# Patient Record
Sex: MALE | Race: WHITE | NOT HISPANIC OR LATINO | Employment: OTHER | URBAN - METROPOLITAN AREA
[De-identification: names, ages, dates, MRNs, and addresses within clinical notes are randomized per-mention and may not be internally consistent; named-entity substitution may affect disease eponyms.]

---

## 2022-02-14 DIAGNOSIS — Z89.519: ICD-10-CM

## 2022-02-14 DIAGNOSIS — Z89.512 S/P BKA (BELOW KNEE AMPUTATION), LEFT: Primary | ICD-10-CM

## 2022-02-18 ENCOUNTER — CLINICAL SUPPORT (OUTPATIENT)
Dept: REHABILITATION | Facility: HOSPITAL | Age: 82
End: 2022-02-18
Attending: INTERNAL MEDICINE
Payer: MEDICARE

## 2022-02-18 DIAGNOSIS — R68.89 DECREASED STRENGTH, ENDURANCE, AND MOBILITY: ICD-10-CM

## 2022-02-18 DIAGNOSIS — R53.1 DECREASED STRENGTH, ENDURANCE, AND MOBILITY: ICD-10-CM

## 2022-02-18 DIAGNOSIS — Z89.512 S/P BKA (BELOW KNEE AMPUTATION), LEFT: ICD-10-CM

## 2022-02-18 DIAGNOSIS — Z74.09 DECREASED STRENGTH, ENDURANCE, AND MOBILITY: ICD-10-CM

## 2022-02-18 DIAGNOSIS — M25.661 DECREASED RANGE OF MOTION OF BOTH LOWER EXTREMITIES: ICD-10-CM

## 2022-02-18 DIAGNOSIS — M25.662 DECREASED RANGE OF MOTION OF BOTH LOWER EXTREMITIES: ICD-10-CM

## 2022-02-18 PROBLEM — M25.669 DECREASED ROM OF LOWER EXTREMITY: Status: ACTIVE | Noted: 2022-02-18

## 2022-02-18 PROCEDURE — 97163 PT EVAL HIGH COMPLEX 45 MIN: CPT

## 2022-02-18 NOTE — PLAN OF CARE
[facility]   Physical Therapy Initial Evaluation     Date: 2/18/2022   Name: Hunter Schofield  [facility] Number: [id number]    Therapy Diagnosis:   Encounter Diagnosis   Name Primary?    S/P BKA (below knee amputation), left      Physician: Fallon Dudley*    Physician Orders: PT Eval and Treat   Medical Diagnosis from Referral: s/p Left BKA  Evaluation Date: 2/18/2022  Authorization Period Expiration: 4/1/22  Plan of Care Expiration: 4/15/22  Progress Note Due: 3/18/22  Visit # / Visits authorized: 1/1  FOTO: 1/3    Precautions: Standard, Diabetes, Fall and wound on R foot, Incontinence    Time In: 1010  Time Out: 1055  Total Appointment Time (timed & untimed codes): 45 minutes      SUBJECTIVE   Date of onset: 06/27/2021    HisMedical History:      Aortic atherosclerosis (HCC) (Primary Dx);   Anemia, unspecified type;   BPH with elevated PSA;   Stage 3a chronic kidney disease (HCC);   Diabetic ulcer of toe of right foot associated with type 2 diabetes mellitus, unspecified ulcer stage (HCC);   Essential hypertension;   History of osteomyelitis;   History of rectal cancer;   Hyperlipidemia, unspecified hyperlipidemia type;   Acquired hypothyroidism;   Peripheral vascular disease (HCC);   Phantom limb pain (HCC);   History of therapeutic radiation;   Type 2 diabetes mellitus with stage 3a chronic kidney disease, with long-term current use of insulin (HCC);   Thrombocytopenia (HCC);   S/P BKA (below knee amputation), left (HCC);   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 Mixed in 8 oz water    ascorbic acid, vitamin C, (vitamin C) 500 mg tablet Take 500 mg by mouth 2 (two) times  daily.    aspirin 81 MG chewable tablet Take 1 tablet by mouth daily. 30 tablet 11    atorvastatin (LIPITOR) 40 mg tablet TAKE 1 TABLET BY MOUTH EVERY DAY 30 tablet 0    blood sugar diagnostic (FreeStyle Precision Jass Strips) by Misc.(Non-Drug; Combo Route) route. Indications: used to test blood sugar 3-4 times daily.    blood-glucose meter (ACCU-CHEK TEODORO PLUS METER) USE TO TEST BLOOD SUGARS DAILY (Patient taking differently: USE TO TEST BLOOD SUGARS 3-4 times DAILY) 1 each 0    blood-glucose meter by Misc.(Non-Drug; Combo Route) route. Indications: use to test blood sugars daily. used to test blood sugars 3-4 times daily.    clopidogreL (PLAVIX) 75 mg tablet Take 1 tablet by mouth daily for 180 days. 30 tablet 5    clotrimazole-betamethasone (Lotrisone) cream Apply topically 2 (two) times daily. Apply to affected area 2 times daily 45 g 1    ferrous gluconate 324mg, 38 mg elemental iron, (FERGON) 324 mg (38 mg iron) tablet Take 1 tablet by mouth Take daily with breakfast. 90 tablet 3    FreeStyle Precision Jass Strips USE TO TEST BLOOD SUGAR 3 TIMES DAILY (Patient taking differently: USE TO TEST BLOOD SUGAR 3-4 TIMES DAILY) 200 strip 0    insulin lispro (HUMALOG, ADMELOG) 100 unit/mL injection Inject 0-15 Units into the skin 4 (four) times daily before meals and at bedtime. 10 mL 12    Lantus Solostar U-100 Insulin (LANTUS) 100 unit/mL (3 mL) injection Inject 10 Units into the skin at bedtime. ADMINISTER 30 UNITS UNDER THE SKIN EVERY NIGHT AT BEDTIME 15 mL 1    levothyroxine (SYNTHROID, LEVOTHROID) 112 mcg tablet TAKE 1 TABLET BY MOUTH DAILY 30 tablet 5    lidocain-me.salicyl-caps-menth 0.5-20-0.035-5 % Adhesive Patch, Medicated Apply topically.    lidocaine (XYLOCAINE) 5 % Ointment Apply topically 4 (four) times daily as needed. Indications: apply around anus before BM four times daily as needed.    multivitamin with folic acid (THERAGRAN) 400 mcg Tablet Take 1 tablet by mouth daily.    pen  "needle, diabetic (BD Ultra-Fine Mini Pen Needle) 31 gauge x 3/16" Needle Use as directed 100 each 0    protein supplement (ProMod Protein) Liquid Take 30 mLs by         Allergies:    Penicillins    History of current condition - Hunter reports: That he has not walked since May of 2021.  He had Left toe amputations then Left BKA in June of 2021.  He has had debridement on Right foot.  He had Inpatient Rehab, then was seeing home health therapy and home health [facility] care.  Now he goes to wound care 1x/week for wound on the plantar aspect of the right. He has not seen by therapy for the past several months.    Imaging: No significant imaging in chart    Prior Level of Function: Independent with activities of daily living, walking 2-5 miles/day, driving    Current Level of Function: Self-Employed, working from home, living home alone, able to dress, wheelchair does not fit through the doorway, using urinal and depends and cleans up in bed, dressing and donning prosthetic lower extremity on his own.    Falls:  None    Prior Therapy: inpatient rehab and home health    Family Present at Eval: none    Social History:  lives alone    Occupation: self employed    Exercise Routine/History: no routine    Home Environment: 1 story, no step to enter home - ramp    DME: hospital bed, manual wheelchair, drop arm bedside commode, walker  Presents to evaluation sitting in manual wheelchair     Orthotics: bilateral knee braces    Pain:Current 2/10, worst 2/10, best 0/10   Location: bilateral legs    Description: Aching  Aggravating Factors: Sitting, Extension, Flexing and Getting out of bed/chair  Easing Factors: relaxation and rest    Patients goals: To stand up, walk and drive       OBJECTIVE   STRENGTH:    L/E MMT Right   ROM Left ROM Pain/Dysfunction with Movement Goal   Hip Flexion  3+/5 Within Functional Limits  3+/5 Within Functional Limits     Hip Extension  2+/5 Limited but unable to accurately measure due to time constraints " 2+/5 Limited but unable to accurately measure due to time constraints     Hip Abduction  2+/5 Limited but unable to accurately measure due to time constraints 2+/5 Limited but unable to accurately measure due to time constraints     Knee Extension 3-/5 -40 degrees 3-/5 -35 degrees *potential contractures - 10 bilaterally   Knee Flexion 4-/5 Within Functional Limits  4-/5 Within Functional Limits     Ankle DF 3+/5 To Neutral, limited by bandage not applicable  not applicable      Ankle PF 3+/5 To Neutral, limited by bandage not applicable  not applicable        Mobility:  Patient is independent with wheelchair propulsion    Transfers:  Attempted 2 stands in parallel bars with moderate assistance x2 but he was unable to achieve upright stand posture due to tightness in bilateral knees and hips.    Posture:  Patient presents with: slouched posture, forward head, posterior pelvic tilt, flexed posturing of bilateral lower extremities     Sensation:  Type Response Comments   Light Touch I Reports touch feeling hyposensitive   Localization N    Proprioception I    N = Normal, I = Impaired, A = Absent    Coordination:  Type Response Left  Response Right  Comments   Alternating Toe Tap      Heel to Shin      Finger to Nose          *bilateral lower extremity coordination deficits noted with all functional mobility             Limitation/Restriction for FOTO Amputation Survey    Therapist reviewed FOTO scores for Hunter Schofield on 2/18/2022.   FOTO documents entered into [employer] - see Media section.    Limitation Score: 59%         TREATMENT     Total Treatment time (time-based codes) separate from Evaluation: 0 minutes      Hunter received the treatments listed below:  none      PATIENT EDUCATION AND HOME EXERCISES     Education provided:   Patient educated on staying active within his home.  Also, he was educated on performing Terminal Knee Extension exercise in supine or with lower extremities elevated in sitting.  He was  also asked to attempt to transition to Sidelying in bed to provide feedback on positions that he can attempt to get in for next session to stretch lower extremities     Written Home Exercises Provided: HEP discussed. Exercises were reviewed and Hunter was able to demonstrate them prior to the end of the session.  Hunter demonstrated good  understanding of the education provided. See EMR under Patient Instructions for exercises provided during therapy sessions.    ASSESSMENT     Hunter is a 81 y.o. male referred to outpatient Physical Therapy with a medical diagnosis of s/p Left BKA. Patient presents with limited range of motion in bilateral knees and hips, weakness of lower extremities, decreased coordination which decrease his ability to efficiently perform transitional movements. He does not have family or friend support at home and may benefit from home modifications to improve his independence within the home.     Patient prognosis is Fair.   Patientt will benefit from skilled outpatient Physical Therapy to address the deficits stated above and in the chart below, provide patient /family education, and to maximize patientt's level of independence.     Plan of care discussed with patient: Yes  Patient's spiritual, cultural and educational needs considered and patient is agreeable to the plan of care and goals as stated below:     Anticipated Barriers for therapy: co-morbidities, transportation assistance needed at times, lifestyle, potential contractures of knees/hips    Medical Necessity is demonstrated by the following  History  Co-morbidities and personal factors that may impact the plan of care Co-morbidities:   CAD, CKD stage 3, diabetes, history of cancer, HTN, poor medication/medical compliance, transportation assistance required and s/p L BKA    Personal Factors:   age  lifestyle     high   Examination  Body Structures and Functions, activity limitations and participation restrictions that may impact the  plan of care Body Regions:   lower extremities  trunk    Body Systems:    gross symmetry  ROM  strength  gross coordinated movement  balance  gait  transfers  transitions  motor control  motor learning  skin integrity    Participation Restrictions:   Pain, range of motion limitations    Activity limitations:   Learning and applying knowledge  solving problems    General Tasks and Commands  undertaking multiple tasks    Communication  no deficits    Mobility  lifting and carrying objects  walking  moving around using equipment (WC)  using transportation (bus, train, plane, car)  driving (bike, car, motorcycle)    Self care  washing oneself (bathing, drying, washing hands)  caring for body parts (brushing teeth, shaving, grooming)  toileting  dressing  looking after one's health    Domestic Life  shopping  cooking  doing house work (cleaning house, washing dishes, laundry)  assisting others    Interactions/Relationships  formal relationships  intimate relationships    Life Areas  employment    Community and Social Life  community life  recreation and leisure  Latter day and spirituality         high   Clinical Presentation evolving clinical presentation with changing clinical characteristics moderate   Decision Making/ Complexity Score: high     GOALS:    Short Term Goals:  4 weeks Progress    1. Function: Patient will demonstrate improved function as indicated by a functional limitation score of less than or equal to 55 out of 100 on FOTO = 44% limitation PC   2. Mobility: Patient will improve sit to stand transfer with moderate assistance in order to progress towards independence with functional activities.  PC   3. Gait: Patient will ambulated 3 steps in parallel bars with Maximum assistance to demonstrate improved strength, posture and endurance PC   4. Balance: Complete FIST test upon next visit. PC   5. HEP: Patient will demonstrate independence with HEP in order to progress toward functional independence.     6. Assess patient's needs for Dynasplints and set up consultation if needed.      Long Term Goals:  8 weeks Progress   1. Function: Patient will demonstrate improved function as indicated by a functional limitation score of less than or equal to 60 out of 100 on FOTO = 40 % limitation PC   2. Mobility: Patient will improve AROM of bilateral knees to -10 degrees  in order to return to maximal functional potential and improve quality of life. PC   3. Gait: Patient will ambulate 15 feet with rolling walker with moderate assistance to demonstrate improved strength, endurance and mobility. PC   4. Balance: Modify FIST goal once completed. PC   5. HEP: Patient educated on HEP in preparation for d/c verbalizing and demonstrating good understanding of topics discussed.      6.       Goals Key:  PC= progressing/continue; PM= partially met;        DC= discontinue      PLAN   Plan of care Certification: 2/18/2022 to 4/15/2022    Outpatient Physical Therapy 2 times weekly for 8 weeks to include the following interventions: Electrical Stimulation Attended, Gait Training, Moist Heat/ Ice, Neuromuscular Re-ed, Orthotic Management and Training, Patient Education, Self Care, Therapeutic Activities, Therapeutic Exercise and Prosthetic Management.     Mindy Patino, PT, DPT    I CERTIFY THE NEED FOR THESE SERVICES FURNISHED UNDER THIS PLAN OF TREATMENT AND WHILE UNDER MY CARE   Physician's comments:     Physician's Signature: ___________________________________________________

## 2022-02-22 ENCOUNTER — CLINICAL SUPPORT (OUTPATIENT)
Dept: REHABILITATION | Facility: HOSPITAL | Age: 82
End: 2022-02-22
Attending: INTERNAL MEDICINE
Payer: MEDICARE

## 2022-02-22 DIAGNOSIS — R68.89 DECREASED STRENGTH, ENDURANCE, AND MOBILITY: ICD-10-CM

## 2022-02-22 DIAGNOSIS — M25.661 DECREASED RANGE OF MOTION OF BOTH LOWER EXTREMITIES: Primary | ICD-10-CM

## 2022-02-22 DIAGNOSIS — M25.662 DECREASED RANGE OF MOTION OF BOTH LOWER EXTREMITIES: Primary | ICD-10-CM

## 2022-02-22 DIAGNOSIS — R53.1 DECREASED STRENGTH, ENDURANCE, AND MOBILITY: ICD-10-CM

## 2022-02-22 DIAGNOSIS — Z74.09 DECREASED STRENGTH, ENDURANCE, AND MOBILITY: ICD-10-CM

## 2022-02-22 PROCEDURE — 97530 THERAPEUTIC ACTIVITIES: CPT

## 2022-02-22 PROCEDURE — 97110 THERAPEUTIC EXERCISES: CPT

## 2022-02-22 NOTE — PROGRESS NOTES
[facility]   Physical Therapy Treatment Note     Name: Hunter Schofield  Clinic Number: [id number]    Therapy Diagnosis: Left BKA, decreased range of motion of bilateral hips and knees  Physician: Fallon Dudley*    Visit Date: 2/22/2022     Physician Orders: PT Eval and Treat   Medical Diagnosis from Referral: s/p Left BKA  Evaluation Date: 2/18/2022  Authorization Period Expiration: 4/1/22  Plan of Care Expiration: 4/15/22  Progress Note Due: 3/18/22  Visit # / Visits authorized: 1/20 (+1 eval)  FOTO: 1/3     Precautions: Standard, Diabetes, Fall and wound on R foot, Incontinence    PTA Visit #: 0/5     Time In: 1007  Time Out: 1100  Total Billable Time: 53 minutes    SUBJECTIVE     Pt reports: He reports wearing knee braces to sleep in at night to assit with stretching knees  He was compliant with instructions given at evaluation.  Response to previous treatment: good with no adverse effects  Functional change: no significant change since eval    Pain: 6/10 non-verbal with stretching  Location: bilateral knees and hips      OBJECTIVE     Objective Measures updated at progress report unless specified.   In supine:  Left Hip Extension = - 20  Right Hip Extension = - 25  Left Knee Extension = - 48  Right Knee Extension = - 45    Treatment     Hunter received the treatments listed below:      THERAPEUTIC EXERCISES to develop strength, endurance, ROM, flexibility, posture and core stabilization for (43) minutes including:    Intervention Performed Today    short arc quad in supine x lower extremities over wedge 4x10, passive to stretch to endrange by PT for 2x10 and then PT performing superior patella glides for 2x10   sidelying toward prone  x Stretch to Hip flexor 3x30 seconds each side   Quad set  x 2x10 bilateral    Sidelying hip extension  x 2x10 bilaterally with 10 seconds endrange stretch on la   [place] Left Prosthetic leg x Performed at edge of mat    Sitting in  reclined position at edge of mat  x Hip flexor stretch 3x30 seconds bilateral           Plan for Next Visit:      manual therapy techniques: Joint mobilizations, Manual traction and Soft tissue Mobilization were applied to the:0 minutes, including:    Intervention Performed Today                                                neuromuscular re-education activities to improve: Balance, Coordination, Sense, Proprioception and Posture for 0 minutes. The following activities were included:    Intervention Performed Today                                                THERAPEUTIC ACTIVITIES to improve dynamic and functional  performance for (10) minutes including:    Intervention Performed Today    Bed - wheelchair transfer  x Practiced set up and sequence   Bed mobility sit to supine to sit x Practiced sequence    Rolling  x 3 x each side working on sequence to incorporate hip flexor stretch on each side. Then separate stretch performed as listed above                         Plan for Next Visit:        Patient Education and Home Exercises     Home Exercises Provided and Patient Education Provided     Education provided: Patient educated on increasing length and consistency of stretching knees and hips to every 2 hours at home.  He was shown and demonstrated understanding of short arc quads, rolling toward prone and sitting in reclined position to stretch hip flexors.    Home Exercises Provided:  Exercises were reviewed and Hunter was able to demonstrate them prior to the end of the session.  Hunter demonstrated good  understanding of the education provided. See EMR under Patient Instructions for exercises provided during therapy sessions    ASSESSMENT     Pt responded well to treatment tasks.  He is significantly limited by hip flexor and hamstring tightness bilaterally.  He was able to perform transfers and bed mobility without assistance.  He was endured all exercises and stretches, but is limited by pain at endrange.     Supine position directly effected knee extension measurements today due to hip flexor tightness.  Hunter Is progressing well towards his goals.     Pt prognosis is Fair.     Pt will continue to benefit from skilled outpatient physical therapy to address the deficits listed in the problem list box on initial evaluation, provide pt/family education and to maximize pt's level of independence in the home and community environment.     Pt's spiritual, cultural and educational needs considered and pt agreeable to plan of care and goals.     Anticipated Barriers for therapy: co-morbidities, transportation assistance needed at times, lifestyle, potential contractures of knees/hips    GOALS:     Short Term Goals:  4 weeks Progress    1. Function: Patient will demonstrate improved function as indicated by a functional limitation score of less than or equal to 55 out of 100 on FOTO = 44% limitation PC   2. Mobility: Patient will improve sit to stand transfer with moderate assistance in order to progress towards independence with functional activities.  PC   3. Gait: Patient will ambulated 3 steps in parallel bars with Maximum assistance to demonstrate improved strength, posture and endurance PC   4. Balance: Complete FIST test upon next visit. PC   5. HEP: Patient will demonstrate independence with HEP in order to progress toward functional independence.     6. Assess patient's needs for Dynasplints and set up consultation if needed.        Long Term Goals:  8 weeks Progress   1. Function: Patient will demonstrate improved function as indicated by a functional limitation score of less than or equal to 60 out of 100 on FOTO = 40 % limitation PC   2. Mobility: Patient will improve AROM of bilateral knees to -10 degrees  in order to return to maximal functional potential and improve quality of life. PC   3. Gait: Patient will ambulate 15 feet with rolling walker with moderate assistance to demonstrate improved strength,  endurance and mobility. PC   4. Balance: Modify FIST goal once completed. PC   5. HEP: Patient educated on HEP in preparation for d/c verbalizing and demonstrating good understanding of topics discussed.       6.          Goals Key:  PC= progressing/continue;        PM= partially met;             DC= discontinue         PLAN     Continue Plan of Care (POC) and progress per patient tolerance. See Treatment section for exercise progression.  Outpatient Physical Therapy 2 times weekly for 8 weeks to include the following interventions: Electrical Stimulation Attended, Gait Training, Moist Heat/ Ice, Neuromuscular Re-ed, Orthotic Management and Training, Patient Education, Self Care, Therapeutic Activities, Therapeutic Exercise and Prosthetic Management.      Mindy Patino, PT, DPT

## 2022-02-24 ENCOUNTER — CLINICAL SUPPORT (OUTPATIENT)
Dept: REHABILITATION | Facility: HOSPITAL | Age: 82
End: 2022-02-24
Attending: INTERNAL MEDICINE
Payer: MEDICARE

## 2022-02-24 DIAGNOSIS — M25.662 DECREASED RANGE OF MOTION OF BOTH LOWER EXTREMITIES: Primary | ICD-10-CM

## 2022-02-24 DIAGNOSIS — Z74.09 DECREASED STRENGTH, ENDURANCE, AND MOBILITY: ICD-10-CM

## 2022-02-24 DIAGNOSIS — R68.89 DECREASED STRENGTH, ENDURANCE, AND MOBILITY: ICD-10-CM

## 2022-02-24 DIAGNOSIS — R53.1 DECREASED STRENGTH, ENDURANCE, AND MOBILITY: ICD-10-CM

## 2022-02-24 DIAGNOSIS — M25.661 DECREASED RANGE OF MOTION OF BOTH LOWER EXTREMITIES: Primary | ICD-10-CM

## 2022-02-24 PROCEDURE — 97110 THERAPEUTIC EXERCISES: CPT

## 2022-02-24 NOTE — PROGRESS NOTES
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  Physician: Fallon Dudley*    Visit Date: 2/24/2022     Physician Orders: PT Eval and Treat   Medical Diagnosis from Referral: s/p Left BKA  Evaluation Date: 2/18/2022  Authorization Period Expiration: 4/1/22  Plan of Care Expiration: 4/15/22  Progress Note Due: 3/18/22  Visit # / Visits authorized: 1/20 (+1 eval)  FOTO: 1/3     Precautions: Standard, Diabetes, Fall and wound on R foot, Incontinence    PTA Visit #: 0/5     Time In: 1019  Time Out: 1100  Total Billable Time: 41 minutes    SUBJECTIVE     Pt reports: He reports wearing knee braces to sleep in at night to assit with stretching knees.  He presented with bilateral knee immobilizer braces donned today.  He was compliant with instructions given at last session.  Response to previous treatment: good with no adverse effects  Functional change: no significant change since last session    Pain: 6/10 non-verbal with stretching  Location: bilateral knees and hips      OBJECTIVE     Objective Measures updated at progress report unless specified.   In supine:  Measures taken 2/22/22  Left Hip Extension = - 20  Right Hip Extension = - 25  Left Knee Extension = - 48  Right Knee Extension = - 45     Treatment     Hunter received the treatments listed below:      THERAPEUTIC EXERCISES to develop strength, endurance, ROM, flexibility, posture and core stabilization for (41) minutes including:    Intervention Performed Today    short arc quad in supine x lower extremities over wedge 2x10 bilateral    sidelying toward prone  x Stretch to Hip flexor 3x30 seconds each side   Quad set  x 2x10 Left     Sidelying hip extension  x 2x10 bilaterally with 10 seconds endrange stretch on last rep   [place] Left Prosthetic leg x Performed at edge of mat     Sitting in reclined position at edge of mat   Hip flexor stretch 3x30 seconds bilateral    Hip Abduction x In sidelying 2x10 bilateral , In supine 2x10 bilateral    Bridge attempt x 1x5   Posterior Pelvic Tilt Supine x 2x5               Plan for Next Visit:      manual therapy techniques: Joint mobilizations, Manual traction and Soft tissue Mobilization were applied to the:0 minutes, including:    Intervention Performed Today                                                neuromuscular re-education activities to improve: Balance, Coordination, Sense, Proprioception and Posture for 0 minutes. The following activities were included:    Intervention Performed Today                                                THERAPEUTIC ACTIVITIES to improve dynamic and functional  performance for () minutes including:    Intervention Performed Today    Bed - wheelchair transfer   Practiced set up and sequence   Bed mobility sit to supine to sit  Practiced sequence    Rolling   3 x each side working on sequence to incorporate hip flexor stretch on each side. Then separate stretch performed as listed above                         Plan for Next Visit:        Patient Education and Home Exercises     Home Exercises Provided and Patient Education Provided     Education provided:   -2/22/22 Patient educated on increasing length and consistency of stretching knees and hips to every 2 hours at home.  He was shown and demonstrated understanding of short arc quads, rolling toward prone and sitting in reclined position to stretch hip flexors.  -2/24/22 Patient given Home Exercise Program on exercises for lower extremities to be performed 2x15 3x's/day  See patient Instructions in EMR  Home Exercises Provided: 2/24/22 Exercises were reviewed and Hunter was able to demonstrate them prior to the end of the session.  Hunter demonstrated good  understanding of the education provided.   ASSESSMENT     Pt responded well to treatment tasks.  He is  significantly limited by hip flexor and hamstring tightness bilaterally.    He was endured all exercises and stretches, but is limited by pain at endrange.  He reported good understanding on the importance of compliance with exercises and stretches to make functional gains with therapy.  Hunter Is progressing well towards his goals.     Pt prognosis is Fair.     Pt will continue to benefit from skilled outpatient physical therapy to address the deficits listed in the problem list box on initial evaluation, provide pt/family education and to maximize pt's level of independence in the home and community environment.     Pt's spiritual, cultural and educational needs considered and pt agreeable to plan of care and goals.     Anticipated Barriers for therapy: co-morbidities, transportation assistance needed at times, lifestyle, potential contractures of knees/hips    GOALS:     Short Term Goals:  4 weeks Progress    1. Function: Patient will demonstrate improved function as indicated by a functional limitation score of less than or equal to 55 out of 100 on FOTO = 44% limitation PC   2. Mobility: Patient will improve sit to stand transfer with moderate assistance in order to progress towards independence with functional activities.  PC   3. Gait: Patient will ambulated 3 steps in parallel bars with Maximum assistance to demonstrate improved strength, posture and endurance PC   4. Balance: Complete FIST test upon next visit. PC   5. HEP: Patient will demonstrate independence with HEP in order to progress toward functional independence.     6. Assess patient's needs for Dynasplints and set up consultation if needed.        Long Term Goals:  8 weeks Progress   1. Function: Patient will demonstrate improved function as indicated by a functional limitation score of less than or equal to 60 out of 100 on FOTO = 40 % limitation PC   2. Mobility: Patient will improve AROM of bilateral knees to -10 degrees  in order to return to  maximal functional potential and improve quality of life. PC   3. Gait: Patient will ambulate 15 feet with rolling walker with moderate assistance to demonstrate improved strength, endurance and mobility. PC   4. Balance: Modify FIST goal once completed. PC   5. HEP: Patient educated on HEP in preparation for d/c verbalizing and demonstrating good understanding of topics discussed.       6.          Goals Key:  PC= progressing/continue;        PM= partially met;             DC= discontinue         PLAN     Continue Plan of Care (POC) and progress per patient tolerance. See Treatment section for exercise progression.  Outpatient Physical Therapy 2 times weekly for 8 weeks to include the following interventions: Electrical Stimulation Attended, Gait Training, Moist Heat/ Ice, Neuromuscular Re-ed, Orthotic Management and Training, Patient Education, Self Care, Therapeutic Activities, Therapeutic Exercise and Prosthetic Management.      Mindy Patino, PT, DPT

## 2022-02-24 NOTE — PATIENT INSTRUCTIONS
DO NOT perform straight leg raise or Hip and knee bending exercise due to tightness. Work up to 20 minutes in prone position.  Perform each 2x15 3x's /day.

## 2022-03-01 ENCOUNTER — CLINICAL SUPPORT (OUTPATIENT)
Dept: REHABILITATION | Facility: HOSPITAL | Age: 82
End: 2022-03-01
Payer: MEDICARE

## 2022-03-01 DIAGNOSIS — M25.662 DECREASED RANGE OF MOTION OF BOTH LOWER EXTREMITIES: Primary | ICD-10-CM

## 2022-03-01 DIAGNOSIS — Z74.09 DECREASED STRENGTH, ENDURANCE, AND MOBILITY: ICD-10-CM

## 2022-03-01 DIAGNOSIS — R68.89 DECREASED STRENGTH, ENDURANCE, AND MOBILITY: ICD-10-CM

## 2022-03-01 DIAGNOSIS — R53.1 DECREASED STRENGTH, ENDURANCE, AND MOBILITY: ICD-10-CM

## 2022-03-01 DIAGNOSIS — M25.661 DECREASED RANGE OF MOTION OF BOTH LOWER EXTREMITIES: Primary | ICD-10-CM

## 2022-03-01 PROCEDURE — 97530 THERAPEUTIC ACTIVITIES: CPT

## 2022-03-01 PROCEDURE — 97110 THERAPEUTIC EXERCISES: CPT

## 2022-03-01 NOTE — PROGRESS NOTES
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  Response to previous treatment: good with no adverse effects  Functional change: no significant change since last session, he reports that Right knee and hip feel easier to move with exercises.    Pain: 0/10 at rest  Location: bilateral knees and hips      OBJECTIVE     Objective Measures updated at progress report unless specified.   In supine:  Measures taken 2/22/22  Left Hip Extension = - 20  Right Hip Extension = - 25  Left Knee Extension = - 48  Right Knee Extension = - 45     Treatment     Hunter Schofield received the treatments listed below:      THERAPEUTIC EXERCISES to develop strength, endurance, ROM, flexibility, posture and core stabilization for (37) minutes including:    Intervention Performed Today    short arc quad in supine x lower extremities over wedge 2x30 Right AROM, Left 2x10 Active Assistive for form and quality    sidelying toward prone   Stretch to Hip flexor 3x30 seconds each side   Quad set   2x10 Left     Sidelying hip extension  x 3x10 bilaterally with 10 seconds endrange stretch on last rep   [employer] and [place] Left Prosthetic leg x Performed at edge of mat    Sitting in reclined position at edge of mat    Hip flexor stretch 3x30 seconds bilateral    Hip Abduction x In sidelying 2x10 bilateral    Bridge attempt  1x5   Posterior Pelvic Tilt Supine  2x5   Prone lying over light blue small triangle wedge x Holding position 2 minutes, then 3 minutes   Stretch to Hamstrings x 3x30 seconds with hip at endrange extension and 3x30 seconds with hip at 90 degrees flexion.  Repeated bilateral      Plan for Next Visit:      manual therapy techniques: Joint mobilizations, Manual traction and Soft tissue Mobilization were applied to the:0 minutes, including:    Intervention Performed Today                                                neuromuscular re-education activities to improve: Balance, Coordination, Sense, Proprioception and Posture for 0 minutes. The following activities were included:    Intervention Performed Today                                                THERAPEUTIC ACTIVITIES to improve dynamic and functional  performance for (10) minutes including:    Intervention Performed Today    Bed - wheelchair transfer  x Practiced set up and sequence   Bed mobility sit to supine to sit x Practiced sequence    Rolling   3 x each side working on sequence to incorporate hip flexor stretch on each side. Then separate stretch performed as listed above   Standing in parallel bars x 3 x's working on several weight shifts towards hip extension and knee extension while standing.                    Plan for Next Visit:        Patient Education and Home Exercises     Home Exercises Provided and Patient Education Provided     Education provided:   -2/22/22 Patient educated on increasing length and consistency of stretching knees and hips to every 2 hours at home.  He was shown and demonstrated understanding of short arc quads, rolling toward prone and sitting in reclined position to stretch hip flexors.  -2/24/22 Patient given Home Exercise Program on exercises for lower extremities to be performed 2x15 3x's/day  See patient  Instructions in EMR  -3/1/22 Re-inforced Home Exercise Program and asked patient to incorporate prone lying for longer periods of time to build up to 20 minutes per day.  Home Exercises Provided: 2/24/22 Exercises were reviewed and Hunter Schofield was able to demonstrate them prior to the end of the session.  Hunter Schofield demonstrated good  understanding of the education provided.   ASSESSMENT     Pt responded well to treatment tasks.  He was able to progress to prone lying over wedge today.  He had normal discomfort with stretches and exercises, but no complaints of pain.  He had good effort and endurance.  He was also able to progress with standing in parallel bars with improved stand posture.  Hunter Schofield Is progressing well towards his goals.     Pt prognosis is Fair  To Good    Pt will continue to benefit from skilled outpatient physical therapy to address the deficits listed in the problem list box on initial evaluation, provide pt/family education and to maximize pt's level of independence in the home and community environment.     Pt's spiritual, cultural and educational needs considered and pt agreeable to plan of care and goals.     Anticipated Barriers for therapy: co-morbidities, transportation assistance needed at times, lifestyle, potential contractures of knees/hips    GOALS:     Short Term Goals:  4 weeks Progress    1. Function: Patient will demonstrate improved function as indicated by a functional limitation score of less than or equal to 55 out of 100 on FOTO = 44% limitation PC   2. Mobility: Patient will improve sit to stand transfer with moderate assistance in order to progress towards independence with functional activities.  PC   3. Gait: Patient will ambulated 3 steps in parallel bars with Maximum assistance to demonstrate improved strength, posture and endurance PC   4. Balance: Complete FIST test upon next visit. PC   5. HEP: Patient will demonstrate independence with  HEP in order to progress toward functional independence.     6. Assess patient's needs for Dynasplints and set up consultation if needed.        Long Term Goals:  8 weeks Progress   1. Function: Patient will demonstrate improved function as indicated by a functional limitation score of less than or equal to 60 out of 100 on FOTO = 40 % limitation PC   2. Mobility: Patient will improve AROM of bilateral knees to -10 degrees  in order to return to maximal functional potential and improve quality of life. PC   3. Gait: Patient will ambulate 15 feet with rolling walker with moderate assistance to demonstrate improved strength, endurance and mobility. PC   4. Balance: Modify FIST goal once completed. PC   5. HEP: Patient educated on HEP in preparation for d/c verbalizing and demonstrating good understanding of topics discussed.       6.          Goals Key:  PC= progressing/continue;        PM= partially met;             DC= discontinue         PLAN     Continue Plan of Care (POC) and progress per patient tolerance. See Treatment section for exercise progression.  Outpatient Physical Therapy 2 times weekly for 8 weeks to include the following interventions: Electrical Stimulation Attended, Gait Training, Moist Heat/ Ice, Neuromuscular Re-ed, Orthotic Management and Training, Patient Education, Self Care, Therapeutic Activities, Therapeutic Exercise and Prosthetic Management.      Mindy Patino, PT, DPT

## 2022-03-03 ENCOUNTER — CLINICAL SUPPORT (OUTPATIENT)
Dept: REHABILITATION | Facility: HOSPITAL | Age: 82
End: 2022-03-03
Payer: MEDICARE

## 2022-03-03 DIAGNOSIS — Z74.09 DECREASED STRENGTH, ENDURANCE, AND MOBILITY: ICD-10-CM

## 2022-03-03 DIAGNOSIS — R53.1 DECREASED STRENGTH, ENDURANCE, AND MOBILITY: ICD-10-CM

## 2022-03-03 DIAGNOSIS — R68.89 DECREASED STRENGTH, ENDURANCE, AND MOBILITY: ICD-10-CM

## 2022-03-03 DIAGNOSIS — M25.662 DECREASED RANGE OF MOTION OF BOTH LOWER EXTREMITIES: Primary | ICD-10-CM

## 2022-03-03 DIAGNOSIS — M25.661 DECREASED RANGE OF MOTION OF BOTH LOWER EXTREMITIES: Primary | ICD-10-CM

## 2022-03-03 PROCEDURE — 97530 THERAPEUTIC ACTIVITIES: CPT

## 2022-03-03 PROCEDURE — 97110 THERAPEUTIC EXERCISES: CPT

## 2022-03-03 NOTE — PROGRESS NOTES
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  Physician: Fallon Dudley*    Visit Date: 3/3/2022     Physician Orders: PT Eval and Treat   Medical Diagnosis from Referral: s/p Left BKA  Evaluation Date: 2/18/2022  Authorization Period Expiration: 4/1/22  Plan of Care Expiration: 4/15/22  Progress Note Due: 3/18/22  Visit # / Visits authorized: 4/20 (+1 eval)  FOTO: 1/3     Precautions: Standard, Diabetes, Fall and wound on R foot, Incontinence    PTA Visit #: 0/5     Time In: 0840  Time Out: 0930  Total Billable Time: 50    SUBJECTIVE     Pt reports: He reports that he did not perform Home Exercise Program because he was upset with wound care visit.  He reports that he went to wound care yesterday and was told that his wound has gotten bigger on his Right plantar aspect of foot.  He had an X-Ray of Right Foot, they wanted to look at bones in his foot and he is waiting on the results.  According to patient, he is still allowed to weight bear on the foot.    Response to previous treatment: good with no adverse effects  Functional change:  No significant change in function.  Pain: 0/10 at rest  Location: not applicable     OBJECTIVE     Objective Measures updated at progress report unless specified.   In supine:  Measures taken 2/22/22  Left Hip Extension = - 20  Right Hip Extension = - 25  Left Knee Extension = - 48  Right Knee Extension = - 45     3/3/22  In prone:   Hip Extension = - 20  Right, unable to get accurate measure on Left    Treatment     Hunter Schofield received the treatments listed below:      THERAPEUTIC EXERCISES to develop strength, endurance, ROM, flexibility, posture and core stabilization for (35) minutes including:    Intervention Performed Today    short arc quad in supine x lower extremities over wedge 2x20 Right AROM,  Left 2x20 Active Assistive for form and quality    sidelying toward prone   Stretch to Hip flexor 3x30 seconds each side   Quad set   2x10 Left     Sidelying hip extension  x 2x10 bilaterally with 10 seconds endrange stretch on last rep   [place] Left Prosthetic leg x Performed at edge of mat    Sitting in reclined position at edge of mat   Hip flexor stretch 3x30 seconds bilateral    Hip Abduction x In sidelying 2x10 bilateral    Bridge attempt  1x5   Posterior Pelvic Tilt Supine  2x5   Prone lying over light blue small triangle wedge x Holding position 2 minutes, then 4 minutes   Stretch to Hamstrings x 3 x 1 minute Right lower extremity    [place] with forceful push towards extension x 2x10 bilateral with 10 second enrange hold stretch on last rep    10 # over distal Left thigh x Stretch to Left hip flexor for over 5 minutes while exercising Right lower extremity                     Plan for Next Visit:      manual therapy techniques: Joint mobilizations, Manual traction and Soft tissue Mobilization were applied to the:0 minutes, including:    Intervention Performed Today                                                neuromuscular re-education activities to improve: Balance, Coordination, Sense, Proprioception and Posture for 0 minutes. The following activities were included:    Intervention Performed Today                                                THERAPEUTIC ACTIVITIES to improve dynamic and functional  performance for (15) minutes including:    Intervention Performed Today    Bed - wheelchair transfer  x Practiced set up and sequence   Bed mobility sit to supine to sit x Practiced sequence    Rolling   3 x each side working on sequence to incorporate hip flexor stretch on each side. Then separate stretch performed as listed above   Standing in parallel bars x 4 x's working on several weight shifts towards hip extension and knee extension while standing.   Standing in parallel bars  performing step forward x With Right lower extremity 5x's with moderate assistance to maintain standing position               Plan for Next Visit:        Patient Education and Home Exercises     Home Exercises Provided and Patient Education Provided     Education provided:   -2/22/22 Patient educated on increasing length and consistency of stretching knees and hips to every 2 hours at home.  He was shown and demonstrated understanding of short arc quads, rolling toward prone and sitting in reclined position to stretch hip flexors.  -2/24/22 Patient given Home Exercise Program on exercises for lower extremities to be performed 2x15 3x's/day  See patient Instructions in EMR  -3/1/22 Re-inforced Home Exercise Program and asked patient to incorporate prone lying for longer periods of time to build up to 20 minutes per day.  3/3/22 - Discussed the option of dynasplints for bilateral lower extremities. Patient educated on the option of Dynasplints vs basic knee braces and encouraged to consider Dynasplints as a more aggressive and efficient way of properly positioning and dynamically stretching his legs outside of therapy.    Home Exercises Provided: 2/24/22 Exercises were reviewed and Hunter Schofield was able to demonstrate them prior to the end of the session.  Hunter Schofield demonstrated good  understanding of the education provided.   ASSESSMENT     Pt responded well to treatment tasks.  He was able to progress prone lying time and is making gains with his stand tolerance and posture.  He may benefit from a more aggressive lower extremity splint and Dynasplint consult will be initiated for next session.  Pt prognosis is Fair  To Good    Pt will continue to benefit from skilled outpatient physical therapy to address the deficits listed in the problem list box on initial evaluation, provide pt/family education and to maximize pt's level of independence in the home and community environment.     Pt's spiritual,  cultural and educational needs considered and pt agreeable to plan of care and goals.     Anticipated Barriers for therapy: co-morbidities, transportation assistance needed at times, lifestyle, potential contractures of knees/hips    GOALS:     Short Term Goals:  4 weeks Progress    1. Function: Patient will demonstrate improved function as indicated by a functional limitation score of less than or equal to 55 out of 100 on FOTO = 44% limitation PC   2. Mobility: Patient will improve sit to stand transfer with moderate assistance in order to progress towards independence with functional activities.  PC   3. Gait: Patient will ambulated 3 steps in parallel bars with Maximum assistance to demonstrate improved strength, posture and endurance PC   4. Balance: Complete FIST test upon next visit. PC   5. HEP: Patient will demonstrate independence with HEP in order to progress toward functional independence.     6. Assess patient's needs for Dynasplints and set up consultation if needed.        Long Term Goals:  8 weeks Progress   1. Function: Patient will demonstrate improved function as indicated by a functional limitation score of less than or equal to 60 out of 100 on FOTO = 40 % limitation PC   2. Mobility: Patient will improve AROM of bilateral knees to -10 degrees  in order to return to maximal functional potential and improve quality of life. PC   3. Gait: Patient will ambulate 15 feet with rolling walker with moderate assistance to demonstrate improved strength, endurance and mobility. PC   4. Balance: Modify FIST goal once completed. PC   5. HEP: Patient educated on HEP in preparation for d/c verbalizing and demonstrating good understanding of topics discussed.       6.          Goals Key:  PC= progressing/continue;        PM= partially met;             DC= discontinue         PLAN     Continue Plan of Care (POC) and progress per patient tolerance. See Treatment section for exercise progression.  Outpatient  Physical Therapy 2 times weekly for 8 weeks to include the following interventions: Electrical Stimulation Attended, Gait Training, Moist Heat/ Ice, Neuromuscular Re-ed, Orthotic Management and Training, Patient Education, Self Care, Therapeutic Activities, Therapeutic Exercise and Prosthetic Management.      Mindy Patino, PT, DPT

## 2022-03-08 ENCOUNTER — CLINICAL SUPPORT (OUTPATIENT)
Dept: REHABILITATION | Facility: HOSPITAL | Age: 82
End: 2022-03-08
Payer: MEDICARE

## 2022-03-08 DIAGNOSIS — R68.89 DECREASED STRENGTH, ENDURANCE, AND MOBILITY: ICD-10-CM

## 2022-03-08 DIAGNOSIS — Z74.09 DECREASED STRENGTH, ENDURANCE, AND MOBILITY: ICD-10-CM

## 2022-03-08 DIAGNOSIS — M25.662 DECREASED RANGE OF MOTION OF BOTH LOWER EXTREMITIES: Primary | ICD-10-CM

## 2022-03-08 DIAGNOSIS — M25.661 DECREASED RANGE OF MOTION OF BOTH LOWER EXTREMITIES: Primary | ICD-10-CM

## 2022-03-08 DIAGNOSIS — R53.1 DECREASED STRENGTH, ENDURANCE, AND MOBILITY: ICD-10-CM

## 2022-03-08 PROCEDURE — 97110 THERAPEUTIC EXERCISES: CPT

## 2022-03-08 PROCEDURE — 97530 THERAPEUTIC ACTIVITIES: CPT

## 2022-03-09 NOTE — PROGRESS NOTES
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  Physician: Fallon Dudley*    Visit Date: 3/8/2022     Physician Orders: PT Eval and Treat   Medical Diagnosis from Referral: s/p Left BKA  Evaluation Date: 2/18/2022  Authorization Period Expiration: 4/1/22  Plan of Care Expiration: 4/15/22  Progress Note Due: 3/18/22  Visit # / Visits authorized:5/20 (+1 eval)  FOTO: 1/3     Precautions: Standard, Diabetes, Fall and wound on R foot, Incontinence    PTA Visit #: 0/5     Time In: 1018  Time Out: 1100  Total Billable Time: 48    SUBJECTIVE     Pt reports: He reports that he has been exercising and stretching his legs on the days that he does not come in for therapy.  Response to previous treatment: good with no adverse effects  Functional change:  No significant change in function.  Pain: 0/10 at rest  Location: not applicable     OBJECTIVE     Objective Measures updated at progress report unless specified.   In supine:  Measures taken 2/22/22  Left Hip Extension = - 20  Right Hip Extension = - 25  Left Knee Extension = - 48  Right Knee Extension = - 45     3/3/22  In prone:   Hip Extension = - 20  Right, unable to get accurate measure on Left    Treatment     Hunter Schofield received the treatments listed below:      THERAPEUTIC EXERCISES to develop strength, endurance, ROM, flexibility, posture and core stabilization for (15) minutes including:    Intervention Performed Today    short arc quad in supine  lower extremities over wedge 2x20 Right AROM, Left 2x20 Active Assistive for form and quality    sidelying toward prone   Stretch to Hip flexor 3x30 seconds each side   Quad set   2x10 Left     Sidelying hip extension   2x10 bilaterally with 10 seconds endrange stretch on last rep   [place] Left Prosthetic leg x Performed at edge of  mat    Sitting in reclined position at edge of mat   Hip flexor stretch 3x30 seconds bilateral    Hip Abduction  In sidelying 2x10 bilateral    Bridge attempt  1x5   Posterior Pelvic Tilt Supine  2x5   Prone lying over light blue small triangle wedge x Holding position 2 minutes, then 4 minutes   Stretch to Hamstrings  3 x 1 minute Right lower extremity    Heelslide with forceful push towards extension  2x10 bilateral with 10 second enrange hold stretch on last rep    10 # over distal Left thigh  Stretch to Left hip flexor for over 5 minutes while exercising Right lower extremity    Prone Hamstring curl endrange for quad/hip flexor stretch x 2x10 AAROM   lower extremity lifts towards extension in prone x 2x10 AAROM   Prone hip flexor stretch  x 3x10 second holds bilateral      Plan for Next Visit:      manual therapy techniques: Joint mobilizations, Manual traction and Soft tissue Mobilization were applied to the:0 minutes, including:    Intervention Performed Today                                                neuromuscular re-education activities to improve: Balance, Coordination, Sense, Proprioception and Posture for 0 minutes. The following activities were included:    Intervention Performed Today                                                THERAPEUTIC ACTIVITIES to improve dynamic and functional  performance for (33) minutes including:    Intervention Performed Today    Bed - wheelchair transfer  x Practiced set up and sequence   Bed mobility sit to supine to sit x Practiced sequence    Rolling   3 x each side working on sequence to incorporate hip flexor stretch on each side. Then separate stretch performed as listed above   Standing in parallel bars x 4 x's working on several weight shifts towards hip extension and knee extension while standing.   Standing in parallel bars performing step forward x With Right lower extremity 5x's with moderate assistance to maintain standing position   Sit to   parallel bars without assistance x 2x's   Dynasplint Consult x Andriy Stout (Rep for Dynasplint) met with physical therapist and patient today during session to discuss options for dynasplints for bilateral knees due to patient's significant lack of range of motion.       Plan for Next Visit:        Patient Education and Home Exercises     Home Exercises Provided and Patient Education Provided     Education provided:   -2/22/22 Patient educated on increasing length and consistency of stretching knees and hips to every 2 hours at home.  He was shown and demonstrated understanding of short arc quads, rolling toward prone and sitting in reclined position to stretch hip flexors.  -2/24/22 Patient given Home Exercise Program on exercises for lower extremities to be performed 2x15 3x's/day  See patient Instructions in EMR  -3/1/22 Re-inforced Home Exercise Program and asked patient to incorporate prone lying for longer periods of time to build up to 20 minutes per day.  3/3/22 - Discussed the option of dynasplints for bilateral lower extremities. Patient educated on the option of Dynasplints vs basic knee braces and encouraged to consider Dynasplints as a more aggressive and efficient way of properly positioning and dynamically stretching his legs outside of therapy.  3/8/22 - Patient was educated on Dynasplints with Andriy Stout (Rep).   He was shown pictures on ipad and Andriy and physical therapist discussed wearing schedule to make gains with his muscle length outside of therapy sessions.  Andriy informed patient that he would assist with collecting information regarding insurance coverage and provide him with any out of pocket costs so he can decide if he would like to pursue this option.    Home Exercise Provided: 2/24/22 Exercises were reviewed and Hunter Schofield was able to demonstrate them prior to the end of the session.  Hunter Schofield demonstrated good  understanding of the education provided.   ASSESSMENT      Pt responded well to session today, verbalizing understanding of the benefits of a Dynasplint vs his current knee braces at home.  He progressed to standing on his own in the parallel bars today, but continues to need assistance at his hips to complete upright stand at his available range of motion.  He is progressing well with Physical Therapy.    Pt prognosis is Fair  To Good    Pt will continue to benefit from skilled outpatient physical therapy to address the deficits listed in the problem list box on initial evaluation, provide pt/family education and to maximize pt's level of independence in the home and community environment.     Pt's spiritual, cultural and educational needs considered and pt agreeable to plan of care and goals.     Anticipated Barriers for therapy: co-morbidities, transportation assistance needed at times, lifestyle, potential contractures of knees/hips    GOALS:     Short Term Goals:  4 weeks Progress    1. Function: Patient will demonstrate improved function as indicated by a functional limitation score of less than or equal to 55 out of 100 on FOTO = 44% limitation PC   2. Mobility: Patient will improve sit to stand transfer with moderate assistance in order to progress towards independence with functional activities.  PC   3. Gait: Patient will ambulated 3 steps in parallel bars with Maximum assistance to demonstrate improved strength, posture and endurance PC   4. Balance: Complete FIST test upon next visit. PC   5. HEP: Patient will demonstrate independence with HEP in order to progress toward functional independence.     6. Assess patient's needs for Dynasplints and set up consultation if needed.        Long Term Goals:  8 weeks Progress   1. Function: Patient will demonstrate improved function as indicated by a functional limitation score of less than or equal to 60 out of 100 on FOTO = 40 % limitation PC   2. Mobility: Patient will improve AROM of bilateral knees to -10 degrees   in order to return to maximal functional potential and improve quality of life. PC   3. Gait: Patient will ambulate 15 feet with rolling walker with moderate assistance to demonstrate improved strength, endurance and mobility. PC   4. Balance: Modify FIST goal once completed. PC   5. HEP: Patient educated on HEP in preparation for d/c verbalizing and demonstrating good understanding of topics discussed.       6.          Goals Key:  PC= progressing/continue;        PM= partially met;             DC= discontinue         PLAN     Continue Plan of Care (POC) and progress per patient tolerance. See Treatment section for exercise progression.  Outpatient Physical Therapy 2 times weekly for 8 weeks to include the following interventions: Electrical Stimulation Attended, Gait Training, Moist Heat/ Ice, Neuromuscular Re-ed, Orthotic Management and Training, Patient Education, Self Care, Therapeutic Activities, Therapeutic Exercise and Prosthetic Management.      Mindy Patino, PT, DPT

## 2022-03-10 ENCOUNTER — CLINICAL SUPPORT (OUTPATIENT)
Dept: REHABILITATION | Facility: HOSPITAL | Age: 82
End: 2022-03-10
Payer: MEDICARE

## 2022-03-10 DIAGNOSIS — Z74.09 DECREASED STRENGTH, ENDURANCE, AND MOBILITY: ICD-10-CM

## 2022-03-10 DIAGNOSIS — M25.661 DECREASED RANGE OF MOTION OF BOTH LOWER EXTREMITIES: Primary | ICD-10-CM

## 2022-03-10 DIAGNOSIS — R68.89 DECREASED STRENGTH, ENDURANCE, AND MOBILITY: ICD-10-CM

## 2022-03-10 DIAGNOSIS — M25.662 DECREASED RANGE OF MOTION OF BOTH LOWER EXTREMITIES: Primary | ICD-10-CM

## 2022-03-10 DIAGNOSIS — R53.1 DECREASED STRENGTH, ENDURANCE, AND MOBILITY: ICD-10-CM

## 2022-03-10 PROCEDURE — 97110 THERAPEUTIC EXERCISES: CPT

## 2022-03-15 ENCOUNTER — CLINICAL SUPPORT (OUTPATIENT)
Dept: REHABILITATION | Facility: HOSPITAL | Age: 82
End: 2022-03-15
Payer: MEDICARE

## 2022-03-15 DIAGNOSIS — M25.661 DECREASED RANGE OF MOTION OF BOTH LOWER EXTREMITIES: Primary | ICD-10-CM

## 2022-03-15 DIAGNOSIS — Z74.09 DECREASED STRENGTH, ENDURANCE, AND MOBILITY: ICD-10-CM

## 2022-03-15 DIAGNOSIS — M25.662 DECREASED RANGE OF MOTION OF BOTH LOWER EXTREMITIES: Primary | ICD-10-CM

## 2022-03-15 DIAGNOSIS — R53.1 DECREASED STRENGTH, ENDURANCE, AND MOBILITY: ICD-10-CM

## 2022-03-15 DIAGNOSIS — R68.89 DECREASED STRENGTH, ENDURANCE, AND MOBILITY: ICD-10-CM

## 2022-03-15 PROCEDURE — 97110 THERAPEUTIC EXERCISES: CPT

## 2022-03-15 NOTE — PROGRESS NOTES
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 He reports that he has not heard back from his MD regarding authorization for injection at Right toes.  He is ready to participate in therapy today.  Response to previous treatment: good with no adverse effects  Functional change:  No significant change in function.  Pain: 0/10 at rest  Location: not applicable     OBJECTIVE     Objective Measures updated at progress report unless specified.   In supine:  Measures taken 2/22/22  Left Hip Extension = - 20  Right Hip Extension = - 25  Left Knee Extension = - 48  Right Knee Extension = - 45     3/3/22  In prone:   Hip Extension = - 20  Right, unable to get accurate measure on Left    Treatment     Hunter Schofield received the treatments listed below:      THERAPEUTIC EXERCISES to develop strength, endurance, ROM, flexibility, posture and core stabilization for (44) minutes including:    Intervention Performed Today    short arc quad in supine  lower extremities over wedge 2x20 Right AROM, Left 2x20 Active Assistive for form and quality    sidelying toward prone   Stretch to Hip flexor 3x30 seconds each side   Quad set   2x10 Left     Sidelying hip extension   2x10 bilaterally with 10 seconds endrange stretch on last rep    [place] Left Prosthetic leg  Performed at edge of mat    Sitting in reclined position at edge of mat   Hip flexor stretch 3x30 seconds bilateral    Hip Abduction  In sidelying 2x10 bilateral    Bridge attempt  1x5   Posterior Pelvic Tilt Supine  2x5   Prone lying over light blue small triangle wedge  Holding position 2 minutes, then 4 minutes   Stretch to Hamstrings  3 x 1 minute Right lower extremity    Heelslide with forceful push towards extension  2x10 bilateral with 10 second enrange hold stretch on last rep    10 # over distal Left thigh  Stretch to Left hip flexor for over 5 minutes while exercising Right lower extremity    Prone Hamstring curl endrange for quad/hip flexor stretch  2x10 AAROM   lower extremity lifts towards extension in prone  2x10 AAROM   Prone hip flexor stretch   3x10 second holds bilateral    Standing frame raising to patient's tolerance X        X  X  X    X  x - Standing 7x's in standing frame (towel under Right heel to offload anterior foot) holding each stand for 1-5 minutes working on the following:  - Horizontal Abduction with bilateral upper extremities 1x15  - terminal knee extension in stand position 2x10 bilateral   - Rows with green theraband 2x10  - Pushing through upper extremities to achieve trunk extension 2x10  - Overhead press with 2# dumbbell in each hand 1x8 bilateral   - Patient given feedback on standing frame position - working on increasing seat angle after 30 second rest between lifts (repeated 3-4 times)   NuStep  x 5 minutes at Level 1, working on alternating movement of lower extremities and stretch to bilateral knees   * In standing frame:  Sitting position = 0 degree seat angle  Full upright stand position with hips and knees at 0 degrees = 90 degree seat angle  3/15/22 - 1st  standing frame was at 42 degree seat angle, last stand position was at 65 degree seat angle  Plan for Next Visit:      manual therapy techniques: Joint  mobilizations, Manual traction and Soft tissue Mobilization were applied to the:(0) minutes, including:    Intervention Performed Today    Patella glides superiorly  2 minutes bilateral    Anterior tibia glide Grade 1-2  1 minute bilateral                                      neuromuscular re-education activities to improve: Balance, Coordination, Sense, Proprioception and Posture for 0 minutes. The following activities were included:    Intervention Performed Today                                                THERAPEUTIC ACTIVITIES to improve dynamic and functional  performance for () minutes including:    Intervention Performed Today    Bed - wheelchair transfer   Practiced set up and sequence   Bed mobility sit to supine to sit  Practiced sequence    Rolling   3 x each side working on sequence to incorporate hip flexor stretch on each side. Then separate stretch performed as listed above   Standing in parallel bars  4 x's working on several weight shifts towards hip extension and knee extension while standing.   Standing in parallel bars performing step forward  With Right lower extremity 5x's with moderate assistance to maintain standing position   Sit to  parallel bars without assistance  2x's   Dynasplint Consult  Andriy Stout (Rep for Dynasplint) met with physical therapist and patient today during session to discuss options for dynasplints for bilateral knees due to patient's significant lack of range of motion.       Plan for Next Visit:        Patient Education and Home Exercises     Home Exercises Provided and Patient Education Provided     Education provided:   -2/22/22 Patient educated on increasing length and consistency of stretching knees and hips to every 2 hours at home.  He was shown and demonstrated understanding of short arc quads, rolling toward prone and sitting in reclined position to stretch hip flexors.  -2/24/22 Patient given Home Exercise Program on exercises for lower  extremities to be performed 2x15 3x's/day  See patient Instructions in EMR  -3/1/22 Re-inforced Home Exercise Program and asked patient to incorporate prone lying for longer periods of time to build up to 20 minutes per day.  3/3/22 - Discussed the option of dynasplints for bilateral lower extremities. Patient educated on the option of Dynasplints vs basic knee braces and encouraged to consider Dynasplints as a more aggressive and efficient way of properly positioning and dynamically stretching his legs outside of therapy.  3/8/22 - Patient was educated on Dynasplints with Andriy Stout ().   He was shown pictures on ipad and Andriy and physical therapist discussed wearing schedule to make gains with his muscle length outside of therapy sessions.  Andriy informed patient that he would assist with collecting information regarding insurance coverage and provide him with any out of pocket costs so he can decide if he would like to pursue this option.    Home Exercise Provided: 2/24/22 Exercises were reviewed and Hunter Schofield was able to demonstrate them prior to the end of the session.  Hunter Schofield demonstrated good  understanding of the education provided.   ASSESSMENT     Pt responded well to session today.  He was able to tolerate improved stand endurance in standing frame today and with prolonged stretch to his legs, he was able to increase his stand position 23 degrees from 1st stand to last stand of the session.  He in continuing to make gains with Physical Therapy.    Pt prognosis is Fair  To Good    Pt will continue to benefit from skilled outpatient physical therapy to address the deficits listed in the problem list box on initial evaluation, provide pt/family education and to maximize pt's level of independence in the home and community environment.     Pt's spiritual, cultural and educational needs considered and pt agreeable to plan of care and goals.     Anticipated Barriers for therapy:  co-morbidities, transportation assistance needed at times, lifestyle, potential contractures of knees/hips    GOALS:     Short Term Goals:  4 weeks Progress    1. Function: Patient will demonstrate improved function as indicated by a functional limitation score of less than or equal to 55 out of 100 on FOTO = 44% limitation PC   2. Mobility: Patient will improve sit to stand transfer with moderate assistance in order to progress towards independence with functional activities.  PC   3. Gait: Patient will ambulated 3 steps in parallel bars with Maximum assistance to demonstrate improved strength, posture and endurance PC   4. Balance: Complete FIST test upon next visit. PC   5. HEP: Patient will demonstrate independence with HEP in order to progress toward functional independence.     6. Assess patient's needs for Dynasplints and set up consultation if needed.        Long Term Goals:  8 weeks Progress   1. Function: Patient will demonstrate improved function as indicated by a functional limitation score of less than or equal to 60 out of 100 on FOTO = 40 % limitation PC   2. Mobility: Patient will improve AROM of bilateral knees to -10 degrees  in order to return to maximal functional potential and improve quality of life. PC   3. Gait: Patient will ambulate 15 feet with rolling walker with moderate assistance to demonstrate improved strength, endurance and mobility. PC   4. Balance: Modify FIST goal once completed. PC   5. HEP: Patient educated on HEP in preparation for d/c verbalizing and demonstrating good understanding of topics discussed.       6.          Goals Key:  PC= progressing/continue;        PM= partially met;             DC= discontinue         PLAN     Continue Plan of Care (POC) and progress per patient tolerance. See Treatment section for exercise progression.  Outpatient Physical Therapy 2 times weekly for 8 weeks to include the following interventions: Electrical Stimulation Attended, Gait  Training, Moist Heat/ Ice, Neuromuscular Re-ed, Orthotic Management and Training, Patient Education, Self Care, Therapeutic Activities, Therapeutic Exercise and Prosthetic Management.      Mindy Patino, PT, DPT

## 2022-03-17 ENCOUNTER — CLINICAL SUPPORT (OUTPATIENT)
Dept: REHABILITATION | Facility: HOSPITAL | Age: 82
End: 2022-03-17
Payer: MEDICARE

## 2022-03-17 DIAGNOSIS — Z74.09 DECREASED STRENGTH, ENDURANCE, AND MOBILITY: ICD-10-CM

## 2022-03-17 DIAGNOSIS — R53.1 DECREASED STRENGTH, ENDURANCE, AND MOBILITY: ICD-10-CM

## 2022-03-17 DIAGNOSIS — R68.89 DECREASED STRENGTH, ENDURANCE, AND MOBILITY: ICD-10-CM

## 2022-03-17 DIAGNOSIS — M25.661 DECREASED RANGE OF MOTION OF BOTH LOWER EXTREMITIES: Primary | ICD-10-CM

## 2022-03-17 DIAGNOSIS — M25.662 DECREASED RANGE OF MOTION OF BOTH LOWER EXTREMITIES: Primary | ICD-10-CM

## 2022-03-17 PROCEDURE — 97110 THERAPEUTIC EXERCISES: CPT

## 2022-03-17 PROCEDURE — 97116 GAIT TRAINING THERAPY: CPT

## 2022-03-17 NOTE — PROGRESS NOTES
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  Physician: Fallon Dudley*    Visit Date: 3/17/2022     Physician Orders: PT Eval and Treat   Medical Diagnosis from Referral: s/p Left BKA  Evaluation Date: 2/18/2022  Authorization Period Expiration: 4/1/22  Plan of Care Expiration: 4/15/22  Progress Note Due: 4/15/22  Visit # / Visits authorized:8/20 (+1 eval)  FOTO: 2/3     Precautions: Standard, Diabetes, Fall and wound on R foot, Incontinence    PTA Visit #: 0/5     Time In: 1004  Time Out: 1058  Total Billable Time: 54    SUBJECTIVE     Pt reports: that he went to wound care yesterday and no longer has a wound on the plantar aspect of the right foot.  Injection to heal the spot at his Right toes is scheduled for tomorrow.  Response to previous treatment: good with no adverse effects.  Functional change:  No significant change in function.  Pain: 0/10 at rest  Location: not applicable     OBJECTIVE     Objective Measures updated at progress report unless specified.   In supine:  Measures taken 2/22/22  Left Hip Extension = - 20  Right Hip Extension = - 25  Left Knee Extension = - 48  Right Knee Extension = - 45     3/3/22  In prone:   Hip Extension = - 20  Right, unable to get accurate measure on Left    3/17/22  Measurements of bilateral Knees and Hips taken in standing frame:  Hip Extension: Left  (-25) Right   (-25)  Knee Extension: Left  (-23)    Right  (-23)   Sitting: Knee Extension: Left:  (-35) AROM to (-30) PROM, Right: (-37) AROM, (-34) PROM    STRENGTH:                    L/E MMT Right  2/18/22    Right  3/17/22 range of motion  2/18/22 Range of motion  3/17/22 Left  2/18/22 Left   3/17/22 range of motion   2/18/22 Range of motion  3/17/22 Pain/Dysfunction with Movement Goal   Hip Flexion  3+/5 4-/5 Within Functional  Limits  Within Functional Limits  3+/5 4-/5 Within Functional Limits  Within Functional Limits         Hip Extension  2+/5 2+/5 Limited but unable to accurately measure due to time constraints See above 2+/5 2+/5 Limited but unable to accurately measure due to time constraints See above       Hip Abduction  2+/5 3-/5 Limited but unable to accurately measure due to time constraints In sitting- approximately 10 degrees 2+/5 3-/5 Limited but unable to accurately measure due to time constraints In sitting - approximately 10 degrees       Knee Extension 3-/5 3+ within available Range -40 degrees See above 3-/5 3+ within available range -35 degrees See above *potential contractures - 10 bilaterally   Knee Flexion 4-/5 4/5 Within Functional Limits  Within Functional Limits  4-/5 4/5 Within Functional Limits Within Functional Limits        Ankle DF 3+/5 3+/5 within available range To Neutral, limited by bandage To Neutral, limited by bandage not applicable  Not applicable not applicable  Not applicable       Ankle PF 3+/5 4-/5 To Neutral, limited by bandage 10 degrees not applicable  Not applicable not applicable  Not applicable              Treatment     Hunter Schofield received the treatments listed below:      THERAPEUTIC EXERCISES to develop strength, endurance, ROM, flexibility, posture and core stabilization for (44) minutes including:    Intervention Performed Today    short arc quad in supine  lower extremities over wedge 2x20 Right AROM, Left 2x20 Active Assistive for form and quality    sidelying toward prone   Stretch to Hip flexor 3x30 seconds each side   Quad set   2x10 Left     Sidelying hip extension   2x10 bilaterally with 10 seconds endrange stretch on last rep   Gerlach and Donning Left Prosthetic leg  Performed at edge of mat    Sitting in reclined position at edge of mat   Hip flexor stretch 3x30 seconds bilateral    Hip Abduction  In sidelying 2x10 bilateral    Bridge attempt  1x5   Posterior Pelvic  Tilt Supine  2x5   Prone lying over light blue small triangle wedge  Holding position 2 minutes, then 4 minutes   Stretch to Hamstrings  3 x 1 minute Right lower extremity    Heelslide with forceful push towards extension  2x10 bilateral with 10 second enrange hold stretch on last rep    10 # over distal Left thigh  Stretch to Left hip flexor for over 5 minutes while exercising Right lower extremity    Prone Hamstring curl endrange for quad/hip flexor stretch  2x10 AAROM   lower extremity lifts towards extension in prone  2x10 AAROM   Prone hip flexor stretch   3x10 second holds bilateral    Standing frame raising to patient's tolerance X        X  X  X          x - Standing 4x's in standing frame holding each stand for 1-5 minutes working on the following:  - Horizontal Abduction with bilateral upper extremities 1x15  - terminal knee extension in stand position 2x10 bilateral   - Rows with green theraband 2x10  - Pushing through upper extremities to achieve trunk extension 2x10  - Overhead press with 2# dumbbell in each hand 1x8 bilateral   - Patient given feedback on standing frame position - working on increasing seat angle after 30 second rest between lifts (repeated 3-4 times)  - Sarah Ramires 2 x 25 hits working on upright trunk posture   NuStep   5 minutes at Level 1, working on alternating movement of lower extremities and stretch to bilateral knees   * In standing frame:  Sitting position = 0 degree seat angle  Full upright stand position with hips and knees at 0 degrees = 90 degree seat angle  3/15/22 - 1st  standing frame was at 42 degree seat angle, last stand position was at 65 degree seat angle  3/17/22 - 1st  standing frame was at 48 degree seat angle, last stand position was at 70 degree seat angle  Plan for Next Visit:      manual therapy techniques: Joint mobilizations, Manual traction and Soft tissue Mobilization were applied to the:(0) minutes, including:    Intervention  Performed Today    Patella glides superiorly  2 minutes bilateral    Anterior tibia glide Grade 1-2  1 minute bilateral                                      Neuromuscular Re-Education activities to improve: Balance, Coordination, Sense, Proprioception and Posture for 0 minutes. The following activities were included:    Intervention Performed Today                                                THERAPEUTIC ACTIVITIES to improve dynamic and functional  performance for () minutes including:    Intervention Performed Today    Bed - wheelchair transfer   Practiced set up and sequence   Bed mobility sit to supine to sit  Practiced sequence    Rolling   3 x each side working on sequence to incorporate hip flexor stretch on each side. Then separate stretch performed as listed above   Standing in parallel bars  3 x's working on several weight shifts towards hip extension and knee extension while standing.   Standing in parallel bars performing step forward  With Right lower extremity 5x's with moderate assistance to maintain standing position        Sit to  parallel bars without assistance  2x's   Dynasplint Consult  Andriy Stout (Rep for Dynasplint) met with physical therapist and patient today during session to discuss options for dynasplints for bilateral knees due to patient's significant lack of range of motion.       Plan for Next Visit:      Gait Training: for (10) minutes:  Ambulating 3x8 ft in the parallel bars with bilateral hand support, sheet support around hips for therapist to facilitate hip extension and overall moderate assistance for postural facilitation and cues for sequence.    Limitation/Restriction for FOTO Amputation Survey     Therapist reviewed FOTO scores for Hunter Schofield on 3/17/2022.   FOTO documents entered into [facility] - see Media section.     Limitation Score: 52%            Patient Education and Home Exercises     Home Exercises Provided and Patient Education Provided     Education  provided:   -2/22/22 Patient educated on increasing length and consistency of stretching knees and hips to every 2 hours at home.  He was shown and demonstrated understanding of short arc quads, rolling toward prone and sitting in reclined position to stretch hip flexors.  -2/24/22 Patient given Home Exercise Program on exercises for lower extremities to be performed 2x15 3x's/day  See patient Instructions in EMR  -3/1/22 Re-inforced Home Exercise Program and asked patient to incorporate prone lying for longer periods of time to build up to 20 minutes per day.  3/3/22 - Discussed the option of dynasplints for bilateral lower extremities. Patient educated on the option of Dynasplints vs basic knee braces and encouraged to consider Dynasplints as a more aggressive and efficient way of properly positioning and dynamically stretching his legs outside of therapy.  3/8/22 - Patient was educated on Dynasplints with Andriy Stout ().   He was shown pictures on ipad and Andriy and physical therapist discussed wearing schedule to make gains with his muscle length outside of therapy sessions.  Andriy informed patient that he would assist with collecting information regarding insurance coverage and provide him with any out of pocket costs so he can decide if he would like to pursue this option.    Home Exercise Provided: 2/24/22 Exercises were reviewed and Hunter Schofield was able to demonstrate them prior to the end of the session.  Hunter Schofield demonstrated good  understanding of the education provided.   ASSESSMENT     Pt responded well to session today.  He has demonstrated improved stand endurance and bilateral lower extremity range of motion since last assessed with assistance of standing frame positioning.  He still lacks active motion.  He was able to walk 3x8ft in the parallel bars today, though he has crouched posturing due to residual tightness.  Each step, therapist assisting patient to dynamically stretch  his lower extremity.  He has great effort and is making gains with his therapy.    Pt prognosis is Fair  To Good    Pt will continue to benefit from skilled outpatient physical therapy to address the deficits listed in the problem list box on initial evaluation, provide pt/family education and to maximize pt's level of independence in the home and community environment.     Pt's spiritual, cultural and educational needs considered and pt agreeable to plan of care and goals.     Anticipated Barriers for therapy: co-morbidities, transportation assistance needed at times, lifestyle, potential contractures of knees/hips    GOALS:     Short Term Goals:  4 weeks Progress    1. Function: Patient will demonstrate improved function as indicated by a functional limitation score of less than or equal to 55 out of 100 on FOTO = 44% limitation PC   2. Mobility: Patient will improve sit to stand transfer with moderate assistance in order to progress towards independence with functional activities.  met   3. Gait: Patient will ambulated 3 steps in parallel bars with Maximum assistance to demonstrate improved strength, posture and endurance met   4. Balance: Complete FIST test upon next visit. PC   5. HEP: Patient will demonstrate independence with HEP in order to progress toward functional independence. met    6. Assess patient's needs for Dynasplints and set up consultation if needed. met       Long Term Goals:  8 weeks Progress   1. Function: Patient will demonstrate improved function as indicated by a functional limitation score of less than or equal to 60 out of 100 on FOTO = 40 % limitation PC   2. Mobility: Patient will improve AROM of bilateral knees to -10 degrees  in order to return to maximal functional potential and improve quality of life. PC   3. Gait: Patient will ambulate 15 feet with rolling walker with moderate assistance to demonstrate improved strength, endurance and mobility. PC   4. Balance: Modify FIST goal  once completed. PC   5. HEP: Patient educated on HEP in preparation for d/c verbalizing and demonstrating good understanding of topics discussed.    PC   6.          Goals Key:  PC= progressing/continue;        PM= partially met;             DC= discontinue         PLAN     Continue Plan of Care (POC) and progress per patient tolerance. See Treatment section for exercise progression.  Outpatient Physical Therapy 2 times weekly for 8 weeks to include the following interventions: Electrical Stimulation Attended, Gait Training, Moist Heat/ Ice, Neuromuscular Re-ed, Orthotic Management and Training, Patient Education, Self Care, Therapeutic Activities, Therapeutic Exercise and Prosthetic Management.      Mindy Patino, PT, DPT

## 2022-03-22 ENCOUNTER — CLINICAL SUPPORT (OUTPATIENT)
Dept: REHABILITATION | Facility: HOSPITAL | Age: 82
End: 2022-03-22
Payer: MEDICARE

## 2022-03-22 DIAGNOSIS — M25.661 DECREASED RANGE OF MOTION OF BOTH LOWER EXTREMITIES: Primary | ICD-10-CM

## 2022-03-22 DIAGNOSIS — Z74.09 DECREASED STRENGTH, ENDURANCE, AND MOBILITY: ICD-10-CM

## 2022-03-22 DIAGNOSIS — M25.662 DECREASED RANGE OF MOTION OF BOTH LOWER EXTREMITIES: Primary | ICD-10-CM

## 2022-03-22 DIAGNOSIS — R68.89 DECREASED STRENGTH, ENDURANCE, AND MOBILITY: ICD-10-CM

## 2022-03-22 DIAGNOSIS — R53.1 DECREASED STRENGTH, ENDURANCE, AND MOBILITY: ICD-10-CM

## 2022-03-22 PROCEDURE — 97110 THERAPEUTIC EXERCISES: CPT

## 2022-03-22 NOTE — PROGRESS NOTES
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  Physician: Fallon Dudley*    Visit Date: 3/22/2022     Physician Orders: PT Eval and Treat   Medical Diagnosis from Referral: s/p Left BKA  Evaluation Date: 2/18/2022  Authorization Period Expiration: 4/1/22  Plan of Care Expiration: 4/15/22  Progress Note Due: 4/15/22  Visit # / Visits authorized:9/20 (+1 eval)  FOTO: 2/3     Precautions: Standard, Diabetes, Fall and wound on R foot, Incontinence    PTA Visit #: 0/5     Time In: 1021  Time Out: 1102  Total Billable Time: 41    SUBJECTIVE     Pt reports: that he has received the infusion for the healing of his right foot last Friday and will have his second dose Friday of this week.  Response to previous treatment: good with no adverse effects.  Functional change:  No significant change in function.  Pain: 0/10 at rest  Location: not applicable     OBJECTIVE     Objective Measures updated at progress report unless specified.   In supine:  Measures taken 2/22/22  Left Hip Extension = - 20  Right Hip Extension = - 25  Left Knee Extension = - 48  Right Knee Extension = - 45     3/3/22  In prone:   Hip Extension = - 20  Right, unable to get accurate measure on Left    3/17/22  Measurements of bilateral Knees and Hips taken in standing frame:  Hip Extension: Left  (-25) Right   (-25)  Knee Extension: Left  (-23)    Right  (-23)   Sitting: Knee Extension: Left:  (-35) AROM to (-30) PROM, Right: (-37) AROM, (-34) PROM    STRENGTH:                    L/E MMT Right  2/18/22    Right  3/17/22 range of motion  2/18/22 Range of motion  3/17/22 Left  2/18/22 Left   3/17/22 range of motion   2/18/22 Range of motion  3/17/22 Pain/Dysfunction with Movement Goal   Hip Flexion  3+/5 4-/5 Within Functional Limits  Within Functional Limits  3+/5 4-/5  Within Functional Limits  Within Functional Limits         Hip Extension  2+/5 2+/5 Limited but unable to accurately measure due to time constraints See above 2+/5 2+/5 Limited but unable to accurately measure due to time constraints See above       Hip Abduction  2+/5 3-/5 Limited but unable to accurately measure due to time constraints In sitting- approximately 10 degrees 2+/5 3-/5 Limited but unable to accurately measure due to time constraints In sitting - approximately 10 degrees       Knee Extension 3-/5 3+ within available Range -40 degrees See above 3-/5 3+ within available range -35 degrees See above *potential contractures - 10 bilaterally   Knee Flexion 4-/5 4/5 Within Functional Limits  Within Functional Limits  4-/5 4/5 Within Functional Limits Within Functional Limits        Ankle DF 3+/5 3+/5 within available range To Neutral, limited by bandage To Neutral, limited by bandage not applicable  Not applicable not applicable  Not applicable       Ankle PF 3+/5 4-/5 To Neutral, limited by bandage 10 degrees not applicable  Not applicable not applicable  Not applicable              Treatment     Hunter Schofield received the treatments listed below:      THERAPEUTIC EXERCISES to develop strength, endurance, ROM, flexibility, posture and core stabilization for (41) minutes including:    Intervention Performed Today    short arc quad in supine  lower extremities over wedge 2x20 Right AROM, Left 2x20 Active Assistive for form and quality    sidelying toward prone   Stretch to Hip flexor 3x30 seconds each side   Quad set   2x10 Left     Sidelying hip extension   2x10 bilaterally with 10 seconds endrange stretch on last rep   [place] Left Prosthetic leg  Performed at edge of mat    Sitting in reclined position at edge of mat   Hip flexor stretch 3x30 seconds bilateral    Hip Abduction  In sidelying 2x10 bilateral    Bridge attempt  1x5   Posterior Pelvic Tilt Supine  2x5   Prone lying over light  blue small triangle wedge  Holding position 2 minutes, then 4 minutes   Stretch to Hamstrings  3 x 1 minute Right lower extremity    [place]elslide with forceful push towards extension  2x10 bilateral with 10 second enrange hold stretch on last rep    10 # over distal Left thigh  Stretch to Left hip flexor for over 5 minutes while exercising Right lower extremity    Prone Hamstring curl endrange for quad/hip flexor stretch  2x10 AAROM   lower extremity lifts towards extension in prone  2x10 AAROM   Prone hip flexor stretch   3x10 second holds bilateral    Standing frame raising to patient's tolerance X      x  x      X            x   - Standing 4x's in standing frame holding each stand for several  minutes working on the following:  - Horizontal Abduction with bilateral upper extremities 1x15  - terminal knee extension in stand position 2x10 bilateral   - Rows with 1# dumbbells bilateral  2x10  - Pushing through upper extremities to achieve trunk extension 2x10  - Overhead press with 1# dumbbells bilateral 1x10, then 2# dumbbell in each hand 1x10 bilateral   - Patient given feedback on standing frame position - working on increasing seat angle after 30 second rest between lifts (repeated 3-4 times)  - Sarah Ramires 2 x 25 hits working on upright trunk posture  - Pulling with bilateral upper extremities on tray at front to pull hips forward from seat and extend knees 4x10   NuStep   5 minutes at Level 1, working on alternating movement of lower extremities and stretch to bilateral knees   * In standing frame:  Sitting position = 0 degree seat angle  Full upright stand position with hips and knees at 0 degrees = 90 degree seat angle  3/15/22 - 1st  standing frame was at 42 degree seat angle, last stand position was at 65 degree seat angle  3/17/22 - 1st  standing frame was at 48 degree seat angle, last stand position was at 70 degree seat angle  3/21/22 - 1st  standing frame was at 52 degree seat  angle, last stand position was at 70 degree seat angle  Plan for Next Visit:      manual therapy techniques: Joint mobilizations, Manual traction and Soft tissue Mobilization were applied to the:(0) minutes, including:    Intervention Performed Today    Patella glides superiorly  2 minutes bilateral    Anterior tibia glide Grade 1-2  1 minute bilateral                                      Neuromuscular Re-Education activities to improve: Balance, Coordination, Sense, Proprioception and Posture for 0 minutes. The following activities were included:    Intervention Performed Today                                                THERAPEUTIC ACTIVITIES to improve dynamic and functional  performance for () minutes including:    Intervention Performed Today    Bed - wheelchair transfer   Practiced set up and sequence   Bed mobility sit to supine to sit  Practiced sequence    Rolling   3 x each side working on sequence to incorporate hip flexor stretch on each side. Then separate stretch performed as listed above   Standing in parallel bars  3 x's working on several weight shifts towards hip extension and knee extension while standing.   Standing in parallel bars performing step forward  With Right lower extremity 5x's with moderate assistance to maintain standing position        Sit to  parallel bars without assistance  2x's   Dynasplint Consult  Andriy Stout (Rep for Dynasplint) met with physical therapist and patient today during session to discuss options for dynasplints for bilateral knees due to patient's significant lack of range of motion.       Plan for Next Visit:      Gait Training: for (0) minutes:  3/17/22 - Ambulating 3x8 ft in the parallel bars with bilateral hand support, sheet support around hips for therapist to facilitate hip extension and overall moderate assistance for postural facilitation and cues for sequence.    Limitation/Restriction for FOTO Amputation Survey     Therapist reviewed FOTO  scores for Hunter Schofield on 3/17/2022.   FOTO documents entered into [email or web address] - see Media section.     Limitation Score: 52%            Patient Education and Home Exercises     Home Exercises Provided and Patient Education Provided     Education provided:   -2/22/22 Patient educated on increasing length and consistency of stretching knees and hips to every 2 hours at home.  He was shown and demonstrated understanding of short arc quads, rolling toward prone and sitting in reclined position to stretch hip flexors.  -2/24/22 Patient given Home Exercise Program on exercises for lower extremities to be performed 2x15 3x's/day  See patient Instructions in EMR  -3/1/22 Re-inforced Home Exercise Program and asked patient to incorporate prone lying for longer periods of time to build up to 20 minutes per day.  3/3/22 - Discussed the option of dynasplints for bilateral lower extremities. Patient educated on the option of Dynasplints vs basic knee braces and encouraged to consider Dynasplints as a more aggressive and efficient way of properly positioning and dynamically stretching his legs outside of therapy.  3/8/22 - Patient was educated on Dynasplints with Andriy Stout (Rep).   He was shown pictures on ipad and Andriy and physical therapist discussed wearing schedule to make gains with his muscle length outside of therapy sessions.  Andriy informed patient that he would assist with collecting information regarding insurance coverage and provide him with any out of pocket costs so he can decide if he would like to pursue this option.  3/22/22 - Patient was educated that Andriy Stout (Rep for Dynasplint) messaged me that his splints have been approved and are being mailed this week.     Home Exercise Provided: 2/24/22 Exercises were reviewed and Hunter Schofield was able to demonstrate them prior to the end of the session.  Hunter Schofield demonstrated good  understanding of the education provided.   ASSESSMENT     Pt  responded well to session today.  He has demonstrated improved starting seat angle with standing frame today.  He benefits from the prolonged stretch and upright positioning of standing frame to work towards upright stand posture in preparation for walking with walker.  He has also been approved for bilateral Dynasplints and follow-up consult with Andriy Stout (Rep for Dynasplint) is scheduled for next session.    Pt prognosis is Fair  To Good    Pt will continue to benefit from skilled outpatient physical therapy to address the deficits listed in the problem list box on initial evaluation, provide pt/family education and to maximize pt's level of independence in the home and community environment.     Pt's spiritual, cultural and educational needs considered and pt agreeable to plan of care and goals.     Anticipated Barriers for therapy: co-morbidities, transportation assistance needed at times, lifestyle, potential contractures of knees/hips    GOALS:     Short Term Goals:  4 weeks Progress    1. Function: Patient will demonstrate improved function as indicated by a functional limitation score of less than or equal to 55 out of 100 on FOTO = 44% limitation PC   2. Mobility: Patient will improve sit to stand transfer with moderate assistance in order to progress towards independence with functional activities.  met   3. Gait: Patient will ambulated 3 steps in parallel bars with Maximum assistance to demonstrate improved strength, posture and endurance met   4. Balance: Complete FIST test upon next visit. PC   5. HEP: Patient will demonstrate independence with HEP in order to progress toward functional independence. met    6. Assess patient's needs for Dynasplints and set up consultation if needed. met       Long Term Goals:  8 weeks Progress   1. Function: Patient will demonstrate improved function as indicated by a functional limitation score of less than or equal to 60 out of 100 on FOTO = 40 % limitation PC    2. Mobility: Patient will improve AROM of bilateral knees to -10 degrees  in order to return to maximal functional potential and improve quality of life. PC   3. Gait: Patient will ambulate 15 feet with rolling walker with moderate assistance to demonstrate improved strength, endurance and mobility. PC   4. Balance: Modify FIST goal once completed. PC   5. HEP: Patient educated on HEP in preparation for d/c verbalizing and demonstrating good understanding of topics discussed.    PC   6.          Goals Key:  PC= progressing/continue;        PM= partially met;             DC= discontinue         PLAN     Continue Plan of Care (POC) and progress per patient tolerance. See Treatment section for exercise progression.  Outpatient Physical Therapy 2 times weekly for 8 weeks to include the following interventions: Electrical Stimulation Attended, Gait Training, Moist Heat/ Ice, Neuromuscular Re-ed, Orthotic Management and Training, Patient Education, Self Care, Therapeutic Activities, Therapeutic Exercise and Prosthetic Management.      Mindy Patino, PT, DPT

## 2022-03-24 ENCOUNTER — CLINICAL SUPPORT (OUTPATIENT)
Dept: REHABILITATION | Facility: HOSPITAL | Age: 82
End: 2022-03-24
Payer: MEDICARE

## 2022-03-24 DIAGNOSIS — R53.1 DECREASED STRENGTH, ENDURANCE, AND MOBILITY: ICD-10-CM

## 2022-03-24 DIAGNOSIS — M25.661 DECREASED RANGE OF MOTION OF BOTH LOWER EXTREMITIES: Primary | ICD-10-CM

## 2022-03-24 DIAGNOSIS — Z74.09 DECREASED STRENGTH, ENDURANCE, AND MOBILITY: ICD-10-CM

## 2022-03-24 DIAGNOSIS — M25.662 DECREASED RANGE OF MOTION OF BOTH LOWER EXTREMITIES: Primary | ICD-10-CM

## 2022-03-24 DIAGNOSIS — R68.89 DECREASED STRENGTH, ENDURANCE, AND MOBILITY: ICD-10-CM

## 2022-03-24 PROCEDURE — 97530 THERAPEUTIC ACTIVITIES: CPT

## 2022-03-24 NOTE — PROGRESS NOTES
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   Response to previous treatment: good with no adverse effects.  Functional change:  No significant change in function.  Pain: 0/10 at rest  Location: not applicable     OBJECTIVE     Objective Measures updated at progress report unless specified.   In supine:  Measures taken 2/22/22  Left Hip Extension = - 20  Right Hip Extension = - 25  Left Knee Extension = - 48  Right Knee Extension = - 45     3/3/22  In prone:   Hip Extension = - 20  Right, unable to get accurate measure on Left    3/17/22  Measurements of bilateral Knees and Hips taken in standing frame:  Hip Extension: Left  (-25) Right   (-25)  Knee Extension: Left  (-23)    Right  (-23)   Sitting: Knee Extension: Left:  (-35) AROM to (-30) PROM, Right: (-37) AROM, (-34) PROM    STRENGTH:                    L/E MMT Right  2/18/22    Right  3/17/22 range of motion  2/18/22 Range of motion  3/17/22 Left  2/18/22 Left   3/17/22 range of motion   2/18/22 Range of motion  3/17/22 Pain/Dysfunction with Movement Goal   Hip Flexion  3+/5 4-/5 Within Functional Limits  Within Functional Limits  3+/5 4-/5 Within Functional Limits  Within Functional Limits         Hip Extension  2+/5 2+/5 Limited but  unable to accurately measure due to time constraints See above 2+/5 2+/5 Limited but unable to accurately measure due to time constraints See above       Hip Abduction  2+/5 3-/5 Limited but unable to accurately measure due to time constraints In sitting- approximately 10 degrees 2+/5 3-/5 Limited but unable to accurately measure due to time constraints In sitting - approximately 10 degrees       Knee Extension 3-/5 3+ within available Range -40 degrees See above 3-/5 3+ within available range -35 degrees See above *potential contractures - 10 bilaterally   Knee Flexion 4-/5 4/5 Within Functional Limits  Within Functional Limits  4-/5 4/5 Within Functional Limits Within Functional Limits        Ankle DF 3+/5 3+/5 within available range To Neutral, limited by bandage To Neutral, limited by bandage not applicable  Not applicable not applicable  Not applicable       Ankle PF 3+/5 4-/5 To Neutral, limited by bandage 10 degrees not applicable  Not applicable not applicable  Not applicable              Treatment     Hunter Schofield received the treatments listed below:      THERAPEUTIC EXERCISES to develop strength, endurance, ROM, flexibility, posture and core stabilization for (41) minutes including:    Intervention Performed Today    short arc quad in supine  lower extremities over wedge 2x20 Right AROM, Left 2x20 Active Assistive for form and quality    sidelying toward prone   Stretch to Hip flexor 3x30 seconds each side   Quad set   2x10 Left     Sidelying hip extension   2x10 bilaterally with 10 seconds endrange stretch on last rep   [place] Left Prosthetic leg  Performed at edge of mat    Sitting in reclined position at edge of mat   Hip flexor stretch 3x30 seconds bilateral    Hip Abduction  In sidelying 2x10 bilateral    Bridge attempt  1x5   Posterior Pelvic Tilt Supine  2x5   Prone lying over light blue small triangle wedge  Holding position 2 minutes, then 4 minutes   Stretch to Hamstrings  3  x 1 minute Right lower extremity    Heelslide with forceful push towards extension  2x10 bilateral with 10 second enrange hold stretch on last rep    10 # over distal Left thigh  Stretch to Left hip flexor for over 5 minutes while exercising Right lower extremity    Prone Hamstring curl endrange for quad/hip flexor stretch  2x10 AAROM   lower extremity lifts towards extension in prone  2x10 AAROM   Prone hip flexor stretch   3x10 second holds bilateral    Standing frame raising to patient's tolerance                              - Standing 4x's in standing frame holding each stand for several  minutes working on the following:  - Horizontal Abduction with bilateral upper extremities 1x15  - terminal knee extension in stand position 2x10 bilateral   - Rows with 1# dumbbells bilateral  2x10  - Pushing through upper extremities to achieve trunk extension 2x10  - Overhead press with 1# dumbbells bilateral 1x10, then 2# dumbbell in each hand 1x10 bilateral   - Patient given feedback on standing frame position - working on increasing seat angle after 30 second rest between lifts (repeated 3-4 times)  - Sarah Ramires 2 x 25 hits working on upright trunk posture  - Pulling with bilateral upper extremities on tray at front to pull hips forward from seat and extend knees 4x10   NuStep   5 minutes at Level 1, working on alternating movement of lower extremities and stretch to bilateral knees   Standing Frame x Standing 1 x in standing frame working on holding endrange position of approximately 55 degree seat angle for 5 minutes   * In standing frame:  Sitting position = 0 degree seat angle  Full upright stand position with hips and knees at 0 degrees = 90 degree seat angle  3/15/22 - 1st  standing frame was at 42 degree seat angle, last stand position was at 65 degree seat angle  3/17/22 - 1st  standing frame was at 48 degree seat angle, last stand position was at 70 degree seat angle  3/21/22 - 1st   standing frame was at 52 degree seat angle, last stand position was at 70 degree seat angle  Plan for Next Visit:      manual therapy techniques: Joint mobilizations, Manual traction and Soft tissue Mobilization were applied to the:(0) minutes, including:    Intervention Performed Today    Patella glides superiorly  2 minutes bilateral    Anterior tibia glide Grade 1-2  1 minute bilateral                                      Neuromuscular Re-Education activities to improve: Balance, Coordination, Sense, Proprioception and Posture for 0 minutes. The following activities were included:    Intervention Performed Today                                                THERAPEUTIC ACTIVITIES to improve dynamic and functional  performance for () minutes including:    Intervention Performed Today    Bed - wheelchair transfer   Practiced set up and sequence   Bed mobility sit to supine to sit  Practiced sequence    Rolling   3 x each side working on sequence to incorporate hip flexor stretch on each side. Then separate stretch performed as listed above   Standing in parallel bars  3 x's working on several weight shifts towards hip extension and knee extension while standing.   Standing in parallel bars performing step forward  With Right lower extremity 5x's with moderate assistance to maintain standing position        Sit to  parallel bars without assistance  2x's   Dynasplint Consult  Andriy Stout (Rep for Dynasplint) met with physical therapist and patient today during session to discuss options for dynasplints for bilateral knees due to patient's significant lack of range of motion.     Dynasplint Fitting x Andriy Stout (Rep for Dynasplint) met with physical therapist and patient today to deliver and fit patient for bilateral knee dynasplints.  We discussed wearing time of 1-2 hours/day for the first week and then we would re-assess.  Patient was shown how to don each brace and straps were marked for future use.  The  Dynasplint force was increased to 6 on the Left and 7 on the Right to start.  Extra padding was added to thin straps for skin protection.     Plan for Next Visit:      Gait Training: for (0) minutes:  3/17/22 - Ambulating 3x8 ft in the parallel bars with bilateral hand support, sheet support around hips for therapist to facilitate hip extension and overall moderate assistance for postural facilitation and cues for sequence.    Limitation/Restriction for FOTO Amputation Survey     Therapist reviewed FOTO scores for Hunter Schofield on 3/17/2022.   FOTO documents entered into [employer] - see Media section.     Limitation Score: 52%            Patient Education and Home Exercises     Home Exercises Provided and Patient Education Provided     Education provided:   -2/22/22 Patient educated on increasing length and consistency of stretching knees and hips to every 2 hours at home.  He was shown and demonstrated understanding of short arc quads, rolling toward prone and sitting in reclined position to stretch hip flexors.  -2/24/22 Patient given Home Exercise Program on exercises for lower extremities to be performed 2x15 3x's/day  See patient Instructions in EMR  -3/1/22 Re-inforced Home Exercise Program and asked patient to incorporate prone lying for longer periods of time to build up to 20 minutes per day.  3/3/22 - Discussed the option of dynasplints for bilateral lower extremities. Patient educated on the option of Dynasplints vs basic knee braces and encouraged to consider Dynasplints as a more aggressive and efficient way of properly positioning and dynamically stretching his legs outside of therapy.  3/8/22 - Patient was educated on Dynasplints with Andriy Stout ().   He was shown pictures on ipad and Andriy and physical therapist discussed wearing schedule to make gains with his muscle length outside of therapy sessions.  Andriy informed patient that he would assist with collecting information regarding insurance  coverage and provide him with any out of pocket costs so he can decide if he would like to pursue this option.  3/22/22 - Patient was educated that Andriy Stout (Rep for Dynasplint) messaged me that his splints have been approved and are being mailed this week.   3/24/22 - Patient was educated on Dynasplint management and importance of daily skin checks.  His tolerance of wearing splints for the first week will determine his wearing schedule for the following weeks.    Home Exercise Provided: 2/24/22 Exercises were reviewed and Hunter Schofield was able to demonstrate them prior to the end of the session.  Hunter Schofield demonstrated good  understanding of the education provided.   ASSESSMENT     Pt responded well to session today.  He verbalized good understanding of the use and management of dynasplints.  He was only able to  the standing frame 1 x today secondary to Dynasplint fitting taking most of the session today.  He has good potential to make gains towards standing and gait with consistent use of Dynasplint at home as an adjunct to his weekly physical therapy sessions.    Pt prognosis is Fair  To Good    Pt will continue to benefit from skilled outpatient physical therapy to address the deficits listed in the problem list box on initial evaluation, provide pt/family education and to maximize pt's level of independence in the home and community environment.     Pt's spiritual, cultural and educational needs considered and pt agreeable to plan of care and goals.     Anticipated Barriers for therapy: co-morbidities, transportation assistance needed at times, lifestyle, potential contractures of knees/hips    GOALS:     Short Term Goals:  4 weeks Progress    1. Function: Patient will demonstrate improved function as indicated by a functional limitation score of less than or equal to 55 out of 100 on FOTO = 44% limitation PC   2. Mobility: Patient will improve sit to stand transfer with moderate  assistance in order to progress towards independence with functional activities.  met   3. Gait: Patient will ambulated 3 steps in parallel bars with Maximum assistance to demonstrate improved strength, posture and endurance met   4. Balance: Complete FIST test upon next visit. PC   5. HEP: Patient will demonstrate independence with HEP in order to progress toward functional independence. met    6. Assess patient's needs for Dynasplints and set up consultation if needed. met       Long Term Goals:  8 weeks Progress   1. Function: Patient will demonstrate improved function as indicated by a functional limitation score of less than or equal to 60 out of 100 on FOTO = 40 % limitation PC   2. Mobility: Patient will improve AROM of bilateral knees to -10 degrees  in order to return to maximal functional potential and improve quality of life. PC   3. Gait: Patient will ambulate 15 feet with rolling walker with moderate assistance to demonstrate improved strength, endurance and mobility. PC   4. Balance: Modify FIST goal once completed. PC   5. HEP: Patient educated on HEP in preparation for d/c verbalizing and demonstrating good understanding of topics discussed.    PC   6.          Goals Key:  PC= progressing/continue;        PM= partially met;             DC= discontinue         PLAN     Continue Plan of Care (POC) and progress per patient tolerance. See Treatment section for exercise progression.  Outpatient Physical Therapy 2 times weekly for 8 weeks to include the following interventions: Electrical Stimulation Attended, Gait Training, Moist Heat/ Ice, Neuromuscular Re-ed, Orthotic Management and Training, Patient Education, Self Care, Therapeutic Activities, Therapeutic Exercise and Prosthetic Management.      Mindy Patino, PT, DPT

## 2022-03-29 ENCOUNTER — CLINICAL SUPPORT (OUTPATIENT)
Dept: REHABILITATION | Facility: HOSPITAL | Age: 82
End: 2022-03-29
Payer: MEDICARE

## 2022-03-29 DIAGNOSIS — R53.1 DECREASED STRENGTH, ENDURANCE, AND MOBILITY: ICD-10-CM

## 2022-03-29 DIAGNOSIS — M25.661 DECREASED RANGE OF MOTION OF BOTH LOWER EXTREMITIES: Primary | ICD-10-CM

## 2022-03-29 DIAGNOSIS — Z74.09 DECREASED STRENGTH, ENDURANCE, AND MOBILITY: ICD-10-CM

## 2022-03-29 DIAGNOSIS — M25.662 DECREASED RANGE OF MOTION OF BOTH LOWER EXTREMITIES: Primary | ICD-10-CM

## 2022-03-29 DIAGNOSIS — R68.89 DECREASED STRENGTH, ENDURANCE, AND MOBILITY: ICD-10-CM

## 2022-03-29 PROCEDURE — 97110 THERAPEUTIC EXERCISES: CPT

## 2022-03-29 NOTE — PROGRESS NOTES
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  Physician: Fallon Dudley*    Visit Date: 3/29/2022     Physician Orders: PT Eval and Treat   Medical Diagnosis from Referral: s/p Left BKA  Evaluation Date: 2/18/2022  Authorization Period Expiration: 4/1/22  Plan of Care Expiration: 4/15/22  Progress Note Due: 4/15/22  Visit # / Visits authorized:11/20 (+1 eval)  FOTO: 2/3     Precautions: Standard, Diabetes, Fall and wound on R foot, Incontinence    PTA Visit #: 0/5     Time In: 1017  Time Out: 1103   Total Billable Time: 46 minutes    SUBJECTIVE     Pt reports: that he has been putting on the dynasplints every day for 2 hours, and yesterday for 2x 2hours.  He is through with the infusion treatments and has been told that his Right foot is healed.  Response to previous treatment: good with no adverse effects.  Functional change:  No significant change in function.  Pain: 0/10 at rest  Location: not applicable     OBJECTIVE     Objective Measures updated at progress report unless specified.   In supine:  Measures taken 2/22/22  Left Hip Extension = - 20  Right Hip Extension = - 25  Left Knee Extension = - 48  Right Knee Extension = - 45     3/3/22  In prone:   Hip Extension = - 20  Right, unable to get accurate measure on Left    3/17/22  Measurements of bilateral Knees and Hips taken in standing frame:  Hip Extension: Left  (-25) Right   (-25)  Knee Extension: Left  (-23)    Right  (-23)   Sitting: Knee Extension: Left:  (-35) AROM to (-30) PROM, Right: (-37) AROM, (-34) PROM    STRENGTH:                    L/E MMT Right  2/18/22    Right  3/17/22 range of motion  2/18/22 Range of motion  3/17/22 Left  2/18/22 Left   3/17/22 range of motion   2/18/22 Range of motion  3/17/22 Pain/Dysfunction with Movement Goal   Hip Flexion  3+/5 4-/5 Within  Functional Limits  Within Functional Limits  3+/5 4-/5 Within Functional Limits  Within Functional Limits         Hip Extension  2+/5 2+/5 Limited but unable to accurately measure due to time constraints See above 2+/5 2+/5 Limited but unable to accurately measure due to time constraints See above       Hip Abduction  2+/5 3-/5 Limited but unable to accurately measure due to time constraints In sitting- approximately 10 degrees 2+/5 3-/5 Limited but unable to accurately measure due to time constraints In sitting - approximately 10 degrees       Knee Extension 3-/5 3+ within available Range -40 degrees See above 3-/5 3+ within available range -35 degrees See above *potential contractures - 10 bilaterally   Knee Flexion 4-/5 4/5 Within Functional Limits  Within Functional Limits  4-/5 4/5 Within Functional Limits Within Functional Limits        Ankle DF 3+/5 3+/5 within available range To Neutral, limited by bandage To Neutral, limited by bandage not applicable  Not applicable not applicable  Not applicable       Ankle PF 3+/5 4-/5 To Neutral, limited by bandage 10 degrees not applicable  Not applicable not applicable  Not applicable              Treatment     Hunter Schofield received the treatments listed below:      THERAPEUTIC EXERCISES to develop strength, endurance, ROM, flexibility, posture and core stabilization for (40) minutes including:    Intervention Performed Today    short arc quad in supine  lower extremities over wedge 2x20 Right AROM, Left 2x20 Active Assistive for form and quality    sidelying toward prone   Stretch to Hip flexor 3x30 seconds each side   Quad set   2x10 Left     Sidelying hip extension   2x10 bilaterally with 10 seconds endrange stretch on last rep   St[place] and [place]ning Left Prosthetic leg  Performed at edge of mat    Sitting in reclined position at edge of mat   Hip flexor stretch 3x30 seconds bilateral    Hip Abduction  In sidelying 2x10 bilateral    Bridge attempt  1x5    Posterior Pelvic Tilt Supine  2x5   Prone lying over light blue small triangle wedge  Holding position 2 minutes, then 4 minutes   Stretch to Hamstrings  3 x 1 minute Right lower extremity    Heelslide with forceful push towards extension  2x10 bilateral with 10 second enrange hold stretch on last rep    10 # over distal Left thigh  Stretch to Left hip flexor for over 5 minutes while exercising Right lower extremity    Prone Hamstring curl endrange for quad/hip flexor stretch  2x10 AAROM   lower extremity lifts towards extension in prone  2x10 AAROM   Prone hip flexor stretch   3x10 second holds bilateral    Standing frame raising to patient's tolerance x    X    x  x  x    x        x         - Standing 2x's in standing frame holding each stand for several  minutes working on the following:  - Horizontal Abduction with bilateral upper extremities 1x10 with 1# dowel bilateral   - terminal knee extension in stand position 2x10 bilateral   - Rows with 1# dumbbells bilateral  2x10  - Pushing through upper extremities to achieve trunk extension 2x10  - Overhead press with 1# dumbbells bilateral 1x10  - Patient given feedback on standing frame position - working on increasing seat angle after 30 second rest between lifts (repeated 3-4 times)  - Sarah Ramires 2 x 25 hits working on upright trunk posture  - Pulling with bilateral upper extremities on tray at front to pull hips forward from seat and extend knees 4x10   NuStep   5 minutes at Level 1, working on alternating movement of lower extremities and stretch to bilateral knees   Standing Frame  Standing 1 x in standing frame working on holding endrange position of approximately 55 degree seat angle for 5 minutes   * In standing frame:  Sitting position = 0 degree seat angle  Full upright stand position with hips and knees at 0 degrees = 90 degree seat angle  3/15/22 - 1st  standing frame was at 42 degree seat angle, last stand position was at 65 degree seat  angle  3/17/22 - 1st  standing frame was at 48 degree seat angle, last stand position was at 70 degree seat angle  3/21/22 - 1st  standing frame was at 52 degree seat angle, last stand position was at 70 degree seat angle  3/29/22 - 1st  standing frame was at 60 degree seat angle, last stand position was at 72 degree seat angle  Plan for Next Visit:      manual therapy techniques: Joint mobilizations, Manual traction and Soft tissue Mobilization were applied to the:(0) minutes, including:    Intervention Performed Today    Patella glides superiorly  2 minutes bilateral    Anterior tibia glide Grade 1-2  1 minute bilateral                                      Neuromuscular Re-Education activities to improve: Balance, Coordination, Sense, Proprioception and Posture for 0 minutes. The following activities were included:    Intervention Performed Today                                                THERAPEUTIC ACTIVITIES to improve dynamic and functional  performance for (3) minutes including:    Intervention Performed Today    Bed - wheelchair transfer   Practiced set up and sequence   Bed mobility sit to supine to sit  Practiced sequence    Rolling   3 x each side working on sequence to incorporate hip flexor stretch on each side. Then separate stretch performed as listed above   Standing in parallel bars  3 x's working on several weight shifts towards hip extension and knee extension while standing.   Standing in parallel bars performing step forward  With Right lower extremity 5x's with moderate assistance to maintain standing position   Dynasplint Consult via Rubina with Andriy to discuss fit     Sit to  parallel bars without assistance  2x's   Dynasplint Consult  Andriy Stout (Rep for Dynasplint) met with physical therapist and patient today during session to discuss options for dynasplints for bilateral knees due to patient's significant lack of range of motion.     Dynasplint  Fitting x Andriy Stout (Rep for Dynasplint) met with physical therapist and patient today to deliver and fit patient for bilateral knee dynasplints.  We discussed wearing time of 1-2 hours/day for the first week and then we would re-assess.  Patient was shown how to don each brace and straps were marked for future use.  The Dynasplint force was increased to 6 on the Left and 7 on the Right to start.  Extra padding was added to thin straps for skin protection.     Plan for Next Visit:      Gait Training: for (0) minutes:  3/17/22 - Ambulating 3x8 ft in the parallel bars with bilateral hand support, sheet support around hips for therapist to facilitate hip extension and overall moderate assistance for postural facilitation and cues for sequence.    Limitation/Restriction for FOTO Amputation Survey     Therapist reviewed FOTO scores for Hunter Schofield on 3/17/2022.   FOTO documents entered into [employer] - see Media section.     Limitation Score: 52%            Patient Education and Home Exercises     Home Exercises Provided and Patient Education Provided     Education provided:   -2/22/22 Patient educated on increasing length and consistency of stretching knees and hips to every 2 hours at home.  He was shown and demonstrated understanding of short arc quads, rolling toward prone and sitting in reclined position to stretch hip flexors.  -2/24/22 Patient given Home Exercise Program on exercises for lower extremities to be performed 2x15 3x's/day  See patient Instructions in EMR  -3/1/22 Re-inforced Home Exercise Program and asked patient to incorporate prone lying for longer periods of time to build up to 20 minutes per day.  3/3/22 - Discussed the option of dynasplints for bilateral lower extremities. Patient educated on the option of Dynasplints vs basic knee braces and encouraged to consider Dynasplints as a more aggressive and efficient way of properly positioning and dynamically stretching his legs outside of  therapy.  3/8/22 - Patient was educated on Dynasplints with Andriy Stout (Rep).   He was shown pictures on ipad and Andriy and physical therapist discussed wearing schedule to make gains with his muscle length outside of therapy sessions.  Andriy informed patient that he would assist with collecting information regarding insurance coverage and provide him with any out of pocket costs so he can decide if he would like to pursue this option.  3/22/22 - Patient was educated that Andriy Stout (Rep for Dynasplint) messaged me that his splints have been approved and are being mailed this week.   3/24/22 - Patient was educated on Dynasplint management and importance of daily skin checks.  His tolerance of wearing splints for the first week will determine his wearing schedule for the following weeks.  3/29/22 - Patient was educated on only wearing Dynasplints while lying down to get the most effective stretch to lower extremities.    Home Exercise Provided: 2/24/22 Exercises were reviewed and Hunter Schofield was able to demonstrate them prior to the end of the session.  Hunter Schofield demonstrated good  understanding of the education provided.   ASSESSMENT     Pt responded well to session today.  He was able to progress with his seat angle on the standing frame but is still limited by bilateral hip and knee tightness.  He has tolerated wearing schedule of Dynasplints and was able to increase tension of Right splint to 9 and Left splint to 8.  He has been educated to wear splints only in a lying position to get the most stretch.      Pt prognosis is Fair  To Good    Pt will continue to benefit from skilled outpatient physical therapy to address the deficits listed in the problem list box on initial evaluation, provide pt/family education and to maximize pt's level of independence in the home and community environment.     Pt's spiritual, cultural and educational needs considered and pt agreeable to plan of care and  goals.     Anticipated Barriers for therapy: co-morbidities, transportation assistance needed at times, lifestyle, potential contractures of knees/hips    GOALS:     Short Term Goals:  4 weeks Progress    1. Function: Patient will demonstrate improved function as indicated by a functional limitation score of less than or equal to 55 out of 100 on FOTO = 44% limitation PC   2. Mobility: Patient will improve sit to stand transfer with moderate assistance in order to progress towards independence with functional activities.  met   3. Gait: Patient will ambulated 3 steps in parallel bars with Maximum assistance to demonstrate improved strength, posture and endurance met   4. Balance: Complete FIST test upon next visit. PC   5. HEP: Patient will demonstrate independence with HEP in order to progress toward functional independence. met    6. Assess patient's needs for Dynasplints and set up consultation if needed. met       Long Term Goals:  8 weeks Progress   1. Function: Patient will demonstrate improved function as indicated by a functional limitation score of less than or equal to 60 out of 100 on FOTO = 40 % limitation PC   2. Mobility: Patient will improve AROM of bilateral knees to -10 degrees  in order to return to maximal functional potential and improve quality of life. PC   3. Gait: Patient will ambulate 15 feet with rolling walker with moderate assistance to demonstrate improved strength, endurance and mobility. PC   4. Balance: Modify FIST goal once completed. PC   5. HEP: Patient educated on HEP in preparation for d/c verbalizing and demonstrating good understanding of topics discussed.    PC   6.          Goals Key:  PC= progressing/continue;        PM= partially met;             DC= discontinue         PLAN     Continue Plan of Care (POC) and progress per patient tolerance. See Treatment section for exercise progression.  Outpatient Physical Therapy 2 times weekly for 8 weeks to include the following  interventions: Electrical Stimulation Attended, Gait Training, Moist Heat/ Ice, Neuromuscular Re-ed, Orthotic Management and Training, Patient Education, Self Care, Therapeutic Activities, Therapeutic Exercise and Prosthetic Management.      Mindy Patino, PT, DPT

## 2022-03-31 ENCOUNTER — CLINICAL SUPPORT (OUTPATIENT)
Dept: REHABILITATION | Facility: HOSPITAL | Age: 82
End: 2022-03-31
Payer: MEDICARE

## 2022-03-31 DIAGNOSIS — Z74.09 DECREASED STRENGTH, ENDURANCE, AND MOBILITY: ICD-10-CM

## 2022-03-31 DIAGNOSIS — R68.89 DECREASED STRENGTH, ENDURANCE, AND MOBILITY: ICD-10-CM

## 2022-03-31 DIAGNOSIS — R53.1 DECREASED STRENGTH, ENDURANCE, AND MOBILITY: ICD-10-CM

## 2022-03-31 DIAGNOSIS — M25.661 DECREASED RANGE OF MOTION OF BOTH LOWER EXTREMITIES: Primary | ICD-10-CM

## 2022-03-31 DIAGNOSIS — M25.662 DECREASED RANGE OF MOTION OF BOTH LOWER EXTREMITIES: Primary | ICD-10-CM

## 2022-03-31 PROCEDURE — 97110 THERAPEUTIC EXERCISES: CPT

## 2022-03-31 NOTE — PROGRESS NOTES
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  Physician: Fallon Dudley*    Visit Date: 3/31/2022     Physician Orders: PT Eval and Treat   Medical Diagnosis from Referral: s/p Left BKA  Evaluation Date: 2/18/2022  Authorization Period Expiration: 4/1/22  Plan of Care Expiration: 4/15/22  Progress Note Due: 4/15/22  Visit # / Visits authorized:12/20 (+1 eval)  FOTO: 2/3     Precautions: Standard, Diabetes, Fall and wound on R foot, Incontinence    PTA Visit #: 0/5     Time In: 1000  Time Out: 1055   Total Billable Time: 55 minutes    SUBJECTIVE     Pt reports: that he has been putting on the dynasplints every day in bed, but still does not feel a good stretch.  Response to previous treatment: good with no adverse effects.  Functional change:  He reports that he was able to transfer to [employer] cart for shopping which he has not done before.  Pain: 0/10 at rest  Location: not applicable     OBJECTIVE     Objective Measures updated at progress report unless specified.   In supine:  Measures taken 2/22/22  Left Hip Extension = - 20  Right Hip Extension = - 25  Left Knee Extension = - 48  Right Knee Extension = - 45     3/3/22  In prone:   Hip Extension = - 20  Right, unable to get accurate measure on Left    3/17/22  Measurements of bilateral Knees and Hips taken in standing frame:  Hip Extension: Left  (-25) Right   (-25)  Knee Extension: Left  (-23)    Right  (-23)   Sitting: Knee Extension: Left:  (-35) AROM to (-30) PROM, Right: (-37) AROM, (-34) PROM    STRENGTH:                    L/E MMT Right  2/18/22    Right  3/17/22 range of motion  2/18/22 Range of motion  3/17/22 Left  2/18/22 Left   3/17/22 range of motion   2/18/22 Range of motion  3/17/22 Pain/Dysfunction with Movement Goal   Hip Flexion  3+/5 4-/5 Within Functional Limits   Within Functional Limits  3+/5 4-/5 Within Functional Limits  Within Functional Limits         Hip Extension  2+/5 2+/5 Limited but unable to accurately measure due to time constraints See above 2+/5 2+/5 Limited but unable to accurately measure due to time constraints See above       Hip Abduction  2+/5 3-/5 Limited but unable to accurately measure due to time constraints In sitting- approximately 10 degrees 2+/5 3-/5 Limited but unable to accurately measure due to time constraints In sitting - approximately 10 degrees       Knee Extension 3-/5 3+ within available Range -40 degrees See above 3-/5 3+ within available range -35 degrees See above *potential contractures - 10 bilaterally   Knee Flexion 4-/5 4/5 Within Functional Limits  Within Functional Limits  4-/5 4/5 Within Functional Limits Within Functional Limits        Ankle DF 3+/5 3+/5 within available range To Neutral, limited by bandage To Neutral, limited by bandage not applicable  Not applicable not applicable  Not applicable       Ankle PF 3+/5 4-/5 To Neutral, limited by bandage 10 degrees not applicable  Not applicable not applicable  Not applicable              Treatment     Hunter Schofield received the treatments listed below:      THERAPEUTIC EXERCISES to develop strength, endurance, ROM, flexibility, posture and core stabilization for (55) minutes including:    Intervention Performed Today    short arc quad in supine  lower extremities over wedge 2x20 Right AROM, Left 2x20 Active Assistive for form and quality    sidelying toward prone   Stretch to Hip flexor 3x30 seconds each side   Quad set   2x10 Left     Sidelying hip extension   2x10 bilaterally with 10 seconds endrange stretch on last rep   [place] and Donning Left Prosthetic leg  Performed at edge of mat    Sitting in reclined position at edge of mat   Hip flexor stretch 3x30 seconds bilateral    Hip Abduction  In sidelying 2x10 bilateral    Bridge attempt  1x5   Posterior Pelvic Tilt  Supine  2x5   Prone lying over light blue small triangle wedge  Holding position 2 minutes, then 4 minutes   Stretch to Hamstrings  3 x 1 minute Right lower extremity    Heelslide with forceful push towards extension  2x10 bilateral with 10 second enrange hold stretch on last rep    10 # over distal Left thigh  Stretch to Left hip flexor for over 5 minutes while exercising Right lower extremity    Prone Hamstring curl endrange for quad/hip flexor stretch  2x10 AAROM   lower extremity lifts towards extension in prone  2x10 AAROM   Prone hip flexor stretch   3x10 second holds bilateral    Standing frame raising to patient's tolerance x    X    x  x  x    x        x  x       - Standing 5x's in standing frame holding each stand for 5-8 minutes working on the following:  - Horizontal Abduction with bilateral upper extremities 1x10 with 1# dowel bilateral   - terminal knee extension in stand position 2x15 bilateral   - Rows with green theraband bilateral  2x10  - Pushing through upper extremities to achieve trunk extension 2x10  - Overhead press with green tband bilateral 1x10  - Patient given feedback on standing frame position - working on increasing seat angle after 30 second rest between lifts (repeated 3-4 times)  - Balloon Volley 2 x 25 hits working on upright trunk posture  - Pulling with bilateral upper extremities on tray at front to pull hips forward from seat and extend knees 3x10   NuStep   5 minutes at Level 1, working on alternating movement of lower extremities and stretch to bilateral knees   Standing Frame  Standing 1 x in standing frame working on holding endrange position of approximately 55 degree seat angle for 5 minutes   * In standing frame:  Sitting position = 0 degree seat angle  Full upright stand position with hips and knees at 0 degrees = 90 degree seat angle  3/15/22 - 1st  standing frame was at 42 degree seat angle, last stand position was at 65 degree seat angle  3/17/22 - 1st stand  in standing frame was at 48 degree seat angle, last stand position was at 70 degree seat angle  3/21/22 - 1st  standing frame was at 52 degree seat angle, last stand position was at 70 degree seat angle  3/29/22 - 1st  standing frame was at 60 degree seat angle, last stand position was at 72 degree seat angle  3/31/22 - 1st  standing frame was at 55 degree seat angle, last stand position was at 75 degree seat angle  Plan for Next Visit:      manual therapy techniques: Joint mobilizations, Manual traction and Soft tissue Mobilization were applied to the:(0) minutes, including:    Intervention Performed Today    Patella glides superiorly  2 minutes bilateral    Anterior tibia glide Grade 1-2  1 minute bilateral                                      Neuromuscular Re-Education activities to improve: Balance, Coordination, Sense, Proprioception and Posture for 0 minutes. The following activities were included:    Intervention Performed Today                                                THERAPEUTIC ACTIVITIES to improve dynamic and functional  performance for () minutes including:    Intervention Performed Today    Bed - wheelchair transfer   Practiced set up and sequence   Bed mobility sit to supine to sit  Practiced sequence    Rolling   3 x each side working on sequence to incorporate hip flexor stretch on each side. Then separate stretch performed as listed above   Standing in parallel bars  3 x's working on several weight shifts towards hip extension and knee extension while standing.   Standing in parallel bars performing step forward  With Right lower extremity 5x's with moderate assistance to maintain standing position   Dynasplint Consult via Rubina with Andriy to discuss fit     Sit to  parallel bars without assistance  2x's   Dynasplint Consult  Andriy Stout (Rep for Dynasplint) met with physical therapist and patient today during session to discuss options for dynasplints for  bilateral knees due to patient's significant lack of range of motion.     Dynasplint Fitting  Andriy Stout (Rep for Dynasplint) met with physical therapist and patient today to deliver and fit patient for bilateral knee dynasplints.  We discussed wearing time of 1-2 hours/day for the first week and then we would re-assess.  Patient was shown how to don each brace and straps were marked for future use.  The Dynasplint force was increased to 6 on the Left and 7 on the Right to start.  Extra padding was added to thin straps for skin protection.     Plan for Next Visit:      Gait Training: for (0) minutes:  3/17/22 - Ambulating 3x8 ft in the parallel bars with bilateral hand support, sheet support around hips for therapist to facilitate hip extension and overall moderate assistance for postural facilitation and cues for sequence.    Limitation/Restriction for FOTO Amputation Survey     Therapist reviewed FOTO scores for Hunter Schofield on 3/17/2022.   FOTO documents entered into [employer] - see Media section.     Limitation Score: 52%            Patient Education and Home Exercises     Home Exercises Provided and Patient Education Provided     Education provided:   -2/22/22 Patient educated on increasing length and consistency of stretching knees and hips to every 2 hours at home.  He was shown and demonstrated understanding of short arc quads, rolling toward prone and sitting in reclined position to stretch hip flexors.  -2/24/22 Patient given Home Exercise Program on exercises for lower extremities to be performed 2x15 3x's/day  See patient Instructions in EMR  -3/1/22 Re-inforced Home Exercise Program and asked patient to incorporate prone lying for longer periods of time to build up to 20 minutes per day.  3/3/22 - Discussed the option of dynasplints for bilateral lower extremities. Patient educated on the option of Dynasplints vs basic knee braces and encouraged to consider Dynasplints as a more aggressive and efficient  way of properly positioning and dynamically stretching his legs outside of therapy.  3/8/22 - Patient was educated on Dynasplints with Andriy Stout (Rep).   He was shown pictures on ipad and Andriy and physical therapist discussed wearing schedule to make gains with his muscle length outside of therapy sessions.  Andriy informed patient that he would assist with collecting information regarding insurance coverage and provide him with any out of pocket costs so he can decide if he would like to pursue this option.  3/22/22 - Patient was educated that Andriy Stout (Rep for Dynasplint) messaged me that his splints have been approved and are being mailed this week.   3/24/22 - Patient was educated on Dynasplint management and importance of daily skin checks.  His tolerance of wearing splints for the first week will determine his wearing schedule for the following weeks.  3/29/22 - Patient was educated on only wearing Dynasplints while lying down to get the most effective stretch to lower extremities.  3/31/22 - Patient was asked to reach out to Dynasplint rep (Andriy) to set up a time for Andriy to re-assess the positioning and fit of splints to see if he can feel more of a stretch with wearing schedule.    Home Exercise Provided: 2/24/22 Exercises were reviewed and Hunter Schofield was able to demonstrate them prior to the end of the session.  Hunter Schofield demonstrated good  understanding of the education provided.   ASSESSMENT     Pt responded well to session today.  He was able to progress with his seat angle on the standing frame but is still limited by bilateral hip and knee tightness.  He has tolerated wearing schedule of Dynasplints in supine well with no issues.  He will contact Andriy (Dynasplint rep) to make sure that the fit is correct.  He has great effort and is making gains with his leg length with physical therapy.    Pt prognosis is Fair  To Good    Pt will continue to benefit from skilled outpatient  physical therapy to address the deficits listed in the problem list box on initial evaluation, provide pt/family education and to maximize pt's level of independence in the home and community environment.     Pt's spiritual, cultural and educational needs considered and pt agreeable to plan of care and goals.     Anticipated Barriers for therapy: co-morbidities, transportation assistance needed at times, lifestyle, potential contractures of knees/hips    GOALS:     Short Term Goals:  4 weeks Progress    1. Function: Patient will demonstrate improved function as indicated by a functional limitation score of less than or equal to 55 out of 100 on FOTO = 44% limitation PC   2. Mobility: Patient will improve sit to stand transfer with moderate assistance in order to progress towards independence with functional activities.  met   3. Gait: Patient will ambulated 3 steps in parallel bars with Maximum assistance to demonstrate improved strength, posture and endurance met   4. Balance: Complete FIST test upon next visit. PC   5. HEP: Patient will demonstrate independence with HEP in order to progress toward functional independence. met    6. Assess patient's needs for Dynasplints and set up consultation if needed. met       Long Term Goals:  8 weeks Progress   1. Function: Patient will demonstrate improved function as indicated by a functional limitation score of less than or equal to 60 out of 100 on FOTO = 40 % limitation PC   2. Mobility: Patient will improve AROM of bilateral knees to -10 degrees  in order to return to maximal functional potential and improve quality of life. PC   3. Gait: Patient will ambulate 15 feet with rolling walker with moderate assistance to demonstrate improved strength, endurance and mobility. PC   4. Balance: Modify FIST goal once completed. PC   5. HEP: Patient educated on HEP in preparation for d/c verbalizing and demonstrating good understanding of topics discussed.    PC   6.           Goals Key:  PC= progressing/continue;        PM= partially met;             DC= discontinue         PLAN     Continue Plan of Care (POC) and progress per patient tolerance. See Treatment section for exercise progression.  Outpatient Physical Therapy 2 times weekly for 8 weeks to include the following interventions: Electrical Stimulation Attended, Gait Training, Moist Heat/ Ice, Neuromuscular Re-ed, Orthotic Management and Training, Patient Education, Self Care, Therapeutic Activities, Therapeutic Exercise and Prosthetic Management.      Mindy Patino, PT, DPT

## 2022-04-07 ENCOUNTER — CLINICAL SUPPORT (OUTPATIENT)
Dept: REHABILITATION | Facility: HOSPITAL | Age: 82
End: 2022-04-07
Attending: INTERNAL MEDICINE
Payer: MEDICARE

## 2022-04-07 DIAGNOSIS — Z74.09 DECREASED STRENGTH, ENDURANCE, AND MOBILITY: ICD-10-CM

## 2022-04-07 DIAGNOSIS — M25.661 DECREASED RANGE OF MOTION OF BOTH LOWER EXTREMITIES: Primary | ICD-10-CM

## 2022-04-07 DIAGNOSIS — M25.662 DECREASED RANGE OF MOTION OF BOTH LOWER EXTREMITIES: Primary | ICD-10-CM

## 2022-04-07 DIAGNOSIS — R53.1 DECREASED STRENGTH, ENDURANCE, AND MOBILITY: ICD-10-CM

## 2022-04-07 DIAGNOSIS — R68.89 DECREASED STRENGTH, ENDURANCE, AND MOBILITY: ICD-10-CM

## 2022-04-07 PROCEDURE — 97110 THERAPEUTIC EXERCISES: CPT

## 2022-04-07 PROCEDURE — 97116 GAIT TRAINING THERAPY: CPT

## 2022-04-07 NOTE — PROGRESS NOTES
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  Physician: Fallon Dudley*    Visit Date: 4/7/2022     Physician Orders: PT Eval and Treat   Medical Diagnosis from Referral: s/p Left BKA  Evaluation Date: 2/18/2022  Authorization Period Expiration: 4/1/22  Plan of Care Expiration: 4/15/22  Progress Note Due: 4/15/22  Visit # / Visits authorized:13/20 (+1 eval)  FOTO: 2/3     Precautions: Standard, Diabetes, Fall and wound on R foot, Incontinence    PTA Visit #: 0/5     Time In: 1018  Time Out: 1012   Total Billable Time: 54 minutes    SUBJECTIVE     Pt reports: that he has been putting on the dynasplints every day in bed, but still does not feel a good stretch.  He has been wearing his other knee braces to sleep in at night.  He reports that he could not attend last session due to the rain.  Response to previous treatment: good with no adverse effects.  Functional change:  He reports that he feels stronger and he can tell that his legs extending more.  Pain: 0/10 at rest  Location: not applicable     OBJECTIVE     Objective Measures updated at progress report unless specified.   In supine:  Measures taken 2/22/22  Left Hip Extension = - 20  Right Hip Extension = - 25  Left Knee Extension = - 48  Right Knee Extension = - 45     3/3/22  In prone:   Hip Extension = - 20  Right, unable to get accurate measure on Left    3/17/22  Measurements of bilateral Knees and Hips taken in standing frame:  Hip Extension: Left  (-25) Right   (-25)  Knee Extension: Left  (-23)    Right  (-23)   Sitting: Knee Extension: Left:  (-35) AROM to (-30) PROM, Right: (-37) AROM, (-34) PROM    STRENGTH:                    L/E MMT Right  2/18/22    Right  3/17/22 range of motion  2/18/22 Range of motion  3/17/22 Left  2/18/22 Left   3/17/22 range of motion   2/18/22 Range of  motion  3/17/22 Pain/Dysfunction with Movement Goal   Hip Flexion  3+/5 4-/5 Within Functional Limits  Within Functional Limits  3+/5 4-/5 Within Functional Limits  Within Functional Limits         Hip Extension  2+/5 2+/5 Limited but unable to accurately measure due to time constraints See above 2+/5 2+/5 Limited but unable to accurately measure due to time constraints See above       Hip Abduction  2+/5 3-/5 Limited but unable to accurately measure due to time constraints In sitting- approximately 10 degrees 2+/5 3-/5 Limited but unable to accurately measure due to time constraints In sitting - approximately 10 degrees       Knee Extension 3-/5 3+ within available Range -40 degrees See above 3-/5 3+ within available range -35 degrees See above *potential contractures - 10 bilaterally   Knee Flexion 4-/5 4/5 Within Functional Limits  Within Functional Limits  4-/5 4/5 Within Functional Limits Within Functional Limits        Ankle DF 3+/5 3+/5 within available range To Neutral, limited by bandage To Neutral, limited by bandage not applicable  Not applicable not applicable  Not applicable       Ankle PF 3+/5 4-/5 To Neutral, limited by bandage 10 degrees not applicable  Not applicable not applicable  Not applicable              Treatment     Hunter Schofield received the treatments listed below:      THERAPEUTIC EXERCISES to develop strength, endurance, ROM, flexibility, posture and core stabilization for (45) minutes including:    Intervention Performed Today    short arc quad in supine  lower extremities over wedge 2x20 Right AROM, Left 2x20 Active Assistive for form and quality    sidelying toward prone   Stretch to Hip flexor 3x30 seconds each side   Quad set   2x10 Left     Sidelying hip extension   2x10 bilaterally with 10 seconds endrange stretch on last rep   [place] Left Prosthetic leg  Performed at edge of mat    Sitting in reclined position at edge of mat   Hip flexor stretch 3x30 seconds  bilateral    Hip Abduction  In sidelying 2x10 bilateral    Bridge attempt  1x5   Posterior Pelvic Tilt Supine  2x5   Prone lying over light blue small triangle wedge  Holding position 2 minutes, then 4 minutes   Stretch to Hamstrings  3 x 1 minute Right lower extremity    Heelslide with forceful push towards extension  2x10 bilateral with 10 second enrange hold stretch on last rep    10 # over distal Left thigh  Stretch to Left hip flexor for over 5 minutes while exercising Right lower extremity    Prone Hamstring curl endrange for quad/hip flexor stretch  2x10 AAROM   lower extremity lifts towards extension in prone  2x10 AAROM   Prone hip flexor stretch   3x10 second holds bilateral    Standing frame raising to patient's tolerance x    X    x  x  x    x  x    x  x  x    x - Standing 4x's in standing frame holding each stand for 5-8 minutes working on the following:  - Horizontal Abduction with bilateral upper extremities 2x10 AROM and then with yellow theraband  - terminal knee extension in stand position 4x15 bilateral   - Rows with green theraband bilateral  2x10  - Pushing through upper extremities to achieve trunk extension 2x15  - scap retraction 2x10  - attempted 1x10 shoulder rolls backward - patient had difficulty coordinating  - Overhead press with green tband bilateral 1x10  - Patient given feedback on standing frame position - working on increasing seat angle after 30 second rest between lifts (repeated 3-4 times)  - Balloon Volley 1 x 25 hits working on upright trunk posture  - Pulling with bilateral upper extremities on tray at front to pull hips forward from seat and extend knees 3x10   NuStep   5 minutes at Level 1, working on alternating movement of lower extremities and stretch to bilateral knees   Standing Frame  Standing 1 x in standing frame working on holding endrange position of approximately 55 degree seat angle for 5 minutes   * In standing frame:  Sitting position = 0 degree seat  angle  Full upright stand position with hips and knees at 0 degrees = 90 degree seat angle  3/15/22 - 1st  standing frame was at 42 degree seat angle, last stand position was at 65 degree seat angle  3/17/22 - 1st  standing frame was at 48 degree seat angle, last stand position was at 70 degree seat angle  3/21/22 - 1st  standing frame was at 52 degree seat angle, last stand position was at 70 degree seat angle  3/29/22 - 1st  standing frame was at 60 degree seat angle, last stand position was at 72 degree seat angle  3/31/22 - 1st  standing frame was at 55 degree seat angle, last stand position was at 75 degree seat angle  4/7/22 - 1st  standing frame was at 60 degree seat angle, last stand position wsa at 80 degree seat angle  Plan for Next Visit:      manual therapy techniques: Joint mobilizations, Manual traction and Soft tissue Mobilization were applied to the:(0) minutes, including:    Intervention Performed Today    Patella glides superiorly  2 minutes bilateral    Anterior tibia glide Grade 1-2  1 minute bilateral                                      Neuromuscular Re-Education activities to improve: Balance, Coordination, Sense, Proprioception and Posture for 0 minutes. The following activities were included:    Intervention Performed Today                                                THERAPEUTIC ACTIVITIES to improve dynamic and functional  performance for () minutes including:    Intervention Performed Today    Bed - wheelchair transfer   Practiced set up and sequence   Bed mobility sit to supine to sit  Practiced sequence    Rolling   3 x each side working on sequence to incorporate hip flexor stretch on each side. Then separate stretch performed as listed above   Standing in parallel bars  3 x's working on several weight shifts towards hip extension and knee extension while standing.   Standing in parallel bars performing step forward  With Right lower  extremity 5x's with moderate assistance to maintain standing position   Dynasplint Consult via FaceTime with Andriy to discuss fit     Sit to  parallel bars without assistance  2x's   Dynasplint Consult  Andriy Stout (Rep for Dynasplint) met with physical therapist and patient today during session to discuss options for dynasplints for bilateral knees due to patient's significant lack of range of motion.     Dynasplint Fitting  Andriyjohn Stout (Rep for Dynasplint) met with physical therapist and patient today to deliver and fit patient for bilateral knee dynasplints.  We discussed wearing time of 1-2 hours/day for the first week and then we would re-assess.  Patient was shown how to don each brace and straps were marked for future use.  The Dynasplint force was increased to 6 on the Left and 7 on the Right to start.  Extra padding was added to thin straps for skin protection.     Plan for Next Visit:      Gait Training: for (8) minutes:  4/7/22 - Ambulating 1x 8 ft in the parallel bars with bilateral hand support, sheet support around hips for therapist to facilitate hip extension and overall moderate assistance for postural facilitation and cues for sequence.  4/7/22 - Ambulated with walker with sheet assistance to for hip extension for 5 feet with helper following with wheelchair.    Limitation/Restriction for FOTO Amputation Survey     Therapist reviewed FOTO scores for Hunter Schofield on 3/17/2022.   FOTO documents entered into [employer] - see Media section.     Limitation Score: 52%            Patient Education and Home Exercises     Home Exercises Provided and Patient Education Provided     Education provided:   -2/22/22 Patient educated on increasing length and consistency of stretching knees and hips to every 2 hours at home.  He was shown and demonstrated understanding of short arc quads, rolling toward prone and sitting in reclined position to stretch hip flexors.  -2/24/22 Patient given Home Exercise Program  on exercises for lower extremities to be performed 2x15 3x's/day  See patient Instructions in EMR  -3/1/22 Re-inforced Home Exercise Program and asked patient to incorporate prone lying for longer periods of time to build up to 20 minutes per day.  3/3/22 - Discussed the option of dynasplints for bilateral lower extremities. Patient educated on the option of Dynasplints vs basic knee braces and encouraged to consider Dynasplints as a more aggressive and efficient way of properly positioning and dynamically stretching his legs outside of therapy.  3/8/22 - Patient was educated on Dynasplints with Andriy Stout (Rep).   He was shown pictures on ipad and Andriy and physical therapist discussed wearing schedule to make gains with his muscle length outside of therapy sessions.  Andriy informed patient that he would assist with collecting information regarding insurance coverage and provide him with any out of pocket costs so he can decide if he would like to pursue this option.  3/22/22 - Patient was educated that Andriy Stout (Rep for Dynasplint) messaged me that his splints have been approved and are being mailed this week.   3/24/22 - Patient was educated on Dynasplint management and importance of daily skin checks.  His tolerance of wearing splints for the first week will determine his wearing schedule for the following weeks.  3/29/22 - Patient was educated on only wearing Dynasplints while lying down to get the most effective stretch to lower extremities.  3/31/22 - Patient was asked to reach out to Dynasplint rep (Andriy) to set up a time for Andriy to re-assess the positioning and fit of splints to see if he can feel more of a stretch with wearing schedule.  4/7/22 - Patient was asked to reach out to Dynasplint rep to make sure that splints are adjusted appropriately.  Home Exercise Provided: 2/24/22 Exercises were reviewed and Hunter Schofield was able to demonstrate them prior to the end of the session.  Hunter Moore  Kanwal demonstrated good  understanding of the education provided.   ASSESSMENT     Pt responded well to session today.  He was able to progress with his seat angle on the standing frame and was able to demonstrate improved upright posture with gait.  He was also able to progress to gait training with walker today.  He is making good gains, but continues to need aggressive stretching and strengthening to reach his gait goals.    Pt prognosis is Fair  To Good    Pt will continue to benefit from skilled outpatient physical therapy to address the deficits listed in the problem list box on initial evaluation, provide pt/family education and to maximize pt's level of independence in the home and community environment.     Pt's spiritual, cultural and educational needs considered and pt agreeable to plan of care and goals.     Anticipated Barriers for therapy: co-morbidities, transportation assistance needed at times, lifestyle, potential contractures of knees/hips    GOALS:     Short Term Goals:  4 weeks Progress    1. Function: Patient will demonstrate improved function as indicated by a functional limitation score of less than or equal to 55 out of 100 on FOTO = 44% limitation PC   2. Mobility: Patient will improve sit to stand transfer with moderate assistance in order to progress towards independence with functional activities.  met   3. Gait: Patient will ambulated 3 steps in parallel bars with Maximum assistance to demonstrate improved strength, posture and endurance met   4. Balance: Complete FIST test upon next visit. PC   5. HEP: Patient will demonstrate independence with HEP in order to progress toward functional independence. met    6. Assess patient's needs for Dynasplints and set up consultation if needed. met       Long Term Goals:  8 weeks Progress   1. Function: Patient will demonstrate improved function as indicated by a functional limitation score of less than or equal to 60 out of 100 on FOTO = 40 %  limitation PC   2. Mobility: Patient will improve AROM of bilateral knees to -10 degrees  in order to return to maximal functional potential and improve quality of life. PC   3. Gait: Patient will ambulate 15 feet with rolling walker with moderate assistance to demonstrate improved strength, endurance and mobility. PC   4. Balance: Modify FIST goal once completed. PC   5. HEP: Patient educated on HEP in preparation for d/c verbalizing and demonstrating good understanding of topics discussed.    PC   6.          Goals Key:  PC= progressing/continue;        PM= partially met;             DC= discontinue         PLAN     Continue Plan of Care (POC) and progress per patient tolerance. See Treatment section for exercise progression.  Outpatient Physical Therapy 2 times weekly for 8 weeks to include the following interventions: Electrical Stimulation Attended, Gait Training, Moist Heat/ Ice, Neuromuscular Re-ed, Orthotic Management and Training, Patient Education, Self Care, Therapeutic Activities, Therapeutic Exercise and Prosthetic Management.      Mindy Patino, PT, DPT

## 2022-04-12 ENCOUNTER — CLINICAL SUPPORT (OUTPATIENT)
Dept: REHABILITATION | Facility: HOSPITAL | Age: 82
End: 2022-04-12
Attending: INTERNAL MEDICINE
Payer: MEDICARE

## 2022-04-12 DIAGNOSIS — Z74.09 DECREASED STRENGTH, ENDURANCE, AND MOBILITY: ICD-10-CM

## 2022-04-12 DIAGNOSIS — M25.662 DECREASED RANGE OF MOTION OF BOTH LOWER EXTREMITIES: Primary | ICD-10-CM

## 2022-04-12 DIAGNOSIS — M25.661 DECREASED RANGE OF MOTION OF BOTH LOWER EXTREMITIES: Primary | ICD-10-CM

## 2022-04-12 DIAGNOSIS — R68.89 DECREASED STRENGTH, ENDURANCE, AND MOBILITY: ICD-10-CM

## 2022-04-12 DIAGNOSIS — R53.1 DECREASED STRENGTH, ENDURANCE, AND MOBILITY: ICD-10-CM

## 2022-04-12 PROCEDURE — 97110 THERAPEUTIC EXERCISES: CPT

## 2022-04-12 NOTE — PROGRESS NOTES
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  He reports that he has been putting it more on the left leg vs the right because he feels that his right leg is looser.  Response to previous treatment: good with no adverse effects.  Functional change:  He reports that he feels that his right leg has more motion.  He feels that his transfers are improved and he has been lifting higher with the squat (towards standing), making getting in and out of bed easier.   Pain: 0/10 at rest  Location: not applicable     OBJECTIVE     Objective Measures updated at progress report unless specified.   In supine:  Measures taken 2/22/22  Left Hip Extension = - 20  Right Hip Extension = - 25  Left Knee Extension = - 48  Right Knee Extension = - 45     3/3/22  In prone:   Hip Extension = - 20  Right, unable to get accurate measure on Left    3/17/22  Measurements of bilateral Knees and Hips taken in standing frame:  Hip Extension: Left  (-25) Right   (-25)  Knee Extension: Left  (-23)    Right  (-23)   Sitting: Knee Extension: Left:  (-35) AROM to (-30) PROM, Right: (-37) AROM, (-34) PROM    STRENGTH:                L/E MMT Right  2/18/22    Right  3/17/22 range of motion  2/18/22 Range of  motion  3/17/22 Left  2/18/22 Left   3/17/22 range of motion   2/18/22 Range of motion  3/17/22   Hip Flexion  3+/5 4-/5 Within Functional Limits  Within Functional Limits  3+/5 4-/5 Within Functional Limits  Within Functional Limits    Hip Extension  2+/5 2+/5 Limited but unable to accurately measure due to time constraints See above 2+/5 2+/5 Limited but unable to accurately measure due to time constraints See above   Hip Abduction  2+/5 3-/5 Limited but unable to accurately measure due to time constraints In sitting- approximately 10 degrees 2+/5 3-/5 Limited but unable to accurately measure due to time constraints In sitting - approximately 10 degrees   Knee Extension 3-/5 3+ within available Range -40 degrees See above 3-/5 3+ within available range -35 degrees See above   Knee Flexion 4-/5 4/5 Within Functional Limits  Within Functional Limits  4-/5 4/5 Within Functional Limits Within Functional Limits    Ankle DF 3+/5 3+/5 within available range To Neutral, limited by bandage To Neutral, limited by bandage not applicable  Not applicable not applicable  Not applicable   Ankle PF 3+/5 4-/5 To Neutral, limited by bandage 10 degrees not applicable  Not applicable not applicable  Not applicable          Treatment     Hunter Schofield received the treatments listed below:      THERAPEUTIC EXERCISES to develop strength, endurance, ROM, flexibility, posture and core stabilization for (50) minutes including:    Intervention Performed Today    short arc quad in supine  lower extremities over wedge 2x20 Right AROM, Left 2x20 Active Assistive for form and quality    sidelying toward prone   Stretch to Hip flexor 3x30 seconds each side   Quad set   2x10 Left     Sidelying hip extension   2x10 bilaterally with 10 seconds endrange stretch on last rep   [place] Left Prosthetic leg  Performed at edge of mat    Sitting in reclined position at edge of mat   Hip flexor stretch 3x30 seconds bilateral    Hip  Abduction  In sidelying 2x10 bilateral    Bridge attempt  1x5   Posterior Pelvic Tilt Supine  2x5   Prone lying over light blue small triangle wedge  Holding position 2 minutes, then 4 minutes   Stretch to Hamstrings  3 x 1 minute Right lower extremity    Heelslide with forceful push towards extension  2x10 bilateral with 10 second enrange hold stretch on last rep    10 # over distal Left thigh  Stretch to Left hip flexor for over 5 minutes while exercising Right lower extremity    Prone Hamstring curl endrange for quad/hip flexor stretch  2x10 AAROM   lower extremity lifts towards extension in prone  2x10 AAROM   Prone hip flexor stretch   3x10 second holds bilateral    Standing frame raising to patient's tolerance x    X    x  x  x               - Standing 3x's in standing frame holding each stand for 5-8 minutes working on the following:  - Horizontal Abduction with bilateral upper extremities 2x10 AROM and then with yellow theraband  - terminal knee extension in stand position 2x20 bilateral   - Rows with green theraband bilateral  2x10  - Pushing through upper extremities to achieve trunk extension 2x15  - scap retraction 2x10  - attempted 1x10 shoulder rolls backward - patient had difficulty coordinating  - Overhead press with green tband bilateral 1x10  - Patient given feedback on standing frame position - working on increasing seat angle after 30 second rest between lifts (repeated 3-4 times)  - Balloon Volley 1 x 25 hits working on upright trunk posture  - Pulling with bilateral upper extremities on tray at front to pull hips forward from seat and extend knees 3x10   NuStep   5 minutes at Level 1, working on alternating movement of lower extremities and stretch to bilateral knees   Standing Frame  Standing 1 x in standing frame working on holding endrange position of approximately 55 degree seat angle for 5 minutes   * In standing frame:  Sitting position = 0 degree seat angle  Full upright stand position  with hips and knees at 0 degrees = 90 degree seat angle  3/15/22 - 1st  standing frame was at 42 degree seat angle, last stand position was at 65 degree seat angle  3/17/22 - 1st  standing frame was at 48 degree seat angle, last stand position was at 70 degree seat angle  3/21/22 - 1st  standing frame was at 52 degree seat angle, last stand position was at 70 degree seat angle  3/29/22 - 1st  standing frame was at 60 degree seat angle, last stand position was at 72 degree seat angle  3/31/22 - 1st  standing frame was at 55 degree seat angle, last stand position was at 75 degree seat angle  4/7/22 - 1st  standing frame was at 60 degree seat angle, last stand position was at 80 degree seat angle  4/11/22 - 1st  standing frame was at 60 degree seat angle, last stand position was at 78 degree seat angle  Plan for Next Visit:      manual therapy techniques: Joint mobilizations, Manual traction and Soft tissue Mobilization were applied to the:(0) minutes, including:    Intervention Performed Today    Patella glides superiorly  2 minutes bilateral    Anterior tibia glide Grade 1-2  1 minute bilateral                                      Neuromuscular Re-Education activities to improve: Balance, Coordination, Sense, Proprioception and Posture for 0 minutes. The following activities were included:    Intervention Performed Today                                                THERAPEUTIC ACTIVITIES to improve dynamic and functional  performance for () minutes including:    Intervention Performed Today    Bed - wheelchair transfer   Practiced set up and sequence   Bed mobility sit to supine to sit  Practiced sequence    Rolling   3 x each side working on sequence to incorporate hip flexor stretch on each side. Then separate stretch performed as listed above   Standing in parallel bars  3 x's working on several weight shifts towards hip extension and knee extension while  standing.   Standing in parallel bars performing step forward  With Right lower extremity 5x's with moderate assistance to maintain standing position   Dynasplint Consult via FaceTime with Andriy to discuss fit     Sit to  parallel bars without assistance  2x's   Dynasplint Consult  Andriy Stout (Rep for Dynasplint) met with physical therapist and patient today during session to discuss options for dynasplints for bilateral knees due to patient's significant lack of range of motion.     Dynasplint Fitting  Andriy Stout (Rep for Dynasplint) met with physical therapist and patient today to deliver and fit patient for bilateral knee dynasplints.  We discussed wearing time of 1-2 hours/day for the first week and then we would re-assess.  Patient was shown how to don each brace and straps were marked for future use.  The Dynasplint force was increased to 6 on the Left and 7 on the Right to start.  Extra padding was added to thin straps for skin protection.          Plan for Next Visit:      Gait Training: for (0) minutes:  4/7/22 - Ambulating 1x 8 ft in the parallel bars with bilateral hand support, sheet support around hips for therapist to facilitate hip extension and overall moderate assistance for postural facilitation and cues for sequence.  4/7/22 - Ambulated with walker with sheet assistance to for hip extension for 5 feet with helper following with wheelchair.    Limitation/Restriction for FOTO Amputation Survey     Therapist reviewed FOTO scores for Hunter Schofield on 3/17/2022.   FOTO documents entered into [employer] - see Media section.     Limitation Score: 52%            Patient Education and Home Exercises     Home Exercises Provided and Patient Education Provided     Education provided:   -2/22/22 Patient educated on increasing length and consistency of stretching knees and hips to every 2 hours at home.  He was shown and demonstrated understanding of short arc quads, rolling toward prone and sitting in  reclined position to stretch hip flexors.  -2/24/22 Patient given Home Exercise Program on exercises for lower extremities to be performed 2x15 3x's/day  See patient Instructions in EMR  -3/1/22 Re-inforced Home Exercise Program and asked patient to incorporate prone lying for longer periods of time to build up to 20 minutes per day.  3/3/22 - Discussed the option of dynasplints for bilateral lower extremities. Patient educated on the option of Dynasplints vs basic knee braces and encouraged to consider Dynasplints as a more aggressive and efficient way of properly positioning and dynamically stretching his legs outside of therapy.  3/8/22 - Patient was educated on Dynasplints with Andriy Stout (Rep).   He was shown pictures on ipad and Andriy and physical therapist discussed wearing schedule to make gains with his muscle length outside of therapy sessions.  Andriy informed patient that he would assist with collecting information regarding insurance coverage and provide him with any out of pocket costs so he can decide if he would like to pursue this option.  3/22/22 - Patient was educated that Andriy Stout (Rep for Dynasplint) messaged me that his splints have been approved and are being mailed this week.   3/24/22 - Patient was educated on Dynasplint management and importance of daily skin checks.  His tolerance of wearing splints for the first week will determine his wearing schedule for the following weeks.  3/29/22 - Patient was educated on only wearing Dynasplints while lying down to get the most effective stretch to lower extremities.  3/31/22 - Patient was asked to reach out to Dynasplint rep (Andriy) to set up a time for Andriy to re-assess the positioning and fit of splints to see if he can feel more of a stretch with wearing schedule.  4/7/22 - Patient was asked to reach out to Dynasplint rep to make sure that splints are adjusted appropriately.  4/11/22 - PT recommended that patient follow up with Dynasplint rep  for reassessment of splint fit.  Home Exercise Provided: 2/24/22 Exercises were reviewed and Hunter Schofield was able to demonstrate them prior to the end of the session.  Hunter Schofield demonstrated good  understanding of the education provided.   ASSESSMENT     Pt responded well to session today with good effort and stand endurance in the standing frame.  He was not able to reach 80 degree seat angle due to posterior knee pain/tightness as compared to last session.  He is still making good gains with PT.  PT contacted Dynasplint rep on behalf of patient to set up a home visit for him to re-assess the Dynasplints fit and tension with the patient.    Pt prognosis is Fair  To Good    Pt will continue to benefit from skilled outpatient physical therapy to address the deficits listed in the problem list box on initial evaluation, provide pt/family education and to maximize pt's level of independence in the home and community environment.     Pt's spiritual, cultural and educational needs considered and pt agreeable to plan of care and goals.     Anticipated Barriers for therapy: co-morbidities, transportation assistance needed at times, lifestyle, potential contractures of knees/hips    GOALS:     Short Term Goals:  4 weeks Progress    1. Function: Patient will demonstrate improved function as indicated by a functional limitation score of less than or equal to 55 out of 100 on FOTO = 44% limitation PC   2. Mobility: Patient will improve sit to stand transfer with moderate assistance in order to progress towards independence with functional activities.  met   3. Gait: Patient will ambulated 3 steps in parallel bars with Maximum assistance to demonstrate improved strength, posture and endurance met   4. Balance: Complete FIST test upon next visit. PC   5. HEP: Patient will demonstrate independence with HEP in order to progress toward functional independence. met    6. Assess patient's needs for Dynasplints and  set up consultation if needed. met       Long Term Goals:  8 weeks Progress   1. Function: Patient will demonstrate improved function as indicated by a functional limitation score of less than or equal to 60 out of 100 on FOTO = 40 % limitation PC   2. Mobility: Patient will improve AROM of bilateral knees to -10 degrees  in order to return to maximal functional potential and improve quality of life. PC   3. Gait: Patient will ambulate 15 feet with rolling walker with moderate assistance to demonstrate improved strength, endurance and mobility. PC   4. Balance: Modify FIST goal once completed. PC   5. HEP: Patient educated on HEP in preparation for d/c verbalizing and demonstrating good understanding of topics discussed.    PC   6.          Goals Key:  PC= progressing/continue;        PM= partially met;             DC= discontinue         PLAN     Continue Plan of Care (POC) and progress per patient tolerance. See Treatment section for exercise progression.  Outpatient Physical Therapy 2 times weekly for 8 weeks to include the following interventions: Electrical Stimulation Attended, Gait Training, Moist Heat/ Ice, Neuromuscular Re-ed, Orthotic Management and Training, Patient Education, Self Care, Therapeutic Activities, Therapeutic Exercise and Prosthetic Management.      Mindy Patino, PT, DPT

## 2022-04-14 ENCOUNTER — CLINICAL SUPPORT (OUTPATIENT)
Dept: REHABILITATION | Facility: HOSPITAL | Age: 82
End: 2022-04-14
Attending: INTERNAL MEDICINE
Payer: MEDICARE

## 2022-04-14 DIAGNOSIS — R53.1 DECREASED STRENGTH, ENDURANCE, AND MOBILITY: ICD-10-CM

## 2022-04-14 DIAGNOSIS — M25.662 DECREASED RANGE OF MOTION OF BOTH LOWER EXTREMITIES: Primary | ICD-10-CM

## 2022-04-14 DIAGNOSIS — R68.89 DECREASED STRENGTH, ENDURANCE, AND MOBILITY: ICD-10-CM

## 2022-04-14 DIAGNOSIS — M25.661 DECREASED RANGE OF MOTION OF BOTH LOWER EXTREMITIES: Primary | ICD-10-CM

## 2022-04-14 DIAGNOSIS — Z74.09 DECREASED STRENGTH, ENDURANCE, AND MOBILITY: ICD-10-CM

## 2022-04-14 PROCEDURE — 97110 THERAPEUTIC EXERCISES: CPT

## 2022-04-14 PROCEDURE — 97530 THERAPEUTIC ACTIVITIES: CPT

## 2022-04-14 NOTE — PROGRESS NOTES
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  Physician: Fallon Dudley*    Visit Date: 4/14/2022     Physician Orders: PT Eval and Treat   Medical Diagnosis from Referral: s/p Left BKA  Evaluation Date: 2/18/2022  Authorization Period Expiration: 12/31/22  Plan of Care Expiration: 6/9/22  Progress Note Due: 5/14/22  Visit # / Visits authorized:15/20 (+1 eval)  FOTO: 3/3     Precautions: Standard, Diabetes, Fall and wound on R foot, Incontinence    PTA Visit #: 0/5     Time In: 1018  Time Out: 1108   Total Billable Time: 50 minutes    SUBJECTIVE     Pt reports: that he is sleeping with his old splints and he put a heating pad on his hamstrings before the session today to help loosen his legs for stretching.  Response to previous treatment: good with no adverse effects.  Functional change:  He reports that he feels that his right leg has more motion.  He feels that his transfers are improved and he has been lifting higher with the squat (towards standing), making getting in and out of bed easier.   Pain: 0/10 at rest  Location: not applicable     OBJECTIVE     Objective Measures updated at progress report unless specified.  Re-Assessment for (10) minutes    Joint Measured 2/22/22 3/7/22 4/14/22   Left Hip Extension -20 -25 -20    Right Hip Extension -25 -25 -25   Left Knee Extension -48 -35 AROM, -30 PROM -15   Right Knee Extension -45 -37 AROM, -34 PROM -12     3/3/22  In prone:   Hip Extension = - 20  Right, unable to get accurate measure on Left      STRENGTH:              L/E MMT Right  2/18/22    Right  3/17/22 Right   4/14/22 Left  2/18/22 Left   3/17/22 Left   4/14/22   Hip Flexion  3+/5 4-/5 4-/5 3+/5 4-/5 4-/5   Hip Extension  2+/5 2+/5 2+/5 2+/5 2+/5 2+/5   Hip Abduction  2+/5 3-/5 3-/5 2+/5 3-/5 3-/5   Knee Extension 3-/5 3+  within available Range 4-/5 within available range 3-/5 3+ within available range 3+/5 within available range   Knee Flexion 4-/5 4/5 4+/5 4-/5 4/5 4/5   Ankle DF 3+/5 3+/5 within available range 3+/5 within available range (-10 degrees from neurtral) not applicable  Not applicable not applicable    Ankle PF 3+/5 4-/5 4/5 not applicable  Not applicable not applicable           Treatment     Hunter Schofield received the treatments listed below:      THERAPEUTIC EXERCISES to develop strength, endurance, ROM, flexibility, posture and core stabilization for (50) minutes including:    Intervention Performed Today    short arc quad in supine  lower extremities over wedge 2x20 Right AROM, Left 2x20 Active Assistive for form and quality    sidelying toward prone   Stretch to Hip flexor 3x30 seconds each side   Quad set   2x10 Left     Sidelying hip extension   2x10 bilaterally with 10 seconds endrange stretch on last rep   [place] Left Prosthetic leg  Performed at edge of mat    Sitting in reclined position at edge of mat   Hip flexor stretch 3x30 seconds bilateral    Hip Abduction  In sidelying 2x10 bilateral    Bridge attempt  1x5   Posterior Pelvic Tilt Supine  2x5   Prone lying over light blue small triangle wedge  Holding position 2 minutes, then 4 minutes   Stretch to Hamstrings  3 x 1 minute Right lower extremity    Heelslide with forceful push towards extension  2x10 bilateral with 10 second enrange hold stretch on last rep    10 # over distal Left thigh  Stretch to Left hip flexor for over 5 minutes while exercising Right lower extremity    Prone Hamstring curl endrange for quad/hip flexor stretch  2x10 AAROM   lower extremity lifts towards extension in prone  2x10 AAROM   Prone hip flexor stretch   3x10 second holds bilateral    Standing frame raising to patient's tolerance x    X    x  x  x          x        X  x - Standing 3x's in standing frame holding each stand for 5-8 minutes working on the  following:  - Horizontal Abduction with bilateral upper extremities 2x10 AROM   - terminal knee extension in stand position 2x20 bilateral   - Rows with green theraband bilateral  2x10  - Pushing through upper extremities to achieve trunk extension 2x15  - scap retraction 2x10  - attempted 1x10 shoulder rolls backward - patient had difficulty coordinating  - Overhead press with green tband bilateral 1x10  - Patient given feedback on standing frame position - working on increasing seat angle after 30 second rest between lifts (repeated 3-4 times)  - Balloon Volley 1 x 25 hits working on upright trunk posture  - Pulling with bilateral upper extremities on tray at front to pull hips forward from seat and extend knees 4x10   NuStep   5 minutes at Level 1, working on alternating movement of lower extremities and stretch to bilateral knees   Standing Frame  Standing 1 x in standing frame working on holding endrange position of approximately 55 degree seat angle for 5 minutes   * In standing frame:  Sitting position = 0 degree seat angle  Full upright stand position with hips and knees at 0 degrees = 90 degree seat angle  3/15/22 - 1st  standing frame was at 42 degree seat angle, last stand position was at 65 degree seat angle  3/17/22 - 1st  standing frame was at 48 degree seat angle, last stand position was at 70 degree seat angle  3/21/22 - 1st  standing frame was at 52 degree seat angle, last stand position was at 70 degree seat angle  3/29/22 - 1st  standing frame was at 60 degree seat angle, last stand position was at 72 degree seat angle  3/31/22 - 1st  standing frame was at 55 degree seat angle, last stand position was at 75 degree seat angle  4/7/22 - 1st  standing frame was at 60 degree seat angle, last stand position was at 80 degree seat angle  4/11/22 - 1st  standing frame was at 60 degree seat angle, last stand position was at 78 degree seat  angle  4/14/22 - 1st  standing frame was at 55 degree seat angle, last stand position was at 86 degree seat angle  Plan for Next Visit:      manual therapy techniques: Joint mobilizations, Manual traction and Soft tissue Mobilization were applied to the:(0) minutes, including:    Intervention Performed Today    Patella glides superiorly  2 minutes bilateral    Anterior tibia glide Grade 1-2  1 minute bilateral                                      Neuromuscular Re-Education activities to improve: Balance, Coordination, Sense, Proprioception and Posture for 0 minutes. The following activities were included:    Intervention Performed Today                                                THERAPEUTIC ACTIVITIES to improve dynamic and functional  performance for () minutes including:    Intervention Performed Today    Bed - wheelchair transfer   Practiced set up and sequence   Bed mobility sit to supine to sit  Practiced sequence    Rolling   3 x each side working on sequence to incorporate hip flexor stretch on each side. Then separate stretch performed as listed above   Standing in parallel bars  3 x's working on several weight shifts towards hip extension and knee extension while standing.   Standing in parallel bars performing step forward  With Right lower extremity 5x's with moderate assistance to maintain standing position   [facility]riy to discuss fit     Sit to  parallel bars without assistance  2x's   Dynasplint Consult  Andriy Stout (Rep for Dynasplint) met with physical therapist and patient today during session to discuss options for dynasplints for bilateral knees due to patient's significant lack of range of motion.     Dynasplint Fitting  Andriy Stout (Rep for Dynasplint) met with physical therapist and patient today to deliver and fit patient for bilateral knee dynasplints.  We discussed wearing time of 1-2 hours/day for the first week and then we would re-assess.   Patient was shown how to don each brace and straps were marked for future use.  The Dynasplint force was increased to 6 on the Left and 7 on the Right to start.  Extra padding was added to thin straps for skin protection.          Plan for Next Visit:      Gait Training: for (0) minutes:  4/7/22 - Ambulating 1x 8 ft in the parallel bars with bilateral hand support, sheet support around hips for therapist to facilitate hip extension and overall moderate assistance for postural facilitation and cues for sequence.  4/7/22 - Ambulated with walker with sheet assistance to for hip extension for 5 feet with helper following with wheelchair.    Limitation/Restriction for FOTO Amputation Survey     Therapist reviewed FOTO scores for Hunter Schofield on 3/17/2022.   FOTO documents entered into [employer] - see Media section.     Limitation Score: 52%  4/14/22 - Limitation Score: 45%            Patient Education and Home Exercises     Home Exercises Provided and Patient Education Provided     Education provided:   -2/22/22 Patient educated on increasing length and consistency of stretching knees and hips to every 2 hours at home.  He was shown and demonstrated understanding of short arc quads, rolling toward prone and sitting in reclined position to stretch hip flexors.  -2/24/22 Patient given Home Exercise Program on exercises for lower extremities to be performed 2x15 3x's/day  See patient Instructions in EMR  -3/1/22 Re-inforced Home Exercise Program and asked patient to incorporate prone lying for longer periods of time to build up to 20 minutes per day.  3/3/22 - Discussed the option of dynasplints for bilateral lower extremities. Patient educated on the option of Dynasplints vs basic knee braces and encouraged to consider Dynasplints as a more aggressive and efficient way of properly positioning and dynamically stretching his legs outside of therapy.  3/8/22 - Patient was educated on Dynasplints with Andriy Stout (Rep).   He  was shown pictures on ipad and Andriy and physical therapist discussed wearing schedule to make gains with his muscle length outside of therapy sessions.  Andriy informed patient that he would assist with collecting information regarding insurance coverage and provide him with any out of pocket costs so he can decide if he would like to pursue this option.  3/22/22 - Patient was educated that Andriy Stout (Rep for Dynasplint) messaged me that his splints have been approved and are being mailed this week.   3/24/22 - Patient was educated on Dynasplint management and importance of daily skin checks.  His tolerance of wearing splints for the first week will determine his wearing schedule for the following weeks.  3/29/22 - Patient was educated on only wearing Dynasplints while lying down to get the most effective stretch to lower extremities.  3/31/22 - Patient was asked to reach out to Dynasplint rep (Andriy) to set up a time for Andriy to re-assess the positioning and fit of splints to see if he can feel more of a stretch with wearing schedule.  4/7/22 - Patient was asked to reach out to Dynasplint rep to make sure that splints are adjusted appropriately.  4/11/22 - PT recommended that patient follow up with Dynasplint rep for reassessment of splint fit.  4/14/22 - Patient informed that PT would reach out to Dynasplint rep again to have him schedule a visit.  He was encouraged to continue being active at home.  Home Exercise Provided: 2/24/22 Exercises were reviewed and Hunter Schofield was able to demonstrate them prior to the end of the session.  Hunter Schofield demonstrated good  understanding of the education provided.   ASSESSMENT     Pt responded well to session today with good effort and stand endurance in the standing frame.  He achieved an 86 degree seat angle and had improved bilateral knee extension measurements as compared to when it was last assessed.  He also improved with his bilateral lower extremity  strength.  He has been very consistent with his attendance in therapy and has gradually increased his tolerance in standing frame from 42 degree seat angle to 86 degrees today.  He has received bilateral dynasplints and been compliant wearing them, but reports that he does not feel a good stretch.  Dynasplint rep has been consulted to follow up.  He would benefit from continued PT to work towards improving his transfers and gait safety in order to improve his overall independence within his home.    Pt prognosis is Fair  To Good    Pt will continue to benefit from skilled outpatient physical therapy to address the deficits listed in the problem list box on initial evaluation, provide pt/family education and to maximize pt's level of independence in the home and community environment.     Pt's spiritual, cultural and educational needs considered and pt agreeable to plan of care and goals.     Anticipated Barriers for therapy: co-morbidities, transportation assistance needed at times, lifestyle, potential contractures of knees/hips    GOALS:     Short Term Goals:  4 weeks Progress    1. Function: Patient will demonstrate improved function as indicated by a functional limitation score of less than or equal to 55 out of 100 on FOTO = 44% limitation PC   2. Mobility: Patient will improve sit to stand transfer with moderate assistance in order to progress towards independence with functional activities.  met   3. Gait: Patient will ambulated 3 steps in parallel bars with Maximum assistance to demonstrate improved strength, posture and endurance met   4. Balance: Complete FIST test upon next visit. PC   5. HEP: Patient will demonstrate independence with HEP in order to progress toward functional independence. met    6. Assess patient's needs for Dynasplints and set up consultation if needed. met       Long Term Goals:  8 weeks Progress   1. Function: Patient will demonstrate improved function as indicated by a functional  limitation score of less than or equal to 60 out of 100 on FOTO = 40 % limitation PC   2. Mobility: Patient will improve AROM of bilateral knees to -10 degrees  in order to return to maximal functional potential and improve quality of life. PC   3. Gait: Patient will ambulate 15 feet with rolling walker with moderate assistance to demonstrate improved strength, endurance and mobility. PC   4. Balance: Modify FIST goal once completed. PC   5. HEP: Patient educated on HEP in preparation for d/c verbalizing and demonstrating good understanding of topics discussed.    PC   6.          Goals Key:  PC= progressing/continue;        PM= partially met;             DC= discontinue         PLAN     Continue Plan of Care (POC) and progress per patient tolerance. See Treatment section for exercise progression.  Outpatient Physical Therapy 2 times weekly for 8 weeks to include the following interventions: Electrical Stimulation Attended, Gait Training, Moist Heat/ Ice, Neuromuscular Re-ed, Orthotic Management and Training, Patient Education, Self Care, Therapeutic Activities, Therapeutic Exercise and Prosthetic Management.      Mindy Patino, PT, DPT

## 2022-04-14 NOTE — PLAN OF CARE
Outpatient Therapy Updated Plan of Care     Visit Date: 4/14/2022  Name: Hunter Schofield  Clinic Number: [id number]    Therapy Diagnosis:   Encounter Diagnoses   Name Primary?    Decreased range of motion of both lower extremities Yes    Decreased strength, endurance, and mobility      Physician: Fallon Dudley*    Physician Orders: PT Eval and Treat   Medical Diagnosis from Referral: s/p Left BKA  Evaluation Date: 2/18/2022     Total Visits Received: 16  Cancelled Visits: 1 due to weather (patient propels himself from his home to the therapy clinic)  No Show Visits: 0    Current Certification Period:  2/18/22 to 4/15/22  Precautions:  Standard, Diabetes, Fall and wound on R foot, Incontinence    Visits from Evaluation Date:  15  Functional Level Prior to Evaluation:  Independent within his home, using wheelchair for all functional mobility.  Needing to change brief, clothes and bathe at bed level.    Subjective     Update: Patient reports that he is very excited about his progress.  He looks forward to attending his PT sessions and sees the benefit of using the standing frame to improve the length of his lower extremity muscles.      Objective     Update:     Joint Measured 2/22/22 3/7/22 4/14/22   Left Hip Extension -20 -25 -20    Right Hip Extension -25 -25 -25   Left Knee Extension -48 -35 AROM, -30 PROM -15   Right Knee Extension -45 -37 AROM, -34 PROM -12     3/3/22  In prone:   Hip Extension = - 20  Right, unable to get accurate measure on Left      STRENGTH:              L/E MMT Right  2/18/22    Right  3/17/22 Right   4/14/22 Left  2/18/22 Left   3/17/22 Left   4/14/22   Hip Flexion  3+/5 4-/5 4-/5 3+/5 4-/5 4-/5   Hip Extension  2+/5 2+/5 2+/5 2+/5 2+/5 2+/5   Hip Abduction  2+/5 3-/5 3-/5 2+/5 3-/5 3-/5   Knee Extension 3-/5 3+ within available Range 4-/5 within available range 3-/5 3+ within available range 3+/5 within available range   Knee Flexion 4-/5 4/5 4+/5 4-/5 4/5 4/5   Ankle DF 3+/5  3+/5 within available range 3+/5 within available range (-10 degrees from neurtral) not applicable  Not applicable not applicable    Ankle PF 3+/5 4-/5 4/5 not applicable  Not applicable not applicable        Assessment     Update:   Pt responded well to sessions with good effort.  He has improved his stand endurance in the standing frame.  He has improved bilateral knee extension measurements and strength as compared to when it was last assessed.  He has been very consistent with his attendance in therapy and has gradually increased his tolerance in standing frame from 42 degree seat angle to 86 degrees today.  He has received bilateral dynasplints and been compliant wearing them, but reports that he wants to feel more of a stretch.  Dynasplint rep has been consulted to follow up.  He would benefit from continued PT to work towards improving his transfers and gait safety in order to improve his overall independence within his home and progress to a walker.      Short Term Goals:  4 weeks Progress    1. Function: Patient will demonstrate improved function as indicated by a functional limitation score of less than or equal to 55 out of 100 on FOTO = 44% limitation PC   2. Mobility: Patient will improve sit to stand transfer with moderate assistance in order to progress towards independence with functional activities.  met   3. Gait: Patient will ambulated 3 steps in parallel bars with Maximum assistance to demonstrate improved strength, posture and endurance met   4. Balance: Complete FIST test upon next visit. PC   5. HEP: Patient will demonstrate independence with HEP in order to progress toward functional independence. met    6. Assess patient's needs for Dynasplints and set up consultation if needed. met       Long Term Goals:  8 weeks Progress   1. Function: Patient will demonstrate improved function as indicated by a functional limitation score of less than or equal to 60 out of 100 on FOTO = 40 % limitation PC    2. Mobility: Patient will improve AROM of bilateral knees to -10 degrees  in order to return to maximal functional potential and improve quality of life. PC   3. Gait: Patient will ambulate 15 feet with rolling walker with moderate assistance to demonstrate improved strength, endurance and mobility. PC   4. Balance: Modify FIST goal once completed. PC   5. HEP: Patient educated on HEP in preparation for d/c verbalizing and demonstrating good understanding of topics discussed.    PC   6.          Goals Key:  PC= progressing/continue;        PM= partially met;             DC= discontinue        Reasons for Recertification of Therapy:   Hunter is making great gains with his bilateral hip and knee range of motion in order to progress to gait training with walker.  He would benefit from continued therapy to progress towards his goal of functional mobility independence with walker.    Plan     Updated Certification Period: 4/14/2022 to 6/9/22  Recommended Treatment Plan: 2 times per week for 8 weeks: Electrical Stimulation Attended, Gait Training, Manual Therapy, Moist Heat/ Ice, Neuromuscular Re-ed, Orthotic Management and Training, Patient Education, Self Care, Therapeutic Activities, Therapeutic Exercise and Dry Needling  Other Recommendations: None at this time    Mindy Patino, PT  4/14/2022      I CERTIFY THE NEED FOR THESE SERVICES FURNISHED UNDER THIS PLAN OF TREATMENT AND WHILE UNDER MY CARE    Physician's comments:        Physician's Signature: ___________________________________________________

## 2022-04-19 ENCOUNTER — CLINICAL SUPPORT (OUTPATIENT)
Dept: REHABILITATION | Facility: HOSPITAL | Age: 82
End: 2022-04-19
Attending: INTERNAL MEDICINE
Payer: MEDICARE

## 2022-04-19 DIAGNOSIS — M25.662 DECREASED RANGE OF MOTION OF BOTH LOWER EXTREMITIES: Primary | ICD-10-CM

## 2022-04-19 DIAGNOSIS — M25.661 DECREASED RANGE OF MOTION OF BOTH LOWER EXTREMITIES: Primary | ICD-10-CM

## 2022-04-19 DIAGNOSIS — Z74.09 DECREASED STRENGTH, ENDURANCE, AND MOBILITY: ICD-10-CM

## 2022-04-19 DIAGNOSIS — R68.89 DECREASED STRENGTH, ENDURANCE, AND MOBILITY: ICD-10-CM

## 2022-04-19 DIAGNOSIS — R53.1 DECREASED STRENGTH, ENDURANCE, AND MOBILITY: ICD-10-CM

## 2022-04-19 PROCEDURE — 97110 THERAPEUTIC EXERCISES: CPT

## 2022-04-19 NOTE — PROGRESS NOTES
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  Physician: Fallon Dudley*    Visit Date: 4/19/2022     Physician Orders: PT Eval and Treat   Medical Diagnosis from Referral: s/p Left BKA  Evaluation Date: 2/18/2022  Authorization Period Expiration: 12/31/22  Plan of Care Expiration: 6/9/22  Progress Note Due: 5/14/22  Visit # / Visits authorized:16/20 (+1 eval)  FOTO: 3/3     Precautions: Standard, Diabetes, Fall and wound on R foot, Incontinence    PTA Visit #: 0/5     Time In: 1005  Time Out: 1100   Total Billable Time: 55 minutes    SUBJECTIVE     Pt reports: that he is sleeping with his old splints but they are not staying on his legs at night.  He put a heating pad on his hamstrings before the session today to help loosen his legs for stretching.  Response to previous treatment: good with no adverse effects.  Functional change:  He reports that he is now performing transfers to a rolling stool and scooting with his legs and using furniture with his arms to roll himself into his bathroom in order to use his toilet vs his bedside commode.  Pain: 0/10 at rest  Location: not applicable     OBJECTIVE     Objective Measures updated at progress report unless specified.  Re-Assessment for (0) minutes    Joint Measured 2/22/22 3/7/22 4/14/22   Left Hip Extension -20 -25 -20    Right Hip Extension -25 -25 -25   Left Knee Extension -48 -35 AROM, -30 PROM -15   Right Knee Extension -45 -37 AROM, -34 PROM -12     3/3/22  In prone:   Hip Extension = - 20  Right, unable to get accurate measure on Left      STRENGTH:              L/E MMT Right  2/18/22    Right  3/17/22 Right   4/14/22 Left  2/18/22 Left   3/17/22 Left   4/14/22   Hip Flexion  3+/5 4-/5 4-/5 3+/5 4-/5 4-/5   Hip Extension  2+/5 2+/5 2+/5 2+/5 2+/5 2+/5   Hip Abduction  2+/5 3-/5 3-/5 2+/5  3-/5 3-/5   Knee Extension 3-/5 3+ within available Range 4-/5 within available range 3-/5 3+ within available range 3+/5 within available range   Knee Flexion 4-/5 4/5 4+/5 4-/5 4/5 4/5   Ankle DF 3+/5 3+/5 within available range 3+/5 within available range (-10 degrees from neurtral) not applicable  Not applicable not applicable    Ankle PF 3+/5 4-/5 4/5 not applicable  Not applicable not applicable           Treatment     Hunter Schofield received the treatments listed below:      THERAPEUTIC EXERCISES to develop strength, endurance, ROM, flexibility, posture and core stabilization for (55) minutes including:    Intervention Performed Today    short arc quad in supine  lower extremities over wedge 2x20 Right AROM, Left 2x20 Active Assistive for form and quality    sidelying toward prone   Stretch to Hip flexor 3x30 seconds each side   Quad set   2x10 Left     Sidelying hip extension   2x10 bilaterally with 10 seconds endrange stretch on last rep   [place] Left Prosthetic leg  Performed at edge of mat    Sitting in reclined position at edge of mat   Hip flexor stretch 3x30 seconds bilateral    Hip Abduction  In sidelying 2x10 bilateral    Bridge attempt  1x5   Posterior Pelvic Tilt Supine  2x5   Prone lying over light blue small triangle wedge  Holding position 2 minutes, then 4 minutes   Stretch to Hamstrings  3 x 1 minute Right lower extremity    Heelslide with forceful push towards extension  2x10 bilateral with 10 second enrange hold stretch on last rep    10 # over distal Left thigh  Stretch to Left hip flexor for over 5 minutes while exercising Right lower extremity    Prone Hamstring curl endrange for quad/hip flexor stretch  2x10 AAROM   lower extremity lifts towards extension in prone  2x10 AAROM   Prone hip flexor stretch   3x10 second holds bilateral    Standing frame raising to patient's tolerance x    X    x  x  x    x      x        X  X    X   - Standing 3x's in standing frame holding  each stand for 5-8 minutes working on the following:  - Horizontal Abduction with bilateral upper extremities 2x10 AROM   - terminal knee extension in stand position 2x10 bilateral   - Rows with green theraband bilateral  2x10  - Pushing through upper extremities to achieve trunk extension 2x10  - scap retraction 2x10  - attempted 1x10 shoulder rolls backward - patient had difficulty coordinating  - Overhead press with green tband bilateral 1x10  - Patient given feedback on standing frame position - working on increasing seat angle after 30 second rest between lifts (repeated 3-4 times)  - Beach [employer] Vol[employer] x 25 hits working on upright trunk posture  - Pulling with bilateral upper extremities on tray at front to pull hips forward from seat and extend knees 2x10  - Reaching Left and Right with cones to target to stretch lower extremities with each reach 1x12 cones to each side   NuStep   5 minutes at Level 1, working on alternating movement of lower extremities and stretch to bilateral knees   Standing Frame  Standing 1 x in standing frame working on holding endrange position of approximately 55 degree seat angle for 5 minutes   * Moist Heat to bilateral hamstrings in standing frame to assist with stretching for 10-15 minutes.    * In standing frame:  Sitting position = 0 degree seat angle  Full upright stand position with hips and knees at 0 degrees = 90 degree seat angle  3/15/22 - 1st  standing frame was at 42 degree seat angle, last stand position was at 65 degree seat angle  3/17/22 - 1st  standing frame was at 48 degree seat angle, last stand position was at 70 degree seat angle  3/21/22 - 1st  standing frame was at 52 degree seat angle, last stand position was at 70 degree seat angle  3/29/22 - 1st  standing frame was at 60 degree seat angle, last stand position was at 72 degree seat angle  3/31/22 - 1st  standing frame was at 55 degree seat angle, last stand position  was at 75 degree seat angle  4/7/22 - 1st  standing frame was at 60 degree seat angle, last stand position was at 80 degree seat angle  4/11/22 - 1st  standing frame was at 60 degree seat angle, last stand position was at 78 degree seat angle  4/14/22 - 1st  standing frame was at 55 degree seat angle, last stand position was at 86 degree seat angle  4/19/22 - 1st  standing frame was at 60 degree seat angle, last stand position was at 78 degree seat angle  Plan for Next Visit:      manual therapy techniques: Joint mobilizations, Manual traction and Soft tissue Mobilization were applied to the:(0) minutes, including:    Intervention Performed Today    Patella glides superiorly  2 minutes bilateral    Anterior tibia glide Grade 1-2  1 minute bilateral                                      Neuromuscular Re-Education activities to improve: Balance, Coordination, Sense, Proprioception and Posture for 0 minutes. The following activities were included:    Intervention Performed Today                                                THERAPEUTIC ACTIVITIES to improve dynamic and functional  performance for () minutes including:    Intervention Performed Today    Bed - wheelchair transfer   Practiced set up and sequence   Bed mobility sit to supine to sit  Practiced sequence    Rolling   3 x each side working on sequence to incorporate hip flexor stretch on each side. Then separate stretch performed as listed above   Standing in parallel bars  3 x's working on several weight shifts towards hip extension and knee extension while standing.   Standing in parallel bars performing step forward  With Right lower extremity 5x's with moderate assistance to maintain standing position   Dynasplint Consult via [place] with Andriy to discuss fit     Sit to  parallel bars without assistance  2x's   Dynasplint Consult  Andriy Stout (Rep for Dynasplint) met with physical therapist and patient today during  session to discuss options for dynasplints for bilateral knees due to patient's significant lack of range of motion.     Dynasplint Fitting  Andriyjohn Stout (Rep for Dynasplint) met with physical therapist and patient today to deliver and fit patient for bilateral knee dynasplints.  We discussed wearing time of 1-2 hours/day for the first week and then we would re-assess.  Patient was shown how to don each brace and straps were marked for future use.  The Dynasplint force was increased to 6 on the Left and 7 on the Right to start.  Extra padding was added to thin straps for skin protection.          Plan for Next Visit:      Gait Training: for (0) minutes:  4/7/22 - Ambulating 1x 8 ft in the parallel bars with bilateral hand support, sheet support around hips for therapist to facilitate hip extension and overall moderate assistance for postural facilitation and cues for sequence.  4/7/22 - Ambulated with walker with sheet assistance to for hip extension for 5 feet with helper following with wheelchair.    Limitation/Restriction for FOTO Amputation Survey     Therapist reviewed FOTO scores for Hunter Schofield on 3/17/2022.   FOTO documents entered into [facility] - see Media section.     Limitation Score: 52%  4/14/22 - Limitation Score: 45%            Patient Education and Home Exercises     Home Exercises Provided and Patient Education Provided     Education provided:   -2/22/22 Patient educated on increasing length and consistency of stretching knees and hips to every 2 hours at home.  He was shown and demonstrated understanding of short arc quads, rolling toward prone and sitting in reclined position to stretch hip flexors.  -2/24/22 Patient given Home Exercise Program on exercises for lower extremities to be performed 2x15 3x's/day  See patient Instructions in EMR  -3/1/22 Re-inforced Home Exercise Program and asked patient to incorporate prone lying for longer periods of time to build up to 20 minutes per day.  3/3/22 -  Discussed the option of dynasplints for bilateral lower extremities. Patient educated on the option of Dynasplints vs basic knee braces and encouraged to consider Dynasplints as a more aggressive and efficient way of properly positioning and dynamically stretching his legs outside of therapy.  3/8/22 - Patient was educated on Dynasplints with Andriy Stout (Rep).   He was shown pictures on ipad and Andriy and physical therapist discussed wearing schedule to make gains with his muscle length outside of therapy sessions.  Andriy informed patient that he would assist with collecting information regarding insurance coverage and provide him with any out of pocket costs so he can decide if he would like to pursue this option.  3/22/22 - Patient was educated that Andriy Stout (Rep for Dynasplint) messaged me that his splints have been approved and are being mailed this week.   3/24/22 - Patient was educated on Dynasplint management and importance of daily skin checks.  His tolerance of wearing splints for the first week will determine his wearing schedule for the following weeks.  3/29/22 - Patient was educated on only wearing Dynasplints while lying down to get the most effective stretch to lower extremities.  3/31/22 - [facility] to set up a time for Andriy to re-assess the positioning and fit of splints to see if he can feel more of a stretch with wearing schedule.  4/7/22 - Patient was [facility] to [facility] to make sure that splints are adjusted appropriately.  4/11/22 - PT recommended that patient follow up [facility] for reassessment of splint fit.  4/14/22 - Patient informed that PT would reach out to [facility] again to have him schedule a visit.  He was encouraged to continue being active at home.  4/18/22 -  Patient asked to contact Dynasplint rep to set up a time for a consult that would work for his schedule.    Home Exercise Provided: 2/24/22  Exercises were reviewed and Hunter Schofield was able to demonstrate them prior to the end of the session.  Hunter Schofield demonstrated good  understanding of the education provided.   ASSESSMENT     Pt responded well to treatment session but his hamstrings were tighter today.  Even with moist heat pack, he still struggled to tolerate standing frame seat angle over 78 degrees.  He is making progress with PT and gait training will be focused on for upcoming sessions to carryover improved muscle length into functional tasks.      Pt prognosis is Fair  To Good    Pt will continue to benefit from skilled outpatient physical therapy to address the deficits listed in the problem list box on initial evaluation, provide pt/family education and to maximize pt's level of independence in the home and community environment.     Pt's spiritual, cultural and educational needs considered and pt agreeable to plan of care and goals.     Anticipated Barriers for therapy: co-morbidities, transportation assistance needed at times, lifestyle, potential contractures of knees/hips    GOALS:     Short Term Goals:  4 weeks Progress    1. Function: Patient will demonstrate improved function as indicated by a functional limitation score of less than or equal to 55 out of 100 on FOTO = 44% limitation PC   2. Mobility: Patient will improve sit to stand transfer with moderate assistance in order to progress towards independence with functional activities.  met   3. Gait: Patient will ambulated 3 steps in parallel bars with Maximum assistance to demonstrate improved strength, posture and endurance met   4. Balance: Complete FIST test upon next visit. PC   5. HEP: Patient will demonstrate independence with HEP in order to progress toward functional independence. met    6. Assess patient's needs for Dynasplints and set up consultation if needed. met       Long Term Goals:  8 weeks Progress   1. Function: Patient will demonstrate improved  function as indicated by a functional limitation score of less than or equal to 60 out of 100 on FOTO = 40 % limitation PC   2. Mobility: Patient will improve AROM of bilateral knees to -10 degrees  in order to return to maximal functional potential and improve quality of life. PC   3. Gait: Patient will ambulate 15 feet with rolling walker with moderate assistance to demonstrate improved strength, endurance and mobility. PC   4. Balance: Modify FIST goal once completed. PC   5. HEP: Patient educated on HEP in preparation for d/c verbalizing and demonstrating good understanding of topics discussed.    PC   6.          Goals Key:  PC= progressing/continue;        PM= partially met;             DC= discontinue         PLAN     Continue Plan of Care (POC) and progress per patient tolerance. See Treatment section for exercise progression.  Outpatient Physical Therapy 2 times weekly for 8 weeks to include the following interventions: Electrical Stimulation Attended, Gait Training, Moist Heat/ Ice, Neuromuscular Re-ed, Orthotic Management and Training, Patient Education, Self Care, Therapeutic Activities, Therapeutic Exercise and Prosthetic Management.      Mindy Patino, PT, DPT

## 2022-04-22 ENCOUNTER — CLINICAL SUPPORT (OUTPATIENT)
Dept: REHABILITATION | Facility: HOSPITAL | Age: 82
End: 2022-04-22
Attending: INTERNAL MEDICINE
Payer: MEDICARE

## 2022-04-22 DIAGNOSIS — Z74.09 DECREASED STRENGTH, ENDURANCE, AND MOBILITY: ICD-10-CM

## 2022-04-22 DIAGNOSIS — M25.662 DECREASED RANGE OF MOTION OF BOTH LOWER EXTREMITIES: Primary | ICD-10-CM

## 2022-04-22 DIAGNOSIS — M25.661 DECREASED RANGE OF MOTION OF BOTH LOWER EXTREMITIES: Primary | ICD-10-CM

## 2022-04-22 DIAGNOSIS — R53.1 DECREASED STRENGTH, ENDURANCE, AND MOBILITY: ICD-10-CM

## 2022-04-22 DIAGNOSIS — R68.89 DECREASED STRENGTH, ENDURANCE, AND MOBILITY: ICD-10-CM

## 2022-04-22 PROCEDURE — 97110 THERAPEUTIC EXERCISES: CPT

## 2022-04-22 NOTE — PROGRESS NOTES
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  Physician: Fallon Dudley*    Visit Date: 4/22/2022     Physician Orders: PT Eval and Treat   Medical Diagnosis from Referral: s/p Left BKA  Evaluation Date: 2/18/2022  Authorization Period Expiration: 12/31/22  Plan of Care Expiration: 6/9/22  Progress Note Due: 5/14/22  Visit # / Visits authorized:16/20 (+1 eval)  FOTO: 3/3     Precautions: Standard, Diabetes, Fall and wound on R foot, Incontinence    PTA Visit #: 0/5     Time In: 0945  Time Out: 1030   Total Billable Time: 45 minutes    SUBJECTIVE     Pt reports: that he is sleeping with his old splints but they are not staying on his legs at night.  He put a heating pad on his hamstrings before the session today to help loosen his legs for stretching.  Response to previous treatment: good with no adverse effects.  Functional change:  He reports that he is now performing transfers to a rolling stool and scooting with his legs and using furniture with his arms to roll himself into his bathroom in order to use his toilet vs his bedside commode.  Pain: 0/10 at rest  Location: not applicable     OBJECTIVE     Objective Measures updated at progress report unless specified.  Re-Assessment for (0) minutes    Joint Measured 2/22/22 3/7/22 4/14/22   Left Hip Extension -20 -25 -20    Right Hip Extension -25 -25 -25   Left Knee Extension -48 -35 AROM, -30 PROM -15   Right Knee Extension -45 -37 AROM, -34 PROM -12     3/3/22  In prone:   Hip Extension = - 20  Right, unable to get accurate measure on Left      STRENGTH:              L/E MMT Right  2/18/22    Right  3/17/22 Right   4/14/22 Left  2/18/22 Left   3/17/22 Left   4/14/22   Hip Flexion  3+/5 4-/5 4-/5 3+/5 4-/5 4-/5   Hip Extension  2+/5 2+/5 2+/5 2+/5 2+/5 2+/5   Hip Abduction  2+/5 3-/5 3-/5 2+/5  3-/5 3-/5   Knee Extension 3-/5 3+ within available Range 4-/5 within available range 3-/5 3+ within available range 3+/5 within available range   Knee Flexion 4-/5 4/5 4+/5 4-/5 4/5 4/5   Ankle DF 3+/5 3+/5 within available range 3+/5 within available range (-10 degrees from neurtral) not applicable  Not applicable not applicable    Ankle PF 3+/5 4-/5 4/5 not applicable  Not applicable not applicable           Treatment     Hunter Schofield received the treatments listed below:      THERAPEUTIC EXERCISES to develop strength, endurance, ROM, flexibility, posture and core stabilization for (45) minutes including:    Intervention Performed Today    short arc quad in supine  lower extremities over wedge 2x20 Right AROM, Left 2x20 Active Assistive for form and quality    sidelying toward prone   Stretch to Hip flexor 3x30 seconds each side   Quad set   2x10 Left     Sidelying hip extension   2x10 bilaterally with 10 seconds endrange stretch on last rep   [place] [employer] [place] Left Prosthetic leg  Performed at edge of mat    Sitting in reclined position at edge of mat   Hip flexor stretch 3x30 seconds bilateral    Hip Abduction  In sidelying 2x10 bilateral    Bridge attempt  1x5   Posterior Pelvic Tilt Supine  2x5   Prone lying over light blue small triangle wedge  Holding position 2 minutes, then 4 minutes   Stretch to Hamstrings  3 x 1 minute Right lower extremity    Heelslide with forceful push towards extension  2x10 bilateral with 10 second enrange hold stretch on last rep    10 # over distal Left thigh  Stretch to Left hip flexor for over 5 minutes while exercising Right lower extremity    Prone Hamstring curl endrange for quad/hip flexor stretch  2x10 AAROM   lower extremity lifts towards extension in prone  2x10 AAROM   Prone hip flexor stretch   3x10 second holds bilateral    Standing frame raising to patient's tolerance x    X    x  x  x    x              X  X       - Standing 3x's in standing frame holding  each stand for 5-8 minutes working on the following:  - Horizontal Abduction with bilateral upper extremities 2x10 AROM   - terminal knee extension in stand position 2x10 bilateral   - Rows with green theraband bilateral  2x10  - Pushing through upper extremities to achieve trunk extension 2x10  - scap retraction 2x10  - attempted 1x10 shoulder rolls backward - patient had difficulty coordinating  - Overhead press with green tband bilateral 1x10  - Patient given feedback on standing frame position - working on increasing seat angle after 30 second rest between lifts (repeated 3-4 times)  - Beach [employer] Volley 1 x 25 hits working on upright trunk posture  - Pulling with bilateral upper extremities on tray at front to pull hips forward from seat and extend knees 3x10  - Reaching Left and Right with cones to target to stretch lower extremities with each reach 1x12 cones to each side   NuStep   5 minutes at Level 1, working on alternating movement of lower extremities and stretch to bilateral knees   Standing Frame  Standing 1 x in standing frame working on holding endrange position of approximately 55 degree seat angle for 5 minutes     * In standing frame:  Sitting position = 0 degree seat angle  Full upright stand position with hips and knees at 0 degrees = 90 degree seat angle  3/15/22 - 1st  standing frame was at 42 degree seat angle, last stand position was at 65 degree seat angle  3/17/22 - 1st  standing frame was at 48 degree seat angle, last stand position was at 70 degree seat angle  3/21/22 - 1st  standing frame was at 52 degree seat angle, last stand position was at 70 degree seat angle  3/29/22 - 1st  standing frame was at 60 degree seat angle, last stand position was at 72 degree seat angle  3/31/22 - 1st  standing frame was at 55 degree seat angle, last stand position was at 75 degree seat angle  4/7/22 - 1st  standing frame was at 60 degree seat angle, last  stand position was at 80 degree seat angle  4/11/22 - 1st  standing frame was at 60 degree seat angle, last stand position was at 78 degree seat angle  4/14/22 - 1st  standing frame was at 55 degree seat angle, last stand position was at 86 degree seat angle  4/19/22 - 1st  standing frame was at 60 degree seat angle, last stand position was at 78 degree seat angle  4/22/22 - 1st  standing frame was at 65 degree seat angle, last stand position was at 81 degree seat angle    Plan for Next Visit:      manual therapy techniques: Joint mobilizations, Manual traction and Soft tissue Mobilization were applied to the:(0) minutes, including:    Intervention Performed Today    Patella glides superiorly  2 minutes bilateral    Anterior tibia glide Grade 1-2  1 minute bilateral                                      Neuromuscular Re-Education activities to improve: Balance, Coordination, Sense, Proprioception and Posture for 0 minutes. The following activities were included:    Intervention Performed Today                                                THERAPEUTIC ACTIVITIES to improve dynamic and functional  performance for () minutes including:    Intervention Performed Today    Bed - wheelchair transfer   Practiced set up and sequence   Bed mobility sit to supine to sit  Practiced sequence    Rolling   3 x each side working on sequence to incorporate hip flexor stretch on each side. Then separate stretch performed as listed above   Standing in parallel bars  3 x's working on several weight shifts towards hip extension and knee extension while standing.   Standing in parallel bars performing step forward  With Right lower extremity 5x's with moderate assistance to maintain standing position   Dynasplint Consult via Rubina with Andriy to discuss fit     Sit to  parallel bars without assistance  2x's   Dynasplint Consult  Andriy Stout (Rep for Dynasplint) met with physical therapist and  patient today during session to discuss options for dynasplints for bilateral knees due to patient's significant lack of range of motion.     Dynasplint Fitting  Andriyjohn Stout (Rep for Dynasplint) met with physical therapist and patient today to deliver and fit patient for bilateral knee dynasplints.  We discussed wearing time of 1-2 hours/day for the first week and then we would re-assess.  Patient was shown how to don each brace and straps were marked for future use.  The Dynasplint force was increased to 6 on the Left and 7 on the Right to start.  Extra padding was added to thin straps for skin protection.          Plan for Next Visit:      Gait Training: for (0) minutes:  4/7/22 - Ambulating 1x 8 ft in the parallel bars with bilateral hand support, sheet support around hips for therapist to facilitate hip extension and overall moderate assistance for postural facilitation and cues for sequence.  4/7/22 - Ambulated with walker with sheet assistance to for hip extension for 5 feet with helper following with wheelchair.    Limitation/Restriction for FOTO Amputation Survey     Therapist reviewed FOTO scores for Hunter Schofield on 3/17/2022.   FOTO documents entered into [employer] - see Media section.     Limitation Score: 52%  4/14/22 - Limitation Score: 45%            Patient Education and Home Exercises     Home Exercises Provided and Patient Education Provided     Education provided:   -2/22/22 Patient educated on increasing length and consistency of stretching knees and hips to every 2 hours at home.  He was shown and demonstrated understanding of short arc quads, rolling toward prone and sitting in reclined position to stretch hip flexors.  -2/24/22 Patient given Home Exercise Program on exercises for lower extremities to be performed 2x15 3x's/day  See patient Instructions in EMR  -3/1/22 Re-inforced Home Exercise Program and asked patient to incorporate prone lying for longer periods of time to build up to 20  minutes per day.  3/3/22 - Discussed the option of dynasplints for bilateral lower extremities. Patient educated on the option of Dynasplints vs basic knee braces and encouraged to consider Dynasplints as a more aggressive and efficient way of properly positioning and dynamically stretching his legs outside of therapy.  3/8/22 - Patient was educated on Dynasplints with Andriy Stout (Rep).   He was shown pictures on ipad and Andriy and physical therapist discussed wearing schedule to make gains with his muscle length outside of therapy sessions.  Andriy informed patient that he would assist with collecting information regarding insurance coverage and provide him with any out of pocket costs so he can decide if he would like to pursue this option.  3/22/22 - Patient was educated that Andriy Stout (Rep for Dynasplint) messaged me that his splints have been approved and are being mailed this week.   3/24/22 - Patient was educated on Dynasplint management and importance of daily skin checks.  His tolerance of wearing splints for the first week will determine his wearing schedule for the following weeks.  3/29/22 - Patient was educated on only wearing Dynasplints while lying down to get the most effective stretch to lower extremities.  3/31/22 - Patient was asked to reach out to Dynasplint rep (Andriy) to set up a time for Andriy to re-assess the positioning and fit of splints to see if he can feel more of a stretch with wearing schedule.  4/7/22 - Patient was asked to reach out to Dynasplint rep to make sure that splints are adjusted appropriately.  4/11/22 - PT recommended that [facility] for reassessment of splint fit.  4/14/22 - Patient informed that PT would reach out to [facility] rep again to have him schedule a visit.  He was encouraged to continue being active at home.  4/18/22 -  Patient asked to contact Dynasplint rep to set up a time for a consult that would work for his schedule.    Home  Exercise Provided: 2/24/22 Exercises were reviewed and Hunter Moore Tatedeborah was able to demonstrate them prior to the end of the session.  Hunter Schofield demonstrated good  understanding of the education provided.   ASSESSMENT     Pt responded well to treatment session with good effort.  He is making improvements with knee and hip range of motion with assistance of standing frame and will start to progress towards strengthening tasks outside of standing frame to assist with carryover of gains to functional activities.  Pt prognosis is Fair  To Good    Pt will continue to benefit from skilled outpatient physical therapy to address the deficits listed in the problem list box on initial evaluation, provide pt/family education and to maximize pt's level of independence in the home and community environment.     Pt's spiritual, cultural and educational needs considered and pt agreeable to plan of care and goals.     Anticipated Barriers for therapy: co-morbidities, transportation assistance needed at times, lifestyle, potential contractures of knees/hips    GOALS:     Short Term Goals:  4 weeks Progress    1. Function: Patient will demonstrate improved function as indicated by a functional limitation score of less than or equal to 55 out of 100 on FOTO = 44% limitation PC   2. Mobility: Patient will improve sit to stand transfer with moderate assistance in order to progress towards independence with functional activities.  met   3. Gait: Patient will ambulated 3 steps in parallel bars with Maximum assistance to demonstrate improved strength, posture and endurance met   4. Balance: Complete FIST test upon next visit. PC   5. HEP: Patient will demonstrate independence with HEP in order to progress toward functional independence. met    6. Assess patient's needs for Dynasplints and set up consultation if needed. met       Long Term Goals:  8 weeks Progress   1. Function: Patient will demonstrate improved function as  indicated by a functional limitation score of less than or equal to 60 out of 100 on FOTO = 40 % limitation PC   2. Mobility: Patient will improve AROM of bilateral knees to -10 degrees  in order to return to maximal functional potential and improve quality of life. PC   3. Gait: Patient will ambulate 15 feet with rolling walker with moderate assistance to demonstrate improved strength, endurance and mobility. PC   4. Balance: Modify FIST goal once completed. PC   5. HEP: Patient educated on HEP in preparation for d/c verbalizing and demonstrating good understanding of topics discussed.    PC   6.          Goals Key:  PC= progressing/continue;        PM= partially met;             DC= discontinue         PLAN     Continue Plan of Care (POC) and progress per patient tolerance. See Treatment section for exercise progression.  Outpatient Physical Therapy 2 times weekly for 8 weeks to include the following interventions: Electrical Stimulation Attended, Gait Training, Moist Heat/ Ice, Neuromuscular Re-ed, Orthotic Management and Training, Patient Education, Self Care, Therapeutic Activities, Therapeutic Exercise and Prosthetic Management.      Mindy Patino, PT, DPT

## 2022-04-26 ENCOUNTER — CLINICAL SUPPORT (OUTPATIENT)
Dept: REHABILITATION | Facility: HOSPITAL | Age: 82
End: 2022-04-26
Attending: INTERNAL MEDICINE
Payer: MEDICARE

## 2022-04-26 DIAGNOSIS — Z74.09 DECREASED STRENGTH, ENDURANCE, AND MOBILITY: ICD-10-CM

## 2022-04-26 DIAGNOSIS — M25.661 DECREASED RANGE OF MOTION OF BOTH LOWER EXTREMITIES: Primary | ICD-10-CM

## 2022-04-26 DIAGNOSIS — R68.89 DECREASED STRENGTH, ENDURANCE, AND MOBILITY: ICD-10-CM

## 2022-04-26 DIAGNOSIS — R53.1 DECREASED STRENGTH, ENDURANCE, AND MOBILITY: ICD-10-CM

## 2022-04-26 DIAGNOSIS — M25.662 DECREASED RANGE OF MOTION OF BOTH LOWER EXTREMITIES: Primary | ICD-10-CM

## 2022-04-26 PROCEDURE — 97110 THERAPEUTIC EXERCISES: CPT

## 2022-04-26 NOTE — PROGRESS NOTES
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  Physician: Fallon Dudley*    Visit Date: 4/26/2022     Physician Orders: PT Eval and Treat   Medical Diagnosis from Referral: s/p Left BKA  Evaluation Date: 2/18/2022  Authorization Period Expiration: 12/31/22  Plan of Care Expiration: 6/9/22  Progress Note Due: 5/14/22  Visit # / Visits authorized:18/20 (+1 eval)  FOTO: 3/3     Precautions: Standard, Diabetes, Fall and wound on R foot, Incontinence    PTA Visit #: 0/5     Time In: 1517  Time Out: 1600   Total Billable Time: 42 minutes    SUBJECTIVE     Pt reports: that he is sleeping with his old splints but they are not staying on his legs at night.  He put a heating pad on his hamstrings before the session today to help loosen his legs for stretching.  Response to previous treatment: good with no adverse effects.  Functional change:  He reports that he is now performing transfers to a rolling stool and scooting with his legs and using furniture with his arms to roll himself into his bathroom in order to use his toilet vs his bedside commode.  Pain: 0/10 at rest  Location: not applicable     OBJECTIVE     Objective Measures updated at progress report unless specified.  Re-Assessment for (0) minutes    Joint Measured 2/22/22 3/7/22 4/14/22   Left Hip Extension -20 -25 -20    Right Hip Extension -25 -25 -25   Left Knee Extension -48 -35 AROM, -30 PROM -15   Right Knee Extension -45 -37 AROM, -34 PROM -12     3/3/22  In prone:   Hip Extension = - 20  Right, unable to get accurate measure on Left      STRENGTH:              L/E MMT Right  2/18/22    Right  3/17/22 Right   4/14/22 Left  2/18/22 Left   3/17/22 Left   4/14/22   Hip Flexion  3+/5 4-/5 4-/5 3+/5 4-/5 4-/5   Hip Extension  2+/5 2+/5 2+/5 2+/5 2+/5 2+/5   Hip Abduction  2+/5 3-/5 3-/5 2+/5  3-/5 3-/5   Knee Extension 3-/5 3+ within available Range 4-/5 within available range 3-/5 3+ within available range 3+/5 within available range   Knee Flexion 4-/5 4/5 4+/5 4-/5 4/5 4/5   Ankle DF 3+/5 3+/5 within available range 3+/5 within available range (-10 degrees from neurtral) not applicable  Not applicable not applicable    Ankle PF 3+/5 4-/5 4/5 not applicable  Not applicable not applicable           Treatment     Hunter Schofield received the treatments listed below:      THERAPEUTIC EXERCISES to develop strength, endurance, ROM, flexibility, posture and core stabilization for (38) minutes including:    Intervention Performed Today    short arc quad in supine  lower extremities over wedge 2x20 Right AROM, Left 2x20 Active Assistive for form and quality    sidelying toward prone   Stretch to Hip flexor 3x30 seconds each side   Quad set   2x10 Left     Sidelying hip extension   2x10 bilaterally with 10 seconds endrange stretch on last rep   [place] Left Prosthetic leg  Performed at edge of mat    Sitting in reclined position at edge of mat   Hip flexor stretch 3x30 seconds bilateral    Hip Abduction  In sidelying 2x10 bilateral    Bridge attempt  1x5   Posterior Pelvic Tilt Supine  2x5   Prone lying over light blue small triangle wedge  Holding position 2 minutes, then 4 minutes   Stretch to Hamstrings  3 x 1 minute Right lower extremity    Heelslide with forceful push towards extension  2x10 bilateral with 10 second enrange hold stretch on last rep    10 # over distal Left thigh  Stretch to Left hip flexor for over 5 minutes while exercising Right lower extremity    Prone Hamstring curl endrange for quad/hip flexor stretch  2x10 AAROM   lower extremity lifts towards extension in prone  2x10 AAROM   Prone hip flexor stretch   3x10 second holds bilateral    Standing frame raising to patient's tolerance x        x  x  x                  X  X    x   - Standing 3x's in standing frame holding  each stand for 5-10 minutes working on the following:  - Horizontal Abduction with bilateral upper extremities 2x10 AROM   - terminal knee extension in stand position 2x10 bilateral   - Rows with green theraband bilateral  2x10  - Pushing through upper extremities to achieve trunk extension 2x10  - scap retraction 2x10  - attempted 1x10 shoulder rolls backward - patient had difficulty coordinating  - Overhead press with green tband bilateral 1x10  - Patient given feedback on standing frame position - working on increasing seat angle after 30 second rest between lifts (repeated 3-4 times)  - [employer] x 25 hits working on upright trunk posture  - Pulling with bilateral upper extremities on tray at front to pull hips forward from seat and extend knees 3x10  - Reaching Left and Right with cones to target to stretch lower extremities with each reach 1x12 cones to each side   NuStep   5 minutes at Level 1, working on alternating movement of lower extremities and stretch to bilateral knees   Standing Frame  Standing 1 x in standing frame working on holding endrange position of approximately 55 degree seat angle for 5 minutes     * In standing frame:  Sitting position = 0 degree seat angle  Full upright stand position with hips and knees at 0 degrees = 90 degree seat angle  3/15/22 - 1st  standing frame was at 42 degree seat angle, last stand position was at 65 degree seat angle  3/17/22 - 1st  standing frame was at 48 degree seat angle, last stand position was at 70 degree seat angle  3/21/22 - 1st  standing frame was at 52 degree seat angle, last stand position was at 70 degree seat angle  3/29/22 - 1st  standing frame was at 60 degree seat angle, last stand position was at 72 degree seat angle  3/31/22 - 1st  standing frame was at 55 degree seat angle, last stand position was at 75 degree seat angle  4/7/22 - 1st  standing frame was at 60 degree seat angle, last  stand position was at 80 degree seat angle  4/11/22 - 1st  standing frame was at 60 degree seat angle, last stand position was at 78 degree seat angle  4/14/22 - 1st  standing frame was at 55 degree seat angle, last stand position was at 86 degree seat angle  4/19/22 - 1st  standing frame was at 60 degree seat angle, last stand position was at 78 degree seat angle  4/22/22 - 1st  standing frame was at 65 degree seat angle, last stand position was at 81 degree seat angle  4/26/22 - 1st  standing frame was at 65 degree seat angle, last stand position was at 82 degree seat angle    Plan for Next Visit:      manual therapy techniques: Joint mobilizations, Manual traction and Soft tissue Mobilization were applied to the:(0) minutes, including:    Intervention Performed Today    Patella glides superiorly  2 minutes bilateral    Anterior tibia glide Grade 1-2  1 minute bilateral                                      Neuromuscular Re-Education activities to improve: Balance, Coordination, Sense, Proprioception and Posture for 0 minutes. The following activities were included:    Intervention Performed Today                                                THERAPEUTIC ACTIVITIES to improve dynamic and functional  performance for (5) minutes including:    Intervention Performed Today    Bed - wheelchair transfer   Practiced set up and sequence   Bed mobility sit to supine to sit  Practiced sequence    Rolling   3 x each side working on sequence to incorporate hip flexor stretch on each side. Then separate stretch performed as listed above   Standing in parallel bars x 4 x's working on several weight shifts towards hip extension and knee extension while standing.   Standing in parallel bars performing step forward  With Right lower extremity 5x's with moderate assistance to maintain standing position   Dynasplint Consult via [facility] with Andriy to discuss fit     Sit to  parallel  bars without assistance  2x's   Dynasplint Consult  Andriy Stout (Rep for Dynasplint) met with physical therapist and patient today during session to discuss options for dynasplints for bilateral knees due to patient's significant lack of range of motion.     Dynasplint Fitting  Andriy Stout (Rep for Dynasplint) met with physical therapist and patient today to deliver and fit patient for bilateral knee dynasplints.  We discussed wearing time of 1-2 hours/day for the first week and then we would re-assess.  Patient was shown how to don each brace and straps were marked for future use.  The Dynasplint force was increased to 6 on the Left and 7 on the Right to start.  Extra padding was added to thin straps for skin protection.          Plan for Next Visit:      Gait Training: for (0) minutes:  4/7/22 - Ambulating 1x 8 ft in the parallel bars with bilateral hand support, sheet support around hips for therapist to facilitate hip extension and overall moderate assistance for postural facilitation and cues for sequence.  4/7/22 - Ambulated with walker with sheet assistance to for hip extension for 5 feet with helper following with wheelchair.    Limitation/Restriction for FOTO Amputation Survey     Therapist reviewed FOTO scores for Hunter Schofield on 3/17/2022.   FOTO documents entered into [employer] - see Media section.     Limitation Score: 52%  4/14/22 - Limitation Score: 45%            Patient Education and Home Exercises     Home Exercises Provided and Patient Education Provided     Education provided:   -2/22/22 Patient educated on increasing length and consistency of stretching knees and hips to every 2 hours at home.  He was shown and demonstrated understanding of short arc quads, rolling toward prone and sitting in reclined position to stretch hip flexors.  -2/24/22 Patient given Home Exercise Program on exercises for lower extremities to be performed 2x15 3x's/day  See patient Instructions in EMR  -3/1/22 Re-inforced  Home Exercise Program and asked patient to incorporate prone lying for longer periods of time to build up to 20 minutes per day.  3/3/22 - Discussed the option of dynasplints for bilateral lower extremities. Patient educated on the option of Dynasplints vs basic knee braces and encouraged to consider Dynasplints as a more aggressive and efficient way of properly positioning and dynamically stretching his legs outside of therapy.  3/8/22 - Patient was educated on Dynasplints with Andriy Stout (Rep).   He was shown pictures on ipad and Andriy and physical therapist discussed wearing schedule to make gains with his muscle length outside of therapy sessions.  Andriy informed patient that he would assist with collecting information regarding insurance coverage and provide him with any out of pocket costs so he can decide if he would like to pursue this option.  3/22/22 - Patient was educated that Andriy Stout (Rep for Dynasplint) messaged me that his splints have been approved and are being mailed this week.   3/24/22 - Patient was educated on Dynasplint management and importance of daily skin checks.  His tolerance of wearing splints for the first week will determine his wearing schedule for the following weeks.  3/29/22 - Patient was educated on only wearing Dynasplints while lying down to get the most effective stretch to lower extremities.  3/31/22 - Patient was asked to reach out to Dynasplint rep (Andriy) to set up a time for Andriy to re-assess the positioning and fit of splints to see if he can feel more of a stretch with wearing schedule.  4/7/22 - Patient was asked to reach out to Dynasplint rep to make sure that splints are adjusted appropriately.  4/11/22 - PT recommended that patient follow up with Dynasplint rep for reassessment of splint fit.  4/14/22 - Patient informed that PT would reach out to Dynasplint rep again to have him schedule a visit.  He was encouraged to continue being active at home.  4/18/22 -   Patient asked to contact Dynasplint rep to set up a time for a consult that would work for his schedule.    Home Exercise Provided: 2/24/22 Exercises were reviewed and Hunter Schofield was able to demonstrate them prior to the end of the session.  Hunter Schofield demonstrated good  understanding of the education provided.   ASSESSMENT     Pt responded well to treatment session with good effort.  He is making improvements with knee and hip range of motion with assistance of standing frame and today demonstrated improved sit to stand transfer and improved stand posture as compared to last attempt.  He is very motivated and has good potential to progress.   Pt prognosis is Fair  To Good    Pt will continue to benefit from skilled outpatient physical therapy to address the deficits listed in the problem list box on initial evaluation, provide pt/family education and to maximize pt's level of independence in the home and community environment.     Pt's spiritual, cultural and educational needs considered and pt agreeable to plan of care and goals.     Anticipated Barriers for therapy: co-morbidities, transportation assistance needed at times, lifestyle, potential contractures of knees/hips    GOALS:     Short Term Goals:  4 weeks Progress    1. Function: Patient will demonstrate improved function as indicated by a functional limitation score of less than or equal to 55 out of 100 on FOTO = 44% limitation PC   2. Mobility: Patient will improve sit to stand transfer with moderate assistance in order to progress towards independence with functional activities.  met   3. Gait: Patient will ambulated 3 steps in parallel bars with Maximum assistance to demonstrate improved strength, posture and endurance met   4. Balance: Complete FIST test upon next visit. PC   5. HEP: Patient will demonstrate independence with HEP in order to progress toward functional independence. met    6. Assess patient's needs for Dynasplints and  set up consultation if needed. met       Long Term Goals:  8 weeks Progress   1. Function: Patient will demonstrate improved function as indicated by a functional limitation score of less than or equal to 60 out of 100 on FOTO = 40 % limitation PC   2. Mobility: Patient will improve AROM of bilateral knees to -10 degrees  in order to return to maximal functional potential and improve quality of life. PC   3. Gait: Patient will ambulate 15 feet with rolling walker with moderate assistance to demonstrate improved strength, endurance and mobility. PC   4. Balance: Modify FIST goal once completed. PC   5. HEP: Patient educated on HEP in preparation for d/c verbalizing and demonstrating good understanding of topics discussed.    PC   6.          Goals Key:  PC= progressing/continue;        PM= partially met;             DC= discontinue         PLAN     Continue Plan of Care (POC) and progress per patient tolerance. See Treatment section for exercise progression.  Outpatient Physical Therapy 2 times weekly for 8 weeks to include the following interventions: Electrical Stimulation Attended, Gait Training, Moist Heat/ Ice, Neuromuscular Re-ed, Orthotic Management and Training, Patient Education, Self Care, Therapeutic Activities, Therapeutic Exercise and Prosthetic Management.      Mindy Patino, PT, DPT

## 2022-04-28 ENCOUNTER — CLINICAL SUPPORT (OUTPATIENT)
Dept: REHABILITATION | Facility: HOSPITAL | Age: 82
End: 2022-04-28
Attending: INTERNAL MEDICINE
Payer: MEDICARE

## 2022-04-28 DIAGNOSIS — R53.1 DECREASED STRENGTH, ENDURANCE, AND MOBILITY: ICD-10-CM

## 2022-04-28 DIAGNOSIS — M25.662 DECREASED RANGE OF MOTION OF BOTH LOWER EXTREMITIES: Primary | ICD-10-CM

## 2022-04-28 DIAGNOSIS — R68.89 DECREASED STRENGTH, ENDURANCE, AND MOBILITY: ICD-10-CM

## 2022-04-28 DIAGNOSIS — M25.661 DECREASED RANGE OF MOTION OF BOTH LOWER EXTREMITIES: Primary | ICD-10-CM

## 2022-04-28 DIAGNOSIS — Z74.09 DECREASED STRENGTH, ENDURANCE, AND MOBILITY: ICD-10-CM

## 2022-04-28 PROCEDURE — 97530 THERAPEUTIC ACTIVITIES: CPT

## 2022-04-28 PROCEDURE — 97110 THERAPEUTIC EXERCISES: CPT

## 2022-04-29 NOTE — PROGRESS NOTES
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  Physician: Fallon Dudley*    Visit Date: 4/28/2022     Physician Orders: PT Eval and Treat   Medical Diagnosis from Referral: s/p Left BKA  Evaluation Date: 2/18/2022  Authorization Period Expiration: 12/31/22  Plan of Care Expiration: 6/9/22  Progress Note Due: 5/14/22  Visit # / Visits authorized:18/20 (+1 eval)  FOTO: 3/3     Precautions: Standard, Diabetes, Fall and wound on R foot, Incontinence    PTA Visit #: 0/5     Time In: 1103  Time Out: 1145   Total Billable Time: 42 minutes    SUBJECTIVE     Pt reports: that he is sleeping with his old splints but they are not staying on his legs at night.  He put a heating pad on his hamstrings before the session today to help loosen his legs for stretching.  Response to previous treatment: good with no adverse effects.  Functional change:  He reports that he is now performing transfers to a rolling stool and scooting with his legs and using furniture with his arms to roll himself into his bathroom in order to use his toilet vs his bedside commode.  Pain: 0/10 at rest  Location: not applicable     OBJECTIVE     Objective Measures updated at progress report unless specified.  Re-Assessment for (0) minutes    Joint Measured 2/22/22 3/7/22 4/14/22   Left Hip Extension -20 -25 -20    Right Hip Extension -25 -25 -25   Left Knee Extension -48 -35 AROM, -30 PROM -15   Right Knee Extension -45 -37 AROM, -34 PROM -12     3/3/22  In prone:   Hip Extension = - 20  Right, unable to get accurate measure on Left      STRENGTH:              L/E MMT Right  2/18/22    Right  3/17/22 Right   4/14/22 Left  2/18/22 Left   3/17/22 Left   4/14/22   Hip Flexion  3+/5 4-/5 4-/5 3+/5 4-/5 4-/5   Hip Extension  2+/5 2+/5 2+/5 2+/5 2+/5 2+/5   Hip Abduction  2+/5 3-/5 3-/5 2+/5  3-/5 3-/5   Knee Extension 3-/5 3+ within available Range 4-/5 within available range 3-/5 3+ within available range 3+/5 within available range   Knee Flexion 4-/5 4/5 4+/5 4-/5 4/5 4/5   Ankle DF 3+/5 3+/5 within available range 3+/5 within available range (-10 degrees from neurtral) not applicable  Not applicable not applicable    Ankle PF 3+/5 4-/5 4/5 not applicable  Not applicable not applicable           Treatment     Hunter Schofield received the treatments listed below:      THERAPEUTIC EXERCISES to develop strength, endurance, ROM, flexibility, posture and core stabilization for (10) minutes including:    Intervention Performed Today    short arc quad in supine  lower extremities over wedge 2x20 Right AROM, Left 2x20 Active Assistive for form and quality    sidelying toward prone   Stretch to Hip flexor 3x30 seconds each side   Quad set   2x10 Left     Sidelying hip extension   2x10 bilaterally with 10 seconds endrange stretch on last rep   [place] Left Prosthetic leg  Performed at edge of mat    Sitting in reclined position at edge of mat   Hip flexor stretch 3x30 seconds bilateral    Hip Abduction  In sidelying 2x10 bilateral    Bridge attempt  1x5   Posterior Pelvic Tilt Supine  2x5   Prone lying over light blue small triangle wedge  Holding position 2 minutes, then 4 minutes   Stretch to Hamstrings  3 x 1 minute Right lower extremity    Heelslide with forceful push towards extension  2x10 bilateral with 10 second enrange hold stretch on last rep    10 # over distal Left thigh  Stretch to Left hip flexor for over 5 minutes while exercising Right lower extremity    Prone Hamstring curl endrange for quad/hip flexor stretch  2x10 AAROM   lower extremity lifts towards extension in prone  2x10 AAROM   Prone hip flexor stretch   3x10 second holds bilateral    Standing frame raising to patient's tolerance                                      - Standing 3x's in standing frame holding each stand  for 5-10 minutes working on the following:  - Horizontal Abduction with bilateral upper extremities 2x10 AROM   - terminal knee extension in stand position 2x10 bilateral   - Rows with green theraband bilateral  2x10  - Pushing through upper extremities to achieve trunk extension 2x10  - scap retraction 2x10  - attempted 1x10 shoulder rolls backward - patient had difficulty coordinating  - Overhead press with green tband bilateral 1x10  - Patient given feedback on standing frame position - working on increasing seat angle after 30 second rest between lifts (repeated 3-4 times)  - Beach [employer] Volley 1 x 25 hits working on upright trunk posture  - Pulling with bilateral upper extremities on tray at front to pull hips forward from seat and extend knees 3x10  - Reaching Left and Right with cones to target to stretch lower extremities with each reach 1x12 cones to each side   NuStep   5 minutes at Level 1, working on alternating movement of lower extremities and stretch to bilateral knees   Standing Frame  Standing 1 x in standing frame working on holding endrange position of approximately 55 degree seat angle for 5 minutes   Standing in parallel bars working sit to stands  X  X  X  X  x - standing 2x's  - Standing weights shifts Left to Right 1x10  - Standing knee extension 1x10  - Standing Left upper extremity lifts off bar 1x5  - Standing bilateral upper extremity lifts off bar 1x10 with PT helping to hold hips for support with minimum to moderate assist     * In standing frame:  Sitting position = 0 degree seat angle  Full upright stand position with hips and knees at 0 degrees = 90 degree seat angle  3/15/22 - 1st  standing frame was at 42 degree seat angle, last stand position was at 65 degree seat angle  3/17/22 - 1st  standing frame was at 48 degree seat angle, last stand position was at 70 degree seat angle  3/21/22 - 1st  standing frame was at 52 degree seat angle, last stand position was  at 70 degree seat angle  3/29/22 - 1st  standing frame was at 60 degree seat angle, last stand position was at 72 degree seat angle  3/31/22 - 1st  standing frame was at 55 degree seat angle, last stand position was at 75 degree seat angle  4/7/22 - 1st  standing frame was at 60 degree seat angle, last stand position was at 80 degree seat angle  4/11/22 - 1st  standing frame was at 60 degree seat angle, last stand position was at 78 degree seat angle  4/14/22 - 1st  standing frame was at 55 degree seat angle, last stand position was at 86 degree seat angle  4/19/22 - 1st  standing frame was at 60 degree seat angle, last stand position was at 78 degree seat angle  4/22/22 - 1st  standing frame was at 65 degree seat angle, last stand position was at 81 degree seat angle  4/26/22 - 1st  standing frame was at 65 degree seat angle, last stand position was at 82 degree seat angle    Plan for Next Visit:      manual therapy techniques: Joint mobilizations, Manual traction and Soft tissue Mobilization were applied to the:(0) minutes, including:    Intervention Performed Today    Patella glides superiorly  2 minutes bilateral    Anterior tibia glide Grade 1-2  1 minute bilateral                                      Neuromuscular Re-Education activities to improve: Balance, Coordination, Sense, Proprioception and Posture for 0 minutes. The following activities were included:    Intervention Performed Today                                                THERAPEUTIC ACTIVITIES to improve dynamic and functional  performance for (32) minutes including:    Intervention Performed Today    Bed - wheelchair transfer   Practiced set up and sequence   Bed mobility sit to supine to sit  Practiced sequence    Rolling   3 x each side working on sequence to incorporate hip flexor stretch on each side. Then separate stretch performed as listed above   Standing in parallel bars   4 x's working on several weight shifts towards hip extension and knee extension while standing.   Standing in parallel bars performing step forward  With Right lower extremity 5x's with moderate assistance to maintain standing position   Dynasplint Consult via FaceTime with Andriy to discuss fit     Sit to  parallel bars without assistance  2x's   Dynasplint Consult  Andriy Stout (Rep for Dynasplint) met with physical therapist and patient today during session to discuss options for dynasplints for bilateral knees due to patient's significant lack of range of motion.     Dynasplint Fitting  Andriy Stout (Rep for Dynasplint) met with physical therapist and patient today to deliver and fit patient for bilateral knee dynasplints.  We discussed wearing time of 1-2 hours/day for the first week and then we would re-assess.  Patient was shown how to don each brace and straps were marked for future use.  The Dynasplint force was increased to 6 on the Left and 7 on the Right to start.  Extra padding was added to thin straps for skin protection.   Dynasplint Consult x Andriy Stout (Rep for Dynasplint) met with physical therapist and patient today during session to discuss proper fit and positioning of Dynasplint to feel a better stretch at home. Pt only brought Left splint to session today and his splint was donned, straps were tightened and new black line added for patient to follow at home.  Left splint was increased from 7/13 to 9/13 tension.  He reports that he places a pillow under his knee at home and he was educated on better positioning of pillow under distal stump with prosthesis removed.  He also felt more of a stretch in long sitting vs supine and was asked to try this position at home for part of the stretch if able.      Plan for Next Visit:      Gait Training: for (0) minutes:  4/7/22 - Ambulating 1x 8 ft in the parallel bars with bilateral hand support, sheet support around hips for therapist to facilitate  hip extension and overall moderate assistance for postural facilitation and cues for sequence.  4/7/22 - Ambulated with walker with sheet assistance to for hip extension for 5 feet with helper following with wheelchair.    Limitation/Restriction for FOTO Amputation Survey     Therapist reviewed FOTO scores for Hunter Schofield on 3/17/2022.   FOTO documents entered into [employer] [place] - see Media section.     Limitation Score: 52%  4/14/22 - Limitation Score: 45%            Patient Education and Home Exercises     Home Exercises Provided and Patient Education Provided     Education provided:   -2/22/22 Patient educated on increasing length and consistency of stretching knees and hips to every 2 hours at home.  He was shown and demonstrated understanding of short arc quads, rolling toward prone and sitting in reclined position to stretch hip flexors.  -2/24/22 Patient given Home Exercise Program on exercises for lower extremities to be performed 2x15 3x's/day  See patient Instructions in EMR  -3/1/22 Re-inforced Home Exercise Program and asked patient to incorporate prone lying for longer periods of time to build up to 20 minutes per day.  3/3/22 - Discussed the option of dynasplints for bilateral lower extremities. Patient educated on the option of Dynasplints vs basic knee braces and encouraged to consider Dynasplints as a more aggressive and efficient way of properly positioning and dynamically stretching his legs outside of therapy.  3/8/22 - Patient was educated on Dynasplints with Andriy Stout (Rep).   He was shown pictures on ipad and Andriy and physical therapist discussed wearing schedule to make gains with his muscle length outside of therapy sessions.  Andriy informed patient that he would assist with collecting information regarding insurance coverage and provide him with any out of pocket costs so he can decide if he would like to pursue this option.  3/22/22 - Patient was educated that Andriy Stout (Rep for Dynasplint)  messaged me that his splints have been approved and are being mailed this week.   3/24/22 - Patient was educated on Dynasplint management and importance of daily skin checks.  His tolerance of wearing splints for the first week will determine his wearing schedule for the following weeks.  3/29/22 - Patient was educated on only wearing Dynasplints while lying down to get the most effective stretch to lower extremities.  3/31/22 - [facility] to set up a time for Andriy to re-assess the positioning and fit of splints to see if he can feel more of a stretch with wearing schedule.  4/7/22 - Patient was asked to reach out to [facility] to make sure that splints are adjusted appropriately.  4/11/22 - PT recommended that patient follow up with Dynasplint rep for reassessment of splint fit.  4/14/22 - Patient informed that PT would reach out to [facility] again to have him schedule a visit.  He was encouraged to continue being active at home.  4/18/22 -  Patient asked to contact Dynasplint rep to set up a time for a consult that would work for his schedule.  4/28/22 - Patient educated on wearing Dynaspints for 2 hours/day for the next 4 days and then we will assess his ability to increase to 4 hours/day after next visit.  He verbalized good understanding of education from Andriy (Fuadasplint) and was told to try Right splint on in new wear position first before increasing tension to 9/13.      Home Exercise Provided: 2/24/22 Exercises were reviewed and Hunter Schofield was able to demonstrate them prior to the end of the session.  Hunter Schofield demonstrated good  understanding of the education provided.   ASSESSMENT     Pt participated well with session today.  He verbalized good understanding of all recommendations and education from [facility] and PT.  He was able to perform sit to stand transfer with contact guard assist today and was able to stand without upper  extremity support several times.  He is progressing with PT.  Pt prognosis is Fair  To Good    Pt will continue to benefit from skilled outpatient physical therapy to address the deficits listed in the problem list box on initial evaluation, provide pt/family education and to maximize pt's level of independence in the home and community environment.     Pt's spiritual, cultural and educational needs considered and pt agreeable to plan of care and goals.     Anticipated Barriers for therapy: co-morbidities, transportation assistance needed at times, lifestyle, potential contractures of knees/hips    GOALS:     Short Term Goals:  4 weeks Progress    1. Function: Patient will demonstrate improved function as indicated by a functional limitation score of less than or equal to 55 out of 100 on FOTO = 44% limitation PC   2. Mobility: Patient will improve sit to stand transfer with moderate assistance in order to progress towards independence with functional activities.  met   3. Gait: Patient will ambulated 3 steps in parallel bars with Maximum assistance to demonstrate improved strength, posture and endurance met   4. Balance: Complete FIST test upon next visit. PC   5. HEP: Patient will demonstrate independence with HEP in order to progress toward functional independence. met    6. Assess patient's needs for Dynasplints and set up consultation if needed. met       Long Term Goals:  8 weeks Progress   1. Function: Patient will demonstrate improved function as indicated by a functional limitation score of less than or equal to 60 out of 100 on FOTO = 40 % limitation PC   2. Mobility: Patient will improve AROM of bilateral knees to -10 degrees  in order to return to maximal functional potential and improve quality of life. PC   3. Gait: Patient will ambulate 15 feet with rolling walker with moderate assistance to demonstrate improved strength, endurance and mobility. PC   4. Balance: Modify FIST goal once completed. PC    5. HEP: Patient educated on HEP in preparation for d/c verbalizing and demonstrating good understanding of topics discussed.    PC   6.          Goals Key:  PC= progressing/continue;        PM= partially met;             DC= discontinue         PLAN     Continue Plan of Care (POC) and progress per patient tolerance. See Treatment section for exercise progression.  Outpatient Physical Therapy 2 times weekly for 8 weeks to include the following interventions: Electrical Stimulation Attended, Gait Training, Moist Heat/ Ice, Neuromuscular Re-ed, Orthotic Management and Training, Patient Education, Self Care, Therapeutic Activities, Therapeutic Exercise and Prosthetic Management.      Mindy Patino, PT, DPT

## 2022-05-03 ENCOUNTER — CLINICAL SUPPORT (OUTPATIENT)
Dept: REHABILITATION | Facility: HOSPITAL | Age: 82
End: 2022-05-03
Payer: MEDICARE

## 2022-05-03 DIAGNOSIS — M25.662 DECREASED RANGE OF MOTION OF BOTH LOWER EXTREMITIES: Primary | ICD-10-CM

## 2022-05-03 DIAGNOSIS — R68.89 DECREASED STRENGTH, ENDURANCE, AND MOBILITY: ICD-10-CM

## 2022-05-03 DIAGNOSIS — Z74.09 DECREASED STRENGTH, ENDURANCE, AND MOBILITY: ICD-10-CM

## 2022-05-03 DIAGNOSIS — M25.661 DECREASED RANGE OF MOTION OF BOTH LOWER EXTREMITIES: Primary | ICD-10-CM

## 2022-05-03 DIAGNOSIS — R53.1 DECREASED STRENGTH, ENDURANCE, AND MOBILITY: ICD-10-CM

## 2022-05-03 PROCEDURE — 97110 THERAPEUTIC EXERCISES: CPT

## 2022-05-03 NOTE — PROGRESS NOTES
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  Physician: Fallon Dudley*    Visit Date: 5/3/2022     Physician Orders: PT Eval and Treat   Medical Diagnosis from Referral: s/p Left BKA  Evaluation Date: 2/18/2022  Authorization Period Expiration: 12/31/22  Plan of Care Expiration: 6/9/22  Progress Note Due: 5/14/22  Visit # / Visits authorized:18/30 (+1 eval)  FOTO: 3/3     Precautions: Standard, Diabetes, Fall and wound on R foot, Incontinence    PTA Visit #: 0/5     Time In: 1016  Time Out: 1100   Total Billable Time: 44 minutes    SUBJECTIVE     Pt reports: that he has been compliant with dynasplints and feels that he could go up with the tension slightly.  Response to previous treatment: good with no adverse effects.  Functional change:  He reports that he was able to stand at his stove to reach his microwave (over stove) at home since last session.  Pain: 0/10 at rest  Location: not applicable     OBJECTIVE     Objective Measures updated at progress report unless specified.  Re-Assessment for (0) minutes    Joint Measured 2/22/22 3/7/22 4/14/22   Left Hip Extension -20 -25 -20    Right Hip Extension -25 -25 -25   Left Knee Extension -48 -35 AROM, -30 PROM -15   Right Knee Extension -45 -37 AROM, -34 PROM -12     3/3/22  In prone:   Hip Extension = - 20  Right, unable to get accurate measure on Left      STRENGTH:              L/E MMT Right  2/18/22    Right  3/17/22 Right   4/14/22 Left  2/18/22 Left   3/17/22 Left   4/14/22   Hip Flexion  3+/5 4-/5 4-/5 3+/5 4-/5 4-/5   Hip Extension  2+/5 2+/5 2+/5 2+/5 2+/5 2+/5   Hip Abduction  2+/5 3-/5 3-/5 2+/5 3-/5 3-/5   Knee Extension 3-/5 3+ within available Range 4-/5 within available range 3-/5 3+ within available range 3+/5 within available range   Knee Flexion 4-/5 4/5 4+/5 4-/5 4/5 4/5    Ankle DF 3+/5 3+/5 within available range 3+/5 within available range (-10 degrees from neurtral) not applicable  Not applicable not applicable    Ankle PF 3+/5 4-/5 4/5 not applicable  Not applicable not applicable           Treatment     Hunter Schofield received the treatments listed below:      THERAPEUTIC EXERCISES to develop strength, endurance, ROM, flexibility, posture and core stabilization for (44) minutes including:    Intervention Performed Today    short arc quad in supine  lower extremities over wedge 2x20 Right AROM, Left 2x20 Active Assistive for form and quality    sidelying toward prone   Stretch to Hip flexor 3x30 seconds each side   Quad set   2x10 Left     Sidelying hip extension   2x10 bilaterally with 10 seconds endrange stretch on last rep   Rico and Donning Left Prosthetic leg  Performed at edge of mat    Sitting in reclined position at edge of mat   Hip flexor stretch 3x30 seconds bilateral    Hip Abduction  In sidelying 2x10 bilateral    Bridge attempt  1x5   Posterior Pelvic Tilt Supine  2x5   Prone lying over light blue small triangle wedge  Holding position 2 minutes, then 4 minutes   Stretch to Hamstrings  3 x 1 minute Right lower extremity    Heelslide with forceful push towards extension  2x10 bilateral with 10 second enrange hold stretch on last rep    10 # over distal Left thigh  Stretch to Left hip flexor for over 5 minutes while exercising Right lower extremity    Prone Hamstring curl endrange for quad/hip flexor stretch  2x10 AAROM   lower extremity lifts towards extension in prone  2x10 AAROM   Prone hip flexor stretch   3x10 second holds bilateral    Standing frame raising to patient's tolerance x        x  x  x                  x      x - Standing 2x's in standing frame holding each stand for 15 - 20  minutes working on the following:  - Horizontal Abduction with bilateral upper extremities 2x10 AROM   - terminal knee extension in stand position 4x10 bilateral   -  Rows with red theraband bilateral  2x10  - Pushing through upper extremities to achieve trunk extension 3x10  - scap retraction 2x10  - attempted 1x10 shoulder rolls backward - patient had difficulty coordinating  - Overhead press with green tband bilateral 1x10  - Patient given feedback on standing frame position - working on increasing seat angle after 30 second rest between lifts (repeated 3-4 times)  - Beach [employer] Volley [employer] x 25 hits working on upright trunk posture  - Pulling with bilateral upper extremities on tray at front to pull hips forward from seat and extend knees 3x10  - Reaching Left and Right with cones to target to stretch lower extremities with each reach 2x12 cones to each side   NuStep   5 minutes at Level 1, working on alternating movement of lower extremities and stretch to bilateral knees   Standing Frame  Standing 1 x in standing frame working on holding endrange position of approximately 55 degree seat angle for 5 minutes   Standing in parallel bars working sit to stands           - standing 2x's  - Standing weights shifts Left to Right 1x10  - Standing knee extension 1x10  - Standing Left upper extremity lifts off bar 1x5  - Standing bilateral upper extremity lifts off bar 1x10 with PT helping to hold hips for support with minimum to moderate assist     * In standing frame:  Sitting position = 0 degree seat angle  Full upright stand position with hips and knees at 0 degrees = 90 degree seat angle  3/15/22 - 1st  standing frame was at 42 degree seat angle, last stand position was at 65 degree seat angle  3/17/22 - 1st  standing frame was at 48 degree seat angle, last stand position was at 70 degree seat angle  3/21/22 - 1st  standing frame was at 52 degree seat angle, last stand position was at 70 degree seat angle  3/29/22 - 1st  standing frame was at 60 degree seat angle, last stand position was at 72 degree seat angle  3/31/22 - 1st  standing frame  was at 55 degree seat angle, last stand position was at 75 degree seat angle  4/7/22 - 1st  standing frame was at 60 degree seat angle, last stand position was at 80 degree seat angle  4/11/22 - 1st  standing frame was at 60 degree seat angle, last stand position was at 78 degree seat angle  4/14/22 - 1st  standing frame was at 55 degree seat angle, last stand position was at 86 degree seat angle  4/19/22 - 1st  standing frame was at 60 degree seat angle, last stand position was at 78 degree seat angle  4/22/22 - 1st  standing frame was at 65 degree seat angle, last stand position was at 81 degree seat angle  4/26/22 - 1st  standing frame was at 65 degree seat angle, last stand position was at 82 degree seat angle  5/3/22 - 1st  standing frame was at 65 degree seat angle, last stand position was at 88 degree seat angle  Plan for Next Visit:      manual therapy techniques: Joint mobilizations, Manual traction and Soft tissue Mobilization were applied to the:(0) minutes, including:    Intervention Performed Today    Patella glides superiorly  2 minutes bilateral    Anterior tibia glide Grade 1-2  1 minute bilateral                                      Neuromuscular Re-Education activities to improve: Balance, Coordination, Sense, Proprioception and Posture for 0 minutes. The following activities were included:    Intervention Performed Today                                                THERAPEUTIC ACTIVITIES to improve dynamic and functional  performance for (0) minutes including:    Intervention Performed Today    Bed - wheelchair transfer   Practiced set up and sequence   Bed mobility sit to supine to sit  Practiced sequence    Rolling   3 x each side working on sequence to incorporate hip flexor stretch on each side. Then separate stretch performed as listed above   Standing in parallel bars  4 x's working on several weight shifts towards hip extension and  knee extension while standing.   Standing in parallel bars performing step forward  With Right lower extremity 5x's with moderate assistance to maintain standing position   Dynasplint Consult via FaceTime with Andriy to discuss fit     Sit to  parallel bars without assistance  2x's   Dynasplint Consult  Andriy Stout (Rep for Dynasplint) met with physical therapist and patient today during session to discuss options for dynasplints for bilateral knees due to patient's significant lack of range of motion.     Dynasplint Fitting  Andriy Stout (Rep for Dynasplint) met with physical therapist and patient today to deliver and fit patient for bilateral knee dynasplints.  We discussed wearing time of 1-2 hours/day for the first week and then we would re-assess.  Patient was shown how to don each brace and straps were marked for future use.  The Dynasplint force was increased to 6 on the Left and 7 on the Right to start.  Extra padding was added to thin straps for skin protection.   Dynasplint Consult  Andriy Stout (Rep for Dynasplint) met with physical therapist and patient today during session to discuss proper fit and positioning of Dynasplint to feel a better stretch at home. Pt only brought Left splint to session today and his splint was donned, straps were tightened and new black line added for patient to follow at home.  Left splint was increased from 7/13 to 9/13 tension.  He reports that he places a pillow under his knee at home and he was educated on better positioning of pillow under distal stump with prosthesis removed.  He also felt more of a stretch in long sitting vs supine and was asked to try this position at home for part of the stretch if able.      Plan for Next Visit:      Gait Training: for (0) minutes:  4/7/22 - Ambulating 1x 8 ft in the parallel bars with bilateral hand support, sheet support around hips for therapist to facilitate hip extension and overall moderate assistance for postural  facilitation and cues for sequence.  4/7/22 - Ambulated with walker with sheet assistance to for hip extension for 5 feet with helper following with wheelchair.    Limitation/Restriction for FOTO Amputation Survey     Therapist reviewed FOTO scores for Hunter Schofield on 3/17/2022.   FOTO documents entered into [employer] - see Media section.     Limitation Score: 52%  4/14/22 - Limitation Score: 45%            Patient Education and Home Exercises     Home Exercises Provided and Patient Education Provided     Education provided:   -2/22/22 Patient educated on increasing length and consistency of stretching knees and hips to every 2 hours at home.  He was shown and demonstrated understanding of short arc quads, rolling toward prone and sitting in reclined position to stretch hip flexors.  -2/24/22 Patient given Home Exercise Program on exercises for lower extremities to be performed 2x15 3x's/day  See patient Instructions in EMR  -3/1/22 Re-inforced Home Exercise Program and asked patient to incorporate prone lying for longer periods of time to build up to 20 minutes per day.  3/3/22 - Discussed the option of dynasplints for bilateral lower extremities. Patient educated on the option of Dynasplints vs basic knee braces and encouraged to consider Dynasplints as a more aggressive and efficient way of properly positioning and dynamically stretching his legs outside of therapy.  3/8/22 - Patient was educated on Dynasplints with Andriy Stout (Rep).   He was shown pictures on ipad and Andriy and physical therapist discussed wearing schedule to make gains with his muscle length outside of therapy sessions.  Andriy informed patient that he would assist with collecting information regarding insurance coverage and provide him with any out of pocket costs so he can decide if he would like to pursue this option.  3/22/22 - Patient was educated that Andriy Stout (Rep for Dynasplint) messaged me that his splints have been approved and are  being mailed this week.   3/24/22 - Patient was educated on Dynasplint management and importance of daily skin checks.  His tolerance of wearing splints for the first week will determine his wearing schedule for the following weeks.  3/29/22 - Patient was educated on only wearing Dynasplints while lying down to get the most effective stretch to lower extremities.  3/31/22 - Patient was asked to reach out to [facility] rep ([facility]) to set up a time for Andriy to re-assess the positioning and fit of splints to see if he can feel more of a stretch with wearing schedule.  4/7/22 - Patient was asked to reach out to [facility] to make sure that splints are adjusted appropriately.  4/11/22 - PT recommended that patient follow up with Dynasplint rep for reassessment of splint fit.  4/14/22 - Patient informed that PT would reach out to [facility] again to have him schedule a visit.  He was encouraged to continue being active at home.  4/18/22 -  Patient asked to contact Dynasplint rep to set up a time for a consult that would work for his schedule.  4/28/22 - Patient educated on wearing Dynaspints for 2 hours/day for the next 4 days and then we will assess his ability to increase to 4 hours/day after next visit.  He verbalized good understanding of education from Andriy (Dynasplint) and was told to try Right splint on in new wear position first before increasing tension to 9/13.    5/3/22 - Patient educated on attempting standing at home at sink holding stand position for 10-15 seconds repeating 3x's and doing this in the morning and afternoon every day.     Home Exercise Provided: 2/24/22 Exercises were reviewed and Hunter Schofield was able to demonstrate them prior to the end of the session.  Hunter Schofield demonstrated good  understanding of the education provided.   ASSESSMENT     Pt participated well with session today.  He was able to achieve 88 degree seat angle for the first time in the session.  He is  demonstrating improved bilateral knee and hip flexibility and also reports improved functional mobility at home.  He is progressing well with PT.  Pt prognosis is Fair  To Good    Pt will continue to benefit from skilled outpatient physical therapy to address the deficits listed in the problem list box on initial evaluation, provide pt/family education and to maximize pt's level of independence in the home and community environment.     Pt's spiritual, cultural and educational needs considered and pt agreeable to plan of care and goals.     Anticipated Barriers for therapy: co-morbidities, transportation assistance needed at times, lifestyle, potential contractures of knees/hips    GOALS:     Short Term Goals:  4 weeks Progress    1. Function: Patient will demonstrate improved function as indicated by a functional limitation score of less than or equal to 55 out of 100 on FOTO = 44% limitation PC   2. Mobility: Patient will improve sit to stand transfer with moderate assistance in order to progress towards independence with functional activities.  met   3. Gait: Patient will ambulated 3 steps in parallel bars with Maximum assistance to demonstrate improved strength, posture and endurance met   4. Balance: Complete FIST test upon next visit. PC   5. HEP: Patient will demonstrate independence with HEP in order to progress toward functional independence. met    6. Assess patient's needs for Dynasplints and set up consultation if needed. met       Long Term Goals:  8 weeks Progress   1. Function: Patient will demonstrate improved function as indicated by a functional limitation score of less than or equal to 60 out of 100 on FOTO = 40 % limitation PC   2. Mobility: Patient will improve AROM of bilateral knees to -10 degrees  in order to return to maximal functional potential and improve quality of life. PC   3. Gait: Patient will ambulate 15 feet with rolling walker with moderate assistance to demonstrate improved  strength, endurance and mobility. PC   4. Balance: Modify FIST goal once completed. PC   5. HEP: Patient educated on HEP in preparation for d/c verbalizing and demonstrating good understanding of topics discussed.    PC   6.          Goals Key:  PC= progressing/continue;        PM= partially met;             DC= discontinue         PLAN     Continue Plan of Care (POC) and progress per patient tolerance. See Treatment section for exercise progression.  Outpatient Physical Therapy 2 times weekly for 8 weeks to include the following interventions: Electrical Stimulation Attended, Gait Training, Moist Heat/ Ice, Neuromuscular Re-ed, Orthotic Management and Training, Patient Education, Self Care, Therapeutic Activities, Therapeutic Exercise and Prosthetic Management.      Mindy Patino, PT, DPT

## 2022-05-05 ENCOUNTER — CLINICAL SUPPORT (OUTPATIENT)
Dept: REHABILITATION | Facility: HOSPITAL | Age: 82
End: 2022-05-05
Payer: MEDICARE

## 2022-05-05 DIAGNOSIS — R68.89 DECREASED STRENGTH, ENDURANCE, AND MOBILITY: ICD-10-CM

## 2022-05-05 DIAGNOSIS — Z74.09 DECREASED STRENGTH, ENDURANCE, AND MOBILITY: ICD-10-CM

## 2022-05-05 DIAGNOSIS — M25.661 DECREASED RANGE OF MOTION OF BOTH LOWER EXTREMITIES: Primary | ICD-10-CM

## 2022-05-05 DIAGNOSIS — R53.1 DECREASED STRENGTH, ENDURANCE, AND MOBILITY: ICD-10-CM

## 2022-05-05 DIAGNOSIS — M25.662 DECREASED RANGE OF MOTION OF BOTH LOWER EXTREMITIES: Primary | ICD-10-CM

## 2022-05-05 PROCEDURE — 97110 THERAPEUTIC EXERCISES: CPT

## 2022-05-05 NOTE — PROGRESS NOTES
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  Physician: Fallon Dudley*    Visit Date: 5/5/2022     Physician Orders: PT Eval and Treat   Medical Diagnosis from Referral: s/p Left BKA  Evaluation Date: 2/18/2022  Authorization Period Expiration: 12/31/22  Plan of Care Expiration: 6/9/22  Progress Note Due: 5/14/22  Visit # / Visits authorized: 21/30 (+1 eval)  FOTO: 3/3     Precautions: Standard, Diabetes, Fall and wound on R foot, Incontinence    PTA Visit #: 0/5     Time In: 1016  Time Out: 1100   Total Billable Time: 44 minutes    SUBJECTIVE     Pt reports: that he has been compliant with dynasplints and feels that he could go up with the tension slightly.  Response to previous treatment: good with no adverse effects.  Functional change:  He reports that he was able to stand at his stove to reach his microwave (over stove) at home since last session.  Pain: 0/10 at rest  Location: not applicable     OBJECTIVE     Objective Measures updated at progress report unless specified.  Re-Assessment for (0) minutes    Joint Measured 2/22/22 3/7/22 4/14/22   Left Hip Extension -20 -25 -20    Right Hip Extension -25 -25 -25   Left Knee Extension -48 -35 AROM, -30 PROM -15   Right Knee Extension -45 -37 AROM, -34 PROM -12     3/3/22  In prone:   Hip Extension = - 20  Right, unable to get accurate measure on Left      STRENGTH:              L/E MMT Right  2/18/22    Right  3/17/22 Right   4/14/22 Left  2/18/22 Left   3/17/22 Left   4/14/22   Hip Flexion  3+/5 4-/5 4-/5 3+/5 4-/5 4-/5   Hip Extension  2+/5 2+/5 2+/5 2+/5 2+/5 2+/5   Hip Abduction  2+/5 3-/5 3-/5 2+/5 3-/5 3-/5   Knee Extension 3-/5 3+ within available Range 4-/5 within available range 3-/5 3+ within available range 3+/5 within available range   Knee Flexion 4-/5 4/5 4+/5 4-/5 4/5 4/5    Ankle DF 3+/5 3+/5 within available range 3+/5 within available range (-10 degrees from neurtral) not applicable  Not applicable not applicable    Ankle PF 3+/5 4-/5 4/5 not applicable  Not applicable not applicable           Treatment     Hunter Schofield received the treatments listed below:      THERAPEUTIC EXERCISES to develop strength, endurance, ROM, flexibility, posture and core stabilization for (39) minutes including:    Intervention Performed Today    short arc quad in supine  lower extremities over wedge 2x20 Right AROM, Left 2x20 Active Assistive for form and quality    sidelying toward prone   Stretch to Hip flexor 3x30 seconds each side   Quad set   2x10 Left     Sidelying hip extension   2x10 bilaterally with 10 seconds endrange stretch on last rep   [place] Left Prosthetic leg  Performed at edge of mat    Sitting in reclined position at edge of mat   Hip flexor stretch 3x30 seconds bilateral    Hip Abduction  In sidelying 2x10 bilateral    Bridge attempt  1x5   Posterior Pelvic Tilt Supine  2x5   Prone lying over light blue small triangle wedge  Holding position 2 minutes, then 4 minutes   Stretch to Hamstrings  3 x 1 minute Right lower extremity    Heelslide with forceful push towards extension  2x10 bilateral with 10 second enrange hold stretch on last rep    10 # over distal Left thigh  Stretch to Left hip flexor for over 5 minutes while exercising Right lower extremity    Prone Hamstring curl endrange for quad/hip flexor stretch  2x10 AAROM   lower extremity lifts towards extension in prone  2x10 AAROM   Prone hip flexor stretch   3x10 second holds bilateral    Standing frame raising to patient's tolerance x        x    x  x                  x  x    x - Standing 3x's in standing frame holding each stand for 10-15  minutes working on the following:  - Horizontal Abduction with bilateral upper extremities 2x10 AROM   - terminal knee extension in stand position 1x10, 1x15, 1x20  bilateral   - Rows holding 2# dumbbells bilateral  2x10  - Pushing through upper extremities to achieve trunk extension 3x10  - scap retraction 2x10  - attempted 1x10 shoulder rolls backward - patient had difficulty coordinating  - Overhead press with green tband bilateral 1x10  - Patient given feedback on standing frame position - working on increasing seat angle after 30 second rest between lifts (repeated 3-4 times)  - [employer] x 30 hits working on upright trunk posture  - Pulling with bilateral upper extremities on tray at front to pull hips forward from seat and extend knees 1x10  - Reaching Left and Right with cones to target to stretch lower extremities with each reach 2x12 cones to each side   NuStep   5 minutes at Level 1, working on alternating movement of lower extremities and stretch to bilateral knees   Standing Frame  Standing 1 x in standing frame working on holding endrange position of approximately 55 degree seat angle for 5 minutes   Standing in parallel bars working sit to stands           - standing 2x's  - Standing weights shifts Left to Right 1x10  - Standing knee extension 1x10  - Standing Left upper extremity lifts off bar 1x5  - Standing bilateral upper extremity lifts off bar 1x10 with PT helping to hold hips for support with minimum to moderate assist     * In standing frame:  Sitting position = 0 degree seat angle  Full upright stand position with hips and knees at 0 degrees = 90 degree seat angle  3/15/22 - 1st  standing frame was at 42 degree seat angle, last stand position was at 65 degree seat angle  3/17/22 - 1st  standing frame was at 48 degree seat angle, last stand position was at 70 degree seat angle  3/21/22 - 1st  standing frame was at 52 degree seat angle, last stand position was at 70 degree seat angle  3/29/22 - 1st  standing frame was at 60 degree seat angle, last stand position was at 72 degree seat angle  3/31/22 - 1st   standing frame was at 55 degree seat angle, last stand position was at 75 degree seat angle  4/7/22 - 1st  standing frame was at 60 degree seat angle, last stand position was at 80 degree seat angle  4/11/22 - 1st  standing frame was at 60 degree seat angle, last stand position was at 78 degree seat angle  4/14/22 - 1st  standing frame was at 55 degree seat angle, last stand position was at 86 degree seat angle  4/19/22 - 1st  standing frame was at 60 degree seat angle, last stand position was at 78 degree seat angle  4/22/22 - 1st  standing frame was at 65 degree seat angle, last stand position was at 81 degree seat angle  4/26/22 - 1st  standing frame was at 65 degree seat angle, last stand position was at 82 degree seat angle  5/3/22 - 1st  standing frame was at 65 degree seat angle, last stand position was at 88 degree seat angle  5/5/22 - 1st  standing frame was at 65 degree seat angle, last stand position was at 84 degree seat angle  Plan for Next Visit:      manual therapy techniques: Joint mobilizations, Manual traction and Soft tissue Mobilization were applied to the:(0) minutes, including:    Intervention Performed Today    Patella glides superiorly  2 minutes bilateral    Anterior tibia glide Grade 1-2  1 minute bilateral                                      Neuromuscular Re-Education activities to improve: Balance, Coordination, Sense, Proprioception and Posture for 0 minutes. The following activities were included:    Intervention Performed Today                                                THERAPEUTIC ACTIVITIES to improve dynamic and functional  performance for (5) minutes including:    Intervention Performed Today    Bed - wheelchair transfer   Practiced set up and sequence   Bed mobility sit to supine to sit  Practiced sequence    Rolling   3 x each side working on sequence to incorporate hip flexor stretch on each side. Then separate  stretch performed as listed above   Standing in parallel bars  4 x's working on several weight shifts towards hip extension and knee extension while standing.   Standing in parallel bars performing step forward  With Right lower extremity 5x's with moderate assistance to maintain standing position   Dynasplint Consult via FaceTime with Andriy to discuss fit     Sit to  parallel bars without assistance  2x's   Dynasplint Consult  Andriy Stout (Rep for Dynasplint) met with physical therapist and patient today during session to discuss options for dynasplints for bilateral knees due to patient's significant lack of range of motion.     Dynasplint Fitting  Andriy Stout (Rep for Dynasplint) met with physical therapist and patient today to deliver and fit patient for bilateral knee dynasplints.  We discussed wearing time of 1-2 hours/day for the first week and then we would re-assess.  Patient was shown how to don each brace and straps were marked for future use.  The Dynasplint force was increased to 6 on the Left and 7 on the Right to start.  Extra padding was added to thin straps for skin protection.   Dynasplint Consult  Andriy Stout (Rep for Dynasplint) met with physical therapist and patient today during session to discuss proper fit and positioning of Dynasplint to feel a better stretch at home. Pt only brought Left splint to session today and his splint was donned, straps were tightened and new black line added for patient to follow at home.  Left splint was increased from 7/13 to 9/13 tension.  He reports that he places a pillow under his knee at home and he was educated on better positioning of pillow under distal stump with prosthesis removed.  He also felt more of a stretch in long sitting vs supine and was asked to try this position at home for part of the stretch if able.    Standing at sink in Neuro Room working on stand posture and positioning  x Simulating activity that he has been given to do for  homework. 1x5 seconds, 1x10 seconds     Plan for Next Visit:      Gait Training: for (0) minutes:  4/7/22 - Ambulating 1x 8 ft in the parallel bars with bilateral hand support, sheet support around hips for therapist to facilitate hip extension and overall moderate assistance for postural facilitation and cues for sequence.  4/7/22 - Ambulated with walker with sheet assistance to for hip extension for 5 feet with helper following with wheelchair.    Limitation/Restriction for FOTO Amputation Survey     Therapist reviewed FOTO scores for Hunter Schofield on 3/17/2022.   FOTO documents entered into [employer] - see Media section.     Limitation Score: 52%  4/14/22 - Limitation Score: 45%            Patient Education and Home Exercises     Home Exercises Provided and Patient Education Provided     Education provided:   -2/22/22 Patient educated on increasing length and consistency of stretching knees and hips to every 2 hours at home.  He was shown and demonstrated understanding of short arc quads, rolling toward prone and sitting in reclined position to stretch hip flexors.  -2/24/22 Patient given Home Exercise Program on exercises for lower extremities to be performed 2x15 3x's/day  See patient Instructions in EMR  -3/1/22 Re-inforced Home Exercise Program and asked patient to incorporate prone lying for longer periods of time to build up to 20 minutes per day.  3/3/22 - Discussed the option of dynasplints for bilateral lower extremities. Patient educated on the option of Dynasplints vs basic knee braces and encouraged to consider Dynasplints as a more aggressive and efficient way of properly positioning and dynamically stretching his legs outside of therapy.  3/8/22 - Patient was educated on Dynasplints with Andriy Stout ().   He was shown pictures on ipad and Andriy and physical therapist discussed wearing schedule to make gains with his muscle length outside of therapy sessions.  Andriy informed patient that he would  assist with collecting information regarding insurance coverage and provide him with any out of pocket costs so he can decide if he would like to pursue this option.  3/22/22 - Patient was educated that Andriy Stout (Rep for Dynasplint) messaged me that his splints have been approved and are being mailed this week.   3/24/22 - Patient was educated on Dynasplint management and importance of daily skin checks.  His tolerance of wearing splints for the first week will determine his wearing schedule for the following weeks.  3/29/22 - Patient was educated on only wearing Dynasplints while lying down to get the most effective stretch to lower extremities.  3/31/22 - Patient was asked to reach out to Dynasplint rep (Andriy) to set up a time for Andriy to re-assess the positioning and fit of splints to see if he can feel more of a stretch with wearing schedule.  4/7/22 - Patient was asked to reach out to [employer] rep to make sure that splints are adjusted appropriately.  4/11/22 - PT [facility] of [facility]int fit.  4/14/22 - Patient informed that PT would reach [facility] to [facility] again to have him schedule a visit.  He was encouraged to continue being active at home.  4/18/22 -  [facility] for a consult that would work for his schedule.  4/28/22 - Patient educated on wearing Dynaspints for 2 hours/day for the next 4 days and then we will assess his ability to increase to 4 hours/day after next visit.  He verbalized good understanding of education from Andriy ([facility]) and was told to try Right splint on in new wear position first before increasing tension to 9/13.    5/3/22 - Patient educated on attempting standing at home at sink holding stand position for 10-15 seconds repeating 3x's and doing this in the morning and afternoon every day.     Home Exercise Provided: 2/24/22 Exercises were reviewed and Hunter Schofield  was able to demonstrate them prior to the end of the session.  Hunter Ybarradeborah demonstrated good  understanding of the education provided.   ASSESSMENT     Pt participated well with session today.  He was able to perform standing at sink without assistance and hold position for up to 10 seconds.  He is continuing to tolerate seat angle over 80 degrees and is compliant with dynasplints.      Pt prognosis is Fair  To Good    Pt will continue to benefit from skilled outpatient physical therapy to address the deficits listed in the problem list box on initial evaluation, provide pt/family education and to maximize pt's level of independence in the home and community environment.     Pt's spiritual, cultural and educational needs considered and pt agreeable to plan of care and goals.     Anticipated Barriers for therapy: co-morbidities, transportation assistance needed at times, lifestyle, potential contractures of knees/hips    GOALS:     Short Term Goals:  4 weeks Progress    1. Function: Patient will demonstrate improved function as indicated by a functional limitation score of less than or equal to 55 out of 100 on FOTO = 44% limitation PC   2. Mobility: Patient will improve sit to stand transfer with moderate assistance in order to progress towards independence with functional activities.  met   3. Gait: Patient will ambulated 3 steps in parallel bars with Maximum assistance to demonstrate improved strength, posture and endurance met   4. Balance: Complete FIST test upon next visit. PC   5. HEP: Patient will demonstrate independence with HEP in order to progress toward functional independence. met    6. Assess patient's needs for Dynasplints and set up consultation if needed. met       Long Term Goals:  8 weeks Progress   1. Function: Patient will demonstrate improved function as indicated by a functional limitation score of less than or equal to 60 out of 100 on FOTO = 40 % limitation PC   2. Mobility: Patient  will improve AROM of bilateral knees to -10 degrees  in order to return to maximal functional potential and improve quality of life. PC   3. Gait: Patient will ambulate 15 feet with rolling walker with moderate assistance to demonstrate improved strength, endurance and mobility. PC   4. Balance: Modify FIST goal once completed. PC   5. HEP: Patient educated on HEP in preparation for d/c verbalizing and demonstrating good understanding of topics discussed.    PC   6.          Goals Key:  PC= progressing/continue;        PM= partially met;             DC= discontinue         PLAN     Continue Plan of Care (POC) and progress per patient tolerance. See Treatment section for exercise progression.  Outpatient Physical Therapy 2 times weekly for 8 weeks to include the following interventions: Electrical Stimulation Attended, Gait Training, Moist Heat/ Ice, Neuromuscular Re-ed, Orthotic Management and Training, Patient Education, Self Care, Therapeutic Activities, Therapeutic Exercise and Prosthetic Management.      Mindy Patino, PT, DPT

## 2022-05-10 ENCOUNTER — CLINICAL SUPPORT (OUTPATIENT)
Dept: REHABILITATION | Facility: HOSPITAL | Age: 82
End: 2022-05-10
Payer: MEDICARE

## 2022-05-10 DIAGNOSIS — Z74.09 DECREASED STRENGTH, ENDURANCE, AND MOBILITY: ICD-10-CM

## 2022-05-10 DIAGNOSIS — M25.661 DECREASED RANGE OF MOTION OF BOTH LOWER EXTREMITIES: Primary | ICD-10-CM

## 2022-05-10 DIAGNOSIS — R53.1 DECREASED STRENGTH, ENDURANCE, AND MOBILITY: ICD-10-CM

## 2022-05-10 DIAGNOSIS — M25.662 DECREASED RANGE OF MOTION OF BOTH LOWER EXTREMITIES: Primary | ICD-10-CM

## 2022-05-10 DIAGNOSIS — R68.89 DECREASED STRENGTH, ENDURANCE, AND MOBILITY: ICD-10-CM

## 2022-05-10 PROCEDURE — 97110 THERAPEUTIC EXERCISES: CPT

## 2022-05-10 NOTE — PROGRESS NOTES
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  Physician: Fallon Dudley*    Visit Date: 5/10/2022     Physician Orders: PT Eval and Treat   Medical Diagnosis from Referral: s/p Left BKA  Evaluation Date: 2/18/2022  Authorization Period Expiration: 12/31/22  Plan of Care Expiration: 6/9/22  Progress Note Due: 5/14/22  Visit # / Visits authorized: 22/30 (+1 eval)  FOTO: 3/3     Precautions: Standard, Diabetes, Fall and wound on R foot, Incontinence    PTA Visit #: 0/5     Time In: 1016  Time Out: 1100   Total Billable Time: 44 minutes    SUBJECTIVE     Pt reports: that he has been compliant with dynasplints and has been standing several times each day at home.  Response to previous treatment: good with no adverse effects.  Functional change:  He reports that he is feeling stronger with more confidence with standing.  Pain: 0/10 at rest  Location: not applicable     OBJECTIVE     Objective Measures updated at progress report unless specified.  Re-Assessment for (0) minutes    Joint Measured 2/22/22 3/7/22 4/14/22   Left Hip Extension -20 -25 -20    Right Hip Extension -25 -25 -25   Left Knee Extension -48 -35 AROM, -30 PROM -15   Right Knee Extension -45 -37 AROM, -34 PROM -12     3/3/22  In prone:   Hip Extension = - 20  Right, unable to get accurate measure on Left      STRENGTH:              L/E MMT Right  2/18/22    Right  3/17/22 Right   4/14/22 Left  2/18/22 Left   3/17/22 Left   4/14/22   Hip Flexion  3+/5 4-/5 4-/5 3+/5 4-/5 4-/5   Hip Extension  2+/5 2+/5 2+/5 2+/5 2+/5 2+/5   Hip Abduction  2+/5 3-/5 3-/5 2+/5 3-/5 3-/5   Knee Extension 3-/5 3+ within available Range 4-/5 within available range 3-/5 3+ within available range 3+/5 within available range   Knee Flexion 4-/5 4/5 4+/5 4-/5 4/5 4/5   Ankle DF 3+/5 3+/5 within available range  3+/5 within available range (-10 degrees from neurtral) not applicable  Not applicable not applicable    Ankle PF 3+/5 4-/5 4/5 not applicable  Not applicable not applicable           Treatment     Hunter Schofield received the treatments listed below:      THERAPEUTIC EXERCISES to develop strength, endurance, ROM, flexibility, posture and core stabilization for (44) minutes including:    Intervention Performed Today    short arc quad in supine  lower extremities over wedge 2x20 Right AROM, Left 2x20 Active Assistive for form and quality    sidelying toward prone   Stretch to Hip flexor 3x30 seconds each side   Quad set   2x10 Left     Sidelying hip extension   2x10 bilaterally with 10 seconds endrange stretch on last rep   Larke [employer] Donning Left Prosthetic leg  Performed at edge of mat    Sitting in reclined position at edge of mat   Hip flexor stretch 3x30 seconds bilateral    Hip Abduction  In sidelying 2x10 bilateral    Bridge attempt  1x5   Posterior Pelvic Tilt Supine  2x5   Prone lying over light blue small triangle wedge  Holding position 2 minutes, then 4 minutes   Stretch to Hamstrings  3 x 1 minute Right lower extremity    Heelslide with forceful push towards extension  2x10 bilateral with 10 second enrange hold stretch on last rep    10 # over distal Left thigh  Stretch to Left hip flexor for over 5 minutes while exercising Right lower extremity    Prone Hamstring curl endrange for quad/hip flexor stretch  2x10 AAROM   lower extremity lifts towards extension in prone  2x10 AAROM   Prone hip flexor stretch   3x10 second holds bilateral    Standing frame raising to patient's tolerance x        x  x  x                    x    x - Standing 2x's in standing frame holding each stand for 10-15  minutes working on the following:  - Horizontal Abduction with bilateral upper extremities 2x10 AROM   - terminal knee extension in stand position 1x15 bilateral   - Rows holding green theraband bilateral  2x10  -  Pushing through upper extremities to achieve trunk extension 3x15  - scap retraction 2x10  - attempted 1x10 shoulder rolls backward - patient had difficulty coordinating  - Overhead press with green tband bilateral 1x10  - Patient given feedback on standing frame position - working on increasing seat angle after 30 second rest between lifts (repeated 3-4 times)  - Beach [employer] Volley 1 x 30 hits working on upright trunk posture  - Pulling with bilateral upper extremities on tray at front to pull hips forward from seat and extend knees 3x15  - Reaching Left and Right with cones to target to stretch lower extremities with each reach 2x12 cones to each side   NuStep   5 minutes at Level 1, working on alternating movement of lower extremities and stretch to bilateral knees   Standing Frame  Standing 1 x in standing frame working on holding endrange position of approximately 55 degree seat angle for 5 minutes   Standing in parallel bars working sit to stands           - standing 2x's  - Standing weights shifts Left to Right 1x10  - Standing knee extension 1x10  - Standing Left upper extremity lifts off bar 1x5  - Standing bilateral upper extremity lifts off bar 1x10 with PT helping to hold hips for support with minimum to moderate assist     * In standing frame:  Sitting position = 0 degree seat angle  Full upright stand position with hips and knees at 0 degrees = 90 degree seat angle  3/15/22 - 1st  standing frame was at 42 degree seat angle, last stand position was at 65 degree seat angle  3/17/22 - 1st  standing frame was at 48 degree seat angle, last stand position was at 70 degree seat angle  3/21/22 - 1st  standing frame was at 52 degree seat angle, last stand position was at 70 degree seat angle  3/29/22 - 1st  standing frame was at 60 degree seat angle, last stand position was at 72 degree seat angle  3/31/22 - 1st  standing frame was at 55 degree seat angle, last stand  position was at 75 degree seat angle  4/7/22 - 1st  standing frame was at 60 degree seat angle, last stand position was at 80 degree seat angle  4/11/22 - 1st  standing frame was at 60 degree seat angle, last stand position was at 78 degree seat angle  4/14/22 - 1st  standing frame was at 55 degree seat angle, last stand position was at 86 degree seat angle  4/19/22 - 1st  standing frame was at 60 degree seat angle, last stand position was at 78 degree seat angle  4/22/22 - 1st  standing frame was at 65 degree seat angle, last stand position was at 81 degree seat angle  4/26/22 - 1st  standing frame was at 65 degree seat angle, last stand position was at 82 degree seat angle  5/3/22 - 1st  standing frame was at 65 degree seat angle, last stand position was at 88 degree seat angle  5/5/22 - 1st  standing frame was at 65 degree seat angle, last stand position was at 84 degree seat angle  5/10/22 - 1st  standing frame was at 60 degree seat angle, last stand position was at  80 degree seat angle  Plan for Next Visit:      manual therapy techniques: Joint mobilizations, Manual traction and Soft tissue Mobilization were applied to the:(0) minutes, including:    Intervention Performed Today    Patella glides superiorly  2 minutes bilateral    Anterior tibia glide Grade 1-2  1 minute bilateral                                      Neuromuscular Re-Education activities to improve: Balance, Coordination, Sense, Proprioception and Posture for 0 minutes. The following activities were included:    Intervention Performed Today                                                THERAPEUTIC ACTIVITIES to improve dynamic and functional  performance for (0) minutes including:    Intervention Performed Today    Bed - wheelchair transfer   Practiced set up and sequence   Bed mobility sit to supine to sit  Practiced sequence    Rolling   3 x each side working on sequence  to incorporate hip flexor stretch on each side. Then separate stretch performed as listed above   Standing in parallel bars  4 x's working on several weight shifts towards hip extension and knee extension while standing.   Standing in parallel bars performing step forward  With Right lower extremity 5x's with moderate assistance to maintain standing position   Dynasplint Consult via FaceTime with Andriy to discuss fit     Sit to  parallel bars without assistance  2x's   Dynasplint Consult  Andriy Stout (Rep for Dynasplint) met with physical therapist and patient today during session to discuss options for dynasplints for bilateral knees due to patient's significant lack of range of motion.     Dynasplint Fitting  Andriy Stout (Rep for Dynasplint) met with physical therapist and patient today to deliver and fit patient for bilateral knee dynasplints.  We discussed wearing time of 1-2 hours/day for the first week and then we would re-assess.  Patient was shown how to don each brace and straps were marked for future use.  The Dynasplint force was increased to 6 on the Left and 7 on the Right to start.  Extra padding was added to thin straps for skin protection.   Dynasplint Consult  Andriy Stout (Rep for Dynasplint) met with physical therapist and patient today during session to discuss proper fit and positioning of Dynasplint to feel a better stretch at home. Pt only brought Left splint to session today and his splint was donned, straps were tightened and new black line added for patient to follow at home.  Left splint was increased from 7/13 to 9/13 tension.  He reports that he places a pillow under his knee at home and he was educated on better positioning of pillow under distal stump with prosthesis removed.  He also felt more of a stretch in long sitting vs supine and was asked to try this position at home for part of the stretch if able.    Standing at sink in Neuro Room working on stand posture and  positioning   Simulating activity that he has been given to do for homework. 1x5 seconds, 1x10 seconds     Plan for Next Visit:      Gait Training: for (0) minutes:  4/7/22 - Ambulating 1x 8 ft in the parallel bars with bilateral hand support, sheet support around hips for therapist to facilitate hip extension and overall moderate assistance for postural facilitation and cues for sequence.  4/7/22 - Ambulated with walker with sheet assistance to for hip extension for 5 feet with helper following with wheelchair.    Limitation/Restriction for FOTO Amputation Survey     Therapist reviewed FOTO scores for Hunter Schofield on 3/17/2022.   FOTO documents entered into [employer] - see Media section.     Limitation Score: 52%  4/14/22 - Limitation Score: 45%            Patient Education and Home Exercises     Home Exercises Provided and Patient Education Provided     Education provided:   -2/22/22 Patient educated on increasing length and consistency of stretching knees and hips to every 2 hours at home.  He was shown and demonstrated understanding of short arc quads, rolling toward prone and sitting in reclined position to stretch hip flexors.  -2/24/22 Patient given Home Exercise Program on exercises for lower extremities to be performed 2x15 3x's/day  See patient Instructions in EMR  -3/1/22 Re-inforced Home Exercise Program and asked patient to incorporate prone lying for longer periods of time to build up to 20 minutes per day.  3/3/22 - Discussed the option of dynasplints for bilateral lower extremities. Patient educated on the option of Dynasplints vs basic knee braces and encouraged to consider Dynasplints as a more aggressive and efficient way of properly positioning and dynamically stretching his legs outside of therapy.  3/8/22 - Patient was educated on Dynasplints with Andriy Stout ().   He was shown pictures on ipad and Andriy and physical therapist discussed wearing schedule to make gains with his muscle length  outside of therapy sessions.  Andriy informed patient that he would assist with collecting information regarding insurance coverage and provide him with any out of pocket costs so he can decide if he would like to pursue this option.  3/22/22 - Patient was educated that Andriy Stout (Rep for Dynasplint) messaged me that his splints have been approved and are being mailed this week.   3/24/22 - Patient was educated on Dynasplint management and importance of daily skin checks.  His tolerance of wearing splints for the first week will determine his wearing schedule for the following weeks.  3/29/22 - Patient was educated on only wearing Dynasplints while lying down to get the most effective stretch to lower extremities.  3/31/22 - Patient was asked to reach out to Dynasplint rep (Andriy) to set up a time for Andriy to re-assess the positioning and fit of splints to see if he can feel more of a stretch with wearing schedule.  4/7/22 - Patient was asked to reach out to [email or web address] rep to make sure that splints are adjusted appropriately.  4/11/22 - PT recommended that [facility] for reassessment of splint fit.  4/14/22 - Patient informed that PT would reach out to [email or web address] again to have him schedule a visit.  He was encouraged to continue being active at home.  4/18/22 -  Patient asked to contact [email or web address] rep to set up a time for a consult that would work for his schedule.  4/28/22 - Patient educated on wearing Dynaspints for 2 hours/day for the next 4 days and then we will assess his ability to increase to 4 hours/day after next visit.  He verbalized good understanding of education from Andriy ([facility]) and was told to try Right splint on in new wear position first before increasing tension to 9/13.    5/3/22 - Patient educated on attempting standing at home at sink holding stand position for 10-15 seconds repeating 3x's and doing this in the morning and afternoon every day.     Home Exercise  Provided: 2/24/22 Exercises were reviewed and Hunter Schofield was able to demonstrate them prior to the end of the session.  Hunter Schofield demonstrated good  understanding of the education provided.   ASSESSMENT     Pt participated well with session today with good effort and participation.  He continues to be limited by knee and hip tightness, but is ready to start functional standing and gait tasks throughout sessions.      Pt prognosis is Fair  To Good    Pt will continue to benefit from skilled outpatient physical therapy to address the deficits listed in the problem list box on initial evaluation, provide pt/family education and to maximize pt's level of independence in the home and community environment.     Pt's spiritual, cultural and educational needs considered and pt agreeable to plan of care and goals.     Anticipated Barriers for therapy: co-morbidities, transportation assistance needed at times, lifestyle, potential contractures of knees/hips    GOALS:     Short Term Goals:  4 weeks Progress    1. Function: Patient will demonstrate improved function as indicated by a functional limitation score of less than or equal to 55 out of 100 on FOTO = 44% limitation PC   2. Mobility: Patient will improve sit to stand transfer with moderate assistance in order to progress towards independence with functional activities.  met   3. Gait: Patient will ambulated 3 steps in parallel bars with Maximum assistance to demonstrate improved strength, posture and endurance met   4. Balance: Complete FIST test upon next visit. PC   5. HEP: Patient will demonstrate independence with HEP in order to progress toward functional independence. met    6. Assess patient's needs for Dynasplints and set up consultation if needed. met       Long Term Goals:  8 weeks Progress   1. Function: Patient will demonstrate improved function as indicated by a functional limitation score of less than or equal to 60 out of 100 on FOTO =  40 % limitation PC   2. Mobility: Patient will improve AROM of bilateral knees to -10 degrees  in order to return to maximal functional potential and improve quality of life. PC   3. Gait: Patient will ambulate 15 feet with rolling walker with moderate assistance to demonstrate improved strength, endurance and mobility. PC   4. Balance: Modify FIST goal once completed. PC   5. HEP: Patient educated on HEP in preparation for d/c verbalizing and demonstrating good understanding of topics discussed.    PC   6.          Goals Key:  PC= progressing/continue;        PM= partially met;             DC= discontinue         PLAN     Continue Plan of Care (POC) and progress per patient tolerance. See Treatment section for exercise progression.  Outpatient Physical Therapy 2 times weekly for 8 weeks to include the following interventions: Electrical Stimulation Attended, Gait Training, Moist Heat/ Ice, Neuromuscular Re-ed, Orthotic Management and Training, Patient Education, Self Care, Therapeutic Activities, Therapeutic Exercise and Prosthetic Management.      Mindy Patino, PT, DPT

## 2022-05-12 ENCOUNTER — CLINICAL SUPPORT (OUTPATIENT)
Dept: REHABILITATION | Facility: HOSPITAL | Age: 82
End: 2022-05-12
Payer: MEDICARE

## 2022-05-12 DIAGNOSIS — M25.661 DECREASED RANGE OF MOTION OF BOTH LOWER EXTREMITIES: Primary | ICD-10-CM

## 2022-05-12 DIAGNOSIS — Z74.09 DECREASED STRENGTH, ENDURANCE, AND MOBILITY: ICD-10-CM

## 2022-05-12 DIAGNOSIS — R53.1 DECREASED STRENGTH, ENDURANCE, AND MOBILITY: ICD-10-CM

## 2022-05-12 DIAGNOSIS — R68.89 DECREASED STRENGTH, ENDURANCE, AND MOBILITY: ICD-10-CM

## 2022-05-12 DIAGNOSIS — M25.662 DECREASED RANGE OF MOTION OF BOTH LOWER EXTREMITIES: Primary | ICD-10-CM

## 2022-05-12 PROCEDURE — 97110 THERAPEUTIC EXERCISES: CPT

## 2022-05-12 PROCEDURE — 97530 THERAPEUTIC ACTIVITIES: CPT

## 2022-05-12 NOTE — PROGRESS NOTES
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  Physician: Fallon Dudley*    Visit Date: 5/12/2022     Physician Orders: PT Eval and Treat   Medical Diagnosis from Referral: s/p Left BKA  Evaluation Date: 2/18/2022  Authorization Period Expiration: 12/31/22  Plan of Care Expiration: 6/9/22  Progress Note Due: 5/14/22  Visit # / Visits authorized: 23/30 (+1 eval)  FOTO: 3/3     Precautions: Standard, Diabetes, Fall and wound on R foot, Incontinence    PTA Visit #: 0/5     Time In: 1016  Time Out: 1100   Total Billable Time: 44 minutes    SUBJECTIVE     Pt reports: that he has been compliant with dynasplints and has been standing several times each day at home.  Response to previous treatment: good with no adverse effects.  Functional change:  He reports that he is feeling stronger with more confidence with standing.  Pain: 0/10 at rest  Location: not applicable     OBJECTIVE     Objective Measures updated at progress report unless specified.  Re-Assessment for (15) minutes    Joint Measured 2/22/22 3/7/22 4/14/22 5/12/22   Left Hip Extension -20 -25 -20  -20   Right Hip Extension -25 -25 -25 -20   Left Knee Extension -48 -35 AROM, -30 PROM -15 -32 PROM   Right Knee Extension -45 -37 AROM, -34 PROM -12 - 35 PROM     3/3/22  In prone:   Hip Extension = - 20  Right, unable to get accurate measure on Left      STRENGTH:                L/E MMT Right  2/18/22    Right  3/17/22 Right   4/14/22 Right  5/12/22 Left  2/18/22 Left   3/17/22 Left   4/14/22 Left   5/12/22   Hip Flexion  3+/5 4-/5 4-/5 4/5 3+/5 4-/5 4-/5 4/5   Hip Extension  2+/5 2+/5 2+/5 2+/5 2+/5 2+/5 2+/5 2+/5   Hip Abduction  2+/5 3-/5 3-/5 3-/5 2+/5 3-/5 3-/5 3-/5   Knee Extension 3-/5 3+ within available Range 4-/5 within available range 4-/5 within available range 3-/5  3+ within available range 3+/5 within available range 3+/5 within available range   Knee Flexion 4-/5 4/5 4+/5 4+/5 4-/5 4/5 4/5 4+/5   Ankle DF 3+/5 3+/5 within available range 3+/5 within available range (-10 degrees from neurtral) NT not applicable  Not applicable not applicable  not applicable    Ankle PF 3+/5 4-/5 4/5 NT not applicable  Not applicable not applicable  not applicable           Treatment     Hunter Schofield received the treatments listed below:      THERAPEUTIC EXERCISES to develop strength, endurance, ROM, flexibility, posture and core stabilization for (24) minutes including:    Intervention Performed Today    short arc quad in supine  lower extremities over wedge 2x20 Right AROM, Left 2x20 Active Assistive for form and quality    sidelying toward prone   Stretch to Hip flexor 3x30 seconds each side   Quad set   2x10 Left     Sidelying hip extension   2x10 bilaterally with 10 seconds endrange stretch on last rep   [place] Left Prosthetic leg  Performed at edge of mat    Sitting in reclined position at edge of mat   Hip flexor stretch 3x30 seconds bilateral    Hip Abduction  In sidelying 2x10 bilateral    Bridge attempt  1x5   Posterior Pelvic Tilt Supine  2x5   Prone lying over light blue small triangle wedge  Holding position 2 minutes, then 4 minutes   Stretch to Hamstrings  3 x 1 minute Right lower extremity    Heelslide with forceful push towards extension  2x10 bilateral with 10 second enrange hold stretch on last rep    10 # over distal Left thigh  Stretch to Left hip flexor for over 5 minutes while exercising Right lower extremity    Prone Hamstring curl endrange for quad/hip flexor stretch  2x10 AAROM   lower extremity lifts towards extension in prone  2x10 AAROM   Prone hip flexor stretch   3x10 second holds bilateral    Standing frame raising to patient's tolerance x        x    x                     - Standing 1x's in standing frame holding each stand for 10-15   minutes working on the following:  - Horizontal Abduction with bilateral upper extremities 2x10 AROM   - terminal knee extension in stand position 1x15 bilateral   - Rows holding green theraband bilateral  2x10  - Pushing through upper extremities to achieve trunk extension 3x10  - scap retraction 2x10  - attempted 1x10 shoulder rolls backward - patient had difficulty coordinating  - Overhead press with green tband bilateral 1x10  - Patient given feedback on standing frame position - working on increasing seat angle after 30 second rest between lifts (repeated 3-4 times)  - [employer] x 30 hits working on upright trunk posture  - Pulling with bilateral upper extremities on tray at front to pull hips forward from seat and extend knees 3x15  - Reaching Left and Right with cones to target to stretch lower extremities with each reach 2x12 cones to each side   NuStep   5 minutes at Level 1, working on alternating movement of lower extremities and stretch to bilateral knees   Standing Frame  Standing 1 x in standing frame working on holding endrange position of approximately 55 degree seat angle for 5 minutes   Standing in parallel bars working sit to stands           - standing 2x's  - Standing weights shifts Left to Right 1x10  - Standing knee extension 1x10  - Standing Left upper extremity lifts off bar 1x5  - Standing bilateral upper extremity lifts off bar 1x10 with PT helping to hold hips for support with minimum to moderate assist     * In standing frame:  Sitting position = 0 degree seat angle  Full upright stand position with hips and knees at 0 degrees = 90 degree seat angle  3/15/22 - 1st  standing frame was at 42 degree seat angle, last stand position was at 65 degree seat angle  3/17/22 - 1st  standing frame was at 48 degree seat angle, last stand position was at 70 degree seat angle  3/21/22 - 1st  standing frame was at 52 degree seat angle, last stand position was at 70  degree seat angle  3/29/22 - 1st  standing frame was at 60 degree seat angle, last stand position was at 72 degree seat angle  3/31/22 - 1st  standing frame was at 55 degree seat angle, last stand position was at 75 degree seat angle  4/7/22 - 1st  standing frame was at 60 degree seat angle, last stand position was at 80 degree seat angle  4/11/22 - 1st  standing frame was at 60 degree seat angle, last stand position was at 78 degree seat angle  4/14/22 - 1st  standing frame was at 55 degree seat angle, last stand position was at 86 degree seat angle  4/19/22 - 1st  standing frame was at 60 degree seat angle, last stand position was at 78 degree seat angle  4/22/22 - 1st  standing frame was at 65 degree seat angle, last stand position was at 81 degree seat angle  4/26/22 - 1st  standing frame was at 65 degree seat angle, last stand position was at 82 degree seat angle  5/3/22 - 1st  standing frame was at 65 degree seat angle, last stand position was at 88 degree seat angle  5/5/22 - 1st  standing frame was at 65 degree seat angle, last stand position was at 84 degree seat angle  5/10/22 - 1st  standing frame was at 60 degree seat angle, last stand position was at 80 degree seat angle  5/12/22 - 1st  standing frame was at 65 degree seat angle, last stand position was at 80 degree seat angle  Plan for Next Visit:      manual therapy techniques: Joint mobilizations, Manual traction and Soft tissue Mobilization were applied to the:(0) minutes, including:    Intervention Performed Today    Patella glides superiorly  2 minutes bilateral    Anterior tibia glide Grade 1-2  1 minute bilateral                                      Neuromuscular Re-Education activities to improve: Balance, Coordination, Sense, Proprioception and Posture for 0 minutes. The following activities were included:    Intervention Performed Today                                                 THERAPEUTIC ACTIVITIES to improve dynamic and functional  performance for (0) minutes including:    Intervention Performed Today    Bed - wheelchair transfer   Practiced set up and sequence   Bed mobility sit to supine to sit  Practiced sequence    Rolling   3 x each side working on sequence to incorporate hip flexor stretch on each side. Then separate stretch performed as listed above   Standing in parallel bars  4 x's working on several weight shifts towards hip extension and knee extension while standing.   Standing in parallel bars performing step forward  With Right lower extremity 5x's with moderate assistance to maintain standing position   Dynasplint Consult via FaceTime with Andriy to discuss fit     Sit to  parallel bars without assistance  2x's   Dynasplint Consult  Andriy Stout (Rep for Dynasplint) met with physical therapist and patient today during session to discuss options for dynasplints for bilateral knees due to patient's significant lack of range of motion.     Dynasplint Fitting  Andriyjohn Stout (Rep for Dynasplint) met with physical therapist and patient today to deliver and fit patient for bilateral knee dynasplints.  We discussed wearing time of 1-2 hours/day for the first week and then we would re-assess.  Patient was shown how to don each brace and straps were marked for future use.  The Dynasplint force was increased to 6 on the Left and 7 on the Right to start.  Extra padding was added to thin straps for skin protection.   Dynasplint Consult  Andriy Stout (Rep for Dynasplint) met with physical therapist and patient today during session to discuss proper fit and positioning of Dynasplint to feel a better stretch at home. Pt only brought Left splint to session today and his splint was donned, straps were tightened and new black line added for patient to follow at home.  Left splint was increased from 7/13 to 9/13 tension.  He reports that he places a  pillow under his knee at home and he was educated on better positioning of pillow under distal stump with prosthesis removed.  He also felt more of a stretch in long sitting vs supine and was asked to try this position at home for part of the stretch if able.    Standing at sink in Neuro Room working on stand posture and positioning   Simulating activity that he has been given to do for homework. 1x5 seconds, 1x10 seconds     Plan for Next Visit:      Gait Training: for (5) minutes:  4/7/22 - Ambulating 1x 8 ft in the parallel bars with bilateral hand support, sheet support around hips for therapist to facilitate hip extension and overall moderate assistance for postural facilitation and cues for sequence.  4/7/22 - Ambulated with walker with sheet assistance to for hip extension for 5 feet with helper following with wheelchair.  5/12/22 - Ambulated with walker without sheet assistance for 10 ft with moderate assist with helper following with wheelchair.    Limitation/Restriction for FOTO Amputation Survey     Therapist reviewed FOTO scores for Hunter Schofield on 3/17/2022.   FOTO documents entered into [employer] - see Media section.     Limitation Score: 52%  4/14/22 - Limitation Score: 45%            Patient Education and Home Exercises     Home Exercises Provided and Patient Education Provided     Education provided:   -2/22/22 Patient educated on increasing length and consistency of stretching knees and hips to every 2 hours at home.  He was shown and demonstrated understanding of short arc quads, rolling toward prone and sitting in reclined position to stretch hip flexors.  -2/24/22 Patient given Home Exercise Program on exercises for lower extremities to be performed 2x15 3x's/day  See patient Instructions in EMR  -3/1/22 Re-inforced Home Exercise Program and asked patient to incorporate prone lying for longer periods of time to build up to 20 minutes per day.  3/3/22 - Discussed the option of dynasplints for  bilateral lower extremities. Patient educated on the option of Dynasplints vs basic knee braces and encouraged to consider Dynasplints as a more aggressive and efficient way of properly positioning and dynamically stretching his legs outside of therapy.  3/8/22 - Patient was educated on Dynasplints with Andriy Stout (Rep).   He was shown pictures on ipad and Andriy and physical therapist discussed wearing schedule to make gains with his muscle length outside of therapy sessions.  Andriy informed patient that he would assist with collecting information regarding insurance coverage and provide him with any out of pocket costs so he can decide if he would like to pursue this option.  3/22/22 - Patient was educated that Andriy Stout (Rep for Dynasplint) messaged me that his splints have been approved and are being mailed this week.   3/24/22 - Patient was educated on Dynasplint management and importance of daily skin checks.  His tolerance of wearing splints for the first week will determine his wearing schedule for the following weeks.  3/29/22 - Patient was educated on only wearing Dynasplints while lying down to get the most effective stretch to lower extremities.  3/31/22 - Patient was asked to reach out to Dynasplint rep (Andriy) to set up a time for Andriy to re-assess the positioning and fit of splints to see if he can feel more of a stretch with wearing schedule.  4/7/22 - [facility] sure that [facility]ints are adjusted appropriately.  4/11/22 - PT recommended that [facility] for reassessment of splint fit.  4/14/22 - Patient informed that PT would [facility] again to have him schedule a visit.  He was encouraged to continue being active at home.  4/18/22 -  Patient asked to contact Dynasplint rep to set up a time for a consult that would work for his schedule.  4/28/22 - Patient educated on wearing Dynaspints for 2 hours/day for the next  4 days and then we will assess his ability to increase to 4 hours/day after next visit.  He verbalized good understanding of education from Andriy (Dynasplint) and was told to try Right splint on in new wear position first before increasing tension to 9/13.    5/3/22 - Patient educated on attempting standing at home at sink holding stand position for 10-15 seconds repeating 3x's and doing this in the morning and afternoon every day.   5/12/22 - Patient educated on the importance of walking with walker each session moving forward to try to get range applied to a functional task.    Home Exercise Provided: 2/24/22 Exercises were reviewed and Hunter Schofield was able to demonstrate them prior to the end of the session.  Hunter Schofield demonstrated good  understanding of the education provided.   ASSESSMENT     Pt participated well with session today with good effort and participation.  He continues to be limited by knee and hip tightness.  Measurements taken at start of session today.  PT will re-measure bilateral hip and knee range of motion at end of session upon next visit to note gains with range of motion.  He has been wearing Dynasplints, but may need to follow up with ortho to determine need of muscle lengthening for functional gains.        Pt prognosis is Fair  To Good    Pt will continue to benefit from skilled outpatient physical therapy to address the deficits listed in the problem list box on initial evaluation, provide pt/family education and to maximize pt's level of independence in the home and community environment.     Pt's spiritual, cultural and educational needs considered and pt agreeable to plan of care and goals.     Anticipated Barriers for therapy: co-morbidities, transportation assistance needed at times, lifestyle, potential contractures of knees/hips    GOALS:     Short Term Goals:  4 weeks Progress    1. Function: Patient will demonstrate improved function as indicated by a  functional limitation score of less than or equal to 55 out of 100 on FOTO = 44% limitation PC   2. Mobility: Patient will improve sit to stand transfer with moderate assistance in order to progress towards independence with functional activities.  met   3. Gait: Patient will ambulated 3 steps in parallel bars with Maximum assistance to demonstrate improved strength, posture and endurance met   4. Balance: Complete FIST test upon next visit. PC   5. HEP: Patient will demonstrate independence with HEP in order to progress toward functional independence. met    6. Assess patient's needs for Dynasplints and set up consultation if needed. met       Long Term Goals:  8 weeks Progress   1. Function: Patient will demonstrate improved function as indicated by a functional limitation score of less than or equal to 60 out of 100 on FOTO = 40 % limitation PC   2. Mobility: Patient will improve AROM of bilateral knees to -10 degrees  in order to return to maximal functional potential and improve quality of life. PC   3. Gait: Patient will ambulate 15 feet with rolling walker with moderate assistance to demonstrate improved strength, endurance and mobility. PC   4. Balance: Modify FIST goal once completed. PC   5. HEP: Patient educated on HEP in preparation for d/c verbalizing and demonstrating good understanding of topics discussed.    PC   6.          Goals Key:  PC= progressing/continue;        PM= partially met;             DC= discontinue         PLAN     Continue Plan of Care (POC) and progress per patient tolerance. See Treatment section for exercise progression.  Outpatient Physical Therapy 2 times weekly for 8 weeks to include the following interventions: Electrical Stimulation Attended, Gait Training, Moist Heat/ Ice, Neuromuscular Re-ed, Orthotic Management and Training, Patient Education, Self Care, Therapeutic Activities, Therapeutic Exercise and Prosthetic Management.      Mindy Patino, PT, DPT

## 2022-05-17 ENCOUNTER — CLINICAL SUPPORT (OUTPATIENT)
Dept: REHABILITATION | Facility: HOSPITAL | Age: 82
End: 2022-05-17
Payer: MEDICARE

## 2022-05-17 DIAGNOSIS — R53.1 DECREASED STRENGTH, ENDURANCE, AND MOBILITY: ICD-10-CM

## 2022-05-17 DIAGNOSIS — M25.662 DECREASED RANGE OF MOTION OF BOTH LOWER EXTREMITIES: Primary | ICD-10-CM

## 2022-05-17 DIAGNOSIS — M25.661 DECREASED RANGE OF MOTION OF BOTH LOWER EXTREMITIES: Primary | ICD-10-CM

## 2022-05-17 DIAGNOSIS — Z74.09 DECREASED STRENGTH, ENDURANCE, AND MOBILITY: ICD-10-CM

## 2022-05-17 DIAGNOSIS — R68.89 DECREASED STRENGTH, ENDURANCE, AND MOBILITY: ICD-10-CM

## 2022-05-17 PROCEDURE — 97116 GAIT TRAINING THERAPY: CPT

## 2022-05-17 PROCEDURE — 97110 THERAPEUTIC EXERCISES: CPT

## 2022-05-17 NOTE — PROGRESS NOTES
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  Physician: Fallon Dudley*    Visit Date: 5/17/2022     Physician Orders: PT Eval and Treat   Medical Diagnosis from Referral: s/p Left BKA  Evaluation Date: 2/18/2022  Authorization Period Expiration: 12/31/22  Plan of Care Expiration: 6/9/22  Progress Note Due: 5/14/22  Visit # / Visits authorized: 23/30 (+1 eval)  FOTO: 3/3     Precautions: Standard, Diabetes, Fall and wound on R foot, Incontinence    PTA Visit #: 0/5     Time In: 1016  Time Out: 1100   Total Billable Time: 44 minutes    SUBJECTIVE     Pt reports: that he has been compliant with dynasplints and has been standing several times each day at home.  Response to previous treatment: good with no adverse effects.  Functional change:  He reports that he is feeling stronger with more confidence with standing.  Pain: 0/10 at rest  Location: not applicable     OBJECTIVE     Objective Measures updated at progress report unless specified.  Re-Assessment for () minutes    Joint Measured 2/22/22 3/7/22 4/14/22 5/12/22   Left Hip Extension -20 -25 -20  -20   Right Hip Extension -25 -25 -25 -20   Left Knee Extension -48 -35 AROM, -30 PROM -15 -32 PROM   Right Knee Extension -45 -37 AROM, -34 PROM -12 - 35 PROM     3/3/22  In prone:   Hip Extension = - 20  Right, unable to get accurate measure on Left    5/17/22  In Standing frame:   Hip Extension = Right -10 , Left - 15  Knee Extension = Right -15 , Left -15    STRENGTH:                L/E MMT Right  2/18/22    Right  3/17/22 Right   4/14/22 Right  5/12/22 Left  2/18/22 Left   3/17/22 Left   4/14/22 Left   5/12/22   Hip Flexion  3+/5 4-/5 4-/5 4/5 3+/5 4-/5 4-/5 4/5   Hip Extension  2+/5 2+/5 2+/5 2+/5 2+/5 2+/5 2+/5 2+/5   Hip Abduction  2+/5 3-/5 3-/5 3-/5 2+/5 3-/5 3-/5 3-/5   Knee Extension  3-/5 3+ within available Range 4-/5 within available range 4-/5 within available range 3-/5 3+ within available range 3+/5 within available range 3+/5 within available range   Knee Flexion 4-/5 4/5 4+/5 4+/5 4-/5 4/5 4/5 4+/5   Ankle DF 3+/5 3+/5 within available range 3+/5 within available range (-10 degrees from neurtral) NT not applicable  Not applicable not applicable  not applicable    Ankle PF 3+/5 4-/5 4/5 NT not applicable  Not applicable not applicable  not applicable           Treatment     Hunter Schofield received the treatments listed below:      THERAPEUTIC EXERCISES to develop strength, endurance, ROM, flexibility, posture and core stabilization for (30) minutes including:  Intervention Performed Today    short arc quad in supine  lower extremities over wedge 2x20 Right AROM, Left 2x20 Active Assistive for form and quality    sidelying toward prone   Stretch to Hip flexor 3x30 seconds each side   Quad set   2x10 Left     Sidelying hip extension   2x10 bilaterally with 10 seconds endrange stretch on last rep   Volin and Donning Left Prosthetic leg  Performed at edge of mat    Sitting in reclined position at edge of mat   Hip flexor stretch 3x30 seconds bilateral    Hip Abduction  In sidelying 2x10 bilateral    Bridge attempt  1x5   Posterior Pelvic Tilt Supine  2x5   Prone lying over light blue small triangle wedge  Holding position 2 minutes, then 4 minutes   Stretch to Hamstrings  3 x 1 minute Right lower extremity    Heelslide with forceful push towards extension  2x10 bilateral with 10 second enrange hold stretch on last rep    10 # over distal Left thigh  Stretch to Left hip flexor for over 5 minutes while exercising Right lower extremity    Prone Hamstring curl endrange for quad/hip flexor stretch  2x10 AAROM   lower extremity lifts towards extension in prone  2x10 AAROM   Prone hip flexor stretch   3x10 second holds bilateral    Standing frame raising to patient's tolerance  x        x    x                  x  X    x - Standing 2x's in standing frame holding each stand for 10-15  minutes working on the following:  - Horizontal Abduction with bilateral upper extremities 2x10 AROM   - terminal knee extension in stand position 2x15 bilateral   - Rows holding red theraband bilateral  2x10  - Pushing through upper extremities to achieve trunk extension 4x15   - scap retraction 2x10  - attempted 1x10 shoulder rolls backward - patient had difficulty coordinating  - Overhead press with green tband bilateral 1x10  - Patient given feedback on standing frame position - working on increasing seat angle after 30 second rest between lifts (repeated 3-4 times)  - Beach [place] 1 x 30 hits working on upright trunk posture  - Pulling with bilateral upper extremities on tray at front to pull hips forward from seat and extend knees 2x15  - Reaching Left and Right with cones to target to stretch lower extremities with each reach 2x12 cones to each side   NuStep   5 minutes at Level 1, working on alternating movement of lower extremities and stretch to bilateral knees   Standing Frame  Standing 1 x in standing frame working on holding endrange position of approximately 55 degree seat angle for 5 minutes   Standing in parallel bars working sit to stands           - standing 2x's  - Standing weights shifts Left to Right 1x10  - Standing knee extension 1x10  - Standing Left upper extremity lifts off bar 1x5  - Standing bilateral upper extremity lifts off bar 1x10 with PT helping to hold hips for support with minimum to moderate assist     * In standing frame:  Sitting position = 0 degree seat angle  Full upright stand position with hips and knees at 0 degrees = 90 degree seat angle  3/15/22 - 1st  standing frame was at 42 degree seat angle, last stand position was at 65 degree seat angle  3/17/22 - 1st  standing frame was at 48 degree seat angle, last stand position was at 70 degree seat  angle  3/21/22 - 1st  standing frame was at 52 degree seat angle, last stand position was at 70 degree seat angle  3/29/22 - 1st  standing frame was at 60 degree seat angle, last stand position was at 72 degree seat angle  3/31/22 - 1st  standing frame was at 55 degree seat angle, last stand position was at 75 degree seat angle  4/7/22 - 1st  standing frame was at 60 degree seat angle, last stand position was at 80 degree seat angle  4/11/22 - 1st  standing frame was at 60 degree seat angle, last stand position was at 78 degree seat angle  4/14/22 - 1st  standing frame was at 55 degree seat angle, last stand position was at 86 degree seat angle  4/19/22 - 1st  standing frame was at 60 degree seat angle, last stand position was at 78 degree seat angle  4/22/22 - 1st  standing frame was at 65 degree seat angle, last stand position was at 81 degree seat angle  4/26/22 - 1st  standing frame was at 65 degree seat angle, last stand position was at 82 degree seat angle  5/3/22 - 1st  standing frame was at 65 degree seat angle, last stand position was at 88 degree seat angle  5/5/22 - 1st  standing frame was at 65 degree seat angle, last stand position was at 84 degree seat angle  5/10/22 - 1st  standing frame was at 60 degree seat angle, last stand position was at 80 degree seat angle  5/12/22 - 1st  standing frame was at 65 degree seat angle, last stand position was at 80 degree seat angle  5/17/22 - 1st  standing frame was at 62 degree seat angle, last stand position was at 82 degree seat angle  Plan for Next Visit:      manual therapy techniques: Joint mobilizations, Manual traction and Soft tissue Mobilization were applied to the:(0) minutes, including:    Intervention Performed Today    Patella glides superiorly  2 minutes bilateral    Anterior tibia glide Grade 1-2  1 minute bilateral                                       Neuromuscular Re-Education activities to improve: Balance, Coordination, Sense, Proprioception and Posture for 0 minutes. The following activities were included:    Intervention Performed Today                                                THERAPEUTIC ACTIVITIES to improve dynamic and functional  performance for (0) minutes including:    Intervention Performed Today    Bed - wheelchair transfer   Practiced set up and sequence   Bed mobility sit to supine to sit  Practiced sequence    Rolling   3 x each side working on sequence to incorporate hip flexor stretch on each side. Then separate stretch performed as listed above   Standing in parallel bars  4 x's working on several weight shifts towards hip extension and knee extension while standing.   Standing in parallel bars performing step forward  With Right lower extremity 5x's with moderate assistance to maintain standing position   Dynasplint Consult via FaceTime with Andriy to discuss fit     Sit to  parallel bars without assistance  2x's   Dynasplint Consult  Andriy Stout (Rep for Dynasplint) met with physical therapist and patient today during session to discuss options for dynasplints for bilateral knees due to patient's significant lack of range of motion.     Dynasplint Fitting  Andriyjohn Stout (Rep for Dynasplint) met with physical therapist and patient today to deliver and fit patient for bilateral knee dynasplints.  We discussed wearing time of 1-2 hours/day for the first week and then we would re-assess.  Patient was shown how to don each brace and straps were marked for future use.  The Dynasplint force was increased to 6 on the Left and 7 on the Right to start.  Extra padding was added to thin straps for skin protection.   Dynasplint Consult  Andriy Stout (Rep for Dynasplint) met with physical therapist and patient today during session to discuss proper fit and positioning of Dynasplint to feel a better stretch at home. Pt only brought  Left splint to session today and his splint was donned, straps were tightened and new black line added for patient to follow at home.  Left splint was increased from 7/13 to 9/13 tension.  He reports that he places a pillow under his knee at home and he was educated on better positioning of pillow under distal stump with prosthesis removed.  He also felt more of a stretch in long sitting vs supine and was asked to try this position at home for part of the stretch if able.    Standing at sink in Neuro Room working on stand posture and positioning   Simulating activity that he has been given to do for homework. 1x5 seconds, 1x10 seconds     Plan for Next Visit:      Gait Training: for (15) minutes:  4/7/22 - Ambulating 1x 8 ft in the parallel bars with bilateral hand support, sheet support around hips for therapist to facilitate hip extension and overall moderate assistance for postural facilitation and cues for sequence.  4/7/22 - Ambulated with walker with sheet assistance to for hip extension for 5 feet with helper following with wheelchair.  5/12/22 - Ambulated with walker without sheet assistance for 10 ft with moderate assist with helper following with wheelchair.  5/17/22 - Ambulated with walker without sheet assist for 2x 12ft with moderate assist with wheelchair following with each step.    Limitation/Restriction for FOTO Amputation Survey     Therapist reviewed FOTO scores for Hunter Schofield on 3/17/2022.   FOTO documents entered into [employer] [address] - see Media section.     Limitation Score: 52%  4/14/22 - Limitation Score: 45%            Patient Education and Home Exercises     Home Exercises Provided and Patient Education Provided     Education provided:   -2/22/22 Patient educated on increasing length and consistency of stretching knees and hips to every 2 hours at home.  He was shown and demonstrated understanding of short arc quads, rolling toward prone and sitting in reclined position to stretch hip  flexors.  -2/24/22 Patient given Home Exercise Program on exercises for lower extremities to be performed 2x15 3x's/day  See patient Instructions in EMR  -3/1/22 Re-inforced Home Exercise Program and asked patient to incorporate prone lying for longer periods of time to build up to 20 minutes per day.  3/3/22 - Discussed the option of dynasplints for bilateral lower extremities. Patient educated on the option of Dynasplints vs basic knee braces and encouraged to consider Dynasplints as a more aggressive and efficient way of properly positioning and dynamically stretching his legs outside of therapy.  3/8/22 - Patient was educated on Dynasplints with Andriy Stout (Rep).   He was shown pictures on ipad and Andriy and physical therapist discussed wearing schedule to make gains with his muscle length outside of therapy sessions.  Andriy informed patient that he would assist with collecting information regarding insurance coverage and provide him with any out of pocket costs so he can decide if he would like to pursue this option.  3/22/22 - Patient was educated that Andriy Stout (Rep for Dynasplint) messaged me that his splints have been approved and are being mailed this week.   3/24/22 - Patient was educated on Dynasplint management and importance of daily skin checks.  His tolerance of wearing splints for the first week will determine his wearing schedule for the following weeks.  3/29/22 - Patient was educated on only wearing Dynasplints while lying down to get the most effective stretch to lower extremities.  3/31/22 - Patient was asked to reach out to Dynasplint rep (Andriy) to set up a time for Andriy to re-assess the positioning and fit of splints to see if he can feel more of a stretch with wearing schedule.  4/7/22 - Patient was asked to reach out to Dynasplint rep to make sure that splints are adjusted appropriately.  4/11/22 - PT recommended that patient follow up with Dynasplint rep for reassessment of splint  fit.  4/14/22 - Patient informed that PT would reach out to [facility] again to have him schedule a visit.  He was encouraged to continue being active at home.  4/18/22 -  Patient asked to contact Dynasplint rep to set up a time for a consult that would work for his schedule.  4/28/22 - Patient educated on wearing Dynaspints for 2 hours/day for the next 4 days and then we will assess his ability to increase to 4 hours/day after next visit.  He verbalized good understanding of education from Andriy (Alexa) and was told to try Right splint on in new wear position first before increasing tension to 9/13.    5/3/22 - Patient educated on attempting standing at home at sink holding stand position for 10-15 seconds repeating 3x's and doing this in the morning and afternoon every day.   5/12/22 - Patient educated on the importance of walking with walker each session moving forward to try to get range applied to a functional task.    Home Exercise Provided: 2/24/22 Exercises were reviewed and Hunter Schofield was able to demonstrate them prior to the end of the session.  Hunter Schofield demonstrated good  understanding of the education provided.   ASSESSMENT     Pt participated well with session today with good effort and participation.  He continues to be limited by knee and hip tightness.  Measurements of hip and knee extension in standing frame improved since last assessed.  He was able to endure 2 walks today with walker.  He would benefit from continued flexibility and strengthening to improve his safety and independence with gait.    Pt prognosis is Fair  To Good    Pt will continue to benefit from skilled outpatient physical therapy to address the deficits listed in the problem list box on initial evaluation, provide pt/family education and to maximize pt's level of independence in the home and community environment.     Pt's spiritual, cultural and educational needs considered and pt agreeable to plan  of care and goals.     Anticipated Barriers for therapy: co-morbidities, transportation assistance needed at times, lifestyle, potential contractures of knees/hips    GOALS:     Short Term Goals:  4 weeks Progress    1. Function: Patient will demonstrate improved function as indicated by a functional limitation score of less than or equal to 55 out of 100 on FOTO = 44% limitation PC   2. Mobility: Patient will improve sit to stand transfer with moderate assistance in order to progress towards independence with functional activities.  met   3. Gait: Patient will ambulated 3 steps in parallel bars with Maximum assistance to demonstrate improved strength, posture and endurance met   4. Balance: Complete FIST test upon next visit. PC   5. HEP: Patient will demonstrate independence with HEP in order to progress toward functional independence. met    6. Assess patient's needs for Dynasplints and set up consultation if needed. met       Long Term Goals:  8 weeks Progress   1. Function: Patient will demonstrate improved function as indicated by a functional limitation score of less than or equal to 60 out of 100 on FOTO = 40 % limitation PC   2. Mobility: Patient will improve AROM of bilateral knees to -10 degrees  in order to return to maximal functional potential and improve quality of life. PC   3. Gait: Patient will ambulate 15 feet with rolling walker with moderate assistance to demonstrate improved strength, endurance and mobility. PC   4. Balance: Modify FIST goal once completed. PC   5. HEP: Patient educated on HEP in preparation for d/c verbalizing and demonstrating good understanding of topics discussed.    PC   6.          Goals Key:  PC= progressing/continue;        PM= partially met;             DC= discontinue         PLAN     Continue Plan of Care (POC) and progress per patient tolerance. See Treatment section for exercise progression.  Outpatient Physical Therapy 2 times weekly for 8 weeks to include the  following interventions: Electrical Stimulation Attended, Gait Training, Moist Heat/ Ice, Neuromuscular Re-ed, Orthotic Management and Training, Patient Education, Self Care, Therapeutic Activities, Therapeutic Exercise and Prosthetic Management.      Mindy Patino, PT, DPT

## 2022-05-19 ENCOUNTER — CLINICAL SUPPORT (OUTPATIENT)
Dept: REHABILITATION | Facility: HOSPITAL | Age: 82
End: 2022-05-19
Payer: MEDICARE

## 2022-05-19 DIAGNOSIS — M25.662 DECREASED RANGE OF MOTION OF BOTH LOWER EXTREMITIES: Primary | ICD-10-CM

## 2022-05-19 DIAGNOSIS — M25.661 DECREASED RANGE OF MOTION OF BOTH LOWER EXTREMITIES: Primary | ICD-10-CM

## 2022-05-19 DIAGNOSIS — R68.89 DECREASED STRENGTH, ENDURANCE, AND MOBILITY: ICD-10-CM

## 2022-05-19 DIAGNOSIS — R53.1 DECREASED STRENGTH, ENDURANCE, AND MOBILITY: ICD-10-CM

## 2022-05-19 DIAGNOSIS — Z74.09 DECREASED STRENGTH, ENDURANCE, AND MOBILITY: ICD-10-CM

## 2022-05-19 PROCEDURE — 97110 THERAPEUTIC EXERCISES: CPT

## 2022-05-19 PROCEDURE — 97116 GAIT TRAINING THERAPY: CPT

## 2022-05-19 NOTE — PROGRESS NOTES
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  Physician: Fallon Dudley*    Visit Date: 5/19/2022     Physician Orders: PT Eval and Treat   Medical Diagnosis from Referral: s/p Left BKA  Evaluation Date: 2/18/2022  Authorization Period Expiration: 12/31/22  Plan of Care Expiration: 6/9/22  Progress Note Due: 6/12/22  Visit # / Visits authorized: 25/30 (+1 eval)  FOTO: 3/3     Precautions: Standard, Diabetes, Fall and wound on R foot, Incontinence    PTA Visit #: 0/5     Time In: 1016  Time Out: 1100   Total Billable Time: 44 minutes    SUBJECTIVE     Pt reports: that he has been compliant with dynasplints and has been standing several times each day at home.  Response to previous treatment: good with no adverse effects.  Functional change:  He reports that he is feeling stronger with more confidence with standing and is able to stand for longer bouts at home.  Pain: 0/10 at rest  Location: not applicable     OBJECTIVE     Objective Measures updated at progress report unless specified.  Re-Assessment for () minutes    Joint Measured 2/22/22 3/7/22 4/14/22 5/12/22   Left Hip Extension -20 -25 -20  -20   Right Hip Extension -25 -25 -25 -20   Left Knee Extension -48 -35 AROM, -30 PROM -15 -32 PROM   Right Knee Extension -45 -37 AROM, -34 PROM -12 - 35 PROM     3/3/22  In prone:   Hip Extension = - 20  Right, unable to get accurate measure on Left    5/17/22  In Standing frame:   Hip Extension = Right -10 , Left - 15  Knee Extension = Right -15 , Left -15    STRENGTH:                L/E MMT Right  2/18/22    Right  3/17/22 Right   4/14/22 Right  5/12/22 Left  2/18/22 Left   3/17/22 Left   4/14/22 Left   5/12/22   Hip Flexion  3+/5 4-/5 4-/5 4/5 3+/5 4-/5 4-/5 4/5   Hip Extension  2+/5 2+/5 2+/5 2+/5 2+/5 2+/5 2+/5 2+/5   Hip Abduction  2+/5 3-/5 3-/5  3-/5 2+/5 3-/5 3-/5 3-/5   Knee Extension 3-/5 3+ within available Range 4-/5 within available range 4-/5 within available range 3-/5 3+ within available range 3+/5 within available range 3+/5 within available range   Knee Flexion 4-/5 4/5 4+/5 4+/5 4-/5 4/5 4/5 4+/5   Ankle DF 3+/5 3+/5 within available range 3+/5 within available range (-10 degrees from neurtral) NT not applicable  Not applicable not applicable  not applicable    Ankle PF 3+/5 4-/5 4/5 NT not applicable  Not applicable not applicable  not applicable           Treatment     Hunter Schofield received the treatments listed below:      THERAPEUTIC EXERCISES to develop strength, endurance, ROM, flexibility, posture and core stabilization for (30) minutes including:  Intervention Performed Today    short arc quad in supine  lower extremities over wedge 2x20 Right AROM, Left 2x20 Active Assistive for form and quality    sidelying toward prone   Stretch to Hip flexor 3x30 seconds each side   Quad set   2x10 Left     Sidelying hip extension   2x10 bilaterally with 10 seconds endrange stretch on last rep   [place] Left Prosthetic leg  Performed at edge of mat    Sitting in reclined position at edge of mat   Hip flexor stretch 3x30 seconds bilateral    Hip Abduction  In sidelying 2x10 bilateral    Bridge attempt  1x5   Posterior Pelvic Tilt Supine  2x5   Prone lying over light blue small triangle wedge  Holding position 2 minutes, then 4 minutes   Stretch to Hamstrings  3 x 1 minute Right lower extremity    Heelslide with forceful push towards extension  2x10 bilateral with 10 second enrange hold stretch on last rep    10 # over distal Left thigh  Stretch to Left hip flexor for over 5 minutes while exercising Right lower extremity    Prone Hamstring curl endrange for quad/hip flexor stretch  2x10 AAROM   lower extremity lifts towards extension in prone  2x10 AAROM   Prone hip flexor stretch   3x10 second holds bilateral    Standing frame  raising to patient's tolerance x    x    x  x  x                    X    x - Standing 2x's in standing frame holding each stand for 10-15  minutes working on the following:  - Horizontal Abduction with bilateral upper extremities 2x10 AROM   - terminal knee extension in stand position 2x10 bilateral   - Rows holding green theraband bilateral  2x10  - Pushing through upper extremities to achieve trunk extension 2x20   - scap retraction 2x10  - attempted 1x10 shoulder rolls backward - patient had difficulty coordinating  - Overhead press with green tband bilateral 1x10  - Patient given feedback on standing frame position - working on increasing seat angle after 30 second rest between lifts (repeated 3-4 times)  - [employer] x 30 hits working on upright trunk posture  - Pulling with bilateral upper extremities on tray at front to pull hips forward from seat and extend knees 2x10  - Reaching Left and Right with cones to target to stretch lower extremities with each reach 2x12 cones to each side   NuStep   5 minutes at Level 1, working on alternating movement of lower extremities and stretch to bilateral knees   Standing Frame  Standing 1 x in standing frame working on holding endrange position of approximately 55 degree seat angle for 5 minutes   Standing in parallel bars working sit to stands           - standing 2x's  - Standing weights shifts Left to Right 1x10  - Standing knee extension 1x10  - Standing Left upper extremity lifts off bar 1x5  - Standing bilateral upper extremity lifts off bar 1x10 with PT helping to hold hips for support with minimum to moderate assist     * In standing frame:  Sitting position = 0 degree seat angle  Full upright stand position with hips and knees at 0 degrees = 90 degree seat angle  3/15/22 - 1st  standing frame was at 42 degree seat angle, last stand position was at 65 degree seat angle  3/17/22 - 1st  standing frame was at 48 degree seat angle, last stand  position was at 70 degree seat angle  3/21/22 - 1st  standing frame was at 52 degree seat angle, last stand position was at 70 degree seat angle  3/29/22 - 1st  standing frame was at 60 degree seat angle, last stand position was at 72 degree seat angle  3/31/22 - 1st  standing frame was at 55 degree seat angle, last stand position was at 75 degree seat angle  4/7/22 - 1st  standing frame was at 60 degree seat angle, last stand position was at 80 degree seat angle  4/11/22 - 1st  standing frame was at 60 degree seat angle, last stand position was at 78 degree seat angle  4/14/22 - 1st  standing frame was at 55 degree seat angle, last stand position was at 86 degree seat angle  4/19/22 - 1st  standing frame was at 60 degree seat angle, last stand position was at 78 degree seat angle  4/22/22 - 1st  standing frame was at 65 degree seat angle, last stand position was at 81 degree seat angle  4/26/22 - 1st  standing frame was at 65 degree seat angle, last stand position was at 82 degree seat angle  5/3/22 - 1st  standing frame was at 65 degree seat angle, last stand position was at 88 degree seat angle  5/5/22 - 1st  standing frame was at 65 degree seat angle, last stand position was at 84 degree seat angle  5/10/22 - 1st  standing frame was at 60 degree seat angle, last stand position was at 80 degree seat angle  5/12/22 - 1st  standing frame was at 65 degree seat angle, last stand position was at 80 degree seat angle  5/17/22 - 1st  standing frame was at 62 degree seat angle, last stand position was at 82 degree seat angle  5/19/22 - 1st  standing frame was at 60 degree seat angle, last stand position was at 80 degree seat angle  Plan for Next Visit:      manual therapy techniques: Joint mobilizations, Manual traction and Soft tissue Mobilization were applied to the:(0) minutes,  including:    Intervention Performed Today    Patella glides superiorly  2 minutes bilateral    Anterior tibia glide Grade 1-2  1 minute bilateral                                      Neuromuscular Re-Education activities to improve: Balance, Coordination, Sense, Proprioception and Posture for 0 minutes. The following activities were included:    Intervention Performed Today                                                THERAPEUTIC ACTIVITIES to improve dynamic and functional  performance for (0) minutes including:    Intervention Performed Today    Bed - wheelchair transfer   Practiced set up and sequence   Bed mobility sit to supine to sit  Practiced sequence    Rolling   3 x each side working on sequence to incorporate hip flexor stretch on each side. Then separate stretch performed as listed above   Standing in parallel bars  4 x's working on several weight shifts towards hip extension and knee extension while standing.   Standing in parallel bars performing step forward  With Right lower extremity 5x's with moderate assistance to maintain standing position   Dynasplint Consult via [employer] with Andriy to discuss fit     Sit to  parallel bars without assistance  2x's   Dynasplint Consult  Andriy Stout (Rep for Dynasplint) met with physical therapist and patient today during session to discuss options for dynasplints for bilateral knees due to patient's significant lack of range of motion.     Dynasplint Fitting  Andriyjohn Stout (Rep for Dynasplint) met with physical therapist and patient today to deliver and fit patient for bilateral knee dynasplints.  We discussed wearing time of 1-2 hours/day for the first week and then we would re-assess.  Patient was shown how to don each brace and straps were marked for future use.  The Dynasplint force was increased to 6 on the Left and 7 on the Right to start.  Extra padding was added to thin straps for skin protection.   Dynasplint Consult  Andriy Stout (Rep for  [employer]) met with physical therapist and patient today during session to discuss proper fit and positioning of Dynasplint to feel a better stretch at home. Pt only brought Left splint to session today and his splint was donned, straps were tightened and new black line added for patient to follow at home.  Left splint was increased from 7/13 to 9/13 tension.  He reports that he places a pillow under his knee at home and he was educated on better positioning of pillow under distal stump with prosthesis removed.  He also felt more of a stretch in long sitting vs supine and was asked to try this position at home for part of the stretch if able.    Standing at sink in Neuro Room working on stand posture and positioning   Simulating activity that he has been given to do for homework. 1x5 seconds, 1x10 seconds     Plan for Next Visit:      Gait Training: for (14) minutes:  4/7/22 - Ambulating 1x 8 ft in the parallel bars with bilateral hand support, sheet support around hips for therapist to facilitate hip extension and overall moderate assistance for postural facilitation and cues for sequence.  4/7/22 - Ambulated with walker with sheet assistance to for hip extension for 5 feet with helper following with wheelchair.  5/12/22 - Ambulated with walker without sheet assistance for 10 ft with moderate assist with helper following with wheelchair.  5/17/22 - Ambulated with walker without sheet assist for 2x 12ft with moderate assist with wheelchair following with each step.  5/19/22 - Ambulated with walker without sheet assist for 1x 12 ft with minimum assist with wheelchair following with each step.  Then ambulated with walker without sheet assist for 1x20 ft without walker following incorporating 2 turns during gait bout.    Limitation/Restriction for FOTO Amputation Survey     Therapist reviewed FOTO scores for Hunter Schofield on 3/17/2022.   FOTO documents entered into [employer] - see Media section.     Limitation Score:  52%  4/14/22 - Limitation Score: 45%            Patient Education and Home Exercises     Home Exercises Provided and Patient Education Provided     Education provided:   -2/22/22 Patient educated on increasing length and consistency of stretching knees and hips to every 2 hours at home.  He was shown and demonstrated understanding of short arc quads, rolling toward prone and sitting in reclined position to stretch hip flexors.  -2/24/22 Patient given Home Exercise Program on exercises for lower extremities to be performed 2x15 3x's/day  See patient Instructions in EMR  -3/1/22 Re-inforced Home Exercise Program and asked patient to incorporate prone lying for longer periods of time to build up to 20 minutes per day.  3/3/22 - Discussed the option of dynasplints for bilateral lower extremities. Patient educated on the option of Dynasplints vs basic knee braces and encouraged to consider Dynasplints as a more aggressive and efficient way of properly positioning and dynamically stretching his legs outside of therapy.  3/8/22 - Patient was educated on Dynasplints with Andriy Stout (Rep).   He was shown pictures on ipad and Andriy and physical therapist discussed wearing schedule to make gains with his muscle length outside of therapy sessions.  Andriy informed patient that he would assist with collecting information regarding insurance coverage and provide him with any out of pocket costs so he can decide if he would like to pursue this option.  3/22/22 - Patient was educated that Andriy Stout (Rep for Dynasplint) messaged me that his splints have been approved and are being mailed this week.   3/24/22 - Patient was educated on Dynasplint management and importance of daily skin checks.  His tolerance of wearing splints for the first week will determine his wearing schedule for the following weeks.  3/29/22 - Patient was educated on only wearing Dynasplints while lying down to get the most effective stretch to lower  extremities.  3/31/22 - Patient was asked to reach out to Dynasplint rep (Andriy) to set up a time for Andriy to re-assess the positioning and fit of splints to see if he can feel more of a stretch with wearing schedule.  4/7/22 - Patient was asked to reach out to Dynasplint rep to make sure that splints are adjusted appropriately.  4/11/22 - PT recommended that patient follow up with Dynasplint rep for reassessment of splint fit.  4/14/22 - Patient informed that PT would reach out to Dynasplint rep again to have him schedule a visit.  He was encouraged to continue being active at home.  4/18/22 -  Patient asked to contact Dynasplint rep to set up a time for a consult that would work for his schedule.  4/28/22 - Patient educated on wearing Dynaspints for 2 hours/day for the next 4 days and then we will assess his ability to increase to 4 hours/day after next visit.  He verbalized good understanding of education from Andriy (Dynasplint) and was told to try Right splint on in new wear position first before increasing tension to 9/13.    5/3/22 - Patient educated on attempting standing at home at sink holding stand position for 10-15 seconds repeating 3x's and doing this in the morning and afternoon every day.   5/12/22 - Patient educated on the importance of walking with walker each session moving forward to try to get range applied to a functional task.    Home Exercise Provided: 2/24/22 Exercises were reviewed and Hunter Schofield was able to demonstrate them prior to the end of the session.  Hunter Schofield demonstrated good  understanding of the education provided.   ASSESSMENT     Pt participated well with session today with good effort and participation.  He demonstrated improved stand posture with walker and increased his gait distance without wheelchair following on second bout.  He is making gains and will continue to benefit from aggressive therapy.    Pt prognosis is Fair  To Good    Pt will continue to  benefit from skilled outpatient physical therapy to address the deficits listed in the problem list box on initial evaluation, provide pt/family education and to maximize pt's level of independence in the home and community environment.     Pt's spiritual, cultural and educational needs considered and pt agreeable to plan of care and goals.     Anticipated Barriers for therapy: co-morbidities, transportation assistance needed at times, lifestyle, potential contractures of knees/hips    GOALS:     Short Term Goals:  4 weeks Progress    1. Function: Patient will demonstrate improved function as indicated by a functional limitation score of less than or equal to 55 out of 100 on FOTO = 44% limitation PC   2. Mobility: Patient will improve sit to stand transfer with moderate assistance in order to progress towards independence with functional activities.  met   3. Gait: Patient will ambulated 3 steps in parallel bars with Maximum assistance to demonstrate improved strength, posture and endurance met   4. Balance: Complete FIST test upon next visit. PC   5. HEP: Patient will demonstrate independence with HEP in order to progress toward functional independence. met    6. Assess patient's needs for Dynasplints and set up consultation if needed. met       Long Term Goals:  8 weeks Progress   1. Function: Patient will demonstrate improved function as indicated by a functional limitation score of less than or equal to 60 out of 100 on FOTO = 40 % limitation PC   2. Mobility: Patient will improve AROM of bilateral knees to -10 degrees  in order to return to maximal functional potential and improve quality of life. PC   3. Gait: Patient will ambulate 15 feet with rolling walker with moderate assistance to demonstrate improved strength, endurance and mobility. PC   4. Balance: Modify FIST goal once completed. PC   5. HEP: Patient educated on HEP in preparation for d/c verbalizing and demonstrating good understanding of topics  discussed.    PC   6.          Goals Key:  PC= progressing/continue;        PM= partially met;             DC= discontinue         PLAN     Continue Plan of Care (POC) and progress per patient tolerance. See Treatment section for exercise progression.  Outpatient Physical Therapy 2 times weekly for 8 weeks to include the following interventions: Electrical Stimulation Attended, Gait Training, Moist Heat/ Ice, Neuromuscular Re-ed, Orthotic Management and Training, Patient Education, Self Care, Therapeutic Activities, Therapeutic Exercise and Prosthetic Management.      Mindy Patino, PT, DPT

## 2022-05-24 ENCOUNTER — CLINICAL SUPPORT (OUTPATIENT)
Dept: REHABILITATION | Facility: HOSPITAL | Age: 82
End: 2022-05-24
Payer: MEDICARE

## 2022-05-24 DIAGNOSIS — M25.661 DECREASED RANGE OF MOTION OF BOTH LOWER EXTREMITIES: Primary | ICD-10-CM

## 2022-05-24 DIAGNOSIS — R68.89 DECREASED STRENGTH, ENDURANCE, AND MOBILITY: ICD-10-CM

## 2022-05-24 DIAGNOSIS — Z74.09 DECREASED STRENGTH, ENDURANCE, AND MOBILITY: ICD-10-CM

## 2022-05-24 DIAGNOSIS — R53.1 DECREASED STRENGTH, ENDURANCE, AND MOBILITY: ICD-10-CM

## 2022-05-24 DIAGNOSIS — M25.662 DECREASED RANGE OF MOTION OF BOTH LOWER EXTREMITIES: Primary | ICD-10-CM

## 2022-05-24 PROCEDURE — 97110 THERAPEUTIC EXERCISES: CPT

## 2022-05-24 PROCEDURE — 97116 GAIT TRAINING THERAPY: CPT

## 2022-05-24 NOTE — PROGRESS NOTES
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  Physician: Fallon Dudley*    Visit Date: 5/24/2022     Physician Orders: PT Eval and Treat   Medical Diagnosis from Referral: s/p Left BKA  Evaluation Date: 2/18/2022  Authorization Period Expiration: 12/31/22  Plan of Care Expiration: 6/9/22  Progress Note Due: 6/12/22  Visit # / Visits authorized: 26/30 (+1 eval)  FOTO: 3/3     Precautions: Standard, Diabetes, Fall and wound on R foot, Incontinence    PTA Visit #: 0/5     Time In: 1006  Time Out: 1048   Total Billable Time: 47 minutes    SUBJECTIVE     Pt reports: that he has been compliant with dynasplints and has been standing several times each day at home.  Response to previous treatment: good with no adverse effects.  Functional change:  He reports that he is feeling stronger with more confidence with standing and is able to stand for longer bouts at home.  He recently has been able to stand without using his hands on his countertop to pull into standing.  Pain: 0/10 at rest  Location: not applicable     OBJECTIVE     Objective Measures updated at progress report unless specified.  Re-Assessment for () minutes    Joint Measured 2/22/22 3/7/22 4/14/22 5/12/22   Left Hip Extension -20 -25 -20  -20   Right Hip Extension -25 -25 -25 -20   Left Knee Extension -48 -35 AROM, -30 PROM -15 -32 PROM   Right Knee Extension -45 -37 AROM, -34 PROM -12 - 35 PROM     3/3/22  In prone:   Hip Extension = - 20  Right, unable to get accurate measure on Left    5/17/22  In Standing frame:   Hip Extension = Right -10 , Left - 15  Knee Extension = Right -15 , Left -15    STRENGTH:                L/E MMT Right  2/18/22    Right  3/17/22 Right   4/14/22 Right  5/12/22 Left  2/18/22 Left   3/17/22 Left   4/14/22 Left   5/12/22   Hip Flexion  3+/5 4-/5 4-/5 4/5 3+/5  4-/5 4-/5 4/5   Hip Extension  2+/5 2+/5 2+/5 2+/5 2+/5 2+/5 2+/5 2+/5   Hip Abduction  2+/5 3-/5 3-/5 3-/5 2+/5 3-/5 3-/5 3-/5   Knee Extension 3-/5 3+ within available Range 4-/5 within available range 4-/5 within available range 3-/5 3+ within available range 3+/5 within available range 3+/5 within available range   Knee Flexion 4-/5 4/5 4+/5 4+/5 4-/5 4/5 4/5 4+/5   Ankle DF 3+/5 3+/5 within available range 3+/5 within available range (-10 degrees from neurtral) NT not applicable  Not applicable not applicable  not applicable    Ankle PF 3+/5 4-/5 4/5 NT not applicable  Not applicable not applicable  not applicable           Treatment     Hunter Schofield received the treatments listed below:      THERAPEUTIC EXERCISES to develop strength, endurance, ROM, flexibility, posture and core stabilization for (30) minutes including:  Intervention Performed Today    short arc quad in supine  lower extremities over wedge 2x20 Right AROM, Left 2x20 Active Assistive for form and quality    sidelying toward prone   Stretch to Hip flexor 3x30 seconds each side   Quad set   2x10 Left     Sidelying hip extension   2x10 bilaterally with 10 seconds endrange stretch on last rep   [place] Left Prosthetic leg  Performed at edge of mat    Sitting in reclined position at edge of mat   Hip flexor stretch 3x30 seconds bilateral    Hip Abduction  In sidelying 2x10 bilateral    Bridge attempt  1x5   Posterior Pelvic Tilt Supine  2x5   Prone lying over light blue small triangle wedge  Holding position 2 minutes, then 4 minutes   Stretch to Hamstrings  3 x 1 minute Right lower extremity    Heelslide with forceful push towards extension  2x10 bilateral with 10 second enrange hold stretch on last rep    10 # over distal Left thigh  Stretch to Left hip flexor for over 5 minutes while exercising Right lower extremity    Prone Hamstring curl endrange for quad/hip flexor stretch  2x10 AAROM   lower extremity lifts towards  extension in prone  2x10 AAROM   Prone hip flexor stretch   3x10 second holds bilateral    Standing frame raising to patient's tolerance x    x    x  x  x                  x      x - Standing 2x's in standing frame holding each stand for 10-15  minutes working on the following:  - Horizontal Abduction with bilateral upper extremities 2x10 AROM   - terminal knee extension in stand position 2x10 bilateral   - Rows holding green theraband bilateral  2x10  - Pushing through upper extremities to achieve trunk extension 3x20   - scap retraction 2x10  - attempted 1x10 shoulder rolls backward - patient had difficulty coordinating  - Overhead press with green tband bilateral 1x10  - Patient given feedback on standing frame position - working on increasing seat angle after 30 second rest between lifts (repeated 3-4 times)  - [employer] x 30 hits working on upright trunk posture  - Pulling with bilateral upper extremities on tray at front to pull hips forward from seat and extend knees 2x10  - Reaching Left and Right with cones to target to stretch lower extremities with each reach 2x12 cones to each side   NuStep   5 minutes at Level 1, working on alternating movement of lower extremities and stretch to bilateral knees   Standing Frame  Standing 1 x in standing frame working on holding endrange position of approximately 55 degree seat angle for 5 minutes   Standing in parallel bars working sit to stands           - standing 2x's  - Standing weights shifts Left to Right 1x10  - Standing knee extension 1x10  - Standing Left upper extremity lifts off bar 1x5  - Standing bilateral upper extremity lifts off bar 1x10 with PT helping to hold hips for support with minimum to moderate assist     * In standing frame:  Sitting position = 0 degree seat angle  Full upright stand position with hips and knees at 0 degrees = 90 degree seat angle  3/15/22 - 1st  standing frame was at 42 degree seat angle, last stand position  was at 65 degree seat angle  3/17/22 - 1st  standing frame was at 48 degree seat angle, last stand position was at 70 degree seat angle  3/21/22 - 1st  standing frame was at 52 degree seat angle, last stand position was at 70 degree seat angle  3/29/22 - 1st  standing frame was at 60 degree seat angle, last stand position was at 72 degree seat angle  3/31/22 - 1st  standing frame was at 55 degree seat angle, last stand position was at 75 degree seat angle  4/7/22 - 1st  standing frame was at 60 degree seat angle, last stand position was at 80 degree seat angle  4/11/22 - 1st  standing frame was at 60 degree seat angle, last stand position was at 78 degree seat angle  4/14/22 - 1st  standing frame was at 55 degree seat angle, last stand position was at 86 degree seat angle  4/19/22 - 1st  standing frame was at 60 degree seat angle, last stand position was at 78 degree seat angle  4/22/22 - 1st  standing frame was at 65 degree seat angle, last stand position was at 81 degree seat angle  4/26/22 - 1st  standing frame was at 65 degree seat angle, last stand position was at 82 degree seat angle  5/3/22 - 1st  standing frame was at 65 degree seat angle, last stand position was at 88 degree seat angle  5/5/22 - 1st  standing frame was at 65 degree seat angle, last stand position was at 84 degree seat angle  5/10/22 - 1st  standing frame was at 60 degree seat angle, last stand position was at 80 degree seat angle  5/12/22 - 1st  standing frame was at 65 degree seat angle, last stand position was at 80 degree seat angle  5/17/22 - 1st  standing frame was at 62 degree seat angle, last stand position was at 82 degree seat angle  5/19/22 - 1st  standing frame was at 60 degree seat angle, last stand position was at 80 degree seat angle  5/24/22 - 1st  standing frame was at 62 degree seat angle,  last stand position was at 82 degree seat angle   Plan for Next Visit:      manual therapy techniques: Joint mobilizations, Manual traction and Soft tissue Mobilization were applied to the:(0) minutes, including:    Intervention Performed Today    Patella glides superiorly  2 minutes bilateral    Anterior tibia glide Grade 1-2  1 minute bilateral                                      Neuromuscular Re-Education activities to improve: Balance, Coordination, Sense, Proprioception and Posture for 0 minutes. The following activities were included:    Intervention Performed Today                                                THERAPEUTIC ACTIVITIES to improve dynamic and functional  performance for (0) minutes including:    Intervention Performed Today    Bed - wheelchair transfer   Practiced set up and sequence   Bed mobility sit to supine to sit  Practiced sequence    Rolling   3 x each side working on sequence to incorporate hip flexor stretch on each side. Then separate stretch performed as listed above   Standing in parallel bars  4 x's working on several weight shifts towards hip extension and knee extension while standing.   Standing in parallel bars performing step forward  With Right lower extremity 5x's with moderate assistance to maintain standing position   Dynasplint Consult via FaceTime with Andriy to discuss fit     Sit to  parallel bars without assistance  2x's   Dynasplint Consult  Andriy Stout (Rep for Dynasplint) met with physical therapist and patient today during session to discuss options for dynasplints for bilateral knees due to patient's significant lack of range of motion.     Dynasplint Fitting  Andriy Stout (Rep for Dynasplint) met with physical therapist and patient today to deliver and fit patient for bilateral knee dynasplints.  We discussed wearing time of 1-2 hours/day for the first week and then we would re-assess.  Patient was shown how to don each brace and straps were marked for  future use.  The Dynasplint force was increased to 6 on the Left and 7 on the Right to start.  Extra padding was added to thin straps for skin protection.   Dynasplint Consult  Andriy Stout (Rep for Dynasplint) met with physical therapist and patient today during session to discuss proper fit and positioning of Dynasplint to feel a better stretch at home. Pt only brought Left splint to session today and his splint was donned, straps were tightened and new black line added for patient to follow at home.  Left splint was increased from 7/13 to 9/13 tension.  He reports that he places a pillow under his knee at home and he was educated on better positioning of pillow under distal stump with prosthesis removed.  He also felt more of a stretch in long sitting vs supine and was asked to try this position at home for part of the stretch if able.    Standing at sink in Neuro Room working on stand posture and positioning   Simulating activity that he has been given to do for homework. 1x5 seconds, 1x10 seconds     Plan for Next Visit:      Gait Training: for (14) minutes:  4/7/22 - Ambulating 1x 8 ft in the parallel bars with bilateral hand support, sheet support around hips for therapist to facilitate hip extension and overall moderate assistance for postural facilitation and cues for sequence.  4/7/22 - Ambulated with walker with sheet assistance to for hip extension for 5 feet with helper following with wheelchair.  5/12/22 - Ambulated with walker without sheet assistance for 10 ft with moderate assist with helper following with wheelchair.  5/17/22 - Ambulated with walker without sheet assist for 2x 12ft with moderate assist with wheelchair following with each step.  5/19/22 - Ambulated with walker without sheet assist for 1x 12 ft with minimum assist with wheelchair following with each step.  Then ambulated with walker without sheet assist for 1x20 ft without walker following incorporating 2 turns during gait  bout.  5/24/22 - Ambulated with walker for 80 ft then 120 ft with minimum assist with helper following with wheelchair.     Limitation/Restriction for FOTO Amputation Survey     Therapist reviewed FOTO scores for Hunter Schofield on 3/17/2022.   FOTO documents entered into [employer] - see Media section.     Limitation Score: 52%  4/14/22 - Limitation Score: 45%            Patient Education and Home Exercises     Home Exercises Provided and Patient Education Provided     Education provided:   -2/22/22 Patient educated on increasing length and consistency of stretching knees and hips to every 2 hours at home.  He was shown and demonstrated understanding of short arc quads, rolling toward prone and sitting in reclined position to stretch hip flexors.  -2/24/22 Patient given Home Exercise Program on exercises for lower extremities to be performed 2x15 3x's/day  See patient Instructions in EMR  -3/1/22 Re-inforced Home Exercise Program and asked patient to incorporate prone lying for longer periods of time to build up to 20 minutes per day.  3/3/22 - Discussed the option of dynasplints for bilateral lower extremities. Patient educated on the option of Dynasplints vs basic knee braces and encouraged to consider Dynasplints as a more aggressive and efficient way of properly positioning and dynamically stretching his legs outside of therapy.  3/8/22 - Patient was educated on Dynasplints with Andriy Stout (Rep).   He was shown pictures on ipad and Andriy and physical therapist discussed wearing schedule to make gains with his muscle length outside of therapy sessions.  Andriy informed patient that he would assist with collecting information regarding insurance coverage and provide him with any out of pocket costs so he can decide if he would like to pursue this option.  3/22/22 - Patient was educated that Andriy Stout (Rep for Dynasplint) messaged me that his splints have been approved and are being mailed this week.   3/24/22 - Patient  was educated on Dynasplint management and importance of daily skin checks.  His tolerance of wearing splints for the first week will determine his wearing schedule for the following weeks.  3/29/22 - Patient was educated on only wearing Dynasplints while lying down to get the most effective stretch to lower extremities.  3/31/22 - Patient was asked to reach out to Dynasplint rep ([facility]) to set up a time for Andriy to re-assess the positioning and fit of splints to see if he can feel more of a stretch with wearing schedule.  4/7/22 - [facility] sure that splints are adjusted appropriately.  4/11/22 - PT recommended that [facility] for reassessment of splint fit.  4/14/22 - Patient informed that PT would reach out to [facility] again to have him schedule a visit.  He was encouraged to continue being active at home.  4/18/22 -  Patient asked to contact Dynasplint rep to set up a time for a consult that would work for his schedule.  4/28/22 - Patient educated on wearing Dynaspints for 2 hours/day for the next 4 days and then we will assess his ability to increase to 4 hours/day after next visit.  He verbalized good understanding of education from Andriy (Dynasplint) and was told to try Right splint on in new wear position first before increasing tension to 9/13.    5/3/22 - Patient educated on attempting standing at home at sink holding stand position for 10-15 seconds repeating 3x's and doing this in the morning and afternoon every day.   5/12/22 - Patient educated on the importance of walking with walker each session moving forward to try to get range applied to a functional task.    Home Exercise Provided: 2/24/22 Exercises were reviewed and Hunter Schofield was able to demonstrate them prior to the end of the session.  Hunter Schofield demonstrated good  understanding of the education provided.   ASSESSMENT     Pt participated well with  session today with good effort and participation.  He demonstrated improved stand posture with walker and increased his gait distance.  He is continuing to make great gains with PT due to his motivation and Home Exercise Program compliance.      Pt prognosis is Fair  To Good    Pt will continue to benefit from skilled outpatient physical therapy to address the deficits listed in the problem list box on initial evaluation, provide pt/family education and to maximize pt's level of independence in the home and community environment.     Pt's spiritual, cultural and educational needs considered and pt agreeable to plan of care and goals.     Anticipated Barriers for therapy: co-morbidities, transportation assistance needed at times, lifestyle, potential contractures of knees/hips    GOALS:     Short Term Goals:  4 weeks Progress    1. Function: Patient will demonstrate improved function as indicated by a functional limitation score of less than or equal to 55 out of 100 on FOTO = 44% limitation PC   2. Mobility: Patient will improve sit to stand transfer with moderate assistance in order to progress towards independence with functional activities.  met   3. Gait: Patient will ambulated 3 steps in parallel bars with Maximum assistance to demonstrate improved strength, posture and endurance met   4. Balance: Complete FIST test upon next visit. PC   5. HEP: Patient will demonstrate independence with HEP in order to progress toward functional independence. met    6. Assess patient's needs for Dynasplints and set up consultation if needed. met       Long Term Goals:  8 weeks Progress   1. Function: Patient will demonstrate improved function as indicated by a functional limitation score of less than or equal to 60 out of 100 on FOTO = 40 % limitation PC   2. Mobility: Patient will improve AROM of bilateral knees to -10 degrees  in order to return to maximal functional potential and improve quality of life. PC   3. Gait:  Patient will ambulate 15 feet with rolling walker with moderate assistance to demonstrate improved strength, endurance and mobility. PC   4. Balance: Modify FIST goal once completed. PC   5. HEP: Patient educated on HEP in preparation for d/c verbalizing and demonstrating good understanding of topics discussed.    PC   6.          Goals Key:  PC= progressing/continue;        PM= partially met;             DC= discontinue         PLAN     Continue Plan of Care (POC) and progress per patient tolerance. See Treatment section for exercise progression.  Outpatient Physical Therapy 2 times weekly for 8 weeks to include the following interventions: Electrical Stimulation Attended, Gait Training, Moist Heat/ Ice, Neuromuscular Re-ed, Orthotic Management and Training, Patient Education, Self Care, Therapeutic Activities, Therapeutic Exercise and Prosthetic Management.      Mindy Patino, PT, DPT

## 2022-05-26 ENCOUNTER — CLINICAL SUPPORT (OUTPATIENT)
Dept: REHABILITATION | Facility: HOSPITAL | Age: 82
End: 2022-05-26
Payer: MEDICARE

## 2022-05-26 DIAGNOSIS — R68.89 DECREASED STRENGTH, ENDURANCE, AND MOBILITY: ICD-10-CM

## 2022-05-26 DIAGNOSIS — Z74.09 DECREASED STRENGTH, ENDURANCE, AND MOBILITY: ICD-10-CM

## 2022-05-26 DIAGNOSIS — R53.1 DECREASED STRENGTH, ENDURANCE, AND MOBILITY: ICD-10-CM

## 2022-05-26 DIAGNOSIS — M25.662 DECREASED RANGE OF MOTION OF BOTH LOWER EXTREMITIES: Primary | ICD-10-CM

## 2022-05-26 DIAGNOSIS — M25.661 DECREASED RANGE OF MOTION OF BOTH LOWER EXTREMITIES: Primary | ICD-10-CM

## 2022-05-26 PROCEDURE — 97116 GAIT TRAINING THERAPY: CPT

## 2022-05-26 PROCEDURE — 97110 THERAPEUTIC EXERCISES: CPT

## 2022-05-26 NOTE — PROGRESS NOTES
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  Physician: Fallon Dudley*    Visit Date: 5/26/2022     Physician Orders: PT Eval and Treat   Medical Diagnosis from Referral: s/p Left BKA  Evaluation Date: 2/18/2022  Authorization Period Expiration: 12/31/22  Plan of Care Expiration: 6/9/22  Progress Note Due: 6/9/22  Visit # / Visits authorized: 27/30 (+1 eval)  FOTO: 3/3     Precautions: Standard, Diabetes, Fall and wound on R foot, Incontinence    PTA Visit #: 0/5     Time In: 0931  Time Out: 1017   Total Billable Time: 46 minutes    SUBJECTIVE     Pt reports: that he has been compliant with Home Exercise Program and wearing splints to stretch knees.   Response to previous treatment: good with no adverse effects.  Functional change:  He reports that he is feeling stronger with more confidence with standing and is able to stand for longer bouts at home.  He recently has been able to stand without using his hands on his countertop to pull into standing.  Pain: 0/10 at rest  Location: not applicable     OBJECTIVE     Objective Measures updated at progress report unless specified.  Re-Assessment for () minutes    Joint Measured 2/22/22 3/7/22 4/14/22 5/12/22   Left Hip Extension -20 -25 -20  -20   Right Hip Extension -25 -25 -25 -20   Left Knee Extension -48 -35 AROM, -30 PROM -15 -32 PROM   Right Knee Extension -45 -37 AROM, -34 PROM -12 - 35 PROM     3/3/22  In prone:   Hip Extension = - 20  Right, unable to get accurate measure on Left    5/17/22  In Standing frame:   Hip Extension = Right -10 , Left - 15  Knee Extension = Right -15 , Left -15    STRENGTH:                L/E MMT Right  2/18/22    Right  3/17/22 Right   4/14/22 Right  5/12/22 Left  2/18/22 Left   3/17/22 Left   4/14/22 Left   5/12/22   Hip Flexion  3+/5 4-/5 4-/5 4/5 3+/5 4-/5  4-/5 4/5   Hip Extension  2+/5 2+/5 2+/5 2+/5 2+/5 2+/5 2+/5 2+/5   Hip Abduction  2+/5 3-/5 3-/5 3-/5 2+/5 3-/5 3-/5 3-/5   Knee Extension 3-/5 3+ within available Range 4-/5 within available range 4-/5 within available range 3-/5 3+ within available range 3+/5 within available range 3+/5 within available range   Knee Flexion 4-/5 4/5 4+/5 4+/5 4-/5 4/5 4/5 4+/5   Ankle DF 3+/5 3+/5 within available range 3+/5 within available range (-10 degrees from neurtral) NT not applicable  Not applicable not applicable  not applicable    Ankle PF 3+/5 4-/5 4/5 NT not applicable  Not applicable not applicable  not applicable           Treatment     Hunter Schofield received the treatments listed below:      THERAPEUTIC EXERCISES to develop strength, endurance, ROM, flexibility, posture and core stabilization for (30) minutes including:  Intervention Performed Today    short arc quad in supine  lower extremities over wedge 2x20 Right AROM, Left 2x20 Active Assistive for form and quality    sidelying toward prone   Stretch to Hip flexor 3x30 seconds each side   Quad set   2x10 Left     Sidelying hip extension   2x10 bilaterally with 10 seconds endrange stretch on last rep   Johnson and Donning Left Prosthetic leg  Performed at edge of mat    Sitting in reclined position at edge of mat   Hip flexor stretch 3x30 seconds bilateral    Hip Abduction  In sidelying 2x10 bilateral    Bridge attempt  1x5   Posterior Pelvic Tilt Supine  2x5   Prone lying over light blue small triangle wedge  Holding position 2 minutes, then 4 minutes   Stretch to Hamstrings  3 x 1 minute Right lower extremity    Heelslide with forceful push towards extension  2x10 bilateral with 10 second enrange hold stretch on last rep    10 # over distal Left thigh  Stretch to Left hip flexor for over 5 minutes while exercising Right lower extremity    Prone Hamstring curl endrange for quad/hip flexor stretch  2x10 AAROM   lower extremity lifts towards extension in  prone  2x10 AAROM   Prone hip flexor stretch   3x10 second holds bilateral    Standing frame raising to patient's tolerance x        x  x  x                  x  x    x - Standing 2x's in standing frame holding each stand for 10-15  minutes working on the following:  - Horizontal Abduction with bilateral upper extremities 2x10 AROM   - terminal knee extension in stand position 2x10 bilateral   - Rows holding red theraband bilateral  2x10  - Pushing through upper extremities to achieve trunk extension 2x20   - scap retraction 2x10  - attempted 1x10 shoulder rolls backward - patient had difficulty coordinating  - Overhead press with green tband bilateral 1x10  - Patient given feedback on standing frame position - working on increasing seat angle after 30 second rest between lifts (repeated 3-4 times)  - Beach Ball VolMixbook 1 x 30 hits working on upright trunk posture  - Pulling with bilateral upper extremities on tray at front to pull hips forward from seat and extend knees 2x20  - Reaching Left and Right with cones to target to stretch lower extremities with each reach 2x12 cones to each side   NuStep   5 minutes at Level 1, working on alternating movement of lower extremities and stretch to bilateral knees   Standing Frame  Standing 1 x in standing frame working on holding endrange position of approximately 55 degree seat angle for 5 minutes   Standing in parallel bars working sit to stands           - standing 2x's  - Standing weights shifts Left to Right 1x10  - Standing knee extension 1x10  - Standing Left upper extremity lifts off bar 1x5  - Standing bilateral upper extremity lifts off bar 1x10 with PT helping to hold hips for support with minimum to moderate assist     * In standing frame:  Sitting position = 0 degree seat angle  Full upright stand position with hips and knees at 0 degrees = 90 degree seat angle  3/15/22 - 1st  standing frame was at 42 degree seat angle, last stand position was at 65  degree seat angle  3/17/22 - 1st  standing frame was at 48 degree seat angle, last stand position was at 70 degree seat angle  3/21/22 - 1st  standing frame was at 52 degree seat angle, last stand position was at 70 degree seat angle  3/29/22 - 1st  standing frame was at 60 degree seat angle, last stand position was at 72 degree seat angle  3/31/22 - 1st  standing frame was at 55 degree seat angle, last stand position was at 75 degree seat angle  4/7/22 - 1st  standing frame was at 60 degree seat angle, last stand position was at 80 degree seat angle  4/11/22 - 1st  standing frame was at 60 degree seat angle, last stand position was at 78 degree seat angle  4/14/22 - 1st  standing frame was at 55 degree seat angle, last stand position was at 86 degree seat angle  4/19/22 - 1st  standing frame was at 60 degree seat angle, last stand position was at 78 degree seat angle  4/22/22 - 1st  standing frame was at 65 degree seat angle, last stand position was at 81 degree seat angle  4/26/22 - 1st  standing frame was at 65 degree seat angle, last stand position was at 82 degree seat angle  5/3/22 - 1st  standing frame was at 65 degree seat angle, last stand position was at 88 degree seat angle  5/5/22 - 1st  standing frame was at 65 degree seat angle, last stand position was at 84 degree seat angle  5/10/22 - 1st  standing frame was at 60 degree seat angle, last stand position was at 80 degree seat angle  5/12/22 - 1st  standing frame was at 65 degree seat angle, last stand position was at 80 degree seat angle  5/17/22 - 1st  standing frame was at 62 degree seat angle, last stand position was at 82 degree seat angle  5/19/22 - 1st  standing frame was at 60 degree seat angle, last stand position was at 80 degree seat angle  5/24/22 - 1st  standing frame was at 62 degree seat angle, last stand  position was at 82 degree seat angle  5/26/22 - 1st  standing frame was at 60 degree seat angle, last stand position was at 80 degree seat angle    Plan for Next Visit:      manual therapy techniques: Joint mobilizations, Manual traction and Soft tissue Mobilization were applied to the:(0) minutes, including:    Intervention Performed Today    Patella glides superiorly  2 minutes bilateral    Anterior tibia glide Grade 1-2  1 minute bilateral                                      Neuromuscular Re-Education activities to improve: Balance, Coordination, Sense, Proprioception and Posture for 0 minutes. The following activities were included:    Intervention Performed Today                                                THERAPEUTIC ACTIVITIES to improve dynamic and functional  performance for (5) minutes including:    Intervention Performed Today    Bed - wheelchair transfer   Practiced set up and sequence   Bed mobility sit to supine to sit  Practiced sequence    Rolling   3 x each side working on sequence to incorporate hip flexor stretch on each side. Then separate stretch performed as listed above   Standing in parallel bars  4 x's working on several weight shifts towards hip extension and knee extension while standing.   Standing in parallel bars performing step forward  With Right lower extremity 5x's with moderate assistance to maintain standing position   Dynasplint Consult via [facility] with Andriy to discuss fit     Sit to  parallel bars without assistance  2x's   Dynasplint Consult  Andriy Stout (Rep for Dynasplint) met with physical therapist and patient today during session to discuss options for dynasplints for bilateral knees due to patient's significant lack of range of motion.     Dynasplint Fitting  Andriy Stout (Rep for Dynasplint) met with physical therapist and patient today to deliver and fit patient for bilateral knee dynasplints.  We discussed wearing time of 1-2 hours/day for the first  week and then we would re-assess.  Patient was shown how to don each brace and straps were marked for future use.  The Dynasplint force was increased to 6 on the Left and 7 on the Right to start.  Extra padding was added to thin straps for skin protection.   Dynasplint Consult  Andriy Stout (Rep for Dynasplint) met with physical therapist and patient today during session to discuss proper fit and positioning of Dynasplint to feel a better stretch at home. Pt only brought Left splint to session today and his splint was donned, straps were tightened and new black line added for patient to follow at home.  Left splint was increased from 7/13 to 9/13 tension.  He reports that he places a pillow under his knee at home and he was educated on better positioning of pillow under distal stump with prosthesis removed.  He also felt more of a stretch in long sitting vs supine and was asked to try this position at home for part of the stretch if able.    Standing at sink in Neuro Room working on stand posture and positioning   Simulating activity that he has been given to do for homework. 1x5 seconds, 1x10 seconds   Patient educated addition to Home Exercise Program  x - Simulated sit to stand setup  From wheelchair to sink at home performing with walker in front to work on letting go of sink and holding on to walker.  Patient was educated on how to sit safely to prevent wheelchair from tipping backwards if he loses balance.  He was told to start with one hand on walker and one hand on counter top until he can progress to both hands on walker for support. This will be a way that he can safely challenge himself at home. Patient practiced standing to walker 3x's working on sequence and safety of task.     Plan for Next Visit:      Gait Training: for (11) minutes:  4/7/22 - Ambulating 1x 8 ft in the parallel bars with bilateral hand support, sheet support around hips for therapist to facilitate hip extension and overall moderate  assistance for postural facilitation and cues for sequence.  4/7/22 - Ambulated with walker with sheet assistance to for hip extension for 5 feet with helper following with wheelchair.  5/12/22 - Ambulated with walker without sheet assistance for 10 ft with moderate assist with helper following with wheelchair.  5/17/22 - Ambulated with walker without sheet assist for 2x 12ft with moderate assist with wheelchair following with each step.  5/19/22 - Ambulated with walker without sheet assist for 1x 12 ft with minimum assist with wheelchair following with each step.  Then ambulated with walker without sheet assist for 1x20 ft without walker following incorporating 2 turns during gait bout.  5/24/22 - Ambulated with walker for 80 ft then 120 ft with minimum assist with helper following with wheelchair.   5/26/22 - Ambulated with walker for 80 ft 2x's with minimum assist with helper following with wheelchair.    Limitation/Restriction for FOTO Amputation Survey     Therapist reviewed FOTO scores for Hunter Schofield on 3/17/2022.   FOTO documents entered into [employer] - see Media section.     Limitation Score: 52%  4/14/22 - Limitation Score: 45%            Patient Education and Home Exercises     Home Exercises Provided and Patient Education Provided     Education provided:   -2/22/22 Patient educated on increasing length and consistency of stretching knees and hips to every 2 hours at home.  He was shown and demonstrated understanding of short arc quads, rolling toward prone and sitting in reclined position to stretch hip flexors.  -2/24/22 Patient given Home Exercise Program on exercises for lower extremities to be performed 2x15 3x's/day  See patient Instructions in EMR  -3/1/22 Re-inforced Home Exercise Program and asked patient to incorporate prone lying for longer periods of time to build up to 20 minutes per day.  3/3/22 - Discussed the option of dynasplints for bilateral lower extremities. Patient educated on the  option of Dynasplints vs basic knee braces and encouraged to consider Dynasplints as a more aggressive and efficient way of properly positioning and dynamically stretching his legs outside of therapy.  3/8/22 - Patient was educated on Dynasplints with Andriy Stout (Rep).   He was shown pictures on ipad and Andriy and physical therapist discussed wearing schedule to make gains with his muscle length outside of therapy sessions.  Andriy informed patient that he would assist with collecting information regarding insurance coverage and provide him with any out of pocket costs so he can decide if he would like to pursue this option.  3/22/22 - Patient was educated that Andriy Stout (Rep for Dynasplint) messaged me that his splints have been approved and are being mailed this week.   3/24/22 - Patient was educated on Dynasplint management and importance of daily skin checks.  His tolerance of wearing splints for the first week will determine his wearing schedule for the following weeks.  3/29/22 - Patient was educated on only wearing Dynasplints while lying down to get the most effective stretch to lower extremities.  3/31/22 - Patient was asked to reach out to Dynasplint rep (Andriy) to set up a time for Andriy to re-assess the positioning and fit of splints to see if he can feel more of a stretch with wearing schedule.  4/7/22 - [facility] are [facility].  4/11/22 - PT recommended that patient follow up with Dynasplint rep for reassessment of splint fit.  4/14/22 - Patient informed that PT would reach out to [facility] again to have him schedule a visit.  He was encouraged to continue being active at home.  4/18/22 -  Patient asked to contact Dynasplint rep to set up a time for a consult that would work for his schedule.  4/28/22 - Patient educated on wearing Dynaspints for 2 hours/day for the next 4 days and then we will assess his ability to  increase to 4 hours/day after next visit.  He verbalized good understanding of education from Andriy (Sarikat) and was told to try Right splint on in new wear position first before increasing tension to 9/13.    5/3/22 - Patient educated on attempting standing at home at sink holding stand position for 10-15 seconds repeating 3x's and doing this in the morning and afternoon every day.   5/12/22 - Patient educated on the importance of walking with walker each session moving forward to try to get range applied to a functional task.  5/26/22 - Patient educated on adding walker to sit to stands at sink as a way of increasing his stand balance safely at home alone.    Home Exercise Provided: 2/24/22 Exercises were reviewed and Hunter Schofield was able to demonstrate them prior to the end of the session.  Hunter Schofield demonstrated good  understanding of the education provided.   ASSESSMENT     Pt participated well with session today with good effort and participation.  He was able to demonstrate improved standing posture with cues.  He demonstrated good understanding of added activity for Home Exercise Program. He has good potential to continue to benefit from PT to work towards functional mobility independence.        Pt prognosis is Fair  To Good    Pt will continue to benefit from skilled outpatient physical therapy to address the deficits listed in the problem list box on initial evaluation, provide pt/family education and to maximize pt's level of independence in the home and community environment.     Pt's spiritual, cultural and educational needs considered and pt agreeable to plan of care and goals.     Anticipated Barriers for therapy: co-morbidities, transportation assistance needed at times, lifestyle, potential contractures of knees/hips    GOALS:     Short Term Goals:  4 weeks Progress    1. Function: Patient will demonstrate improved function as indicated by a functional limitation score of less  than or equal to 55 out of 100 on FOTO = 44% limitation PC   2. Mobility: Patient will improve sit to stand transfer with moderate assistance in order to progress towards independence with functional activities.  met   3. Gait: Patient will ambulated 3 steps in parallel bars with Maximum assistance to demonstrate improved strength, posture and endurance met   4. Balance: Complete FIST test upon next visit. PC   5. HEP: Patient will demonstrate independence with HEP in order to progress toward functional independence. met    6. Assess patient's needs for Dynasplints and set up consultation if needed. met       Long Term Goals:  8 weeks Progress   1. Function: Patient will demonstrate improved function as indicated by a functional limitation score of less than or equal to 60 out of 100 on FOTO = 40 % limitation PC   2. Mobility: Patient will improve AROM of bilateral knees to -10 degrees  in order to return to maximal functional potential and improve quality of life. PC   3. Gait: Patient will ambulate 15 feet with rolling walker with moderate assistance to demonstrate improved strength, endurance and mobility. PC   4. Balance: Modify FIST goal once completed. PC   5. HEP: Patient educated on HEP in preparation for d/c verbalizing and demonstrating good understanding of topics discussed.    PC   6.          Goals Key:  PC= progressing/continue;        PM= partially met;             DC= discontinue         PLAN     Continue Plan of Care (POC) and progress per patient tolerance. See Treatment section for exercise progression.  Outpatient Physical Therapy 2 times weekly for 8 weeks to include the following interventions: Electrical Stimulation Attended, Gait Training, Moist Heat/ Ice, Neuromuscular Re-ed, Orthotic Management and Training, Patient Education, Self Care, Therapeutic Activities, Therapeutic Exercise and Prosthetic Management.      Mindy Patino, PT, DPT

## 2022-05-31 ENCOUNTER — CLINICAL SUPPORT (OUTPATIENT)
Dept: REHABILITATION | Facility: HOSPITAL | Age: 82
End: 2022-05-31
Payer: MEDICARE

## 2022-05-31 DIAGNOSIS — M25.661 DECREASED RANGE OF MOTION OF BOTH LOWER EXTREMITIES: Primary | ICD-10-CM

## 2022-05-31 DIAGNOSIS — M25.662 DECREASED RANGE OF MOTION OF BOTH LOWER EXTREMITIES: Primary | ICD-10-CM

## 2022-05-31 DIAGNOSIS — Z74.09 DECREASED STRENGTH, ENDURANCE, AND MOBILITY: ICD-10-CM

## 2022-05-31 DIAGNOSIS — R53.1 DECREASED STRENGTH, ENDURANCE, AND MOBILITY: ICD-10-CM

## 2022-05-31 DIAGNOSIS — R68.89 DECREASED STRENGTH, ENDURANCE, AND MOBILITY: ICD-10-CM

## 2022-05-31 PROCEDURE — 97110 THERAPEUTIC EXERCISES: CPT | Mod: KX

## 2022-05-31 PROCEDURE — 97116 GAIT TRAINING THERAPY: CPT | Mod: KX

## 2022-05-31 NOTE — PROGRESS NOTES
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  Physician: Fallon Dudley*    Visit Date: 5/31/2022     Physician Orders: PT Eval and Treat   Medical Diagnosis from Referral: s/p Left BKA  Evaluation Date: 2/18/2022  Authorization Period Expiration: 12/31/22  Plan of Care Expiration: 6/9/22  Progress Note Due: 6/9/22  Visit # / Visits authorized: 28/30 (+1 eval)  FOTO: 3/3     Precautions: Standard, Diabetes, Fall and wound on R foot, Incontinence    PTA Visit #: 0/5     Time In: 1016  Time Out: 1100   Total Billable Time: 44 minutes    SUBJECTIVE     Pt reports: that he has been compliant with Home Exercise Program and wearing splints to stretch knees. He reports that he had a fall at home last week when exiting the bathroom from his rolling stool with residual Left forearm and Left hip bruising.  Response to previous treatment: good with no adverse effects.  Functional change:  He reports that he is feeling stronger with more confidence with standing and is able to stand for longer bouts at home.  He recently has been able to stand without using his hands on his countertop to pull into standing.  Pain: 0/10 at rest  Location: not applicable     OBJECTIVE     Objective Measures updated at progress report unless specified.  Re-Assessment for () minutes    Joint Measured 2/22/22 3/7/22 4/14/22 5/12/22   Left Hip Extension -20 -25 -20  -20   Right Hip Extension -25 -25 -25 -20   Left Knee Extension -48 -35 AROM, -30 PROM -15 -32 PROM   Right Knee Extension -45 -37 AROM, -34 PROM -12 - 35 PROM     3/3/22  In prone:   Hip Extension = - 20  Right, unable to get accurate measure on Left    5/17/22  In Standing frame:   Hip Extension = Right -10 , Left - 15  Knee Extension = Right -15 , Left -15    STRENGTH:                L/E MMT Right  2/18/22     Right  3/17/22 Right   4/14/22 Right  5/12/22 Left  2/18/22 Left   3/17/22 Left   4/14/22 Left   5/12/22   Hip Flexion  3+/5 4-/5 4-/5 4/5 3+/5 4-/5 4-/5 4/5   Hip Extension  2+/5 2+/5 2+/5 2+/5 2+/5 2+/5 2+/5 2+/5   Hip Abduction  2+/5 3-/5 3-/5 3-/5 2+/5 3-/5 3-/5 3-/5   Knee Extension 3-/5 3+ within available Range 4-/5 within available range 4-/5 within available range 3-/5 3+ within available range 3+/5 within available range 3+/5 within available range   Knee Flexion 4-/5 4/5 4+/5 4+/5 4-/5 4/5 4/5 4+/5   Ankle DF 3+/5 3+/5 within available range 3+/5 within available range (-10 degrees from neurtral) NT not applicable  Not applicable not applicable  not applicable    Ankle PF 3+/5 4-/5 4/5 NT not applicable  Not applicable not applicable  not applicable           Treatment     Hunter Schofield received the treatments listed below:      THERAPEUTIC EXERCISES to develop strength, endurance, ROM, flexibility, posture and core stabilization for (34) minutes including:  Intervention Performed Today    short arc quad in supine  lower extremities over wedge 2x20 Right AROM, Left 2x20 Active Assistive for form and quality    sidelying toward prone   Stretch to Hip flexor 3x30 seconds each side   Quad set   2x10 Left     Sidelying hip extension   2x10 bilaterally with 10 seconds endrange stretch on last rep   [place] Left Prosthetic leg  Performed at edge of mat    Sitting in reclined position at edge of mat   Hip flexor stretch 3x30 seconds bilateral    Hip Abduction  In sidelying 2x10 bilateral    Bridge attempt  1x5   Posterior Pelvic Tilt Supine  2x5   Prone lying over light blue small triangle wedge  Holding position 2 minutes, then 4 minutes   Stretch to Hamstrings  3 x 1 minute Right lower extremity    Heelslide with forceful push towards extension  2x10 bilateral with 10 second enrange hold stretch on last rep    10 # over distal Left thigh  Stretch to Left hip flexor for over 5 minutes while  exercising Right lower extremity    Prone Hamstring curl endrange for quad/hip flexor stretch  2x10 AAROM   lower extremity lifts towards extension in prone  2x10 AAROM   Prone hip flexor stretch   3x10 second holds bilateral    Standing frame raising to patient's tolerance x        x    x                    x    x - Standing 2x's in standing frame holding each stand for 10-15  minutes working on the following:  - Horizontal Abduction with bilateral upper extremities 2x10 AROM   - terminal knee extension in stand position 2x10 bilateral   - Rows holding red theraband bilateral  2x10  - Pushing through upper extremities to achieve trunk extension 2x20   - scap retraction 2x10  - attempted 1x10 shoulder rolls backward - patient had difficulty coordinating  - Overhead press with green tband bilateral 1x10  - Patient given feedback on standing frame position - working on increasing seat angle after 30 second rest between lifts (repeated 3-4 times)  - [employer] x 30 hits working on upright trunk posture  - Pulling with bilateral upper extremities on tray at front to pull hips forward from seat and extend knees 2x20  - Reaching Left and Right with cones to target to stretch lower extremities with each reach 2x12 cones to each side   NuStep   5 minutes at Level 1, working on alternating movement of lower extremities and stretch to bilateral knees   Standing Frame  Standing 1 x in standing frame working on holding endrange position of approximately 55 degree seat angle for 5 minutes   Standing in parallel bars working sit to stands           - standing 2x's  - Standing weights shifts Left to Right 1x10  - Standing knee extension 1x10  - Standing Left upper extremity lifts off bar 1x5  - Standing bilateral upper extremity lifts off bar 1x10 with PT helping to hold hips for support with minimum to moderate assist     * In standing frame:  Sitting position = 0 degree seat angle  Full upright stand position with hips  and knees at 0 degrees = 90 degree seat angle  3/15/22 - 1st  standing frame was at 42 degree seat angle, last stand position was at 65 degree seat angle  3/17/22 - 1st  standing frame was at 48 degree seat angle, last stand position was at 70 degree seat angle  3/21/22 - 1st  standing frame was at 52 degree seat angle, last stand position was at 70 degree seat angle  3/29/22 - 1st  standing frame was at 60 degree seat angle, last stand position was at 72 degree seat angle  3/31/22 - 1st  standing frame was at 55 degree seat angle, last stand position was at 75 degree seat angle  4/7/22 - 1st  standing frame was at 60 degree seat angle, last stand position was at 80 degree seat angle  4/11/22 - 1st  standing frame was at 60 degree seat angle, last stand position was at 78 degree seat angle  4/14/22 - 1st  standing frame was at 55 degree seat angle, last stand position was at 86 degree seat angle  4/19/22 - 1st  standing frame was at 60 degree seat angle, last stand position was at 78 degree seat angle  4/22/22 - 1st  standing frame was at 65 degree seat angle, last stand position was at 81 degree seat angle  4/26/22 - 1st  standing frame was at 65 degree seat angle, last stand position was at 82 degree seat angle  5/3/22 - 1st  standing frame was at 65 degree seat angle, last stand position was at 88 degree seat angle  5/5/22 - 1st  standing frame was at 65 degree seat angle, last stand position was at 84 degree seat angle  5/10/22 - 1st  standing frame was at 60 degree seat angle, last stand position was at 80 degree seat angle  5/12/22 - 1st  standing frame was at 65 degree seat angle, last stand position was at 80 degree seat angle  5/17/22 - 1st  standing frame was at 62 degree seat angle, last stand position was at 82 degree seat angle  5/19/22 - 1st  standing frame was at 60  degree seat angle, last stand position was at 80 degree seat angle  5/24/22 - 1st  standing frame was at 62 degree seat angle, last stand position was at 82 degree seat angle  5/26/22 - 1st  standing frame was at 60 degree seat angle, last stand position was at 80 degree seat angle   5/31/22 - 1st  standing frame was at 65 degree seat angle, last stand position was at 80 degree seat angle   Plan for Next Visit:      manual therapy techniques: Joint mobilizations, Manual traction and Soft tissue Mobilization were applied to the:(0) minutes, including:    Intervention Performed Today    Patella glides superiorly  2 minutes bilateral    Anterior tibia glide Grade 1-2  1 minute bilateral                                      Neuromuscular Re-Education activities to improve: Balance, Coordination, Sense, Proprioception and Posture for 0 minutes. The following activities were included:    Intervention Performed Today                                                THERAPEUTIC ACTIVITIES to improve dynamic and functional  performance for (0) minutes including:    Intervention Performed Today    Bed - wheelchair transfer   Practiced set up and sequence   Bed mobility sit to supine to sit  Practiced sequence    Rolling   3 x each side working on sequence to incorporate hip flexor stretch on each side. Then separate stretch performed as listed above   Standing in parallel bars  4 x's working on several weight shifts towards hip extension and knee extension while standing.   Standing in parallel bars performing step forward  With Right lower extremity 5x's with moderate assistance to maintain standing position   Dynasplint Consult via [place] with Andriy to discuss fit     Sit to  parallel bars without assistance  2x's   Dynasplint Consult  Andriy Stout (Rep for Dynasplint) met with physical therapist and patient today during session to discuss options for dynasplints for bilateral knees due to  patient's significant lack of range of motion.     Dynasplint Fitting  Andriy Stout (Rep for Dynasplint) met with physical therapist and patient today to deliver and fit patient for bilateral knee dynasplints.  We discussed wearing time of 1-2 hours/day for the first week and then we would re-assess.  Patient was shown how to don each brace and straps were marked for future use.  The Dynasplint force was increased to 6 on the Left and 7 on the Right to start.  Extra padding was added to thin straps for skin protection.   Dynasplint Consult  Andriy Stout (Rep for Dynasplint) met with physical therapist and patient today during session to discuss proper fit and positioning of Dynasplint to feel a better stretch at home. Pt only brought Left splint to session today and his splint was donned, straps were tightened and new black line added for patient to follow at home.  Left splint was increased from 7/13 to 9/13 tension.  He reports that he places a pillow under his knee at home and he was educated on better positioning of pillow under distal stump with prosthesis removed.  He also felt more of a stretch in long sitting vs supine and was asked to try this position at home for part of the stretch if able.    Standing at sink in Neuro Room working on stand posture and positioning   Simulating activity that he has been given to do for homework. 1x5 seconds, 1x10 seconds   Patient educated addition to Home Exercise Program   - Simulated sit to stand setup  From wheelchair to sink at home performing with walker in front to work on letting go of sink and holding on to walker.  Patient was educated on how to sit safely to prevent wheelchair from tipping backwards if he loses balance.  He was told to start with one hand on walker and one hand on counter top until he can progress to both hands on walker for support. This will be a way that he can safely challenge himself at home. Patient practiced standing to walker 3x's working  on sequence and safety of task.     Plan for Next Visit:      Gait Training: for (10) minutes:  5/31/22 - Ambulated with walker for 120 ft with contact guard assist - helper following with wheelchair.    Limitation/Restriction for FOTO Amputation Survey     Therapist reviewed FOTO scores for Hunter Schofield on 3/17/2022.   FOTO documents entered into [employer] - see Media section.     Limitation Score: 52%  4/14/22 - Limitation Score: 45%            Patient Education and Home Exercises     Home Exercises Provided and Patient Education Provided     Education provided:   -2/22/22 Patient educated on increasing length and consistency of stretching knees and hips to every 2 hours at home.  He was shown and demonstrated understanding of short arc quads, rolling toward prone and sitting in reclined position to stretch hip flexors.  -2/24/22 Patient given Home Exercise Program on exercises for lower extremities to be performed 2x15 3x's/day  See patient Instructions in EMR  -3/1/22 Re-inforced Home Exercise Program and asked patient to incorporate prone lying for longer periods of time to build up to 20 minutes per day.  3/3/22 - Discussed the option of dynasplints for bilateral lower extremities. Patient educated on the option of Dynasplints vs basic knee braces and encouraged to consider Dynasplints as a more aggressive and efficient way of properly positioning and dynamically stretching his legs outside of therapy.  3/8/22 - Patient was educated on Dynasplints with Andriy Stout (Rep).   He was shown pictures on ipad and Andriy and physical therapist discussed wearing schedule to make gains with his muscle length outside of therapy sessions.  Andriy informed patient that he would assist with collecting information regarding insurance coverage and provide him with any out of pocket costs so he can decide if he would like to pursue this option.  3/22/22 - Patient was educated that Andriy Stout (Rep for Dynasplint) messaged me that  his splints have been approved and are being mailed this week.   3/24/22 - Patient was educated on Dynasplint management and importance of daily skin checks.  His tolerance of wearing splints for the first week will determine his wearing schedule for the following weeks.  3/29/22 - Patient was educated on only wearing Dynasplints while lying down to get the most effective stretch to lower extremities.  3/31/22 - Patient was asked to reach out to [facility] ([facility]) to set up a time for Andriy to re-assess the positioning and fit of splints to see if he can feel more of a stretch with wearing schedule.  4/7/22 - Patient was asked to reach out to [facility] to make sure that splints are adjusted appropriately.  4/11/22 - PT recommended that patient follow up with Dynasplint rep for reassessment of splint fit.  4/14/22 - Patient informed that PT would reach out to [facility] again to have him schedule a visit.  He was encouraged to continue being active at home.  4/18/22 -  Patient asked to contact Dynasplint rep to set up a time for a consult that would work for his schedule.  4/28/22 - Patient educated on wearing Dynaspints for 2 hours/day for the next 4 days and then we will assess his ability to increase to 4 hours/day after next visit.  He verbalized good understanding of education from Andriy (Dynasplint) and was told to try Right splint on in new wear position first before increasing tension to 9/13.    5/3/22 - Patient educated on attempting standing at home at sink holding stand position for 10-15 seconds repeating 3x's and doing this in the morning and afternoon every day.   5/12/22 - Patient educated on the importance of walking with walker each session moving forward to try to get range applied to a functional task.  5/26/22 - Patient educated on adding walker to sit to stands at sink as a way of increasing his stand balance safely at home alone.    Home Exercise Provided: 2/24/22 Exercises were  reviewed and Hunter Schofield was able to demonstrate them prior to the end of the session.  Hunter Schofield demonstrated good  understanding of the education provided.   ASSESSMENT     Pt participated well with session today with good effort and participation. He demonstrated improved sit to stand quality and independence.  He was also able to manage walker and only required contact guard assist with gait training today.    Pt prognosis is Fair  To Good    Pt will continue to benefit from skilled outpatient physical therapy to address the deficits listed in the problem list box on initial evaluation, provide pt/family education and to maximize pt's level of independence in the home and community environment.     Pt's spiritual, cultural and educational needs considered and pt agreeable to plan of care and goals.     Anticipated Barriers for therapy: co-morbidities, transportation assistance needed at times, lifestyle, potential contractures of knees/hips    GOALS:     Short Term Goals:  4 weeks Progress    1. Function: Patient will demonstrate improved function as indicated by a functional limitation score of less than or equal to 55 out of 100 on FOTO = 44% limitation PC   2. Mobility: Patient will improve sit to stand transfer with moderate assistance in order to progress towards independence with functional activities.  met   3. Gait: Patient will ambulated 3 steps in parallel bars with Maximum assistance to demonstrate improved strength, posture and endurance met   4. Balance: Complete FIST test upon next visit. PC   5. HEP: Patient will demonstrate independence with HEP in order to progress toward functional independence. met    6. Assess patient's needs for Dynasplints and set up consultation if needed. met       Long Term Goals:  8 weeks Progress   1. Function: Patient will demonstrate improved function as indicated by a functional limitation score of less than or equal to 60 out of 100 on FOTO = 40 %  limitation PC   2. Mobility: Patient will improve AROM of bilateral knees to -10 degrees  in order to return to maximal functional potential and improve quality of life. PC   3. Gait: Patient will ambulate 15 feet with rolling walker with moderate assistance to demonstrate improved strength, endurance and mobility. PC   4. Balance: Modify FIST goal once completed. PC   5. HEP: Patient educated on HEP in preparation for d/c verbalizing and demonstrating good understanding of topics discussed.    PC   6.          Goals Key:  PC= progressing/continue;        PM= partially met;             DC= discontinue         PLAN     Continue Plan of Care (POC) and progress per patient tolerance. See Treatment section for exercise progression.  Outpatient Physical Therapy 2 times weekly for 8 weeks to include the following interventions: Electrical Stimulation Attended, Gait Training, Moist Heat/ Ice, Neuromuscular Re-ed, Orthotic Management and Training, Patient Education, Self Care, Therapeutic Activities, Therapeutic Exercise and Prosthetic Management.      Mindy Patino, PT, DPT

## 2022-06-02 ENCOUNTER — CLINICAL SUPPORT (OUTPATIENT)
Dept: REHABILITATION | Facility: HOSPITAL | Age: 82
End: 2022-06-02
Attending: INTERNAL MEDICINE
Payer: MEDICARE

## 2022-06-02 DIAGNOSIS — R68.89 DECREASED STRENGTH, ENDURANCE, AND MOBILITY: ICD-10-CM

## 2022-06-02 DIAGNOSIS — R53.1 DECREASED STRENGTH, ENDURANCE, AND MOBILITY: ICD-10-CM

## 2022-06-02 DIAGNOSIS — Z74.09 DECREASED STRENGTH, ENDURANCE, AND MOBILITY: ICD-10-CM

## 2022-06-02 DIAGNOSIS — M25.662 DECREASED RANGE OF MOTION OF BOTH LOWER EXTREMITIES: Primary | ICD-10-CM

## 2022-06-02 DIAGNOSIS — M25.661 DECREASED RANGE OF MOTION OF BOTH LOWER EXTREMITIES: Primary | ICD-10-CM

## 2022-06-02 PROCEDURE — 97116 GAIT TRAINING THERAPY: CPT | Mod: KX

## 2022-06-02 PROCEDURE — 97110 THERAPEUTIC EXERCISES: CPT | Mod: KX

## 2022-06-02 NOTE — PROGRESS NOTES
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  Physician: Fallon Dudley*    Visit Date: 6/2/2022     Physician Orders: PT Eval and Treat   Medical Diagnosis from Referral: s/p Left BKA  Evaluation Date: 2/18/2022  Authorization Period Expiration: 12/31/22  Plan of Care Expiration: 6/9/22  Progress Note Due: 6/9/22  Visit # / Visits authorized: 29/30 (+1 eval)  FOTO: 3/3     Precautions: Standard, Diabetes, Fall and wound on R foot, Incontinence    PTA Visit #: 0/5     Time In: 1101  Time Out: 1145   Total Billable Time: 44 minutes    SUBJECTIVE     Pt reports: that he has been compliant with Home Exercise Program and wearing splints to stretch knees. He reports that he had a fall at home last week when exiting the bathroom from his rolling stool with residual Left forearm and Left hip bruising.  Response to previous treatment: good with no adverse effects.  Functional change:  He reports that he is feeling stronger with more confidence with standing and is able to stand for longer bouts at home.  He recently has been able to stand without using his hands on his countertop to pull into standing.  Pain: 0/10 at rest  Location: not applicable     OBJECTIVE     Objective Measures updated at progress report unless specified.  Re-Assessment for () minutes    Joint Measured 2/22/22 3/7/22 4/14/22 5/12/22   Left Hip Extension -20 -25 -20  -20   Right Hip Extension -25 -25 -25 -20   Left Knee Extension -48 -35 AROM, -30 PROM -15 -32 PROM   Right Knee Extension -45 -37 AROM, -34 PROM -12 - 35 PROM     3/3/22  In prone:   Hip Extension = - 20  Right, unable to get accurate measure on Left    5/17/22  In Standing frame:   Hip Extension = Right -10 , Left - 15  Knee Extension = Right -15 , Left -15    STRENGTH:                L/E MMT Right  2/18/22     Right  3/17/22 Right   4/14/22 Right  5/12/22 Left  2/18/22 Left   3/17/22 Left   4/14/22 Left   5/12/22   Hip Flexion  3+/5 4-/5 4-/5 4/5 3+/5 4-/5 4-/5 4/5   Hip Extension  2+/5 2+/5 2+/5 2+/5 2+/5 2+/5 2+/5 2+/5   Hip Abduction  2+/5 3-/5 3-/5 3-/5 2+/5 3-/5 3-/5 3-/5   Knee Extension 3-/5 3+ within available Range 4-/5 within available range 4-/5 within available range 3-/5 3+ within available range 3+/5 within available range 3+/5 within available range   Knee Flexion 4-/5 4/5 4+/5 4+/5 4-/5 4/5 4/5 4+/5   Ankle DF 3+/5 3+/5 within available range 3+/5 within available range (-10 degrees from neurtral) NT not applicable  Not applicable not applicable  not applicable    Ankle PF 3+/5 4-/5 4/5 NT not applicable  Not applicable not applicable  not applicable           Treatment     Hunter Schofield received the treatments listed below:      THERAPEUTIC EXERCISES to develop strength, endurance, ROM, flexibility, posture and core stabilization for (34) minutes including:  Intervention Performed Today    short arc quad in supine  lower extremities over wedge 2x20 Right AROM, Left 2x20 Active Assistive for form and quality    sidelying toward prone   Stretch to Hip flexor 3x30 seconds each side   Quad set   2x10 Left     Sidelying hip extension   2x10 bilaterally with 10 seconds endrange stretch on last rep   Gardi and Donning Left Prosthetic leg  Performed at edge of mat    Sitting in reclined position at edge of mat   Hip flexor stretch 3x30 seconds bilateral    Hip Abduction  In sidelying 2x10 bilateral    Bridge attempt  1x5   Posterior Pelvic Tilt Supine  2x5   Prone lying over light blue small triangle wedge  Holding position 2 minutes, then 4 minutes   Stretch to Hamstrings  3 x 1 minute Right lower extremity    Heelslide with forceful push towards extension  2x10 bilateral with 10 second enrange hold stretch on last rep    10 # over distal Left thigh  Stretch to Left hip flexor for over 5 minutes while  exercising Right lower extremity    Prone Hamstring curl endrange for quad/hip flexor stretch  2x10 AAROM   lower extremity lifts towards extension in prone  2x10 AAROM   Prone hip flexor stretch   3x10 second holds bilateral    Standing frame raising to patient's tolerance x        x  x  x                    x    x - Standing 2x's in standing frame holding each stand for 10-15  minutes working on the following:  - Horizontal Abduction with bilateral upper extremities 2x10 AROM   - terminal knee extension in stand position 2x10 bilateral   - Rows holding green theraband bilateral  2x10  - Pushing through upper extremities to achieve trunk extension 2x20   - scap retraction 2x10  - attempted 1x10 shoulder rolls backward - patient had difficulty coordinating  - Overhead press with green tband bilateral 1x10  - Patient given feedback on standing frame position - working on increasing seat angle after 30 second rest between lifts (repeated 3-4 times)  - Beach Ball Volley 1 x 30 hits working on upright trunk posture  - Pulling with bilateral upper extremities on tray at front to pull hips forward from seat and extend knees 2x20  - Reaching Left and Right with cones to target to stretch lower extremities with each reach 2x12 cones to each side   NuStep   5 minutes at Level 1, working on alternating movement of lower extremities and stretch to bilateral knees   Standing Frame  Standing 1 x in standing frame working on holding endrange position of approximately 55 degree seat angle for 5 minutes   Standing in parallel bars working sit to stands           - standing 2x's  - Standing weights shifts Left to Right 1x10  - Standing knee extension 1x10  - Standing Left upper extremity lifts off bar 1x5  - Standing bilateral upper extremity lifts off bar 1x10 with PT helping to hold hips for support with minimum to moderate assist     * In standing frame:  Sitting position = 0 degree seat angle  Full upright stand position with  hips and knees at 0 degrees = 90 degree seat angle  3/15/22 - 1st  standing frame was at 42 degree seat angle, last stand position was at 65 degree seat angle  3/17/22 - 1st  standing frame was at 48 degree seat angle, last stand position was at 70 degree seat angle  3/21/22 - 1st  standing frame was at 52 degree seat angle, last stand position was at 70 degree seat angle  3/29/22 - 1st  standing frame was at 60 degree seat angle, last stand position was at 72 degree seat angle  3/31/22 - 1st  standing frame was at 55 degree seat angle, last stand position was at 75 degree seat angle  4/7/22 - 1st  standing frame was at 60 degree seat angle, last stand position was at 80 degree seat angle  4/11/22 - 1st  standing frame was at 60 degree seat angle, last stand position was at 78 degree seat angle  4/14/22 - 1st  standing frame was at 55 degree seat angle, last stand position was at 86 degree seat angle  4/19/22 - 1st  standing frame was at 60 degree seat angle, last stand position was at 78 degree seat angle  4/22/22 - 1st  standing frame was at 65 degree seat angle, last stand position was at 81 degree seat angle  4/26/22 - 1st  standing frame was at 65 degree seat angle, last stand position was at 82 degree seat angle  5/3/22 - 1st  standing frame was at 65 degree seat angle, last stand position was at 88 degree seat angle  5/5/22 - 1st  standing frame was at 65 degree seat angle, last stand position was at 84 degree seat angle  5/10/22 - 1st  standing frame was at 60 degree seat angle, last stand position was at 80 degree seat angle  5/12/22 - 1st  standing frame was at 65 degree seat angle, last stand position was at 80 degree seat angle  5/17/22 - 1st  standing frame was at 62 degree seat angle, last stand position was at 82 degree seat angle  5/19/22 - 1st  standing frame was at  60 degree seat angle, last stand position was at 80 degree seat angle  5/24/22 - 1st  standing frame was at 62 degree seat angle, last stand position was at 82 degree seat angle  5/26/22 - 1st  standing frame was at 60 degree seat angle, last stand position was at 80 degree seat angle   5/31/22 - 1st  standing frame was at 65 degree seat angle, last stand position was at 80 degree seat angle  6/2/22 - 1st  standing frame was at 70 degree seat angle, last stand position was at 82 degree seat angle   Plan for Next Visit:      manual therapy techniques: Joint mobilizations, Manual traction and Soft tissue Mobilization were applied to the:(0) minutes, including:    Intervention Performed Today    Patella glides superiorly  2 minutes bilateral    Anterior tibia glide Grade 1-2  1 minute bilateral                                      Neuromuscular Re-Education activities to improve: Balance, Coordination, Sense, Proprioception and Posture for 0 minutes. The following activities were included:    Intervention Performed Today                                                THERAPEUTIC ACTIVITIES to improve dynamic and functional  performance for (0) minutes including:    Intervention Performed Today    Bed - wheelchair transfer   Practiced set up and sequence   Bed mobility sit to supine to sit  Practiced sequence    Rolling   3 x each side working on sequence to incorporate hip flexor stretch on each side. Then separate stretch performed as listed above   Standing in parallel bars  4 x's working on several weight shifts towards hip extension and knee extension while standing.   Standing in parallel bars performing step forward  With Right lower extremity 5x's with moderate assistance to maintain standing position   Dynasplint Consult via Rubina with Andriy to discuss fit     Sit to  parallel bars without assistance  2x's   Dynasplint Consult  Andriy Stout (Rep for Dynasplint) met with  physical therapist and patient today during session to discuss options for dynasplints for bilateral knees due to patient's significant lack of range of motion.     Dynasplint Fitting  Andriy Stout (Rep for Dynasplint) met with physical therapist and patient today to deliver and fit patient for bilateral knee dynasplints.  We discussed wearing time of 1-2 hours/day for the first week and then we would re-assess.  Patient was shown how to don each brace and straps were marked for future use.  The Dynasplint force was increased to 6 on the Left and 7 on the Right to start.  Extra padding was added to thin straps for skin protection.   Dynasplint Consult  Andriy Stout (Rep for Dynasplint) met with physical therapist and patient today during session to discuss proper fit and positioning of Dynasplint to feel a better stretch at home. Pt only brought Left splint to session today and his splint was donned, straps were tightened and new black line added for patient to follow at home.  Left splint was increased from 7/13 to 9/13 tension.  He reports that he places a pillow under his knee at home and he was educated on better positioning of pillow under distal stump with prosthesis removed.  He also felt more of a stretch in long sitting vs supine and was asked to try this position at home for part of the stretch if able.    Standing at sink in Neuro Room working on stand posture and positioning   Simulating activity that he has been given to do for homework. 1x5 seconds, 1x10 seconds   Patient educated addition to Home Exercise Program   - Simulated sit to stand setup  From wheelchair to sink at home performing with walker in front to work on letting go of sink and holding on to walker.  Patient was educated on how to sit safely to prevent wheelchair from tipping backwards if he loses balance.  He was told to start with one hand on walker and one hand on counter top until he can progress to both hands on walker for support.  This will be a way that he can safely challenge himself at home. Patient practiced standing to walker 3x's working on sequence and safety of task.     Plan for Next Visit:      Gait Training: for (10) minutes:  6/2/22 - Ambulated with walker for 100 ft (2x's) with contact guard assist to stand by assist - helper following with wheelchair.    Limitation/Restriction for FOTO Amputation Survey     Therapist reviewed FOTO scores for Hunter Schofield on 3/17/2022.   FOTO documents entered into [employer] - see Media section.     Limitation Score: 52%  4/14/22 - Limitation Score: 45%            Patient Education and Home Exercises     Home Exercises Provided and Patient Education Provided     Education provided:   -2/22/22 Patient educated on increasing length and consistency of stretching knees and hips to every 2 hours at home.  He was shown and demonstrated understanding of short arc quads, rolling toward prone and sitting in reclined position to stretch hip flexors.  -2/24/22 Patient given Home Exercise Program on exercises for lower extremities to be performed 2x15 3x's/day  See patient Instructions in EMR  -3/1/22 Re-inforced Home Exercise Program and asked patient to incorporate prone lying for longer periods of time to build up to 20 minutes per day.  3/3/22 - Discussed the option of dynasplints for bilateral lower extremities. Patient educated on the option of Dynasplints vs basic knee braces and encouraged to consider Dynasplints as a more aggressive and efficient way of properly positioning and dynamically stretching his legs outside of therapy.  3/8/22 - Patient was educated on Dynasplints with Andriy Stout ().   He was shown pictures on ipad and Andriy and physical therapist discussed wearing schedule to make gains with his muscle length outside of therapy sessions.  Andriy informed patient that he would assist with collecting information regarding insurance coverage and provide him with any out of pocket costs so he  can decide if he would like to pursue this option.  3/22/22 - Patient was educated that Andriy Stout (Rep for Dynasplint) messaged me that his splints have been approved and are being mailed this week.   3/24/22 - Patient was educated on Dynasplint management and importance of daily skin checks.  His tolerance of wearing splints for the first week will determine his wearing schedule for the following weeks.  3/29/22 - Patient was educated on only wearing Dynasplints while lying down to get the most effective stretch to lower extremities.  3/31/22 - Patient was asked to reach out to Dynasplint rep (Andriy) to set up a time for Andriy to re-assess the positioning and fit of splints to see if he can feel more of a stretch with wearing schedule.  4/7/22 - Patient was asked to reach out to Dynasplint rep to make sure that splints are adjusted appropriately.  4/11/22 - PT recommended that patient follow up with Dynasplint rep for reassessment of splint fit.  4/14/22 - Patient informed that PT would reach out to [employer] rep again to have him schedule a visit.  He was encouraged to continue being active at home.  4/18/22 -  Patient asked to contact [employer] rep to set up a time for a consult that would work for his schedule.  4/28/22 - Patient educated on wearing Dynaspints for 2 hours/day for the next 4 days and then we will assess his ability to increase to 4 hours/day after next visit.  He verbalized good understanding of education from Andriy (Dynasplinjohn) and was told to try Right splint on in new wear position first before increasing tension to 9/13.    5/3/22 - Patient educated on attempting standing at home at sink holding stand position for 10-15 seconds repeating 3x's and doing this in the morning and afternoon every day.   5/12/22 - Patient educated on the importance of walking with walker each session moving forward to try to get range applied to a functional task.  5/26/22 - Patient educated on adding walker to  sit to stands at sink as a way of increasing his stand balance safely at home alone.    Home Exercise Provided: 2/24/22 Exercises were reviewed and Hunter Moore Tatedeborah was able to demonstrate them prior to the end of the session.  Hunter Schofield demonstrated good  understanding of the education provided.   ASSESSMENT     Pt participated well with session today with good effort and participation. He was able to progress to Stand by assist with walker today.  He is making great gains with PT and would benefit from a new plan of care to reach goals for mobility independence as he lives at home alone.  Pt prognosis is Fair  To Good    Pt will continue to benefit from skilled outpatient physical therapy to address the deficits listed in the problem list box on initial evaluation, provide pt/family education and to maximize pt's level of independence in the home and community environment.     Pt's spiritual, cultural and educational needs considered and pt agreeable to plan of care and goals.     Anticipated Barriers for therapy: co-morbidities, transportation assistance needed at times, lifestyle, potential contractures of knees/hips    GOALS:     Short Term Goals:  4 weeks Progress    1. Function: Patient will demonstrate improved function as indicated by a functional limitation score of less than or equal to 55 out of 100 on FOTO = 44% limitation PC   2. Mobility: Patient will improve sit to stand transfer with moderate assistance in order to progress towards independence with functional activities.  met   3. Gait: Patient will ambulated 3 steps in parallel bars with Maximum assistance to demonstrate improved strength, posture and endurance met   4. Balance: Complete FIST test upon next visit. PC   5. HEP: Patient will demonstrate independence with HEP in order to progress toward functional independence. met    6. Assess patient's needs for Dynasplints and set up consultation if needed. met       Long Term Goals:   8 weeks Progress   1. Function: Patient will demonstrate improved function as indicated by a functional limitation score of less than or equal to 60 out of 100 on FOTO = 40 % limitation PC   2. Mobility: Patient will improve AROM of bilateral knees to -10 degrees  in order to return to maximal functional potential and improve quality of life. PC   3. Gait: Patient will ambulate 15 feet with rolling walker with moderate assistance to demonstrate improved strength, endurance and mobility. PC   4. Balance: Modify FIST goal once completed. PC   5. HEP: Patient educated on HEP in preparation for d/c verbalizing and demonstrating good understanding of topics discussed.    PC   6.          Goals Key:  PC= progressing/continue;        PM= partially met;             DC= discontinue         PLAN     Continue Plan of Care (POC) and progress per patient tolerance. See Treatment section for exercise progression.  Outpatient Physical Therapy 2 times weekly for 8 weeks to include the following interventions: Electrical Stimulation Attended, Gait Training, Moist Heat/ Ice, Neuromuscular Re-ed, Orthotic Management and Training, Patient Education, Self Care, Therapeutic Activities, Therapeutic Exercise and Prosthetic Management.      Mindy Patino, PT, DPT

## 2022-06-07 ENCOUNTER — CLINICAL SUPPORT (OUTPATIENT)
Dept: REHABILITATION | Facility: HOSPITAL | Age: 82
End: 2022-06-07
Attending: INTERNAL MEDICINE
Payer: MEDICARE

## 2022-06-07 DIAGNOSIS — R53.1 DECREASED STRENGTH, ENDURANCE, AND MOBILITY: ICD-10-CM

## 2022-06-07 DIAGNOSIS — M25.662 DECREASED RANGE OF MOTION OF BOTH LOWER EXTREMITIES: Primary | ICD-10-CM

## 2022-06-07 DIAGNOSIS — M25.661 DECREASED RANGE OF MOTION OF BOTH LOWER EXTREMITIES: Primary | ICD-10-CM

## 2022-06-07 DIAGNOSIS — Z74.09 DECREASED STRENGTH, ENDURANCE, AND MOBILITY: ICD-10-CM

## 2022-06-07 DIAGNOSIS — R68.89 DECREASED STRENGTH, ENDURANCE, AND MOBILITY: ICD-10-CM

## 2022-06-07 PROCEDURE — 97116 GAIT TRAINING THERAPY: CPT

## 2022-06-07 PROCEDURE — 97530 THERAPEUTIC ACTIVITIES: CPT

## 2022-06-07 PROCEDURE — 97110 THERAPEUTIC EXERCISES: CPT

## 2022-06-07 NOTE — PROGRESS NOTES
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  Physician: Fallon Dudley*    Visit Date: 6/7/2022     Physician Orders: PT Eval and Treat   Medical Diagnosis from Referral: s/p Left BKA  Evaluation Date: 2/18/2022  Authorization Period Expiration: 12/31/22  Plan of Care Expiration: 6/9/22  Progress Note Due: 6/9/22  Visit # / Visits authorized: 30/30 (+1 eval)  FOTO: 3/3     Precautions: Standard, Diabetes, Fall and wound on R foot, Incontinence    PTA Visit #: 0/5     Time In: 1016  Time Out: 1100   Total Billable Time: 44 minutes    SUBJECTIVE     Pt reports: that he has been compliant with Home Exercise Program and wearing splints to stretch knees. He reports that he is feeling stronger with standing at home.   Response to previous treatment: good with no adverse effects.  Functional change:  He reports that he is feeling stronger with more confidence with standing and is able to stand for longer bouts at home.  He recently has been able to stand without using his hands on his countertop to pull into standing.  Pain: 0/10 at rest  Location: not applicable     OBJECTIVE     Objective Measures updated at progress report unless specified.  Re-Assessment for (19) minutes    Joint Measured 2/22/22 3/7/22 4/14/22 5/12/22   Left Hip Extension -20 -25 -20  -20   Right Hip Extension -25 -25 -25 -20   Left Knee Extension -48 -35 AROM, -30 PROM -15 -32 PROM   Right Knee Extension -45 -37 AROM, -34 PROM -12 - 35 PROM     3/3/22  In prone:   Hip Extension = - 20  Right, unable to get accurate measure on Left    5/17/22  In Standing frame:   Hip Extension = Right -10 , Left - 15  Knee Extension = Right -15 , Left -15    6/7/22   In Standing Frame:   Hip Extension = Right  -10, Left - 15  Knee Extension = Right -13, Left -15    Functional Mobility  Assessment:  Bed to Wheelchair transfer with walker with stand by assist to contact guard assist    STRENGTH:                  L/E MMT Right  2/18/22    Right  3/17/22 Right   4/14/22 Right  5/12/22 Right  6/7/22 Left  2/18/22 Left   3/17/22 Left   4/14/22 Left   5/12/22 Left  6/7/22   Hip Flexion  3+/5 4-/5 4-/5 4/5 4/5 3+/5 4-/5 4-/5 4/5 4/5   Hip Extension  2+/5 2+/5 2+/5 2+/5 3-/5 2+/5 2+/5 2+/5 2+/5 3-/5   Hip Abduction  2+/5 3-/5 3-/5 3-/5 4/5 sitting 2+/5 3-/5 3-/5 3-/5 4/5 sittting   Knee Extension 3-/5 3+ within available Range 4-/5 within available range 4-/5 within available range 4-/5 within available range 3-/5 3+ within available range 3+/5 within available range 3+/5 within available range 4-/5 within available range   Knee Flexion 4-/5 4/5 4+/5 4+/5 4+/5 4-/5 4/5 4/5 4+/5 4+/5   Ankle DF 3+/5 3+/5 within available range 3+/5 within available range (-10 degrees from neurtral) NT 4+/5 not applicable  Not applicable not applicable  not applicable  not applicable    Ankle PF 3+/5 4-/5 4/5 NT 5/5 not applicable  Not applicable not applicable  not applicable  not applicable           Treatment     Hunter Schofield received the treatments listed below:      THERAPEUTIC EXERCISES to develop strength, endurance, ROM, flexibility, posture and core stabilization for (15) minutes including:  Intervention Performed Today    short arc quad in supine  lower extremities over wedge 2x20 Right AROM, Left 2x20 Active Assistive for form and quality    sidelying toward prone   Stretch to Hip flexor 3x30 seconds each side   Quad set   2x10 Left     Sidelying hip extension   2x10 bilaterally with 10 seconds endrange stretch on last rep   [place] [employer] [place] Left Prosthetic leg  Performed at edge of mat    Sitting in reclined position at edge of mat   Hip flexor stretch 3x30 seconds bilateral    Hip Abduction  In sidelying 2x10 bilateral    Bridge attempt  1x5   Posterior Pelvic Tilt Supine  2x5   Prone lying over light  blue small triangle wedge  Holding position 2 minutes, then 4 minutes   Stretch to Hamstrings  3 x 1 minute Right lower extremity    Heelslide with forceful push towards extension  2x10 bilateral with 10 second enrange hold stretch on last rep    10 # over distal Left thigh  Stretch to Left hip flexor for over 5 minutes while exercising Right lower extremity    Prone Hamstring curl endrange for quad/hip flexor stretch  2x10 AAROM   lower extremity lifts towards extension in prone  2x10 AAROM   Prone hip flexor stretch   3x10 second holds bilateral    Standing frame raising to patient's tolerance x        x    x                    x    x - Standing 2x's in standing frame holding each stand for 10-15  minutes working on the following:  - Horizontal Abduction with bilateral upper extremities 2x10 AROM   - terminal knee extension in stand position 2x10 bilateral   - Rows holding green theraband bilateral  2x10  - Pushing through upper extremities to achieve trunk extension 3/20  - scap retraction 2x10  - attempted 1x10 shoulder rolls backward - patient had difficulty coordinating  - Overhead press with green tband bilateral 1x10  - Patient given feedback on standing frame position - working on increasing seat angle after 30 second rest between lifts (repeated 3-4 times)  - Beach [employer] Vol[employer] x 30 hits working on upright trunk posture  - Pulling with bilateral upper extremities on tray at front to pull hips forward from seat and extend knees 2x20  - Reaching Left and Right with cones to target to stretch lower extremities with each reach 2x12 cones to each side   NuStep   5 minutes at Level 1, working on alternating movement of lower extremities and stretch to bilateral knees   Standing Frame  Standing 1 x in standing frame working on holding endrange position of approximately 55 degree seat angle for 5 minutes   Standing in parallel bars working sit to stands           - standing 2x's  - Standing weights shifts Left to  Right 1x10  - Standing knee extension 1x10  - Standing Left upper extremity lifts off bar 1x5  - Standing bilateral upper extremity lifts off bar 1x10 with PT helping to hold hips for support with minimum to moderate assist     * In standing frame:  Sitting position = 0 degree seat angle  Full upright stand position with hips and knees at 0 degrees = 90 degree seat angle  3/15/22 - 1st  standing frame was at 42 degree seat angle, last stand position was at 65 degree seat angle  3/17/22 - 1st  standing frame was at 48 degree seat angle, last stand position was at 70 degree seat angle  3/21/22 - 1st  standing frame was at 52 degree seat angle, last stand position was at 70 degree seat angle  3/29/22 - 1st  standing frame was at 60 degree seat angle, last stand position was at 72 degree seat angle  3/31/22 - 1st  standing frame was at 55 degree seat angle, last stand position was at 75 degree seat angle  4/7/22 - 1st  standing frame was at 60 degree seat angle, last stand position was at 80 degree seat angle  4/11/22 - 1st  standing frame was at 60 degree seat angle, last stand position was at 78 degree seat angle  4/14/22 - 1st  standing frame was at 55 degree seat angle, last stand position was at 86 degree seat angle  4/19/22 - 1st  standing frame was at 60 degree seat angle, last stand position was at 78 degree seat angle  4/22/22 - 1st  standing frame was at 65 degree seat angle, last stand position was at 81 degree seat angle  4/26/22 - 1st  standing frame was at 65 degree seat angle, last stand position was at 82 degree seat angle  5/3/22 - 1st  standing frame was at 65 degree seat angle, last stand position was at 88 degree seat angle  5/5/22 - 1st  standing frame was at 65 degree seat angle, last stand position was at 84 degree seat angle  5/10/22 - 1st  standing frame was at 60 degree seat angle,  last stand position was at 80 degree seat angle  5/12/22 - 1st  standing frame was at 65 degree seat angle, last stand position was at 80 degree seat angle  5/17/22 - 1st  standing frame was at 62 degree seat angle, last stand position was at 82 degree seat angle  5/19/22 - 1st  standing frame was at 60 degree seat angle, last stand position was at 80 degree seat angle  5/24/22 - 1st  standing frame was at 62 degree seat angle, last stand position was at 82 degree seat angle  5/26/22 - 1st  standing frame was at 60 degree seat angle, last stand position was at 80 degree seat angle   5/31/22 - 1st  standing frame was at 65 degree seat angle, last stand position was at 80 degree seat angle  6/2/22 - 1st  standing frame was at 70 degree seat angle, last stand position was at 82 degree seat angle  6/7/22 - 1st  standing frame was at 68 degree seat angle, last stand position was at 80 degree seat angle   Plan for Next Visit:      manual therapy techniques: Joint mobilizations, Manual traction and Soft tissue Mobilization were applied to the:(0) minutes, including:    Intervention Performed Today    Patella glides superiorly  2 minutes bilateral    Anterior tibia glide Grade 1-2  1 minute bilateral                                      Neuromuscular Re-Education activities to improve: Balance, Coordination, Sense, Proprioception and Posture for 0 minutes. The following activities were included:    Intervention Performed Today                                                THERAPEUTIC ACTIVITIES to improve dynamic and functional  performance for (0) minutes including:    Intervention Performed Today    Bed - wheelchair transfer   Practiced set up and sequence   Bed mobility sit to supine to sit  Practiced sequence    Rolling   3 x each side working on sequence to incorporate hip flexor stretch on each side. Then separate stretch performed as listed above    Standing in parallel bars  4 x's working on several weight shifts towards hip extension and knee extension while standing.   Standing in parallel bars performing step forward  With Right lower extremity 5x's with moderate assistance to maintain standing position   Dynasplint Consult via FaceTime with Andriy to discuss fit     Sit to  parallel bars without assistance  2x's   Dynasplint Consult  Andriy Stout (Rep for Dynasplint) met with physical therapist and patient today during session to discuss options for dynasplints for bilateral knees due to patient's significant lack of range of motion.     Dynasplint Fitting  Andriy Stout (Rep for Dynasplint) met with physical therapist and patient today to deliver and fit patient for bilateral knee dynasplints.  We discussed wearing time of 1-2 hours/day for the first week and then we would re-assess.  Patient was shown how to don each brace and straps were marked for future use.  The Dynasplint force was increased to 6 on the Left and 7 on the Right to start.  Extra padding was added to thin straps for skin protection.   Dynasplint Consult  Andriy Stout (Rep for Dynasplint) met with physical therapist and patient today during session to discuss proper fit and positioning of Dynasplint to feel a better stretch at home. Pt only brought Left splint to session today and his splint was donned, straps were tightened and new black line added for patient to follow at home.  Left splint was increased from 7/13 to 9/13 tension.  He reports that he places a pillow under his knee at home and he was educated on better positioning of pillow under distal stump with prosthesis removed.  He also felt more of a stretch in long sitting vs supine and was asked to try this position at home for part of the stretch if able.    Standing at sink in Neuro Room working on stand posture and positioning   Simulating activity that he has been given to do for homework. 1x5 seconds, 1x10 seconds    Patient educated addition to Home Exercise Program   - Simulated sit to stand setup  From wheelchair to sink at home performing with walker in front to work on letting go of sink and holding on to walker.  Patient was educated on how to sit safely to prevent wheelchair from tipping backwards if he loses balance.  He was told to start with one hand on walker and one hand on counter top until he can progress to both hands on walker for support. This will be a way that he can safely challenge himself at home. Patient practiced standing to walker 3x's working on sequence and safety of task.     Plan for Next Visit:      Gait Training: for (10) minutes:  6/7/22 - Ambulated with walker for 120 ft  with contact guard assist to stand by assist - helper following with wheelchair.   - Practice stepping to perform stand pivot transfer from wheelchair to mat then back to wheelchair with walker with minimum assist.    Limitation/Restriction for FOTO Amputation Survey     Therapist reviewed FOTO scores for Hunter Schofield on 3/17/2022.   FOTO documents entered into [employer] - see Media section.     Limitation Score: 52%  4/14/22 - Limitation Score: 45%  6/7/22 - Limitation Score: 60%             Patient Education and Home Exercises     Home Exercises Provided and Patient Education Provided     Education provided:   -2/22/22 Patient educated on increasing length and consistency of stretching knees and hips to every 2 hours at home.  He was shown and demonstrated understanding of short arc quads, rolling toward prone and sitting in reclined position to stretch hip flexors.  -2/24/22 Patient given Home Exercise Program on exercises for lower extremities to be performed 2x15 3x's/day  See patient Instructions in EMR  -3/1/22 Re-inforced Home Exercise Program and asked patient to incorporate prone lying for longer periods of time to build up to 20 minutes per day.  3/3/22 - Discussed the option of dynasplints for bilateral lower  extremities. Patient educated on the option of Dynasplints vs basic knee braces and encouraged to consider Dynasplints as a more aggressive and efficient way of properly positioning and dynamically stretching his legs outside of therapy.  3/8/22 - Patient was educated on Dynasplints with Andriy Stout (Rep).   He was shown pictures on ipad and Andriy and physical therapist discussed wearing schedule to make gains with his muscle length outside of therapy sessions.  Andriy informed patient that he would assist with collecting information regarding insurance coverage and provide him with any out of pocket costs so he can decide if he would like to pursue this option.  3/22/22 - Patient was educated that Andriy Stout (Rep for Dynasplint) messaged me that his splints have been approved and are being mailed this week.   3/24/22 - Patient was educated on Dynasplint management and importance of daily skin checks.  His tolerance of wearing splints for the first week will determine his wearing schedule for the following weeks.  3/29/22 - Patient was educated on only wearing Dynasplints while lying down to get the most effective stretch to lower extremities.  3/31/22 - Patient was asked to reach out to Dynasplint rep (Andriy) to set up a time for Andriy to re-assess the positioning and fit of splints to see if he can feel more of a stretch with wearing schedule.  4/7/22 - [facility] sure that [facility] are [facility] appropriately.  4/11/22 - PT [facility] that [facility] of splint fit.  4/14/22 - Patient informed that PT would [facility] to have him schedule a visit.  He was encouraged to continue being active at home.  4/18/22 -  Patient asked to contact Dynasplint rep to set up a time for a consult that would work for his schedule.  4/28/22 - Patient educated on wearing Dynaspints for 2 hours/day for the next 4 days and then  we will assess his ability to increase to 4 hours/day after next visit.  He verbalized good understanding of education from Andriy (Alexa) and was told to try Right splint on in new wear position first before increasing tension to 9/13.    5/3/22 - Patient educated on attempting standing at home at sink holding stand position for 10-15 seconds repeating 3x's and doing this in the morning and afternoon every day.   5/12/22 - Patient educated on the importance of walking with walker each session moving forward to try to get range applied to a functional task.  5/26/22 - Patient educated on adding walker to sit to stands at sink as a way of increasing his stand balance safely at home alone.  6/7/22 - Patient educated on his gains at this point and that PT is requesting more visits for a renewed Plan of Care.     Home Exercise Provided: 2/24/22 Exercises were reviewed and Hunter Schofield was able to demonstrate them prior to the end of the session.  Hunter Schofield demonstrated good  understanding of the education provided.   ASSESSMENT     Pt participated well with session today with good effort and participation. He was able to progress with stand pivot transfers with walker, but needs continued practice for sequence and safety.  He has good potential to progress with renewed plan of care.     Pt prognosis is Fair  To Good    Pt will continue to benefit from skilled outpatient physical therapy to address the deficits listed in the problem list box on initial evaluation, provide pt/family education and to maximize pt's level of independence in the home and community environment.     Pt's spiritual, cultural and educational needs considered and pt agreeable to plan of care and goals.     Anticipated Barriers for therapy: co-morbidities, transportation assistance needed at times, lifestyle, potential contractures of knees/hips    GOALS:     Short Term Goals:  4 weeks Progress    1. Function: Patient will  demonstrate improved function as indicated by a functional limitation score of less than or equal to 55 out of 100 on FOTO = 44% limitation PC   2. Mobility: Patient will improve sit to stand transfer with moderate assistance in order to progress towards independence with functional activities. (Met)  (Modified) - Patient will perform wheelchair to mat transfer with stand by assist to demonstrate improved independence with mobility. PC   3. Gait: Patient will ambulated 3 steps in parallel bars with Maximum assistance to demonstrate improved strength, posture and endurance met   5. HEP: Patient will demonstrate independence with HEP in order to progress toward functional independence. met    6. Assess patient's needs for Dynasplints and set up consultation if needed. met       Long Term Goals:  8 weeks Progress   1. Function: Patient will demonstrate improved function as indicated by a functional limitation score of less than or equal to 60 out of 100 on FOTO = 40 % limitation PC   2. Mobility: Patient will improve AROM of bilateral knees to -10 degrees  in order to return to maximal functional potential and improve quality of life. PC   3. Functional Mobility - Patient will perform mat to wheelchair transfer with modified independence with walker in order to improve his independence with functional mobility. PC   4. Gait: Patient will ambulate 15 feet with rolling walker with moderate assistance to demonstrate improved strength, endurance and mobility. (Met)   (Modified) - Patient will ambulate 100 ft with walker incorporating turns with modified independence to negotiate home environment with independence. PC   5. HEP: Patient educated on HEP in preparation for d/c verbalizing and demonstrating good understanding of topics discussed.    PC   6.          Goals Key:  PC= progressing/continue;        PM= partially met;             DC= discontinue         PLAN     Continue Plan of Care (POC) and progress per patient  tolerance. See Treatment section for exercise progression.  Outpatient Physical Therapy 2 times weekly for 8 weeks to include the following interventions: Electrical Stimulation Attended, Gait Training, Moist Heat/ Ice, Neuromuscular Re-ed, Orthotic Management and Training, Patient Education, Self Care, Therapeutic Activities, Therapeutic Exercise and Prosthetic Management.      Mindy Patino, PT, DPT

## 2022-06-08 NOTE — PLAN OF CARE
Outpatient Therapy Updated Plan of Care     Visit Date: 6/7/2022  Name: Hunter Schofield  Clinic Number: [id number]    Therapy Diagnosis:   Encounter Diagnoses   Name Primary?   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  Needing to change brief, clothes and bathe at bed level.    Subjective     Update: Patient reports that he is very pleased with his progress.  He looks forward to attending PT sessions and would like to continue working on gait training to improve his functional mobility independence as he lives home alone.    Objective     Update:   STRENGTH:                  L/E MMT Right  2/18/22    Right  3/17/22 Right   4/14/22 Right  5/12/22 Right  6/7/22 Left  2/18/22 Left   3/17/22 Left   4/14/22 Left   5/12/22 Left  6/7/22   Hip Flexion  3+/5 4-/5 4-/5 4/5 4/5 3+/5 4-/5 4-/5 4/5 4/5   Hip Extension  2+/5 2+/5 2+/5 2+/5 3-/5 2+/5 2+/5 2+/5 2+/5 3-/5   Hip Abduction  2+/5 3-/5 3-/5 3-/5 4/5 sitting 2+/5 3-/5 3-/5 3-/5 4/5 sittting   Knee Extension 3-/5 3+ within available Range 4-/5 within available range 4-/5 within available range 4-/5 within available range 3-/5 3+ within available range 3+/5 within available range 3+/5 within available range 4-/5 within available range   Knee Flexion 4-/5 4/5 4+/5 4+/5 4+/5 4-/5 4/5 4/5 4+/5 4+/5   Ankle DF 3+/5 3+/5 within available range 3+/5 within available range (-10 degrees from neurtral) NT  4+/5 not applicable  Not applicable not applicable  not applicable  not applicable    Ankle PF 3+/5 4-/5 4/5 NT 5/5 not applicable  Not applicable not applicable  not applicable  not applicable      5/17/22  In Standing frame:   Hip Extension = Right -10 , Left - 15  Knee Extension = Right -15 , Left -15    6/7/22   In Standing Frame:   Hip Extension = Right  -10, Left - 15  Knee Extension = Right -13, Left -15    Functional Mobility Assessment:  6/7/22 Bed to Wheelchair transfer with walker with stand by assist to contact guard assist      Assessment     Update: Hunter has continued to make good gains throughout this plan of care.  He has demonstrated improvement with his tolerance and seat angle of standing frame, increasing his range of motion in lower extremities.  He also has progressed to standing on his own at his sink at home and he has also started to gait train with walker.  He would benefit from continued therapy to work on improving his strength, endurance, balance, gait safety and independence with transfers and use of walker within his home environment as he lives at home alone.     GOALS:     Short Term Goals:  4 weeks Progress    1. Function: Patient will demonstrate improved function as indicated by a functional limitation score of less than or equal to 55 out of 100 on FOTO = 44% limitation PC   2. Mobility: Patient will improve sit to stand transfer with moderate assistance in order to progress towards independence with functional activities. (Met)  (Modified) - Patient will perform wheelchair to mat transfer with stand by assist to demonstrate improved independence with mobility. PC   3. Gait: Patient will ambulated 3 steps in parallel bars with Maximum assistance to demonstrate improved strength, posture and endurance met   5. HEP: Patient will demonstrate independence with HEP in order to progress toward functional independence. met    6. Assess patient's needs for Dynasplints and set up  consultation if needed. met       Long Term Goals:  8 weeks Progress   1. Function: Patient will demonstrate improved function as indicated by a functional limitation score of less than or equal to 60 out of 100 on FOTO = 40 % limitation PC   2. Mobility: Patient will improve AROM of bilateral knees to -10 degrees  in order to return to maximal functional potential and improve quality of life. PC   3. Functional Mobility - Patient will perform mat to wheelchair transfer with modified independence with walker in order to improve his independence with functional mobility. PC   4. Gait: Patient will ambulate 15 feet with rolling walker with moderate assistance to demonstrate improved strength, endurance and mobility. (Met)   (Modified) - Patient will ambulate 100 ft with walker incorporating turns with modified independence to negotiate home environment with independence. PC   5. HEP: Patient educated on HEP in preparation for d/c verbalizing and demonstrating good understanding of topics discussed.    PC   6.          Goals Key:  PC= progressing/continue;        PM= partially met;             DC= discontinue      Reasons for Recertification of Therapy:  To work on gaining more range of motion at bilateral hips and knees, then using this new range to build strength.  He has also started gait training and would benefit from continued practice to improve his safety and independence to carryover gait tasks within his home.    Plan     Updated Certification Period: 6/7/2022 to 8/5/22  Recommended Treatment Plan: 2 times per week for 8 weeks: Electrical Stimulation Attended, Gait Training, Manual Therapy, Moist Heat/ Ice, Neuromuscular Re-ed, Orthotic Management and Training, Patient Education, Self Care, Therapeutic Activities, Therapeutic Exercise and Dry Needling  Other Recommendations: none    Mindy Patino, PT, DPT  6/7/2022      I CERTIFY THE NEED FOR THESE SERVICES FURNISHED UNDER THIS PLAN OF TREATMENT AND WHILE  UNDER MY CARE    Physician's comments:        Physician's Signature: ___________________________________________________

## 2022-06-10 ENCOUNTER — CLINICAL SUPPORT (OUTPATIENT)
Dept: REHABILITATION | Facility: HOSPITAL | Age: 82
End: 2022-06-10
Attending: INTERNAL MEDICINE
Payer: MEDICARE

## 2022-06-10 DIAGNOSIS — M25.661 DECREASED RANGE OF MOTION OF BOTH LOWER EXTREMITIES: Primary | ICD-10-CM

## 2022-06-10 DIAGNOSIS — Z74.09 DECREASED STRENGTH, ENDURANCE, AND MOBILITY: ICD-10-CM

## 2022-06-10 DIAGNOSIS — R68.89 DECREASED STRENGTH, ENDURANCE, AND MOBILITY: ICD-10-CM

## 2022-06-10 DIAGNOSIS — M25.662 DECREASED RANGE OF MOTION OF BOTH LOWER EXTREMITIES: Primary | ICD-10-CM

## 2022-06-10 DIAGNOSIS — R53.1 DECREASED STRENGTH, ENDURANCE, AND MOBILITY: ICD-10-CM

## 2022-06-10 PROCEDURE — 97110 THERAPEUTIC EXERCISES: CPT | Mod: KX

## 2022-06-10 PROCEDURE — 97116 GAIT TRAINING THERAPY: CPT | Mod: KX

## 2022-06-10 NOTE — PROGRESS NOTES

## 2022-06-14 ENCOUNTER — CLINICAL SUPPORT (OUTPATIENT)
Dept: REHABILITATION | Facility: HOSPITAL | Age: 82
End: 2022-06-14
Attending: INTERNAL MEDICINE
Payer: MEDICARE

## 2022-06-14 DIAGNOSIS — M25.662 DECREASED RANGE OF MOTION OF BOTH LOWER EXTREMITIES: Primary | ICD-10-CM

## 2022-06-14 DIAGNOSIS — R53.1 DECREASED STRENGTH, ENDURANCE, AND MOBILITY: ICD-10-CM

## 2022-06-14 DIAGNOSIS — R68.89 DECREASED STRENGTH, ENDURANCE, AND MOBILITY: ICD-10-CM

## 2022-06-14 DIAGNOSIS — M25.661 DECREASED RANGE OF MOTION OF BOTH LOWER EXTREMITIES: Primary | ICD-10-CM

## 2022-06-14 DIAGNOSIS — Z74.09 DECREASED STRENGTH, ENDURANCE, AND MOBILITY: ICD-10-CM

## 2022-06-14 PROCEDURE — 97110 THERAPEUTIC EXERCISES: CPT

## 2022-06-14 PROCEDURE — 97116 GAIT TRAINING THERAPY: CPT

## 2022-06-14 NOTE — PROGRESS NOTES
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  Physician: Fallon Dudley*    Visit Date: 6/14/2022     Physician Orders: PT Eval and Treat   Medical Diagnosis from Referral: s/p Left BKA  Evaluation Date: 2/18/2022  Authorization Period Expiration: 12/31/22  Plan of Care Expiration: 8/5/22  Progress Note Due: 7/7/22  Visit # / Visits authorized: 32/40 (+1 eval)  FOTO: 3/3     Precautions: Standard, Diabetes, Fall and wound on R foot, Incontinence    PTA Visit #: 0/5     Time In: 1330  Time Out: 1420   Total Billable Time: 50 minutes    SUBJECTIVE     Pt reports: that he has been compliant with Home Exercise Program and wearing splints to stretch knees. He reports that he is feeling stronger with standing at home.   Response to previous treatment: good with no adverse effects.  Functional change:  He reports that he is feeling stronger with more confidence with standing and is able to stand for longer bouts at home.  He recently has been able to stand without using his hands on his countertop to pull into standing.  Pain: 0/10 at rest  Location: not applicable     OBJECTIVE     Objective Measures updated at progress report unless specified.  Re-Assessment for (0) minutes    Joint Measured 2/22/22 3/7/22 4/14/22 5/12/22   Left Hip Extension -20 -25 -20  -20   Right Hip Extension -25 -25 -25 -20   Left Knee Extension -48 -35 AROM, -30 PROM -15 -32 PROM   Right Knee Extension -45 -37 AROM, -34 PROM -12 - 35 PROM     3/3/22  In prone:   Hip Extension = - 20  Right, unable to get accurate measure on Left    5/17/22  In Standing frame:   Hip Extension = Right -10 , Left - 15  Knee Extension = Right -15 , Left -15    6/7/22   In Standing Frame:   Hip Extension = Right  -10, Left - 15  Knee Extension = Right -13, Left -15    Functional Mobility  Assessment:  Bed to Wheelchair transfer with walker with stand by assist to contact guard assist    STRENGTH:                  L/E MMT Right  2/18/22    Right  3/17/22 Right   4/14/22 Right  5/12/22 Right  6/7/22 Left  2/18/22 Left   3/17/22 Left   4/14/22 Left   5/12/22 Left  6/7/22   Hip Flexion  3+/5 4-/5 4-/5 4/5 4/5 3+/5 4-/5 4-/5 4/5 4/5   Hip Extension  2+/5 2+/5 2+/5 2+/5 3-/5 2+/5 2+/5 2+/5 2+/5 3-/5   Hip Abduction  2+/5 3-/5 3-/5 3-/5 4/5 sitting 2+/5 3-/5 3-/5 3-/5 4/5 sittting   Knee Extension 3-/5 3+ within available Range 4-/5 within available range 4-/5 within available range 4-/5 within available range 3-/5 3+ within available range 3+/5 within available range 3+/5 within available range 4-/5 within available range   Knee Flexion 4-/5 4/5 4+/5 4+/5 4+/5 4-/5 4/5 4/5 4+/5 4+/5   Ankle DF 3+/5 3+/5 within available range 3+/5 within available range (-10 degrees from neurtral) NT 4+/5 not applicable  Not applicable not applicable  not applicable  not applicable    Ankle PF 3+/5 4-/5 4/5 NT 5/5 not applicable  Not applicable not applicable  not applicable  not applicable           Treatment     Hunter Schofield received the treatments listed below:      THERAPEUTIC EXERCISES to develop strength, endurance, ROM, flexibility, posture and core stabilization for (35) minutes including:  Intervention Performed Today    short arc quad in supine  lower extremities over wedge 2x20 Right AROM, Left 2x20 Active Assistive for form and quality    sidelying toward prone   Stretch to Hip flexor 3x30 seconds each side   Quad set   2x10 Left     Sidelying hip extension   2x10 bilaterally with 10 seconds endrange stretch on last rep   [place] Left Prosthetic leg  Performed at edge of mat    Sitting in reclined position at edge of mat   Hip flexor stretch 3x30 seconds bilateral    Hip Abduction  In sidelying 2x10 bilateral    Bridge attempt  1x5   Posterior Pelvic Tilt Supine  2x5   Prone lying over light  blue small triangle wedge  Holding position 2 minutes, then 4 minutes   Stretch to Hamstrings  3 x 1 minute Right lower extremity    Heelslide with forceful push towards extension  2x10 bilateral with 10 second enrange hold stretch on last rep    10 # over distal Left thigh  Stretch to Left hip flexor for over 5 minutes while exercising Right lower extremity    Prone Hamstring curl endrange for quad/hip flexor stretch  2x10 AAROM   lower extremity lifts towards extension in prone  2x10 AAROM   Prone hip flexor stretch   3x10 second holds bilateral    Standing frame raising to patient's tolerance x        x    x                  x  x  x    X   - Standing 2x's in standing frame holding each stand for 10-15  minutes working on the following:  - Horizontal Abduction with bilateral upper extremities 2x10 AROM   - terminal knee extension in stand position 2x10 bilateral   - Rows holding green theraband bilateral  2x10  - Pushing through upper extremities to achieve trunk extension 2x25  - scap retraction 2x10  - attempted 1x10 shoulder rolls backward - patient had difficulty coordinating  - Overhead press with green tband bilateral 1x10  - Patient given feedback on standing frame position - working on increasing seat angle after 30 second rest between lifts (repeated 3-4 times)  - Lateral reaches 1x10 bilateral   - [place] 1x20 hits working on upright trunk posture  - Pulling with bilateral upper extremities on tray at front to pull hips forward from seat and extend knees 2x25  - Reaching Left and Right with cones to target to stretch lower extremities with each reach 2x12 cones to each side   NuStep   5 minutes at Level 1, working on alternating movement of lower extremities and stretch to bilateral knees   Standing Frame  Standing 1 x in standing frame working on holding endrange position of approximately 55 degree seat angle for 5 minutes   Standing in parallel bars working sit to stands           - standing  2x's  - Standing weights shifts Left to Right 1x10  - Standing knee extension 1x10  - Standing Left upper extremity lifts off bar 1x5  - Standing bilateral upper extremity lifts off bar 1x10 with PT helping to hold hips for support with minimum to moderate assist     * In standing frame:  Sitting position = 0 degree seat angle  Full upright stand position with hips and knees at 0 degrees = 90 degree seat angle  3/15/22 - 1st  standing frame was at 42 degree seat angle, last stand position was at 65 degree seat angle  3/17/22 - 1st  standing frame was at 48 degree seat angle, last stand position was at 70 degree seat angle  3/21/22 - 1st  standing frame was at 52 degree seat angle, last stand position was at 70 degree seat angle  3/29/22 - 1st  standing frame was at 60 degree seat angle, last stand position was at 72 degree seat angle  3/31/22 - 1st  standing frame was at 55 degree seat angle, last stand position was at 75 degree seat angle  4/7/22 - 1st  standing frame was at 60 degree seat angle, last stand position was at 80 degree seat angle  4/11/22 - 1st  standing frame was at 60 degree seat angle, last stand position was at 78 degree seat angle  4/14/22 - 1st  standing frame was at 55 degree seat angle, last stand position was at 86 degree seat angle  4/19/22 - 1st  standing frame was at 60 degree seat angle, last stand position was at 78 degree seat angle  4/22/22 - 1st  standing frame was at 65 degree seat angle, last stand position was at 81 degree seat angle  4/26/22 - 1st  standing frame was at 65 degree seat angle, last stand position was at 82 degree seat angle  5/3/22 - 1st  standing frame was at 65 degree seat angle, last stand position was at 88 degree seat angle  5/5/22 - 1st  standing frame was at 65 degree seat angle, last stand position was at 84 degree seat angle  5/10/22 - 1st   standing frame was at 60 degree seat angle, last stand position was at 80 degree seat angle  5/12/22 - 1st  standing frame was at 65 degree seat angle, last stand position was at 80 degree seat angle  5/17/22 - 1st  standing frame was at 62 degree seat angle, last stand position was at 82 degree seat angle  5/19/22 - 1st  standing frame was at 60 degree seat angle, last stand position was at 80 degree seat angle  5/24/22 - 1st  standing frame was at 62 degree seat angle, last stand position was at 82 degree seat angle  5/26/22 - 1st  standing frame was at 60 degree seat angle, last stand position was at 80 degree seat angle   5/31/22 - 1st  standing frame was at 65 degree seat angle, last stand position was at 80 degree seat angle  6/2/22 - 1st  standing frame was at 70 degree seat angle, last stand position was at 82 degree seat angle  6/7/22 - 1st  standing frame was at 68 degree seat angle, last stand position was at 80 degree seat angle  6/10/22 - 1st  standing frame was at 60 degree seat angle, last stand position was at 80 degree seat angle  6/14/22 - 1st  standing frame was at 65 degree seat angle, last stand position was at 82 degree seat angle   Plan for Next Visit:      manual therapy techniques: Joint mobilizations, Manual traction and Soft tissue Mobilization were applied to the:(0) minutes, including:    Intervention Performed Today    Patella glides superiorly  2 minutes bilateral    Anterior tibia glide Grade 1-2  1 minute bilateral                                      Neuromuscular Re-Education activities to improve: Balance, Coordination, Sense, Proprioception and Posture for 0 minutes. The following activities were included:    Intervention Performed Today                                                THERAPEUTIC ACTIVITIES to improve dynamic and functional  performance for (0) minutes including:    Intervention  Performed Today    Bed - wheelchair transfer   Practiced set up and sequence   Bed mobility sit to supine to sit  Practiced sequence    Rolling   3 x each side working on sequence to incorporate hip flexor stretch on each side. Then separate stretch performed as listed above   Standing in parallel bars  4 x's working on several weight shifts towards hip extension and knee extension while standing.   Standing in parallel bars performing step forward  With Right lower extremity 5x's with moderate assistance to maintain standing position   Dynasplint Consult via FaceTime with Andriy to discuss fit     Sit to  parallel bars without assistance  2x's   Dynasplint Consult  Andriy Stout (Rep for Dynasplint) met with physical therapist and patient today during session to discuss options for dynasplints for bilateral knees due to patient's significant lack of range of motion.     Dynasplint Fitting  Andriy Stout (Rep for Dynasplint) met with physical therapist and patient today to deliver and fit patient for bilateral knee dynasplints.  We discussed wearing time of 1-2 hours/day for the first week and then we would re-assess.  Patient was shown how to don each brace and straps were marked for future use.  The Dynasplint force was increased to 6 on the Left and 7 on the Right to start.  Extra padding was added to thin straps for skin protection.   Dynasplint Consult  Andriy Stout (Rep for Dynasplint) met with physical therapist and patient today during session to discuss proper fit and positioning of Dynasplint to feel a better stretch at home. Pt only brought Left splint to session today and his splint was donned, straps were tightened and new black line added for patient to follow at home.  Left splint was increased from 7/13 to 9/13 tension.  He reports that he places a pillow under his knee at home and he was educated on better positioning of pillow under distal stump with prosthesis removed.  He also felt more of a  stretch in long sitting vs supine and was asked to try this position at home for part of the stretch if able.    Standing at sink in Neuro Room working on stand posture and positioning   Simulating activity that he has been given to do for homework. 1x5 seconds, 1x10 seconds   Patient educated addition to Home Exercise Program   - Simulated sit to stand setup  From wheelchair to sink at home performing with walker in front to work on letting go of sink and holding on to walker.  Patient was educated on how to sit safely to prevent wheelchair from tipping backwards if he loses balance.  He was told to start with one hand on walker and one hand on counter top until he can progress to both hands on walker for support. This will be a way that he can safely challenge himself at home. Patient practiced standing to walker 3x's working on sequence and safety of task.     Plan for Next Visit:      Gait Training: for (15) minutes:  6/10/22- Ambulated with walker for 180 ft  with contact guard assist to stand by assist.    - Practice stepping to perform stand pivot transfer from wheelchair to mat then back to wheelchair with walker contact guard to stand by assist 2x's     Limitation/Restriction for FOTO Amputation Survey     Therapist reviewed FOTO scores for Hunter Schofield on 3/17/2022.   FOTO documents entered into [employer] - see Media section.     Limitation Score: 52%  4/14/22 - Limitation Score: 45%  6/7/22 - Limitation Score: 60%             Patient Education and Home Exercises     Home Exercises Provided and Patient Education Provided     Education provided:   -2/22/22 Patient educated on increasing length and consistency of stretching knees and hips to every 2 hours at home.  He was shown and demonstrated understanding of short arc quads, rolling toward prone and sitting in reclined position to stretch hip flexors.  -2/24/22 Patient given Home Exercise Program on exercises for lower extremities to be performed 2x15  3x's/day  See patient Instructions in EMR  -3/1/22 Re-inforced Home Exercise Program and asked patient to incorporate prone lying for longer periods of time to build up to 20 minutes per day.  3/3/22 - Discussed the option of dynasplints for bilateral lower extremities. Patient educated on the option of Dynasplints vs basic knee braces and encouraged to consider Dynasplints as a more aggressive and efficient way of properly positioning and dynamically stretching his legs outside of therapy.  3/8/22 - Patient was educated on Dynasplints with Andriy Stout (Rep).   He was shown pictures on ipad and Andriy and physical therapist discussed wearing schedule to make gains with his muscle length outside of therapy sessions.  Andriy informed patient that he would assist with collecting information regarding insurance coverage and provide him with any out of pocket costs so he can decide if he would like to pursue this option.  3/22/22 - Patient was educated that Andriy Stout (Rep for Dynasplint) messaged me that his splints have been approved and are being mailed this week.   3/24/22 - Patient was educated on Dynasplint management and importance of daily skin checks.  His tolerance of wearing splints for the first week will determine his wearing schedule for the following weeks.  3/29/22 - Patient was educated on only wearing Dynasplints while lying down to get the most effective stretch to lower extremities.  3/31/22 - Patient was asked to reach out to Dynasplint rep (Andriy) to set up a time for Andriy to re-assess the positioning and fit of splints to see if he can feel more of a stretch with wearing schedule.  4/7/22 - Patient was asked to reach out to Dynasplint rep to make sure that splints are adjusted appropriately.  4/11/22 - PT recommended that patient follow up with Dynasplint rep for reassessment of splint fit.  4/14/22 - Patient informed that PT would reach out to Dynasplint rep again to have him schedule a visit.  He was  encouraged to continue being active at home.  4/18/22 -  Patient asked to contact Dynasplint rep to set up a time for a consult that would work for his schedule.  4/28/22 - Patient educated on wearing Dynaspints for 2 hours/day for the next 4 days and then we will assess his ability to increase to 4 hours/day after next visit.  He verbalized good understanding of education from Andriy (Alexa) and was told to try Right splint on in new wear position first before increasing tension to 9/13.    5/3/22 - Patient educated on attempting standing at home at sink holding stand position for 10-15 seconds repeating 3x's and doing this in the morning and afternoon every day.   5/12/22 - Patient educated on the importance of walking with walker each session moving forward to try to get range applied to a functional task.  5/26/22 - Patient educated on adding walker to sit to stands at sink as a way of increasing his stand balance safely at home alone.  6/7/22 - Patient educated on his gains at this point and that PT is requesting more visits for a renewed Plan of Care.   6/10/22 - Patient educated on progressing to standing marches with walker at home and standing lateral foot taps with walker at home in same set up as before with kitchen counter in front and wheelchair behind for safety.    Home Exercise Provided: 2/24/22 Exercises were reviewed and Hunter Schofield was able to demonstrate them prior to the end of the session.  Hunter Schofield demonstrated good  understanding of the education provided.   ASSESSMENT     Pt participated well with session today with good effort and participation. He demonstrated improved distance with gait and was also able to perform stand pivot transfers with less assistance.  He has good potential to continue to progress with PT treatment.  Pt prognosis is Fair  To Good    Pt will continue to benefit from skilled outpatient physical therapy to address the deficits listed in the  problem list box on initial evaluation, provide pt/family education and to maximize pt's level of independence in the home and community environment.     Pt's spiritual, cultural and educational needs considered and pt agreeable to plan of care and goals.     Anticipated Barriers for therapy: co-morbidities, transportation assistance needed at times, lifestyle, potential contractures of knees/hips    GOALS:     Short Term Goals:  4 weeks Progress    1. Function: Patient will demonstrate improved function as indicated by a functional limitation score of less than or equal to 55 out of 100 on FOTO = 44% limitation PC   2. Mobility: Patient will improve sit to stand transfer with moderate assistance in order to progress towards independence with functional activities. (Met)  (Modified) - Patient will perform wheelchair to mat transfer with stand by assist to demonstrate improved independence with mobility. PC   3. Gait: Patient will ambulated 3 steps in parallel bars with Maximum assistance to demonstrate improved strength, posture and endurance met   5. HEP: Patient will demonstrate independence with HEP in order to progress toward functional independence. met    6. Assess patient's needs for Dynasplints and set up consultation if needed. met       Long Term Goals:  8 weeks Progress   1. Function: Patient will demonstrate improved function as indicated by a functional limitation score of less than or equal to 60 out of 100 on FOTO = 40 % limitation PC   2. Mobility: Patient will improve AROM of bilateral knees to -10 degrees  in order to return to maximal functional potential and improve quality of life. PC   3. Functional Mobility - Patient will perform mat to wheelchair transfer with modified independence with walker in order to improve his independence with functional mobility. PC   4. Gait: Patient will ambulate 15 feet with rolling walker with moderate assistance to demonstrate improved strength, endurance and  mobility. (Met)   (Modified) - Patient will ambulate 100 ft with walker incorporating turns with modified independence to negotiate home environment with independence. PC   5. HEP: Patient educated on HEP in preparation for d/c verbalizing and demonstrating good understanding of topics discussed.    PC   6.          Goals Key:  PC= progressing/continue;        PM= partially met;             DC= discontinue         PLAN     Continue Plan of Care (POC) and progress per patient tolerance. See Treatment section for exercise progression.  Outpatient Physical Therapy 2 times weekly for 8 weeks to include the following interventions: Electrical Stimulation Attended, Gait Training, Moist Heat/ Ice, Neuromuscular Re-ed, Orthotic Management and Training, Patient Education, Self Care, Therapeutic Activities, Therapeutic Exercise and Prosthetic Management.      Mindy Patino, PT, DPT

## 2022-06-16 ENCOUNTER — CLINICAL SUPPORT (OUTPATIENT)
Dept: REHABILITATION | Facility: HOSPITAL | Age: 82
End: 2022-06-16
Attending: INTERNAL MEDICINE
Payer: MEDICARE

## 2022-06-16 DIAGNOSIS — Z74.09 DECREASED STRENGTH, ENDURANCE, AND MOBILITY: ICD-10-CM

## 2022-06-16 DIAGNOSIS — M25.662 DECREASED RANGE OF MOTION OF BOTH LOWER EXTREMITIES: Primary | ICD-10-CM

## 2022-06-16 DIAGNOSIS — M25.661 DECREASED RANGE OF MOTION OF BOTH LOWER EXTREMITIES: Primary | ICD-10-CM

## 2022-06-16 DIAGNOSIS — R53.1 DECREASED STRENGTH, ENDURANCE, AND MOBILITY: ICD-10-CM

## 2022-06-16 DIAGNOSIS — R68.89 DECREASED STRENGTH, ENDURANCE, AND MOBILITY: ICD-10-CM

## 2022-06-16 PROCEDURE — 97110 THERAPEUTIC EXERCISES: CPT

## 2022-06-16 PROCEDURE — 97116 GAIT TRAINING THERAPY: CPT

## 2022-06-16 NOTE — PROGRESS NOTES

## 2022-06-21 ENCOUNTER — CLINICAL SUPPORT (OUTPATIENT)
Dept: REHABILITATION | Facility: HOSPITAL | Age: 82
End: 2022-06-21
Attending: INTERNAL MEDICINE
Payer: MEDICARE

## 2022-06-21 DIAGNOSIS — R53.1 DECREASED STRENGTH, ENDURANCE, AND MOBILITY: ICD-10-CM

## 2022-06-21 DIAGNOSIS — M25.662 DECREASED RANGE OF MOTION OF BOTH LOWER EXTREMITIES: Primary | ICD-10-CM

## 2022-06-21 DIAGNOSIS — M25.661 DECREASED RANGE OF MOTION OF BOTH LOWER EXTREMITIES: Primary | ICD-10-CM

## 2022-06-21 DIAGNOSIS — R68.89 DECREASED STRENGTH, ENDURANCE, AND MOBILITY: ICD-10-CM

## 2022-06-21 DIAGNOSIS — Z74.09 DECREASED STRENGTH, ENDURANCE, AND MOBILITY: ICD-10-CM

## 2022-06-21 PROCEDURE — 97110 THERAPEUTIC EXERCISES: CPT

## 2022-06-21 PROCEDURE — 97116 GAIT TRAINING THERAPY: CPT

## 2022-06-21 NOTE — PROGRESS NOTES

## 2022-06-23 ENCOUNTER — CLINICAL SUPPORT (OUTPATIENT)
Dept: REHABILITATION | Facility: HOSPITAL | Age: 82
End: 2022-06-23
Attending: INTERNAL MEDICINE
Payer: MEDICARE

## 2022-06-23 DIAGNOSIS — M25.662 DECREASED RANGE OF MOTION OF BOTH LOWER EXTREMITIES: Primary | ICD-10-CM

## 2022-06-23 DIAGNOSIS — Z74.09 DECREASED STRENGTH, ENDURANCE, AND MOBILITY: ICD-10-CM

## 2022-06-23 DIAGNOSIS — R53.1 DECREASED STRENGTH, ENDURANCE, AND MOBILITY: ICD-10-CM

## 2022-06-23 DIAGNOSIS — M25.661 DECREASED RANGE OF MOTION OF BOTH LOWER EXTREMITIES: Primary | ICD-10-CM

## 2022-06-23 DIAGNOSIS — R68.89 DECREASED STRENGTH, ENDURANCE, AND MOBILITY: ICD-10-CM

## 2022-06-23 PROCEDURE — 97110 THERAPEUTIC EXERCISES: CPT

## 2022-06-23 PROCEDURE — 97116 GAIT TRAINING THERAPY: CPT

## 2022-06-23 NOTE — PROGRESS NOTES

## 2022-06-30 ENCOUNTER — CLINICAL SUPPORT (OUTPATIENT)
Dept: REHABILITATION | Facility: HOSPITAL | Age: 82
End: 2022-06-30
Attending: INTERNAL MEDICINE
Payer: MEDICARE

## 2022-06-30 DIAGNOSIS — M25.661 DECREASED RANGE OF MOTION OF BOTH LOWER EXTREMITIES: Primary | ICD-10-CM

## 2022-06-30 DIAGNOSIS — R53.1 DECREASED STRENGTH, ENDURANCE, AND MOBILITY: ICD-10-CM

## 2022-06-30 DIAGNOSIS — Z74.09 DECREASED STRENGTH, ENDURANCE, AND MOBILITY: ICD-10-CM

## 2022-06-30 DIAGNOSIS — R68.89 DECREASED STRENGTH, ENDURANCE, AND MOBILITY: ICD-10-CM

## 2022-06-30 DIAGNOSIS — M25.662 DECREASED RANGE OF MOTION OF BOTH LOWER EXTREMITIES: Primary | ICD-10-CM

## 2022-06-30 PROCEDURE — 97116 GAIT TRAINING THERAPY: CPT | Mod: KX

## 2022-06-30 PROCEDURE — 97110 THERAPEUTIC EXERCISES: CPT | Mod: KX

## 2022-06-30 NOTE — PROGRESS NOTES

## 2022-07-06 ENCOUNTER — CLINICAL SUPPORT (OUTPATIENT)
Dept: REHABILITATION | Facility: HOSPITAL | Age: 82
End: 2022-07-06
Payer: MEDICARE

## 2022-07-06 DIAGNOSIS — R68.89 DECREASED STRENGTH, ENDURANCE, AND MOBILITY: ICD-10-CM

## 2022-07-06 DIAGNOSIS — M25.661 DECREASED RANGE OF MOTION OF BOTH LOWER EXTREMITIES: Primary | ICD-10-CM

## 2022-07-06 DIAGNOSIS — Z74.09 DECREASED STRENGTH, ENDURANCE, AND MOBILITY: ICD-10-CM

## 2022-07-06 DIAGNOSIS — M25.662 DECREASED RANGE OF MOTION OF BOTH LOWER EXTREMITIES: Primary | ICD-10-CM

## 2022-07-06 DIAGNOSIS — R53.1 DECREASED STRENGTH, ENDURANCE, AND MOBILITY: ICD-10-CM

## 2022-07-06 PROCEDURE — 97530 THERAPEUTIC ACTIVITIES: CPT

## 2022-07-06 PROCEDURE — 97116 GAIT TRAINING THERAPY: CPT

## 2022-07-06 PROCEDURE — 97110 THERAPEUTIC EXERCISES: CPT

## 2022-07-06 NOTE — PROGRESS NOTES
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    Response to previous treatment: good with no adverse effects.  Functional change:  He reports that he is feeling stronger with more confidence with standing and is able to stand for longer bouts at home.  He recently has been able to stand without using his hands on his countertop to pull into standing.  Pain: 0/10 at rest  Location: not applicable      OBJECTIVE     Objective Measures updated at progress report unless specified.  Re-Assessment for () minutes    Joint Measured 2/22/22 3/7/22 4/14/22 5/12/22   Left Hip Extension -20 -25 -20  -20   Right Hip Extension -25 -25 -25 -20   Left Knee Extension -48 -35 AROM, -30 PROM -15 -32 PROM   Right Knee Extension -45 -37 AROM, -34 PROM -12 - 35 PROM     3/3/22  In prone:   Hip Extension = - 20  Right, unable to get accurate measure on Left    5/17/22  In Standing frame:   Hip Extension = Right -10 , Left - 15  Knee Extension = Right -15 , Left -15    6/7/22   In Standing Frame:   Hip Extension = Right  -10, Left - 15  Knee Extension = Right -13, Left -15    Functional Mobility Assessment:  Bed to Wheelchair transfer with walker with stand by assist to contact guard  assist    STRENGTH:                  L/E MMT Right  2/18/22    Right  3/17/22 Right   4/14/22 Right  5/12/22 Right  6/7/22 Left  2/18/22 Left   3/17/22 Left   4/14/22 Left   5/12/22 Left  6/7/22   Hip Flexion  3+/5 4-/5 4-/5 4/5 4/5 3+/5 4-/5 4-/5 4/5 4/5   Hip Extension  2+/5 2+/5 2+/5 2+/5 3-/5 2+/5 2+/5 2+/5 2+/5 3-/5   Hip Abduction  2+/5 3-/5 3-/5 3-/5 4/5 sitting 2+/5 3-/5 3-/5 3-/5 4/5 sittting   Knee Extension 3-/5 3+ within available Range 4-/5 within available range 4-/5 within available range 4-/5 within available range 3-/5 3+ within available range 3+/5 within available range 3+/5 within available range 4-/5 within available range   Knee Flexion 4-/5 4/5 4+/5 4+/5 4+/5 4-/5 4/5 4/5 4+/5 4+/5   Ankle DF 3+/5 3+/5 within available range 3+/5 within available range (-10 degrees from neurtral) NT 4+/5 not applicable  Not applicable not applicable  not applicable  not applicable    Ankle PF 3+/5 4-/5 4/5 NT 5/5 not applicable  Not applicable not applicable  not applicable  not applicable           Treatment     Hunter Schofield received the treatments listed below:      THERAPEUTIC EXERCISES to develop strength, endurance, ROM, flexibility, posture and core stabilization for (10) minutes including:  Intervention Performed Today    short arc quad in supine  lower extremities over wedge 2x20 Right AROM, Left 2x20 Active Assistive for form and quality    sidelying toward prone   Stretch to Hip flexor 3x30 seconds each side   Quad set   2x10 Left     Sidelying hip extension   2x10 bilaterally with 10 seconds endrange stretch on last rep   [place] Left Prosthetic leg  Performed at edge of mat    Sitting in reclined position at edge of mat   Hip flexor stretch 3x30 seconds bilateral    Hip Abduction  In sidelying 2x10 bilateral    Bridge attempt  1x5   Posterior Pelvic Tilt Supine  2x5   Prone lying over light blue small triangle wedge  Holding position 2 minutes, then 4 minutes   Stretch to  Hamstrings  3 x 1 minute Right lower extremity    Heelslide with forceful push towards extension  2x10 bilateral with 10 second enrange hold stretch on last rep    10 # over distal Left thigh  Stretch to Left hip flexor for over 5 minutes while exercising Right lower extremity    Prone Hamstring curl endrange for quad/hip flexor stretch  2x10 AAROM   lower extremity lifts towards extension in prone  2x10 AAROM   Prone hip flexor stretch   3x10 second holds bilateral    Standing frame raising to patient's tolerance    - Standing 2x's in standing frame holding each stand for 10-15  minutes working on the following:  - Horizontal Abduction with bilateral upper extremities 2x10 AROM   - terminal knee extension in stand position 2x20 bilateral   - Rows holding red theraband bilateral  2x10  - Pushing through upper extremities to achieve trunk extension 2x25  - cross body punch with 2# dumbbell 1x10 bilateral   - scap retraction 2x10  - attempted 1x10 shoulder rolls backward - patient had difficulty coordinating  - Overhead press with green tband bilateral 1x10  - Patient given feedback on standing frame position - working on increasing seat angle after 30 second rest between lifts (repeated 3-4 times)  - Lateral reaches 1x8 bilateral   - [place] Volley 1x20 hits working on upright trunk posture  - Pulling with bilateral upper extremities on tray at front to pull hips forward from seat and extend knees 2x25  - Reaching Left and Right with cones to target to stretch lower extremities with each reach 2x10 cones to each side incorporating reach posteriorly    Shuttle X  X - 1x20 bilateral leg press with 5 band resistance   - 2x10 single leg press with 4-3 band resistance bilateral    NuStep   5 minutes at Level 2, working on alternating movement of lower extremities and stretch to bilateral knees   Standing Frame  Standing 1 x in standing frame working on holding endrange position of approximately 55 degree seat angle for  5 minutes   Standing in parallel bars working sit to stands           - standing 2x's  - Standing weights shifts Left to Right 1x10  - Standing knee extension 1x10  - Standing Left upper extremity lifts off bar 1x5  - Standing bilateral upper extremity lifts off bar 1x10 with PT helping to hold hips for support with minimum to moderate assist     * In standing frame:  Sitting position = 0 degree seat angle  Full upright stand position with hips and knees at 0 degrees = 90 degree seat angle  3/15/22 - 1st  standing frame was at 42 degree seat angle, last stand position was at 65 degree seat angle  3/17/22 - 1st  standing frame was at 48 degree seat angle, last stand position was at 70 degree seat angle  3/21/22 - 1st  standing frame was at 52 degree seat angle, last stand position was at 70 degree seat angle  3/29/22 - 1st  standing frame was at 60 degree seat angle, last stand position was at 72 degree seat angle  3/31/22 - 1st  standing frame was at 55 degree seat angle, last stand position was at 75 degree seat angle  4/7/22 - 1st  standing frame was at 60 degree seat angle, last stand position was at 80 degree seat angle  4/11/22 - 1st  standing frame was at 60 degree seat angle, last stand position was at 78 degree seat angle  4/14/22 - 1st  standing frame was at 55 degree seat angle, last stand position was at 86 degree seat angle  4/19/22 - 1st  standing frame was at 60 degree seat angle, last stand position was at 78 degree seat angle  4/22/22 - 1st  standing frame was at 65 degree seat angle, last stand position was at 81 degree seat angle  4/26/22 - 1st  standing frame was at 65 degree seat angle, last stand position was at 82 degree seat angle  5/3/22 - 1st  standing frame was at 65 degree seat angle, last stand position was at 88 degree seat angle  5/5/22 - 1st  standing frame was at 65 degree seat  angle, last stand position was at 84 degree seat angle  5/10/22 - 1st  standing frame was at 60 degree seat angle, last stand position was at 80 degree seat angle  5/12/22 - 1st  standing frame was at 65 degree seat angle, last stand position was at 80 degree seat angle  5/17/22 - 1st  standing frame was at 62 degree seat angle, last stand position was at 82 degree seat angle  5/19/22 - 1st  standing frame was at 60 degree seat angle, last stand position was at 80 degree seat angle  5/24/22 - 1st  standing frame was at 62 degree seat angle, last stand position was at 82 degree seat angle  5/26/22 - 1st  standing frame was at 60 degree seat angle, last stand position was at 80 degree seat angle   5/31/22 - 1st  standing frame was at 65 degree seat angle, last stand position was at 80 degree seat angle  6/2/22 - 1st  standing frame was at 70 degree seat angle, last stand position was at 82 degree seat angle  6/7/22 - 1st  standing frame was at 68 degree seat angle, last stand position was at 80 degree seat angle  6/10/22 - 1st  standing frame was at 60 degree seat angle, last stand position was at 80 degree seat angle  6/14/22 - 1st  standing frame was at 65 degree seat angle, last stand position was at 82 degree seat angle   6/16/22 - 1st  standing frame was at 70 degree seat angle, last stand position was at 84 degree seat angle  6/21/22 - 1st  standing frame was at 65 degree seat angle, last stand position was at 80 degree seat angle  Plan for Next Visit:      manual therapy techniques: Joint mobilizations, Manual traction and Soft tissue Mobilization were applied to the:(0) minutes, including:    Intervention Performed Today    Patella glides superiorly  2 minutes bilateral    Anterior tibia glide Grade 1-2  1 minute bilateral                                      Neuromuscular Re-Education activities to improve:  Balance, Coordination, Sense, Proprioception and Posture for 0 minutes. The following activities were included:    Intervention Performed Today                                                THERAPEUTIC ACTIVITIES to improve dynamic and functional  performance for (10) minutes including:    Intervention Performed Today    Bed - wheelchair transfer   Practiced set up and sequence   Bed mobility sit to supine to sit  Practiced sequence    Rolling   3 x each side working on sequence to incorporate hip flexor stretch on each side. Then separate stretch performed as listed above   Standing in parallel bars  4 x's working on several weight shifts towards hip extension and knee extension while standing.   Standing in parallel bars performing step forward  With Right lower extremity 5x's with moderate assistance to maintain standing position   Dynasplint Consult via FaceTime with Andriy to discuss fit     Sit to  parallel bars without assistance  2x's   Dynasplint Consult  Andriy Stout (Rep for Dynasplint) met with physical therapist and patient today during session to discuss options for dynasplints for bilateral knees due to patient's significant lack of range of motion.     Dynasplint Fitting  Andriyjohn Stout (Rep for Dynasplint) met with physical therapist and patient today to deliver and fit patient for bilateral knee dynasplints.  We discussed wearing time of 1-2 hours/day for the first week and then we would re-assess.  Patient was shown how to don each brace and straps were marked for future use.  The Dynasplint force was increased to 6 on the Left and 7 on the Right to start.  Extra padding was added to thin straps for skin protection.   Dynasplint Consult  Andriy Stout (Rep for Dynasplint) met with physical therapist and patient today during session to discuss proper fit and positioning of Dynasplint to feel a better stretch at home. Pt only brought Left splint to session today and his splint was donned, straps  were tightened and new black line added for patient to follow at home.  Left splint was increased from 7/13 to 9/13 tension.  He reports that he places a pillow under his knee at home and he was educated on better positioning of pillow under distal stump with prosthesis removed.  He also felt more of a stretch in long sitting vs supine and was asked to try this position at home for part of the stretch if able.    Standing at sink in Neuro Room working on stand posture and positioning   Simulating activity that he has been given to do for homework. 1x5 seconds, 1x10 seconds   Patient educated addition to Home Exercise Program   - Simulated sit to stand setup  From wheelchair to sink at home performing with walker in front to work on letting go of sink and holding on to walker.  Patient was educated on how to sit safely to prevent wheelchair from tipping backwards if he loses balance.  He was told to start with one hand on walker and one hand on counter top until he can progress to both hands on walker for support. This will be a way that he can safely challenge himself at home. Patient practiced standing to walker 3x's working on sequence and safety of task.   Patient expressed concerns about not knowing his current weight, so PT assisted patient with obtaining weight at start of session with wheelchair scale.  x He currently weighs 139 #   Several transfers and sit to stands to adjust walker height as needed X  Contact guard to stand by assist for each     Plan for Next Visit:      Gait Training: for (18) minutes:  6/30/22- Ambulated with walker for 92ft, then 94 ft  with contact guard assist to stand by assist.   He needed increased time with each walk due to slow, cautious gait speed.        Limitation/Restriction for FOTO Amputation Survey     Therapist reviewed FOTO scores for Hunter Schofield on 3/17/2022.   FOTO documents entered into [employer] - see Media section.     Limitation Score: 52%  4/14/22 - Limitation  Score: 45%  6/7/22 - Limitation Score: 60%             Patient Education and Home Exercises     Home Exercises Provided and Patient Education Provided     Education provided:   -2/22/22 Patient educated on increasing length and consistency of stretching knees and hips to every 2 hours at home.  He was shown and demonstrated understanding of short arc quads, rolling toward prone and sitting in reclined position to stretch hip flexors.  -2/24/22 Patient given Home Exercise Program on exercises for lower extremities to be performed 2x15 3x's/day  See patient Instructions in EMR  -3/1/22 Re-inforced Home Exercise Program and asked patient to incorporate prone lying for longer periods of time to build up to 20 minutes per day.  3/3/22 - Discussed the option of dynasplints for bilateral lower extremities. Patient educated on the option of Dynasplints vs basic knee braces and encouraged to consider Dynasplints as a more aggressive and efficient way of properly positioning and dynamically stretching his legs outside of therapy.  3/8/22 - Patient was educated on Dynasplints with Andriy Stout (Rep).   He was shown pictures on ipad and Andriy and physical therapist discussed wearing schedule to make gains with his muscle length outside of therapy sessions.  Andriy informed patient that he would assist with collecting information regarding insurance coverage and provide him with any out of pocket costs so he can decide if he would like to pursue this option.  3/22/22 - Patient was educated that Andriy Stout (Rep for Dynasplint) messaged me that his splints have been approved and are being mailed this week.   3/24/22 - Patient was educated on Dynasplint management and importance of daily skin checks.  His tolerance of wearing splints for the first week will determine his wearing schedule for the following weeks.  3/29/22 - Patient was educated on only wearing Dynasplints while lying down to get the most effective stretch to lower  extremities.  3/31/22 - Patient was asked to reach out to Dynasplint rep (Andriy) to set up a time for Nadriy to re-assess the positioning and fit of splints to see if he can feel more of a stretch with wearing schedule.  4/7/22 - Patient was asked to reach out to Dynasplint rep to make sure that splints are adjusted appropriately.  4/11/22 - PT recommended that patient follow up with Dynasplint rep for reassessment of splint fit.  4/14/22 - Patient informed that PT would reach out to Dynasplint rep again to have him schedule a visit.  He was encouraged to continue being active at home.  4/18/22 -  [facility] to set up a time for a consult that would work for his schedule.  4/28/22 - Patient educated on wearing Dynaspints for 2 hours/day for the next 4 days and then we will assess his ability to increase to 4 hours/day after next visit.  He verbalized good understanding of education from Andriy (Dynasplint) and was told to try Right splint on in new wear position first before increasing tension to 9/13.    5/3/22 - Patient educated on attempting standing at home at sink holding stand position for 10-15 seconds repeating 3x's and doing this in the morning and afternoon every day.   5/12/22 - Patient educated on the importance of walking with walker each session moving forward to try to get range applied to a functional task.  5/26/22 - Patient educated on adding walker to sit to stands at sink as a way of increasing his stand balance safely at home alone.  6/7/22 - Patient educated on his gains at this point and that PT is requesting more visits for a renewed Plan of Care.   6/10/22 - Patient educated on progressing to standing marches with walker at home and standing lateral foot taps with walker at home in same set up as before with kitchen counter in front and wheelchair behind for safety.  6/21/22 - Patient educated on stair mobility sequence.   6/23/22 - Patient educated on the risk of  falls with walking home alone at this time.     Home Exercise Provided: 2/24/22 Exercises were reviewed and Hunter Schofield was able to demonstrate them prior to the end of the session.  Hunter Schofield demonstrated good  understanding of the education provided.   ASSESSMENT     Pt demonstrated improved stand endurance and gait distance as compared to last session.  He is making gains with strength, but is still easily fatigued with exertion. Over the past month, Hunter has demonstrated improved stand tolerance, gait distance, strength and balance.  He is doing more functional mobility at home and would benefit from continued PT treatment.     Pt prognosis is Fair  To Good    Pt will continue to benefit from skilled outpatient physical therapy to address the deficits listed in the problem list box on initial evaluation, provide pt/family education and to maximize pt's level of independence in the home and community environment.     Pt's spiritual, cultural and educational needs considered and pt agreeable to plan of care and goals.     Anticipated Barriers for therapy: co-morbidities, transportation assistance needed at times, lifestyle, potential contractures of knees/hips    GOALS:     Short Term Goals:  4 weeks Progress    1. Function: Patient will demonstrate improved function as indicated by a functional limitation score of less than or equal to 55 out of 100 on FOTO = 44% limitation PC   2. Mobility: Patient will improve sit to stand transfer with moderate assistance in order to progress towards independence with functional activities. (Met)  (Modified) - Patient will perform wheelchair to mat transfer with stand by assist to demonstrate improved independence with mobility. PC   3. Gait: Patient will ambulated 3 steps in parallel bars with Maximum assistance to demonstrate improved strength, posture and endurance met   5. HEP: Patient will demonstrate independence with HEP in order to progress toward  functional independence. met    6. Assess patient's needs for Dynasplints and set up consultation if needed. met       Long Term Goals:  8 weeks Progress   1. Function: Patient will demonstrate improved function as indicated by a functional limitation score of less than or equal to 60 out of 100 on FOTO = 40 % limitation PC   2. Mobility: Patient will improve AROM of bilateral knees to -10 degrees  in order to return to maximal functional potential and improve quality of life. PC   3. Functional Mobility - Patient will perform mat to wheelchair transfer with modified independence with walker in order to improve his independence with functional mobility. PC   4. Gait: Patient will ambulate 15 feet with rolling walker with moderate assistance to demonstrate improved strength, endurance and mobility. (Met)   (Modified) - Patient will ambulate 100 ft with walker incorporating turns with modified independence to negotiate home environment with independence. PC   5. HEP: Patient educated on HEP in preparation for d/c verbalizing and demonstrating good understanding of topics discussed.    PC   6.          Goals Key:  PC= progressing/continue;        PM= partially met;             DC= discontinue         PLAN     Continue Plan of Care (POC) and progress per patient tolerance. See Treatment section for exercise progression.  Outpatient Physical Therapy 2 times weekly for 8 weeks to include the following interventions: Electrical Stimulation Attended, Gait Training, Moist Heat/ Ice, Neuromuscular Re-ed, Orthotic Management and Training, Patient Education, Self Care, Therapeutic Activities, Therapeutic Exercise and Prosthetic Management.      Mindy Patino, PT, DPT

## 2022-07-11 ENCOUNTER — CLINICAL SUPPORT (OUTPATIENT)
Dept: REHABILITATION | Facility: HOSPITAL | Age: 82
End: 2022-07-11
Payer: MEDICARE

## 2022-07-11 DIAGNOSIS — R53.1 DECREASED STRENGTH, ENDURANCE, AND MOBILITY: ICD-10-CM

## 2022-07-11 DIAGNOSIS — M25.669 DECREASED RANGE OF MOTION OF LOWER EXTREMITY, UNSPECIFIED LATERALITY: Primary | ICD-10-CM

## 2022-07-11 DIAGNOSIS — R68.89 DECREASED STRENGTH, ENDURANCE, AND MOBILITY: ICD-10-CM

## 2022-07-11 DIAGNOSIS — Z74.09 DECREASED STRENGTH, ENDURANCE, AND MOBILITY: ICD-10-CM

## 2022-07-11 PROCEDURE — 97110 THERAPEUTIC EXERCISES: CPT | Mod: CQ

## 2022-07-11 PROCEDURE — 97116 GAIT TRAINING THERAPY: CPT | Mod: CQ

## 2022-07-11 NOTE — PROGRESS NOTES
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  Physician: Fallon Dudley*    Visit Date: 7/11/2022     Physician Orders: PT Eval and Treat   Medical Diagnosis from Referral: s/p Left BKA  Evaluation Date: 2/18/2022  Authorization Period Expiration: 12/31/22  Plan of Care Expiration: 8/5/22  Progress Note Due: 7/7/22  Visit # / Visits authorized: 38/40 (+1 eval)  FOTO: 3/3     Precautions: Standard, Diabetes, Fall and wound on R foot, Incontinence    PTA Visit #: 1/5     Time In: 1415  Time Out: 1500  Total Billable Time: 40 minutes    SUBJECTIVE     Pt reports:  That he feels better and has been trying to walk at home.ambualted 4 times yesterday in his home but has not started walking outside of the home.       Response to previous treatment: good with no adverse effects.    Functional change:  He reports that he is feeling stronger with more confidence with standing and is able to stand for longer bouts at home.  He recently has been able to stand without using his hands on his countertop to pull into standing.    Pain: 0/10 at rest  Location: not applicable      OBJECTIVE     Objective Measures updated at progress report unless specified.      Treatment     Hunter Schofield received the treatments listed below:      THERAPEUTIC EXERCISES to develop strength, endurance, ROM, flexibility, posture and core stabilization for (25) minutes including:    Intervention Performed Today    short arc quad in supine  lower extremities over wedge 2x20 Right AROM, Left 2x20 Active Assistive for form and quality    sidelying toward prone  x Stretch to Hip flexor 3x30 seconds each side   Quad set   2x10 Left     Sidelying hip extension  x 2x10 bilaterally with 10 seconds endrange stretch on last rep   [place] Left Prosthetic leg  Performed at edge  of mat    Sitting in reclined position at edge of mat   Hip flexor stretch 3x30 seconds bilateral    Hip Abduction  In sidelying 2x10 bilateral    Bridge  x 3 x 10   Posterior Pelvic Tilt Supine  2x5   Prone lying over light blue small triangle wedge  Holding position 2 minutes, then 4 minutes   Stretch to Hamstrings  3 x 1 minute Right lower extremity    Heelslide with forceful push towards extension  2x10 bilateral with 10 second enrange hold stretch on last rep    10 # over distal Left thigh  Stretch to Left hip flexor for over 5 minutes while exercising Right lower extremity    Prone Hamstring curl endrange for quad/hip flexor stretch  2x10 AAROM   lower extremity lifts towards extension in prone  2x10 AAROM   Prone hip flexor stretch   3x10 second holds bilateral    Standing frame raising to patient's tolerance    - Standing 2x's in standing frame holding each stand for 10-15  minutes working on the following:  - Horizontal Abduction with bilateral upper extremities 2x10 AROM   - terminal knee extension in stand position 2x20 bilateral   - Rows holding red theraband bilateral  2x10  - Pushing through upper extremities to achieve trunk extension 2x25  - cross body punch with 2# dumbbell 1x10 bilateral   - scap retraction 2x10  - attempted 1x10 shoulder rolls backward - patient had difficulty coordinating  - Overhead press with green tband bilateral 1x10  - Patient given feedback on standing frame position - working on increasing seat angle after 30 second rest between lifts (repeated 3-4 times)  - Lateral reaches 1x8 bilateral   - [place]ley 1x20 hits working on upright trunk posture  - Pulling with bilateral upper extremities on tray at front to pull hips forward from seat and extend knees 2x25  - Reaching Left and Right with cones to target to stretch lower extremities with each reach 2x10 cones to each side incorporating reach posteriorly    Shuttle X  X - 1x20 bilateral leg press with 5 band  resistance   - 2x10 single leg press with 4-3 band resistance bilateral    NuStep   5 minutes at Level 2, working on alternating movement of lower extremities and stretch to bilateral knees   Standing Frame  Standing 1 x in standing frame working on holding endrange position of approximately 55 degree seat angle for 5 minutes   Standing in parallel bars working sit to stands           - standing 2x's  - Standing weights shifts Left to Right 1x10  - Standing knee extension 1x10  - Standing Left upper extremity lifts off bar 1x5  - Standing bilateral upper extremity lifts off bar 1x10 with PT helping to hold hips for support with minimum to moderate assist     * In standing frame:  Sitting position = 0 degree seat angle  Full upright stand position with hips and knees at 0 degrees = 90 degree seat angle  3/15/22 - 1st  standing frame was at 42 degree seat angle, last stand position was at 65 degree seat angle  3/17/22 - 1st  standing frame was at 48 degree seat angle, last stand position was at 70 degree seat angle  3/21/22 - 1st  standing frame was at 52 degree seat angle, last stand position was at 70 degree seat angle  3/29/22 - 1st  standing frame was at 60 degree seat angle, last stand position was at 72 degree seat angle  3/31/22 - 1st  standing frame was at 55 degree seat angle, last stand position was at 75 degree seat angle  4/7/22 - 1st  standing frame was at 60 degree seat angle, last stand position was at 80 degree seat angle  4/11/22 - 1st  standing frame was at 60 degree seat angle, last stand position was at 78 degree seat angle  4/14/22 - 1st  standing frame was at 55 degree seat angle, last stand position was at 86 degree seat angle  4/19/22 - 1st  standing frame was at 60 degree seat angle, last stand position was at 78 degree seat angle  4/22/22 - 1st  standing frame was at 65 degree seat angle, last stand position was  at 81 degree seat angle  4/26/22 - 1st  standing frame was at 65 degree seat angle, last stand position was at 82 degree seat angle  5/3/22 - 1st  standing frame was at 65 degree seat angle, last stand position was at 88 degree seat angle  5/5/22 - 1st  standing frame was at 65 degree seat angle, last stand position was at 84 degree seat angle  5/10/22 - 1st  standing frame was at 60 degree seat angle, last stand position was at 80 degree seat angle  5/12/22 - 1st  standing frame was at 65 degree seat angle, last stand position was at 80 degree seat angle  5/17/22 - 1st  standing frame was at 62 degree seat angle, last stand position was at 82 degree seat angle  5/19/22 - 1st  standing frame was at 60 degree seat angle, last stand position was at 80 degree seat angle  5/24/22 - 1st  standing frame was at 62 degree seat angle, last stand position was at 82 degree seat angle  5/26/22 - 1st  standing frame was at 60 degree seat angle, last stand position was at 80 degree seat angle   5/31/22 - 1st  standing frame was at 65 degree seat angle, last stand position was at 80 degree seat angle  6/2/22 - 1st  standing frame was at 70 degree seat angle, last stand position was at 82 degree seat angle  6/7/22 - 1st  standing frame was at 68 degree seat angle, last stand position was at 80 degree seat angle  6/10/22 - 1st  standing frame was at 60 degree seat angle, last stand position was at 80 degree seat angle  6/14/22 - 1st  standing frame was at 65 degree seat angle, last stand position was at 82 degree seat angle   6/16/22 - 1st  standing frame was at 70 degree seat angle, last stand position was at 84 degree seat angle  6/21/22 - 1st  standing frame was at 65 degree seat angle, last stand position was at 80 degree seat angle  Plan for Next Visit:      manual therapy techniques: Joint  mobilizations, Manual traction and Soft tissue Mobilization were applied to the:(0) minutes, including:    Intervention Performed Today    Patella glides superiorly  2 minutes bilateral    Anterior tibia glide Grade 1-2  1 minute bilateral                                      Neuromuscular Re-Education activities to improve: Balance, Coordination, Sense, Proprioception and Posture for 0 minutes. The following activities were included:    Intervention Performed Today                                                THERAPEUTIC ACTIVITIES to improve dynamic and functional  performance for (0) minutes including:    Intervention Performed Today    Bed - wheelchair transfer   Practiced set up and sequence   Bed mobility sit to supine to sit  Practiced sequence    Rolling   3 x each side working on sequence to incorporate hip flexor stretch on each side. Then separate stretch performed as listed above   Standing in parallel bars  4 x's working on several weight shifts towards hip extension and knee extension while standing.   Standing in parallel bars performing step forward  With Right lower extremity 5x's with moderate assistance to maintain standing position   Dynasplint Consult via FaceTime with Andriy to discuss fit     Sit to  parallel bars without assistance  2x's   Dynasplint Consult  Andriy Stout (Rep for Dynasplint) met with physical therapist and patient today during session to discuss options for dynasplints for bilateral knees due to patient's significant lack of range of motion.     Dynasplint Fitting  Andriy Argelia (Rep for Dynasplint) met with physical therapist and patient today to deliver and fit patient for bilateral knee dynasplints.  We discussed wearing time of 1-2 hours/day for the first week and then we would re-assess.  Patient was shown how to don each brace and straps were marked for future use.  The Dynasplint force was increased to 6 on the Left and 7 on the Right to start.  Extra padding was  added to thin straps for skin protection.   Dynasplint Consult  Andriy Stout (Rep for Dynasplint) met with physical therapist and patient today during session to discuss proper fit and positioning of Dynasplint to feel a better stretch at home. Pt only brought Left splint to session today and his splint was donned, straps were tightened and new black line added for patient to follow at home.  Left splint was increased from 7/13 to 9/13 tension.  He reports that he places a pillow under his knee at home and he was educated on better positioning of pillow under distal stump with prosthesis removed.  He also felt more of a stretch in long sitting vs supine and was asked to try this position at home for part of the stretch if able.    Standing at sink in Neuro Room working on stand posture and positioning   Simulating activity that he has been given to do for homework. 1x5 seconds, 1x10 seconds   Patient educated addition to Home Exercise Program   - Simulated sit to stand setup  From wheelchair to sink at home performing with walker in front to work on letting go of sink and holding on to walker.  Patient was educated on how to sit safely to prevent wheelchair from tipping backwards if he loses balance.  He was told to start with one hand on walker and one hand on counter top until he can progress to both hands on walker for support. This will be a way that he can safely challenge himself at home. Patient practiced standing to walker 3x's working on sequence and safety of task.   Patient expressed concerns about not knowing his current weight, so PT assisted patient with obtaining weight at start of session with wheelchair scale.  x He currently weighs 139 #   Several transfers and sit to stands to adjust walker height as needed X  Contact guard to stand by assist for each     Plan for Next Visit:      Gait Training: for (15) minutes:  6/30/22- Ambulated with walker for 92ft, then 94 ft  with contact guard assist to  stand by assist.   He needed increased time with each walk due to slow, cautious gait speed.  7/11/2022: Ambulated with rolling walker for 100 feet, and 80 feet  with contact guard assist.       Patient Education and Home Exercises     Home Exercises Provided and Patient Education Provided     Education provided:   -2/22/22 Patient educated on increasing length and consistency of stretching knees and hips to every 2 hours at home.  He was shown and demonstrated understanding of short arc quads, rolling toward prone and sitting in reclined position to stretch hip flexors.  -2/24/22 Patient given Home Exercise Program on exercises for lower extremities to be performed 2x15 3x's/day  See patient Instructions in EMR  -3/1/22 Re-inforced Home Exercise Program and asked patient to incorporate prone lying for longer periods of time to build up to 20 minutes per day.  3/3/22 - Discussed the option of dynasplints for bilateral lower extremities. Patient educated on the option of Dynasplints vs basic knee braces and encouraged to consider Dynasplints as a more aggressive and efficient way of properly positioning and dynamically stretching his legs outside of therapy.  3/8/22 - Patient was educated on Dynasplints with Andriy Stout (Rep).   He was shown pictures on ipad and Andriy and physical therapist discussed wearing schedule to make gains with his muscle length outside of therapy sessions.  Andriy informed patient that he would assist with collecting information regarding insurance coverage and provide him with any out of pocket costs so he can decide if he would like to pursue this option.  3/22/22 - Patient was educated that Andriy Stout (Rep for Dynasplint) messaged me that his splints have been approved and are being mailed this week.   3/24/22 - Patient was educated on Dynasplint management and importance of daily skin checks.  His tolerance of wearing splints for the first week will determine his wearing schedule for the  following weeks.  3/29/22 - Patient was educated on only wearing Dynasplints while lying down to get the most effective stretch to lower extremities.  3/31/22 - Patient was asked to reach out to Dynasplint rep (Andriy) to set up a time for Andriy to re-assess the positioning and fit of splints to see if he can feel more of a stretch with wearing schedule.  4/7/22 - Patient was asked to reach out to Dynasplint rep to make sure that splints are adjusted appropriately.  4/11/22 - PT recommended that patient follow up with Dynasplint rep for reassessment of splint fit.  4/14/22 - Patient informed that PT would reach out to Dynasplint rep again to have him schedule a visit.  He was encouraged to continue being active at home.  4/18/22 -  Patient asked to contact [facility] rep to set up a time for a consult that would work for his schedule.  4/28/22 - Patient educated on wearing Dynaspints for 2 hours/day for the next 4 days and then we will assess his ability to increase to 4 hours/day after next visit.  He verbalized good understanding of education from Andriy (Dynasplint) and was told to try Right splint on in new wear position first before increasing tension to 9/13.    5/3/22 - Patient educated on attempting standing at home at sink holding stand position for 10-15 seconds repeating 3x's and doing this in the morning and afternoon every day.   5/12/22 - Patient educated on the importance of walking with walker each session moving forward to try to get range applied to a functional task.  5/26/22 - Patient educated on adding walker to sit to stands at sink as a way of increasing his stand balance safely at home alone.  6/7/22 - Patient educated on his gains at this point and that PT is requesting more visits for a renewed Plan of Care.   6/10/22 - Patient educated on progressing to standing marches with walker at home and standing lateral foot taps with walker at home in same set up as before with kitchen counter in front  and wheelchair behind for safety.  6/21/22 - Patient educated on stair mobility sequence.   6/23/22 - Patient educated on the risk of falls with walking home alone at this time.     Home Exercise Provided: 2/24/22 Exercises were reviewed and Hunter Schofield was able to demonstrate them prior to the end of the session.  Hunter Schofield demonstrated good  understanding of the education provided.   ASSESSMENT     Pt demonstrated improved stand endurance and gait distance as compared to last session.  He is making gains with strength, but is still easily fatigued with exertion. His is limited in his hip extension and knee extension preventing an improvement in his gait quality.      Pt prognosis is Fair  To Good    Pt will continue to benefit from skilled outpatient physical therapy to address the deficits listed in the problem list box on initial evaluation, provide pt/family education and to maximize pt's level of independence in the home and community environment.     Pt's spiritual, cultural and educational needs considered and pt agreeable to plan of care and goals.     Anticipated Barriers for therapy: co-morbidities, transportation assistance needed at times, lifestyle, potential contractures of knees/hips    GOALS:     Short Term Goals:  4 weeks Progress    1. Function: Patient will demonstrate improved function as indicated by a functional limitation score of less than or equal to 55 out of 100 on FOTO = 44% limitation PC   2. Mobility: Patient will improve sit to stand transfer with moderate assistance in order to progress towards independence with functional activities. (Met)  (Modified) - Patient will perform wheelchair to mat transfer with stand by assist to demonstrate improved independence with mobility. PC   3. Gait: Patient will ambulated 3 steps in parallel bars with Maximum assistance to demonstrate improved strength, posture and endurance met   5. HEP: Patient will demonstrate independence  with HEP in order to progress toward functional independence. met    6. Assess patient's needs for Dynasplints and set up consultation if needed. met       Long Term Goals:  8 weeks Progress   1. Function: Patient will demonstrate improved function as indicated by a functional limitation score of less than or equal to 60 out of 100 on FOTO = 40 % limitation PC   2. Mobility: Patient will improve AROM of bilateral knees to -10 degrees  in order to return to maximal functional potential and improve quality of life. PC   3. Functional Mobility - Patient will perform mat to wheelchair transfer with modified independence with walker in order to improve his independence with functional mobility. PC   4. Gait: Patient will ambulate 15 feet with rolling walker with moderate assistance to demonstrate improved strength, endurance and mobility. (Met)   (Modified) - Patient will ambulate 100 ft with walker incorporating turns with modified independence to negotiate home environment with independence. PC   5. HEP: Patient educated on HEP in preparation for d/c verbalizing and demonstrating good understanding of topics discussed.    PC   6.          Goals Key:  PC= progressing/continue;        PM= partially met;             DC= discontinue         PLAN     Continue Plan of Care (POC) and progress per patient tolerance. See Treatment section for exercise progression.  Outpatient Physical Therapy 2 times weekly for 8 weeks to include the following interventions: Electrical Stimulation Attended, Gait Training, Moist Heat/ Ice, Neuromuscular Re-ed, Orthotic Management and Training, Patient Education, Self Care, Therapeutic Activities, Therapeutic Exercise and Prosthetic Management.      Sheng Charles, PTA

## 2022-07-14 ENCOUNTER — CLINICAL SUPPORT (OUTPATIENT)
Dept: REHABILITATION | Facility: HOSPITAL | Age: 82
End: 2022-07-14
Payer: MEDICARE

## 2022-07-14 DIAGNOSIS — M25.669 DECREASED RANGE OF MOTION OF LOWER EXTREMITY, UNSPECIFIED LATERALITY: Primary | ICD-10-CM

## 2022-07-14 DIAGNOSIS — R68.89 DECREASED STRENGTH, ENDURANCE, AND MOBILITY: ICD-10-CM

## 2022-07-14 DIAGNOSIS — R53.1 DECREASED STRENGTH, ENDURANCE, AND MOBILITY: ICD-10-CM

## 2022-07-14 DIAGNOSIS — Z74.09 DECREASED STRENGTH, ENDURANCE, AND MOBILITY: ICD-10-CM

## 2022-07-14 PROCEDURE — 97110 THERAPEUTIC EXERCISES: CPT | Mod: CQ

## 2022-07-14 NOTE — PROGRESS NOTES
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  Physician: Fallon Dudley*    Visit Date: 7/14/2022     Physician Orders: PT Eval and Treat   Medical Diagnosis from Referral: s/p Left BKA  Evaluation Date: 2/18/2022  Authorization Period Expiration: 12/31/22  Plan of Care Expiration: 8/5/22  Progress Note Due: 7/7/22  Visit # / Visits authorized: 39/40 (+1 eval)  FOTO: 3/3     Precautions: Standard, Diabetes, Fall and wound on R foot, Incontinence    PTA Visit #: 2/5     Time In: 1220  Time Out: 1310  Total Billable Time: 45 minutes    SUBJECTIVE     Pt reports:  That he feels like he is extremely tired when walking at home and admits to that he is walking a much shorter distance as compared to when he walks during his therapy sessions. Is not walking outside his home at this point.        Response to previous treatment: good with no adverse effects.    Functional change:  He reports that he is feeling stronger with more confidence with standing and is able to stand for longer bouts at home.  He recently has been able to stand without using his hands on his countertop to pull into standing.    Pain: 0/10 at rest  Location: not applicable      OBJECTIVE     Objective Measures updated at progress report unless specified.      Treatment     Hunter Schofield received the treatments listed below:      THERAPEUTIC EXERCISES to develop strength, endurance, ROM, flexibility, posture and core stabilization for (45) minutes including:    Intervention Performed Today    short arc quad in supine  lower extremities over wedge 2x20 Right AROM, Left 2x20 Active Assistive for form and quality    sidelying toward prone   Stretch to Hip flexor 3x30 seconds each side   Quad set  x 3x10 bilateral      Sidelying hip extension   2x10 bilaterally with 10 seconds  endrange stretch on last rep   [place] Left Prosthetic leg  Performed at edge of mat    Sitting in reclined position at edge of mat   Hip flexor stretch 3x30 seconds bilateral    Hip Abduction  In sidelying 2x10 bilateral    Bridge  x 3 x 10 arms folded lower extremities outstretched on wedge    Posterior Pelvic Tilt Supine  2x5   Prone lying over light blue small triangle wedge  Holding position 2 minutes, then 4 minutes   Stretch to Hamstrings  3 x 1 minute Right lower extremity    Heelslide with forceful push towards extension  2x10 bilateral with 10 second enrange hold stretch on last rep    10 # over distal Left thigh  Stretch to Left hip flexor for over 5 minutes while exercising Right lower extremity    Prone Hamstring curl endrange for quad/hip flexor stretch  2x10 AAROM   lower extremity lifts towards extension in prone  2x10 AAROM   Prone hip flexor stretch   3x10 second holds bilateral    Standing frame raising to patient's tolerance    - Standing 2x's in standing frame holding each stand for 10-15  minutes working on the following:  - Horizontal Abduction with bilateral upper extremities 2x10 AROM   - terminal knee extension in stand position 2x20 bilateral   - Rows holding red theraband bilateral  2x10  - Pushing through upper extremities to achieve trunk extension 2x25  - cross body punch with 2# dumbbell 1x10 bilateral   - scap retraction 2x10  - attempted 1x10 shoulder rolls backward - patient had difficulty coordinating  - Overhead press with green tband bilateral 1x10  - Patient given feedback on standing frame position - working on increasing seat angle after 30 second rest between lifts (repeated 3-4 times)  - Lateral reaches 1x8 bilateral   - [place] 1x20 hits working on upright trunk posture  - Pulling with bilateral upper extremities on tray at front to pull hips forward from seat and extend knees 2x25  - Reaching Left and Right with cones to target to stretch lower  extremities with each reach 2x10 cones to each side incorporating reach posteriorly    Shuttle    - 1x20 bilateral leg press with 5 band resistance   - 2x10 single leg press with 4-3 band resistance bilateral    NuStep  x 6 minutes at Level 2, working on alternating movement of lower extremities and stretch to bilateral knees   Standing Frame  Standing 1 x in standing frame working on holding endrange position of approximately 55 degree seat angle for 5 minutes   Standing in parallel bars working sit to stands           - standing 2x's  - Standing weights shifts Left to Right 1x10  - Standing knee extension 1x10  - Standing Left upper extremity lifts off bar 1x5  - Standing bilateral upper extremity lifts off bar 1x10 with PT helping to hold hips for support with minimum to moderate assist   Prone lying  x 10 minutes to promote hip and knee extension   Standing terminal knee extension x X 20 bilateral     Standing tolerance x 2 x each 4 minutes with parallel bars and with rolling walker     * In standing frame:  Sitting position = 0 degree seat angle  Full upright stand position with hips and knees at 0 degrees = 90 degree seat angle  3/15/22 - 1st  standing frame was at 42 degree seat angle, last stand position was at 65 degree seat angle  3/17/22 - 1st  standing frame was at 48 degree seat angle, last stand position was at 70 degree seat angle  3/21/22 - 1st  standing frame was at 52 degree seat angle, last stand position was at 70 degree seat angle  3/29/22 - 1st  standing frame was at 60 degree seat angle, last stand position was at 72 degree seat angle  3/31/22 - 1st  standing frame was at 55 degree seat angle, last stand position was at 75 degree seat angle  4/7/22 - 1st  standing frame was at 60 degree seat angle, last stand position was at 80 degree seat angle  4/11/22 - 1st  standing frame was at 60 degree seat angle, last stand position was at 78  degree seat angle  4/14/22 - 1st  standing frame was at 55 degree seat angle, last stand position was at 86 degree seat angle  4/19/22 - 1st  standing frame was at 60 degree seat angle, last stand position was at 78 degree seat angle  4/22/22 - 1st  standing frame was at 65 degree seat angle, last stand position was at 81 degree seat angle  4/26/22 - 1st  standing frame was at 65 degree seat angle, last stand position was at 82 degree seat angle  5/3/22 - 1st  standing frame was at 65 degree seat angle, last stand position was at 88 degree seat angle  5/5/22 - 1st  standing frame was at 65 degree seat angle, last stand position was at 84 degree seat angle  5/10/22 - 1st  standing frame was at 60 degree seat angle, last stand position was at 80 degree seat angle  5/12/22 - 1st  standing frame was at 65 degree seat angle, last stand position was at 80 degree seat angle  5/17/22 - 1st  standing frame was at 62 degree seat angle, last stand position was at 82 degree seat angle  5/19/22 - 1st  standing frame was at 60 degree seat angle, last stand position was at 80 degree seat angle  5/24/22 - 1st  standing frame was at 62 degree seat angle, last stand position was at 82 degree seat angle  5/26/22 - 1st  standing frame was at 60 degree seat angle, last stand position was at 80 degree seat angle   5/31/22 - 1st  standing frame was at 65 degree seat angle, last stand position was at 80 degree seat angle  6/2/22 - 1st  standing frame was at 70 degree seat angle, last stand position was at 82 degree seat angle  6/7/22 - 1st  standing frame was at 68 degree seat angle, last stand position was at 80 degree seat angle  6/10/22 - 1st  standing frame was at 60 degree seat angle, last stand position was at 80 degree seat angle  6/14/22 - 1st  standing frame was at 65 degree seat angle, last stand  position was at 82 degree seat angle   6/16/22 - 1st  standing frame was at 70 degree seat angle, last stand position was at 84 degree seat angle  6/21/22 - 1st  standing frame was at 65 degree seat angle, last stand position was at 80 degree seat angle  Plan for Next Visit:      manual therapy techniques: Joint mobilizations, Manual traction and Soft tissue Mobilization were applied to the:(0) minutes, including:    Intervention Performed Today    Patella glides superiorly  2 minutes bilateral    Anterior tibia glide Grade 1-2  1 minute bilateral                                        THERAPEUTIC ACTIVITIES to improve dynamic and functional  performance for (0) minutes including:    Intervention Performed Today    Bed - wheelchair transfer   Practiced set up and sequence   Bed mobility sit to supine to sit  Practiced sequence    Rolling   3 x each side working on sequence to incorporate hip flexor stretch on each side. Then separate stretch performed as listed above   Standing in parallel bars  4 x's working on several weight shifts towards hip extension and knee extension while standing.   Standing in parallel bars performing step forward  With Right lower extremity 5x's with moderate assistance to maintain standing position   Dynasplint Consult via [facility] with Andriy to discuss fit     Sit to  parallel bars without assistance  2x's   Dynasplint Consult  Andriy Stout (Rep for Dynasplint) met with physical therapist and patient today during session to discuss options for dynasplints for bilateral knees due to patient's significant lack of range of motion.     Dynasplint Fitting  Andriy Stout (Rep for Dynasplint) met with physical therapist and patient today to deliver and fit patient for bilateral knee dynasplints.  We discussed wearing time of 1-2 hours/day for the first week and then we would re-assess.  Patient was shown how to don each brace and straps were marked for future use.  The  Dynasplint force was increased to 6 on the Left and 7 on the Right to start.  Extra padding was added to thin straps for skin protection.   Dynasplint Consult  Andriy Stout (Rep for Dynasplint) met with physical therapist and patient today during session to discuss proper fit and positioning of Dynasplint to feel a better stretch at home. Pt only brought Left splint to session today and his splint was donned, straps were tightened and new black line added for patient to follow at home.  Left splint was increased from 7/13 to 9/13 tension.  He reports that he places a pillow under his knee at home and he was educated on better positioning of pillow under distal stump with prosthesis removed.  He also felt more of a stretch in long sitting vs supine and was asked to try this position at home for part of the stretch if able.    Standing at sink in Neuro Room working on stand posture and positioning   Simulating activity that he has been given to do for homework. 1x5 seconds, 1x10 seconds   Patient educated addition to Home Exercise Program   - Simulated sit to stand setup  From wheelchair to sink at home performing with walker in front to work on letting go of sink and holding on to walker.  Patient was educated on how to sit safely to prevent wheelchair from tipping backwards if he loses balance.  He was told to start with one hand on walker and one hand on counter top until he can progress to both hands on walker for support. This will be a way that he can safely challenge himself at home. Patient practiced standing to walker 3x's working on sequence and safety of task.   Patient expressed concerns about not knowing his current weight, so PT assisted patient with obtaining weight at start of session with wheelchair scale.   He currently weighs 139 #   Several transfers and sit to stands to adjust walker height as needed  Contact guard to stand by assist for each     Plan for Next Visit:      Gait Training: for (0)  minutes:  6/30/22- Ambulated with walker for 92ft, then 94 ft  with contact guard assist to stand by assist.   He needed increased time with each walk due to slow, cautious gait speed.  7/11/2022: Ambulated with rolling walker for 100 feet, and 80 feet  with contact guard assist.       Patient Education and Home Exercises     Home Exercises Provided and Patient Education Provided     Education provided:   -2/22/22 Patient educated on increasing length and consistency of stretching knees and hips to every 2 hours at home.  He was shown and demonstrated understanding of short arc quads, rolling toward prone and sitting in reclined position to stretch hip flexors.  -2/24/22 Patient given Home Exercise Program on exercises for lower extremities to be performed 2x15 3x's/day  See patient Instructions in EMR  -3/1/22 Re-inforced Home Exercise Program and asked patient to incorporate prone lying for longer periods of time to build up to 20 minutes per day.  3/3/22 - Discussed the option of dynasplints for bilateral lower extremities. Patient educated on the option of Dynasplints vs basic knee braces and encouraged to consider Dynasplints as a more aggressive and efficient way of properly positioning and dynamically stretching his legs outside of therapy.  3/8/22 - Patient was educated on Dynasplints with Andriy Stout (Rep).   He was shown pictures on ipad and Andriy and physical therapist discussed wearing schedule to make gains with his muscle length outside of therapy sessions.  Andriy informed patient that he would assist with collecting information regarding insurance coverage and provide him with any out of pocket costs so he can decide if he would like to pursue this option.  3/22/22 - Patient was educated that Andriy Stout (Rep for Dynasplint) messaged me that his splints have been approved and are being mailed this week.   3/24/22 - Patient was educated on Dynasplint management and importance of daily skin checks.  His  tolerance of wearing splints for the first week will determine his wearing schedule for the following weeks.  3/29/22 - Patient was educated on only wearing Dynasplints while lying down to get the most effective stretch to lower extremities.  3/31/22 - Patient was asked to reach out to Dynasplint rep (Andriy) to set up a time for Andriy to re-assess the positioning and fit of splints to see if he can feel more of a stretch with wearing schedule.  4/7/22 - [facility] are [facility] appropriately.  4/11/22 - PT [facility] that [facility] fit.  4/14/22 - Patient [facility] that [facility] have him schedule a [facility].  He was encouraged to continue being active at home.  4/18/22 -  Patient asked to contact Dynasplint rep to set up a time for a consult that would work for his schedule.  4/28/22 - Patient educated on wearing Dynaspints for 2 hours/day for the next 4 days and then we will assess his ability to increase to 4 hours/day after next visit.  He verbalized good understanding of education from Andriy (Dynasplint) and was told to try Right splint on in new wear position first before increasing tension to 9/13.    5/3/22 - Patient educated on attempting standing at home at sink holding stand position for 10-15 seconds repeating 3x's and doing this in the morning and afternoon every day.   5/12/22 - Patient educated on the importance of walking with walker each session moving forward to try to get range applied to a functional task.  5/26/22 - Patient educated on adding walker to sit to stands at sink as a way of increasing his stand balance safely at home alone.  6/7/22 - Patient educated on his gains at this point and that PT is requesting more visits for a renewed Plan of Care.   6/10/22 - Patient educated on progressing to standing marches with walker at home and standing  lateral foot taps with walker at home in same set up as before with kitchen counter in front and wheelchair behind for safety.  6/21/22 - Patient educated on stair mobility sequence.   6/23/22 - Patient educated on the risk of falls with walking home alone at this time.   7/14/2022 - to incorporate prone lying at home to lengthen hip flexors and hamstrings    Home Exercise Provided: 2/24/22 Exercises were reviewed and Hunter Schofield was able to demonstrate them prior to the end of the session.  Hunter Schofield demonstrated good  understanding of the education provided.   ASSESSMENT     Patient is unable to stand erect due to hip and knee flexion contractures and due to weakness. He responded well to prone positioning on multiple pillows for comfort and was able to tolerate minimal additional pressure at his ankles for added knee extension stretch. After, his standing posture improved by him being able to demonstrate additional hip and knee extension. He indicated he had good understanding of his responsibility to lay in prone at home as part of his home exercise program. Patient was fatigued after this session.        Pt prognosis is Fair  To Good    Pt will continue to benefit from skilled outpatient physical therapy to address the deficits listed in the problem list box on initial evaluation, provide pt/family education and to maximize pt's level of independence in the home and community environment.     Pt's spiritual, cultural and educational needs considered and pt agreeable to plan of care and goals.     Anticipated Barriers for therapy: co-morbidities, transportation assistance needed at times, lifestyle, potential contractures of knees/hips    GOALS:     Short Term Goals:  4 weeks Progress    1. Function: Patient will demonstrate improved function as indicated by a functional limitation score of less than or equal to 55 out of 100 on FOTO = 44% limitation PC   2. Mobility: Patient will improve sit  to stand transfer with moderate assistance in order to progress towards independence with functional activities. (Met)  (Modified) - Patient will perform wheelchair to mat transfer with stand by assist to demonstrate improved independence with mobility. PC   3. Gait: Patient will ambulated 3 steps in parallel bars with Maximum assistance to demonstrate improved strength, posture and endurance met   5. HEP: Patient will demonstrate independence with HEP in order to progress toward functional independence. met    6. Assess patient's needs for Dynasplints and set up consultation if needed. met       Long Term Goals:  8 weeks Progress   1. Function: Patient will demonstrate improved function as indicated by a functional limitation score of less than or equal to 60 out of 100 on FOTO = 40 % limitation PC   2. Mobility: Patient will improve AROM of bilateral knees to -10 degrees  in order to return to maximal functional potential and improve quality of life. PC   3. Functional Mobility - Patient will perform mat to wheelchair transfer with modified independence with walker in order to improve his independence with functional mobility. PC   4. Gait: Patient will ambulate 15 feet with rolling walker with moderate assistance to demonstrate improved strength, endurance and mobility. (Met)   (Modified) - Patient will ambulate 100 ft with walker incorporating turns with modified independence to negotiate home environment with independence. PC   5. HEP: Patient educated on HEP in preparation for d/c verbalizing and demonstrating good understanding of topics discussed.    PC   6.          Goals Key:  PC= progressing/continue;        PM= partially met;             DC= discontinue         PLAN     Continue Plan of Care (POC) and progress per patient tolerance. See Treatment section for exercise progression.  Outpatient Physical Therapy 2 times weekly for 8 weeks to include the following interventions: Electrical Stimulation  Attended, Gait Training, Moist Heat/ Ice, Neuromuscular Re-ed, Orthotic Management and Training, Patient Education, Self Care, Therapeutic Activities, Therapeutic Exercise and Prosthetic Management.      Sheng Charles, PTA

## 2022-07-18 ENCOUNTER — DOCUMENTATION ONLY (OUTPATIENT)
Dept: REHABILITATION | Facility: HOSPITAL | Age: 82
End: 2022-07-18
Payer: MEDICARE

## 2022-07-18 NOTE — PROGRESS NOTES
PT/PTA met face to face to discuss pt's treatment plan and progress towards established goals. Pt will be seen by a physical therapist minimally every 6th visit or every 30 days.        Sheng Charles PTA

## 2022-07-19 ENCOUNTER — CLINICAL SUPPORT (OUTPATIENT)
Dept: REHABILITATION | Facility: HOSPITAL | Age: 82
End: 2022-07-19
Payer: MEDICARE

## 2022-07-19 DIAGNOSIS — R68.89 DECREASED STRENGTH, ENDURANCE, AND MOBILITY: ICD-10-CM

## 2022-07-19 DIAGNOSIS — Z74.09 DECREASED STRENGTH, ENDURANCE, AND MOBILITY: ICD-10-CM

## 2022-07-19 DIAGNOSIS — R53.1 DECREASED STRENGTH, ENDURANCE, AND MOBILITY: ICD-10-CM

## 2022-07-19 DIAGNOSIS — M25.669 DECREASED RANGE OF MOTION OF LOWER EXTREMITY, UNSPECIFIED LATERALITY: Primary | ICD-10-CM

## 2022-07-19 PROCEDURE — 97116 GAIT TRAINING THERAPY: CPT | Mod: KX

## 2022-07-19 PROCEDURE — 97110 THERAPEUTIC EXERCISES: CPT | Mod: KX

## 2022-07-19 PROCEDURE — 97530 THERAPEUTIC ACTIVITIES: CPT | Mod: KX

## 2022-07-19 NOTE — PROGRESS NOTES
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  He has also been trying to get on his stomach at home.   Response to previous treatment: good with no adverse effects.  Functional change:  He reports that he is feeling stronger with more confidence with standing and is able to stand for longer bouts at home.  He recently has been able to stand without using his hands on his countertop to pull into standing.  Pain: 0/10 at rest  Location: not applicable      OBJECTIVE     Objective Measures updated at progress report unless specified.  Re-Assessment for () minutes    Joint Measured 2/22/22 3/7/22 4/14/22 5/12/22   Left Hip Extension -20 -25 -20  -20   Right Hip Extension -25 -25 -25 -20   Left Knee Extension -48 -35 AROM, -30 PROM -15 -32 PROM   Right Knee Extension -45 -37 AROM, -34 PROM -12 - 35 PROM     3/3/22  In prone:   Hip Extension = - 20  Right, unable to get accurate measure on Left    5/17/22  In Standing frame:   Hip Extension = Right -10 , Left - 15  Knee Extension = Right -15 , Left -15    6/7/22   In Standing Frame:   Hip Extension = Right  -10, Left - 15  Knee Extension = Right -13, Left -15    Functional Mobility Assessment:  Bed to Wheelchair transfer with walker  with stand by assist to contact guard assist    STRENGTH:                  L/E MMT Right  2/18/22    Right  3/17/22 Right   4/14/22 Right  5/12/22 Right  6/7/22 Left  2/18/22 Left   3/17/22 Left   4/14/22 Left   5/12/22 Left  6/7/22   Hip Flexion  3+/5 4-/5 4-/5 4/5 4/5 3+/5 4-/5 4-/5 4/5 4/5   Hip Extension  2+/5 2+/5 2+/5 2+/5 3-/5 2+/5 2+/5 2+/5 2+/5 3-/5   Hip Abduction  2+/5 3-/5 3-/5 3-/5 4/5 sitting 2+/5 3-/5 3-/5 3-/5 4/5 sittting   Knee Extension 3-/5 3+ within available Range 4-/5 within available range 4-/5 within available range 4-/5 within available range 3-/5 3+ within available range 3+/5 within available range 3+/5 within available range 4-/5 within available range   Knee Flexion 4-/5 4/5 4+/5 4+/5 4+/5 4-/5 4/5 4/5 4+/5 4+/5   Ankle DF 3+/5 3+/5 within available range 3+/5 within available range (-10 degrees from neurtral) NT 4+/5 not applicable  Not applicable not applicable  not applicable  not applicable    Ankle PF 3+/5 4-/5 4/5 NT 5/5 not applicable  Not applicable not applicable  not applicable  not applicable           Treatment     Hunter received the treatments listed below:      THERAPEUTIC EXERCISES to develop strength, endurance, ROM, flexibility, posture and core stabilization for (20) minutes including:  Intervention Performed Today    short arc quad in supine  lower extremities over wedge 2x20 Right AROM, Left 2x20 Active Assistive for form and quality    sidelying toward prone   Stretch to Hip flexor 3x30 seconds each side   Quad set   2x10 Left     Sidelying hip extension   2x10 bilaterally with 10 seconds endrange stretch on last rep   [place] Left Prosthetic leg  Performed at edge of mat    Sitting in reclined position at edge of mat   Hip flexor stretch 3x30 seconds bilateral    Hip Abduction  In sidelying 2x10 bilateral    Bridge attempt  1x5   Posterior Pelvic Tilt Supine  2x5   Prone lying over light blue small triangle wedge  Holding position 2 minutes, then 4  minutes   Stretch to Hamstrings  3 x 1 minute Right lower extremity    Heelslide with forceful push towards extension  2x10 bilateral with 10 second enrange hold stretch on last rep    10 # over distal Left thigh  Stretch to Left hip flexor for over 5 minutes while exercising Right lower extremity    Prone Hamstring curl endrange for quad/hip flexor stretch  2x10 AAROM   lower extremity lifts towards extension in prone  2x10 AAROM   Prone hip flexor stretch   3x10 second holds bilateral    Standing frame raising to patient's tolerance    - Standing 2x's in standing frame holding each stand for 10-15  minutes working on the following:  - Horizontal Abduction with bilateral upper extremities 2x10 AROM   - terminal knee extension in stand position 2x20 bilateral   - Rows holding red theraband bilateral  2x10  - Pushing through upper extremities to achieve trunk extension 2x25  - cross body punch with 2# dumbbell 1x10 bilateral   - scap retraction 2x10  - attempted 1x10 shoulder rolls backward - patient had difficulty coordinating  - Overhead press with green tband bilateral 1x10  - Patient given feedback on standing frame position - working on increasing seat angle after 30 second rest between lifts (repeated 3-4 times)  - Lateral reaches 1x8 bilateral   - [place] Volley 1x20 hits working on upright trunk posture  - Pulling with bilateral upper extremities on tray at front to pull hips forward from seat and extend knees 2x25  - Reaching Left and Right with cones to target to stretch lower extremities with each reach 2x10 cones to each side incorporating reach posteriorly    Shuttle X  X - 3x10 bilateral leg press with 5 band resistance   - 1x10 single leg press with 4 band resistance Right and 3 band resistance Left     NuStep  x 11 minutes at Level 3, working on alternating movement of lower extremities and stretch to bilateral knees   Standing Frame  Standing 1 x in standing frame working on holding endrange  position of approximately 55 degree seat angle for 5 minutes   Standing in parallel bars working sit to stands           - standing 2x's  - Standing weights shifts Left to Right 1x10  - Standing knee extension 1x10  - Standing Left upper extremity lifts off bar 1x5  - Standing bilateral upper extremity lifts off bar 1x10 with PT helping to hold hips for support with minimum to moderate assist     * In standing frame:  Sitting position = 0 degree seat angle  Full upright stand position with hips and knees at 0 degrees = 90 degree seat angle  3/15/22 - 1st  standing frame was at 42 degree seat angle, last stand position was at 65 degree seat angle  3/17/22 - 1st  standing frame was at 48 degree seat angle, last stand position was at 70 degree seat angle  3/21/22 - 1st  standing frame was at 52 degree seat angle, last stand position was at 70 degree seat angle  3/29/22 - 1st  standing frame was at 60 degree seat angle, last stand position was at 72 degree seat angle  3/31/22 - 1st  standing frame was at 55 degree seat angle, last stand position was at 75 degree seat angle  4/7/22 - 1st  standing frame was at 60 degree seat angle, last stand position was at 80 degree seat angle  4/11/22 - 1st  standing frame was at 60 degree seat angle, last stand position was at 78 degree seat angle  4/14/22 - 1st  standing frame was at 55 degree seat angle, last stand position was at 86 degree seat angle  4/19/22 - 1st  standing frame was at 60 degree seat angle, last stand position was at 78 degree seat angle  4/22/22 - 1st  standing frame was at 65 degree seat angle, last stand position was at 81 degree seat angle  4/26/22 - 1st  standing frame was at 65 degree seat angle, last stand position was at 82 degree seat angle  5/3/22 - 1st  standing frame was at 65 degree seat angle, last stand position was at 88 degree seat angle  5/5/22 - 1st   standing frame was at 65 degree seat angle, last stand position was at 84 degree seat angle  5/10/22 - 1st  standing frame was at 60 degree seat angle, last stand position was at 80 degree seat angle  5/12/22 - 1st  standing frame was at 65 degree seat angle, last stand position was at 80 degree seat angle  5/17/22 - 1st  standing frame was at 62 degree seat angle, last stand position was at 82 degree seat angle  5/19/22 - 1st  standing frame was at 60 degree seat angle, last stand position was at 80 degree seat angle  5/24/22 - 1st  standing frame was at 62 degree seat angle, last stand position was at 82 degree seat angle  5/26/22 - 1st  standing frame was at 60 degree seat angle, last stand position was at 80 degree seat angle   5/31/22 - 1st  standing frame was at 65 degree seat angle, last stand position was at 80 degree seat angle  6/2/22 - 1st  standing frame was at 70 degree seat angle, last stand position was at 82 degree seat angle  6/7/22 - 1st  standing frame was at 68 degree seat angle, last stand position was at 80 degree seat angle  6/10/22 - 1st  standing frame was at 60 degree seat angle, last stand position was at 80 degree seat angle  6/14/22 - 1st  standing frame was at 65 degree seat angle, last stand position was at 82 degree seat angle   6/16/22 - 1st  standing frame was at 70 degree seat angle, last stand position was at 84 degree seat angle  6/21/22 - 1st  standing frame was at 65 degree seat angle, last stand position was at 80 degree seat angle  Plan for Next Visit:      manual therapy techniques: Joint mobilizations, Manual traction and Soft tissue Mobilization were applied to the:(0) minutes, including:    Intervention Performed Today    Patella glides superiorly  2 minutes bilateral    Anterior tibia glide Grade 1-2  1 minute bilateral                                       Neuromuscular Re-Education activities to improve: Balance, Coordination, Sense, Proprioception and Posture for 0 minutes. The following activities were included:    Intervention Performed Today                                                THERAPEUTIC ACTIVITIES to improve dynamic and functional  performance for (8) minutes including:    Intervention Performed Today    Bed - wheelchair transfer   Practiced set up and sequence   Bed mobility sit to supine to sit  Practiced sequence    Rolling   3 x each side working on sequence to incorporate hip flexor stretch on each side. Then separate stretch performed as listed above   Standing in parallel bars  4 x's working on several weight shifts towards hip extension and knee extension while standing.   Standing in parallel bars performing step forward  With Right lower extremity 5x's with moderate assistance to maintain standing position   Dynasplint Consult via FaceTime with Andriy to discuss fit     Sit to  parallel bars without assistance  2x's   Dynasplint Consult  Andriy Stout (Rep for Dynasplint) met with physical therapist and patient today during session to discuss options for dynasplints for bilateral knees due to patient's significant lack of range of motion.     Dynasplint Fitting  Andriyjohn Stout (Rep for Dynasplint) met with physical therapist and patient today to deliver and fit patient for bilateral knee dynasplints.  We discussed wearing time of 1-2 hours/day for the first week and then we would re-assess.  Patient was shown how to don each brace and straps were marked for future use.  The Dynasplint force was increased to 6 on the Left and 7 on the Right to start.  Extra padding was added to thin straps for skin protection.   Dynasplint Consult  Andriy Stout (Rep for Dynasplint) met with physical therapist and patient today during session to discuss proper fit and positioning of Dynasplint to feel a better stretch at home. Pt only brought Left  splint to session today and his splint was donned, straps were tightened and new black line added for patient to follow at home.  Left splint was increased from 7/13 to 9/13 tension.  He reports that he places a pillow under his knee at home and he was educated on better positioning of pillow under distal stump with prosthesis removed.  He also felt more of a stretch in long sitting vs supine and was asked to try this position at home for part of the stretch if able.    Standing at sink in Neuro Room working on stand posture and positioning   Simulating activity that he has been given to do for homework. 1x5 seconds, 1x10 seconds   Patient educated addition to Home Exercise Program   - Simulated sit to stand setup  From wheelchair to sink at home performing with walker in front to work on letting go of sink and holding on to walker.  Patient was educated on how to sit safely to prevent wheelchair from tipping backwards if he loses balance.  He was told to start with one hand on walker and one hand on counter top until he can progress to both hands on walker for support. This will be a way that he can safely challenge himself at home. Patient practiced standing to walker 3x's working on sequence and safety of task.   Patient expressed concerns about not knowing his current weight, so PT assisted patient with obtaining weight at start of session with wheelchair scale.   He currently weighs 139 #   Several transfers and sit to stands to adjust walker height as needed  Contact guard to stand by assist for each   Several sit to stand and wheelchair to machine transfers performed throughout session x With independence.      Plan for Next Visit:      Gait Training: for (10) minutes:  7/19/22- Ambulated with walker for 150ft  with contact guard assist to stand by assist.   He needed increased time with each walk due to slow, cautious gait speed.        Limitation/Restriction for FOTO Amputation Survey     Therapist reviewed  FOTO scores for Hunter Schofield on 3/17/2022.   FOTO documents entered into [employer] - see Media section.     Limitation Score: 52%  4/14/22 - Limitation Score: 45%  6/7/22 - Limitation Score: 60%             Patient Education and Home Exercises     Home Exercises Provided and Patient Education Provided     Education provided:   -2/22/22 Patient educated on increasing length and consistency of stretching knees and hips to every 2 hours at home.  He was shown and demonstrated understanding of short arc quads, rolling toward prone and sitting in reclined position to stretch hip flexors.  -2/24/22 Patient given Home Exercise Program on exercises for lower extremities to be performed 2x15 3x's/day  See patient Instructions in EMR  -3/1/22 Re-inforced Home Exercise Program and asked patient to incorporate prone lying for longer periods of time to build up to 20 minutes per day.  3/3/22 - Discussed the option of dynasplints for bilateral lower extremities. Patient educated on the option of Dynasplints vs basic knee braces and encouraged to consider Dynasplints as a more aggressive and efficient way of properly positioning and dynamically stretching his legs outside of therapy.  3/8/22 - Patient was educated on Dynasplints with Andriy Stout (Rep).   He was shown pictures on ipad and Andriy and physical therapist discussed wearing schedule to make gains with his muscle length outside of therapy sessions.  Andriy informed patient that he would assist with collecting information regarding insurance coverage and provide him with any out of pocket costs so he can decide if he would like to pursue this option.  3/22/22 - Patient was educated that Andriy Stout (Rep for Dynasplint) messaged me that his splints have been approved and are being mailed this week.   3/24/22 - Patient was educated on Dynasplint management and importance of daily skin checks.  His tolerance of wearing splints for the first week will determine his wearing schedule  for the following weeks.  3/29/22 - Patient was educated on only wearing Dynasplints while lying down to get the most effective stretch to lower extremities.  3/31/22 - Patient was asked to reach out to Dynasplint rep (Andriy) to set up a time for Andriy to re-assess the positioning and fit of splints to see if he can feel more of a stretch with wearing schedule.  4/7/22 - Patient was asked to reach out to Dynasplint rep to make sure that splints are adjusted appropriately.  4/11/22 - PT recommended that patient follow up with Dynasplint rep for reassessment of splint fit.  4/14/22 - Patient informed that PT would reach out to Dynasplint rep again to have him schedule a visit.  He was encouraged to continue being active at home.  4/18/22 -  [facility] for a consult that would work for his schedule.  4/28/22 - Patient educated on wearing Dynaspints for 2 hours/day for the next 4 days and then we will assess his ability to increase to 4 hours/day after next visit.  He verbalized good understanding of education from Andriy (Dynasplint) and was told to try Right splint on in new wear position first before increasing tension to 9/13.    5/3/22 - Patient educated on attempting standing at home at sink holding stand position for 10-15 seconds repeating 3x's and doing this in the morning and afternoon every day.   5/12/22 - Patient educated on the importance of walking with walker each session moving forward to try to get range applied to a functional task.  5/26/22 - Patient educated on adding walker to sit to stands at sink as a way of increasing his stand balance safely at home alone.  6/7/22 - Patient educated on his gains at this point and that PT is requesting more visits for a renewed Plan of Care.   6/10/22 - Patient educated on progressing to standing marches with walker at home and standing lateral foot taps with walker at home in same set up as before with kitchen counter  in front and wheelchair behind for safety.  6/21/22 - Patient educated on stair mobility sequence.   6/23/22 - Patient educated on the risk of falls with walking home alone at this time.     Home Exercise Provided: 2/24/22 Exercises were reviewed and Hunter was able to demonstrate them prior to the end of the session.  Hunter demonstrated good  understanding of the education provided.   ASSESSMENT     Pt demonstrated improved endurance as compared to last session.  He demonstrated improved upright stand posture and would benefit from continued PT to work towards independence with functional mobility.     Pt prognosis is Fair  To Good    Pt will continue to benefit from skilled outpatient physical therapy to address the deficits listed in the problem list box on initial evaluation, provide pt/family education and to maximize pt's level of independence in the home and community environment.     Pt's spiritual, cultural and educational needs considered and pt agreeable to plan of care and goals.     Anticipated Barriers for therapy: co-morbidities, transportation assistance needed at times, lifestyle, potential contractures of knees/hips    GOALS:     Short Term Goals:  4 weeks Progress    1. Function: Patient will demonstrate improved function as indicated by a functional limitation score of less than or equal to 55 out of 100 on FOTO = 44% limitation PC   2. Mobility: Patient will improve sit to stand transfer with moderate assistance in order to progress towards independence with functional activities. (Met)  (Modified) - Patient will perform wheelchair to mat transfer with stand by assist to demonstrate improved independence with mobility. PC   3. Gait: Patient will ambulated 3 steps in parallel bars with Maximum assistance to demonstrate improved strength, posture and endurance met   5. HEP: Patient will demonstrate independence with HEP in order to progress toward functional independence. met    6. Assess patient's  needs for Dynasplints and set up consultation if needed. met       Long Term Goals:  8 weeks Progress   1. Function: Patient will demonstrate improved function as indicated by a functional limitation score of less than or equal to 60 out of 100 on FOTO = 40 % limitation PC   2. Mobility: Patient will improve AROM of bilateral knees to -10 degrees  in order to return to maximal functional potential and improve quality of life. PC   3. Functional Mobility - Patient will perform mat to wheelchair transfer with modified independence with walker in order to improve his independence with functional mobility. PC   4. Gait: Patient will ambulate 15 feet with rolling walker with moderate assistance to demonstrate improved strength, endurance and mobility. (Met)   (Modified) - Patient will ambulate 100 ft with walker incorporating turns with modified independence to negotiate home environment with independence. PC   5. HEP: Patient educated on HEP in preparation for d/c verbalizing and demonstrating good understanding of topics discussed.    PC   6.          Goals Key:  PC= progressing/continue;        PM= partially met;             DC= discontinue         PLAN     Continue Plan of Care (POC) and progress per patient tolerance. See Treatment section for exercise progression.  Outpatient Physical Therapy 2 times weekly for 8 weeks to include the following interventions: Electrical Stimulation Attended, Gait Training, Moist Heat/ Ice, Neuromuscular Re-ed, Orthotic Management and Training, Patient Education, Self Care, Therapeutic Activities, Therapeutic Exercise and Prosthetic Management.      Mindy Patino, PT, DPT

## 2022-07-21 ENCOUNTER — CLINICAL SUPPORT (OUTPATIENT)
Dept: REHABILITATION | Facility: HOSPITAL | Age: 82
End: 2022-07-21
Payer: MEDICARE

## 2022-07-21 DIAGNOSIS — R53.1 DECREASED STRENGTH, ENDURANCE, AND MOBILITY: ICD-10-CM

## 2022-07-21 DIAGNOSIS — R68.89 DECREASED STRENGTH, ENDURANCE, AND MOBILITY: ICD-10-CM

## 2022-07-21 DIAGNOSIS — M25.662 DECREASED RANGE OF MOTION OF BOTH LOWER EXTREMITIES: Primary | ICD-10-CM

## 2022-07-21 DIAGNOSIS — M25.661 DECREASED RANGE OF MOTION OF BOTH LOWER EXTREMITIES: Primary | ICD-10-CM

## 2022-07-21 DIAGNOSIS — Z74.09 DECREASED STRENGTH, ENDURANCE, AND MOBILITY: ICD-10-CM

## 2022-07-21 PROCEDURE — 97116 GAIT TRAINING THERAPY: CPT

## 2022-07-21 PROCEDURE — 97110 THERAPEUTIC EXERCISES: CPT

## 2022-07-21 PROCEDURE — 97530 THERAPEUTIC ACTIVITIES: CPT

## 2022-07-21 NOTE — PROGRESS NOTES
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  He has also been trying to get on his stomach at home.   Response to previous treatment: good with no adverse effects.  Functional change:  He reports that he is feeling stronger with more confidence with standing and is able to stand for longer bouts at home.  He recently has been able to stand without using his hands on his countertop to pull into standing.  Pain: 0/10 at rest  Location: not applicable      OBJECTIVE     Objective Measures updated at progress report unless specified.  Re-Assessment for () minutes    Joint Measured 2/22/22 3/7/22 4/14/22 5/12/22   Left Hip Extension -20 -25 -20  -20   Right Hip Extension -25 -25 -25 -20   Left Knee Extension -48 -35 AROM, -30 PROM -15 -32 PROM   Right Knee Extension -45 -37 AROM, -34 PROM -12 - 35 PROM     3/3/22  In prone:   Hip Extension = - 20  Right, unable to get accurate measure on Left    5/17/22  In Standing frame:   Hip Extension = Right -10 , Left - 15  Knee Extension = Right -15 , Left -15    6/7/22   In Standing Frame:   Hip Extension = Right  -10, Left - 15  Knee Extension = Right -13, Left -15    Functional Mobility Assessment:  Bed to Wheelchair transfer with walker  with stand by assist to contact guard assist    STRENGTH:                  L/E MMT Right  2/18/22    Right  3/17/22 Right   4/14/22 Right  5/12/22 Right  6/7/22 Left  2/18/22 Left   3/17/22 Left   4/14/22 Left   5/12/22 Left  6/7/22   Hip Flexion  3+/5 4-/5 4-/5 4/5 4/5 3+/5 4-/5 4-/5 4/5 4/5   Hip Extension  2+/5 2+/5 2+/5 2+/5 3-/5 2+/5 2+/5 2+/5 2+/5 3-/5   Hip Abduction  2+/5 3-/5 3-/5 3-/5 4/5 sitting 2+/5 3-/5 3-/5 3-/5 4/5 sittting   Knee Extension 3-/5 3+ within available Range 4-/5 within available range 4-/5 within available range 4-/5 within available range 3-/5 3+ within available range 3+/5 within available range 3+/5 within available range 4-/5 within available range   Knee Flexion 4-/5 4/5 4+/5 4+/5 4+/5 4-/5 4/5 4/5 4+/5 4+/5   Ankle DF 3+/5 3+/5 within available range 3+/5 within available range (-10 degrees from neurtral) NT 4+/5 not applicable  Not applicable not applicable  not applicable  not applicable    Ankle PF 3+/5 4-/5 4/5 NT 5/5 not applicable  Not applicable not applicable  not applicable  not applicable           Treatment     Hunter received the treatments listed below:      THERAPEUTIC EXERCISES to develop strength, endurance, ROM, flexibility, posture and core stabilization for (20) minutes including:  Intervention Performed Today    short arc quad in supine  lower extremities over wedge 2x20 Right AROM, Left 2x20 Active Assistive for form and quality    sidelying toward prone   Stretch to Hip flexor 3x30 seconds each side   Quad set   2x10 Left     Sidelying hip extension   2x10 bilaterally with 10 seconds endrange stretch on last rep   [employer] Left Prosthetic leg  Performed at edge of mat    Sitting in reclined position at edge of mat   Hip flexor stretch 3x30 seconds bilateral    Hip Abduction  In sidelying 2x10 bilateral    Bridge attempt  1x5   Posterior Pelvic Tilt Supine  2x5   Prone lying over light blue small triangle wedge  Holding position 2 minutes, then 4  minutes   Stretch to Hamstrings  3 x 1 minute Right lower extremity    Heelslide with forceful push towards extension  2x10 bilateral with 10 second enrange hold stretch on last rep    10 # over distal Left thigh  Stretch to Left hip flexor for over 5 minutes while exercising Right lower extremity    Prone Hamstring curl endrange for quad/hip flexor stretch  2x10 AAROM   lower extremity lifts towards extension in prone  2x10 AAROM   Prone hip flexor stretch   3x10 second holds bilateral    Standing frame raising to patient's tolerance    - Standing 2x's in standing frame holding each stand for 10-15  minutes working on the following:  - Horizontal Abduction with bilateral upper extremities 2x10 AROM   - terminal knee extension in stand position 2x20 bilateral   - Rows holding red theraband bilateral  2x10  - Pushing through upper extremities to achieve trunk extension 2x25  - cross body punch with 2# dumbbell 1x10 bilateral   - scap retraction 2x10  - attempted 1x10 shoulder rolls backward - patient had difficulty coordinating  - Overhead press with green tband bilateral 1x10  - Patient given feedback on standing frame position - working on increasing seat angle after 30 second rest between lifts (repeated 3-4 times)  - Lateral reaches 1x8 bilateral   - Beach Ball Volley 1x20 hits working on upright trunk posture  - Pulling with bilateral upper extremities on tray at front to pull hips forward from seat and extend knees 2x25  - Reaching Left and Right with cones to target to stretch lower extremities with each reach 2x10 cones to each side incorporating reach posteriorly    Shuttle X  X - 3x10 bilateral leg press with 5 band resistance   - 1x10 single leg press with 4 band resistance Right and 3 band resistance Left     NuStep  x 9 minutes at Level 3, working on alternating movement of lower extremities and stretch to bilateral knees   Stairs x - Step ups with Left lower extremity with bilateral upper extremity  support 1x5   Standing Frame  Standing 1 x in standing frame working on holding endrange position of approximately 55 degree seat angle for 5 minutes   Standing in parallel bars working sit to stands           - standing 2x's  - Standing weights shifts Left to Right 1x10  - Standing knee extension 1x10  - Standing Left upper extremity lifts off bar 1x5  - Standing bilateral upper extremity lifts off bar 1x10 with PT helping to hold hips for support with minimum to moderate assist     * In standing frame:  Sitting position = 0 degree seat angle  Full upright stand position with hips and knees at 0 degrees = 90 degree seat angle  3/15/22 - 1st  standing frame was at 42 degree seat angle, last stand position was at 65 degree seat angle  3/17/22 - 1st  standing frame was at 48 degree seat angle, last stand position was at 70 degree seat angle  3/21/22 - 1st  standing frame was at 52 degree seat angle, last stand position was at 70 degree seat angle  3/29/22 - 1st  standing frame was at 60 degree seat angle, last stand position was at 72 degree seat angle  3/31/22 - 1st  standing frame was at 55 degree seat angle, last stand position was at 75 degree seat angle  4/7/22 - 1st  standing frame was at 60 degree seat angle, last stand position was at 80 degree seat angle  4/11/22 - 1st  standing frame was at 60 degree seat angle, last stand position was at 78 degree seat angle  4/14/22 - 1st  standing frame was at 55 degree seat angle, last stand position was at 86 degree seat angle  4/19/22 - 1st  standing frame was at 60 degree seat angle, last stand position was at 78 degree seat angle  4/22/22 - 1st  standing frame was at 65 degree seat angle, last stand position was at 81 degree seat angle  4/26/22 - 1st  standing frame was at 65 degree seat angle, last stand position was at 82 degree seat angle  5/3/22 - 1st  standing frame  was at 65 degree seat angle, last stand position was at 88 degree seat angle  5/5/22 - 1st  standing frame was at 65 degree seat angle, last stand position was at 84 degree seat angle  5/10/22 - 1st  standing frame was at 60 degree seat angle, last stand position was at 80 degree seat angle  5/12/22 - 1st  standing frame was at 65 degree seat angle, last stand position was at 80 degree seat angle  5/17/22 - 1st  standing frame was at 62 degree seat angle, last stand position was at 82 degree seat angle  5/19/22 - 1st  standing frame was at 60 degree seat angle, last stand position was at 80 degree seat angle  5/24/22 - 1st  standing frame was at 62 degree seat angle, last stand position was at 82 degree seat angle  5/26/22 - 1st  standing frame was at 60 degree seat angle, last stand position was at 80 degree seat angle   5/31/22 - 1st  standing frame was at 65 degree seat angle, last stand position was at 80 degree seat angle  6/2/22 - 1st  standing frame was at 70 degree seat angle, last stand position was at 82 degree seat angle  6/7/22 - 1st  standing frame was at 68 degree seat angle, last stand position was at 80 degree seat angle  6/10/22 - 1st  standing frame was at 60 degree seat angle, last stand position was at 80 degree seat angle  6/14/22 - 1st  standing frame was at 65 degree seat angle, last stand position was at 82 degree seat angle   6/16/22 - 1st  standing frame was at 70 degree seat angle, last stand position was at 84 degree seat angle  6/21/22 - 1st  standing frame was at 65 degree seat angle, last stand position was at 80 degree seat angle  Plan for Next Visit:      manual therapy techniques: Joint mobilizations, Manual traction and Soft tissue Mobilization were applied to the:(0) minutes, including:    Intervention Performed Today    Patella glides superiorly  2 minutes bilateral     Anterior tibia glide Grade 1-2  1 minute bilateral                                      Neuromuscular Re-Education activities to improve: Balance, Coordination, Sense, Proprioception and Posture for 0 minutes. The following activities were included:    Intervention Performed Today                                                THERAPEUTIC ACTIVITIES to improve dynamic and functional  performance for (8) minutes including:    Intervention Performed Today    Bed - wheelchair transfer   Practiced set up and sequence   Bed mobility sit to supine to sit  Practiced sequence    Rolling   3 x each side working on sequence to incorporate hip flexor stretch on each side. Then separate stretch performed as listed above   Standing in parallel bars  4 x's working on several weight shifts towards hip extension and knee extension while standing.   Standing in parallel bars performing step forward  With Right lower extremity 5x's with moderate assistance to maintain standing position   Dynasplint Consult via FaceTime with Andriy to discuss fit     Sit to  parallel bars without assistance  2x's   Dynasplint Consult  Andriy Stout (Rep for Dynasplint) met with physical therapist and patient today during session to discuss options for dynasplints for bilateral knees due to patient's significant lack of range of motion.     Dynasplint Fitting  Andriyjohn Stout (Rep for Dynasplint) met with physical therapist and patient today to deliver and fit patient for bilateral knee dynasplints.  We discussed wearing time of 1-2 hours/day for the first week and then we would re-assess.  Patient was shown how to don each brace and straps were marked for future use.  The Dynasplint force was increased to 6 on the Left and 7 on the Right to start.  Extra padding was added to thin straps for skin protection.   Dynasplint Consult  Andriy Stout (Rep for Dynasplint) met with physical therapist and patient today during session to discuss proper fit and  positioning of Dynasplint to feel a better stretch at home. Pt only brought Left splint to session today and his splint was donned, straps were tightened and new black line added for patient to follow at home.  Left splint was increased from 7/13 to 9/13 tension.  He reports that he places a pillow under his knee at home and he was educated on better positioning of pillow under distal stump with prosthesis removed.  He also felt more of a stretch in long sitting vs supine and was asked to try this position at home for part of the stretch if able.    Standing at sink in Neuro Room working on stand posture and positioning   Simulating activity that he has been given to do for homework. 1x5 seconds, 1x10 seconds   Patient educated addition to Home Exercise Program   - Simulated sit to stand setup  From wheelchair to sink at home performing with walker in front to work on letting go of sink and holding on to walker.  Patient was educated on how to sit safely to prevent wheelchair from tipping backwards if he loses balance.  He was told to start with one hand on walker and one hand on counter top until he can progress to both hands on walker for support. This will be a way that he can safely challenge himself at home. Patient practiced standing to walker 3x's working on sequence and safety of task.   Patient expressed concerns about not knowing his current weight, so PT assisted patient with obtaining weight at start of session with wheelchair scale.   He currently weighs 139 #   Several transfers and sit to stands to adjust walker height as needed  Contact guard to stand by assist for each   Several sit to stand and wheelchair to machine transfers performed throughout session x With independence.      Plan for Next Visit:      Gait Training: for (12) minutes:  7/19/22- Ambulated with walker for 178ft then 74ft with stand by assist.   He needed demonstrated improved stand posture with gait  today.       Limitation/Restriction for FOTO Amputation Survey     Therapist reviewed FOTO scores for Hunter Schofield on 3/17/2022.   FOTO documents entered into [employer] - see Media section.     Limitation Score: 52%  4/14/22 - Limitation Score: 45%  6/7/22 - Limitation Score: 60%             Patient Education and Home Exercises     Home Exercises Provided and Patient Education Provided     Education provided:   -2/22/22 Patient educated on increasing length and consistency of stretching knees and hips to every 2 hours at home.  He was shown and demonstrated understanding of short arc quads, rolling toward prone and sitting in reclined position to stretch hip flexors.  -2/24/22 Patient given Home Exercise Program on exercises for lower extremities to be performed 2x15 3x's/day  See patient Instructions in EMR  -3/1/22 Re-inforced Home Exercise Program and asked patient to incorporate prone lying for longer periods of time to build up to 20 minutes per day.  3/3/22 - Discussed the option of dynasplints for bilateral lower extremities. Patient educated on the option of Dynasplints vs basic knee braces and encouraged to consider Dynasplints as a more aggressive and efficient way of properly positioning and dynamically stretching his legs outside of therapy.  3/8/22 - Patient was educated on Dynasplints with Andriy Stout (Rep).   He was shown pictures on ipad and Andriy and physical therapist discussed wearing schedule to make gains with his muscle length outside of therapy sessions.  Andriy informed patient that he would assist with collecting information regarding insurance coverage and provide him with any out of pocket costs so he can decide if he would like to pursue this option.  3/22/22 - Patient was educated that Andriy Stout (Rep for Dynasplint) messaged me that his splints have been approved and are being mailed this week.   3/24/22 - Patient was educated on Dynasplint management and importance of daily skin checks.   His tolerance of wearing splints for the first week will determine his wearing schedule for the following weeks.  3/29/22 - Patient was educated on only wearing Dynasplints while lying down to get the most effective stretch to lower extremities.  3/31/22 - Patient was asked to reach out to Dynasplint rep (Andriy) to set up a time for Andriy to re-assess the positioning and fit of splints to see if he can feel more of a stretch with wearing schedule.  4/7/22 - [facility] make sure that splints are adjusted appropriately.  4/11/22 - PT [facility] of [facility]int fit.  4/14/22 - Patient informed that PT would [facility] again to have him schedule a visit.  He was encouraged to continue being active at home.  4/18/22 -  Patient asked to contact Dynasplint rep to set up a time for a consult that would work for his schedule.  4/28/22 - Patient educated on wearing Dynaspints for 2 hours/day for the next 4 days and then we will assess his ability to increase to 4 hours/day after next visit.  He verbalized good understanding of education from Andriy (Dynasplint) and was told to try Right splint on in new wear position first before increasing tension to 9/13.    5/3/22 - Patient educated on attempting standing at home at sink holding stand position for 10-15 seconds repeating 3x's and doing this in the morning and afternoon every day.   5/12/22 - Patient educated on the importance of walking with walker each session moving forward to try to get range applied to a functional task.  5/26/22 - Patient educated on adding walker to sit to stands at sink as a way of increasing his stand balance safely at home alone.  6/7/22 - Patient educated on his gains at this point and that PT is requesting more visits for a renewed Plan of Care.   6/10/22 - Patient educated on progressing to standing marches with walker at home and  standing lateral foot taps with walker at home in same set up as before with kitchen counter in front and wheelchair behind for safety.  6/21/22 - Patient educated on stair mobility sequence.   6/23/22 - Patient educated on the risk of falls with walking home alone at this time.     Home Exercise Provided: 2/24/22 Exercises were reviewed and Hunter was able to demonstrate them prior to the end of the session.  Hunter demonstrated good  understanding of the education provided.   ASSESSMENT     Pt demonstrated improved endurance as compared to last session.  He progressed with gait distance and was able to perform most of walking with improved upright stand posture.     Pt prognosis is Fair  To Good    Pt will continue to benefit from skilled outpatient physical therapy to address the deficits listed in the problem list box on initial evaluation, provide pt/family education and to maximize pt's level of independence in the home and community environment.     Pt's spiritual, cultural and educational needs considered and pt agreeable to plan of care and goals.     Anticipated Barriers for therapy: co-morbidities, transportation assistance needed at times, lifestyle, potential contractures of knees/hips    GOALS:     Short Term Goals:  4 weeks Progress    1. Function: Patient will demonstrate improved function as indicated by a functional limitation score of less than or equal to 55 out of 100 on FOTO = 44% limitation PC   2. Mobility: Patient will improve sit to stand transfer with moderate assistance in order to progress towards independence with functional activities. (Met)  (Modified) - Patient will perform wheelchair to mat transfer with stand by assist to demonstrate improved independence with mobility. PC   3. Gait: Patient will ambulated 3 steps in parallel bars with Maximum assistance to demonstrate improved strength, posture and endurance met   5. HEP: Patient will demonstrate independence with HEP in order to  progress toward functional independence. met    6. Assess patient's needs for Dynasplints and set up consultation if needed. met       Long Term Goals:  8 weeks Progress   1. Function: Patient will demonstrate improved function as indicated by a functional limitation score of less than or equal to 60 out of 100 on FOTO = 40 % limitation PC   2. Mobility: Patient will improve AROM of bilateral knees to -10 degrees  in order to return to maximal functional potential and improve quality of life. PC   3. Functional Mobility - Patient will perform mat to wheelchair transfer with modified independence with walker in order to improve his independence with functional mobility. PC   4. Gait: Patient will ambulate 15 feet with rolling walker with moderate assistance to demonstrate improved strength, endurance and mobility. (Met)   (Modified) - Patient will ambulate 100 ft with walker incorporating turns with modified independence to negotiate home environment with independence. PC   5. HEP: Patient educated on HEP in preparation for d/c verbalizing and demonstrating good understanding of topics discussed.    PC   6.          Goals Key:  PC= progressing/continue;        PM= partially met;             DC= discontinue         PLAN     Continue Plan of Care (POC) and progress per patient tolerance. See Treatment section for exercise progression.  Outpatient Physical Therapy 2 times weekly for 8 weeks to include the following interventions: Electrical Stimulation Attended, Gait Training, Moist Heat/ Ice, Neuromuscular Re-ed, Orthotic Management and Training, Patient Education, Self Care, Therapeutic Activities, Therapeutic Exercise and Prosthetic Management.      Mindy Patino, PT, DPT

## 2022-07-26 ENCOUNTER — CLINICAL SUPPORT (OUTPATIENT)
Dept: REHABILITATION | Facility: HOSPITAL | Age: 82
End: 2022-07-26
Payer: MEDICARE

## 2022-07-26 DIAGNOSIS — M25.661 DECREASED RANGE OF MOTION OF BOTH LOWER EXTREMITIES: Primary | ICD-10-CM

## 2022-07-26 DIAGNOSIS — Z74.09 DECREASED STRENGTH, ENDURANCE, AND MOBILITY: ICD-10-CM

## 2022-07-26 DIAGNOSIS — M25.662 DECREASED RANGE OF MOTION OF BOTH LOWER EXTREMITIES: Primary | ICD-10-CM

## 2022-07-26 DIAGNOSIS — R68.89 DECREASED STRENGTH, ENDURANCE, AND MOBILITY: ICD-10-CM

## 2022-07-26 DIAGNOSIS — R53.1 DECREASED STRENGTH, ENDURANCE, AND MOBILITY: ICD-10-CM

## 2022-07-26 PROCEDURE — 97116 GAIT TRAINING THERAPY: CPT

## 2022-07-26 PROCEDURE — 97110 THERAPEUTIC EXERCISES: CPT

## 2022-07-26 NOTE — PROGRESS NOTES
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  He has also been trying to get on his stomach at home.   Response to previous treatment: good with no adverse effects.  Functional change:  He reports that he is feeling stronger with more confidence with standing and is able to stand for longer bouts at home.  He recently has been able to stand without using his hands on his countertop to pull into standing.  Pain: 0/10 at rest  Location: not applicable      OBJECTIVE     Objective Measures updated at progress report unless specified.  Re-Assessment for () minutes    Joint Measured 2/22/22 3/7/22 4/14/22 5/12/22   Left Hip Extension -20 -25 -20  -20   Right Hip Extension -25 -25 -25 -20   Left Knee Extension -48 -35 AROM, -30 PROM -15 -32 PROM   Right Knee Extension -45 -37 AROM, -34 PROM -12 - 35 PROM     3/3/22  In prone:   Hip Extension = - 20  Right, unable to get accurate measure on Left    5/17/22  In Standing frame:   Hip Extension = Right -10 , Left - 15  Knee Extension = Right -15 , Left -15    6/7/22   In Standing Frame:   Hip Extension = Right  -10, Left - 15  Knee Extension = Right -13, Left -15    Functional Mobility Assessment:  Bed to Wheelchair transfer with walker  with stand by assist to contact guard assist    STRENGTH:                  L/E MMT Right  2/18/22    Right  3/17/22 Right   4/14/22 Right  5/12/22 Right  6/7/22 Left  2/18/22 Left   3/17/22 Left   4/14/22 Left   5/12/22 Left  6/7/22   Hip Flexion  3+/5 4-/5 4-/5 4/5 4/5 3+/5 4-/5 4-/5 4/5 4/5   Hip Extension  2+/5 2+/5 2+/5 2+/5 3-/5 2+/5 2+/5 2+/5 2+/5 3-/5   Hip Abduction  2+/5 3-/5 3-/5 3-/5 4/5 sitting 2+/5 3-/5 3-/5 3-/5 4/5 sittting   Knee Extension 3-/5 3+ within available Range 4-/5 within available range 4-/5 within available range 4-/5 within available range 3-/5 3+ within available range 3+/5 within available range 3+/5 within available range 4-/5 within available range   Knee Flexion 4-/5 4/5 4+/5 4+/5 4+/5 4-/5 4/5 4/5 4+/5 4+/5   Ankle DF 3+/5 3+/5 within available range 3+/5 within available range (-10 degrees from neurtral) NT 4+/5 not applicable  Not applicable not applicable  not applicable  not applicable    Ankle PF 3+/5 4-/5 4/5 NT 5/5 not applicable  Not applicable not applicable  not applicable  not applicable           Treatment     Hunter received the treatments listed below:      THERAPEUTIC EXERCISES to develop strength, endurance, ROM, flexibility, posture and core stabilization for (24) minutes including:  Intervention Performed Today    short arc quad in supine  lower extremities over wedge 2x20 Right AROM, Left 2x20 Active Assistive for form and quality    sidelying toward prone   Stretch to Hip flexor 3x30 seconds each side   Quad set   2x10 Left     Sidelying hip extension   2x10 bilaterally with 10 seconds endrange stretch on last rep   [facility] Left Prosthetic leg  Performed at edge of mat    Sitting in reclined position at edge of mat   Hip flexor stretch 3x30 seconds bilateral    Hip Abduction  In sidelying 2x10 bilateral    Bridge attempt  1x5   Posterior Pelvic Tilt Supine  2x5   Prone lying over light blue small triangle wedge  Holding position 2 minutes, then 4  minutes   Stretch to Hamstrings  3 x 1 minute Right lower extremity    Heelslide with forceful push towards extension  2x10 bilateral with 10 second enrange hold stretch on last rep    10 # over distal Left thigh  Stretch to Left hip flexor for over 5 minutes while exercising Right lower extremity    Prone Hamstring curl endrange for quad/hip flexor stretch  2x10 AAROM   lower extremity lifts towards extension in prone  2x10 AAROM   Prone hip flexor stretch   3x10 second holds bilateral    Standing frame raising to patient's tolerance    - Standing 2x's in standing frame holding each stand for 10-15  minutes working on the following:  - Horizontal Abduction with bilateral upper extremities 2x10 AROM   - terminal knee extension in stand position 2x20 bilateral   - Rows holding red theraband bilateral  2x10  - Pushing through upper extremities to achieve trunk extension 2x25  - cross body punch with 2# dumbbell 1x10 bilateral   - scap retraction 2x10  - attempted 1x10 shoulder rolls backward - patient had difficulty coordinating  - Overhead press with green tband bilateral 1x10  - Patient given feedback on standing frame position - working on increasing seat angle after 30 second rest between lifts (repeated 3-4 times)  - Lateral reaches 1x8 bilateral   - [place] 1x20 hits working on upright trunk posture  - Pulling with bilateral upper extremities on tray at front to pull hips forward from seat and extend knees 2x25  - Reaching Left and Right with cones to target to stretch lower extremities with each reach 2x10 cones to each side incorporating reach posteriorly    Shuttle X  X - 3x10 bilateral leg press with 4-5 band resistance   - 1x10 single leg press with 4 band resistance Right and 3 band resistance Left     NuStep  x 9 minutes at Level 3, working on alternating movement of lower extremities and stretch to bilateral knees   Stairs  - Step ups with Left lower extremity with bilateral upper extremity  support 1x5   Standing Frame  Standing 1 x in standing frame working on holding endrange position of approximately 55 degree seat angle for 5 minutes   Standing in parallel bars working sit to stands           - standing 2x's  - Standing weights shifts Left to Right 1x10  - Standing knee extension 1x10  - Standing Left upper extremity lifts off bar 1x5  - Standing bilateral upper extremity lifts off bar 1x10 with PT helping to hold hips for support with minimum to moderate assist     * In standing frame:  Sitting position = 0 degree seat angle  Full upright stand position with hips and knees at 0 degrees = 90 degree seat angle  3/15/22 - 1st  standing frame was at 42 degree seat angle, last stand position was at 65 degree seat angle  3/17/22 - 1st  standing frame was at 48 degree seat angle, last stand position was at 70 degree seat angle  3/21/22 - 1st  standing frame was at 52 degree seat angle, last stand position was at 70 degree seat angle  3/29/22 - 1st  standing frame was at 60 degree seat angle, last stand position was at 72 degree seat angle  3/31/22 - 1st  standing frame was at 55 degree seat angle, last stand position was at 75 degree seat angle  4/7/22 - 1st  standing frame was at 60 degree seat angle, last stand position was at 80 degree seat angle  4/11/22 - 1st  standing frame was at 60 degree seat angle, last stand position was at 78 degree seat angle  4/14/22 - 1st  standing frame was at 55 degree seat angle, last stand position was at 86 degree seat angle  4/19/22 - 1st  standing frame was at 60 degree seat angle, last stand position was at 78 degree seat angle  4/22/22 - 1st  standing frame was at 65 degree seat angle, last stand position was at 81 degree seat angle  4/26/22 - 1st  standing frame was at 65 degree seat angle, last stand position was at 82 degree seat angle  5/3/22 - 1st  standing frame  was at 65 degree seat angle, last stand position was at 88 degree seat angle  5/5/22 - 1st  standing frame was at 65 degree seat angle, last stand position was at 84 degree seat angle  5/10/22 - 1st  standing frame was at 60 degree seat angle, last stand position was at 80 degree seat angle  5/12/22 - 1st  standing frame was at 65 degree seat angle, last stand position was at 80 degree seat angle  5/17/22 - 1st  standing frame was at 62 degree seat angle, last stand position was at 82 degree seat angle  5/19/22 - 1st  standing frame was at 60 degree seat angle, last stand position was at 80 degree seat angle  5/24/22 - 1st  standing frame was at 62 degree seat angle, last stand position was at 82 degree seat angle  5/26/22 - 1st  standing frame was at 60 degree seat angle, last stand position was at 80 degree seat angle   5/31/22 - 1st  standing frame was at 65 degree seat angle, last stand position was at 80 degree seat angle  6/2/22 - 1st  standing frame was at 70 degree seat angle, last stand position was at 82 degree seat angle  6/7/22 - 1st  standing frame was at 68 degree seat angle, last stand position was at 80 degree seat angle  6/10/22 - 1st  standing frame was at 60 degree seat angle, last stand position was at 80 degree seat angle  6/14/22 - 1st  standing frame was at 65 degree seat angle, last stand position was at 82 degree seat angle   6/16/22 - 1st  standing frame was at 70 degree seat angle, last stand position was at 84 degree seat angle  6/21/22 - 1st  standing frame was at 65 degree seat angle, last stand position was at 80 degree seat angle  Plan for Next Visit:      manual therapy techniques: Joint mobilizations, Manual traction and Soft tissue Mobilization were applied to the:(0) minutes, including:    Intervention Performed Today    Patella glides superiorly  2 minutes bilateral     Anterior tibia glide Grade 1-2  1 minute bilateral                                      Neuromuscular Re-Education activities to improve: Balance, Coordination, Sense, Proprioception and Posture for 0 minutes. The following activities were included:    Intervention Performed Today                                                THERAPEUTIC ACTIVITIES to improve dynamic and functional  performance for (4) minutes including:    Intervention Performed Today    Bed - wheelchair transfer   Practiced set up and sequence   Bed mobility sit to supine to sit  Practiced sequence    Rolling   3 x each side working on sequence to incorporate hip flexor stretch on each side. Then separate stretch performed as listed above   Standing in parallel bars  4 x's working on several weight shifts towards hip extension and knee extension while standing.   Standing in parallel bars performing step forward  With Right lower extremity 5x's with moderate assistance to maintain standing position   Dynasplint Consult via FaceTime with Andriy to discuss fit     Sit to  parallel bars without assistance  2x's   Dynasplint Consult  Andriy Stout (Rep for Dynasplint) met with physical therapist and patient today during session to discuss options for dynasplints for bilateral knees due to patient's significant lack of range of motion.     Dynasplint Fitting  Andriyjohn Stout (Rep for Dynasplint) met with physical therapist and patient today to deliver and fit patient for bilateral knee dynasplints.  We discussed wearing time of 1-2 hours/day for the first week and then we would re-assess.  Patient was shown how to don each brace and straps were marked for future use.  The Dynasplint force was increased to 6 on the Left and 7 on the Right to start.  Extra padding was added to thin straps for skin protection.   Dynasplint Consult  Andriy Stout (Rep for Dynasplint) met with physical therapist and patient today during session to discuss proper fit and  positioning of Dynasplint to feel a better stretch at home. Pt only brought Left splint to session today and his splint was donned, straps were tightened and new black line added for patient to follow at home.  Left splint was increased from 7/13 to 9/13 tension.  He reports that he places a pillow under his knee at home and he was educated on better positioning of pillow under distal stump with prosthesis removed.  He also felt more of a stretch in long sitting vs supine and was asked to try this position at home for part of the stretch if able.    Standing at sink in Neuro Room working on stand posture and positioning   Simulating activity that he has been given to do for homework. 1x5 seconds, 1x10 seconds   Patient educated addition to Home Exercise Program   - Simulated sit to stand setup  From wheelchair to sink at home performing with walker in front to work on letting go of sink and holding on to walker.  Patient was educated on how to sit safely to prevent wheelchair from tipping backwards if he loses balance.  He was told to start with one hand on walker and one hand on counter top until he can progress to both hands on walker for support. This will be a way that he can safely challenge himself at home. Patient practiced standing to walker 3x's working on sequence and safety of task.   Patient expressed concerns about not knowing his current weight, so PT assisted patient with obtaining weight at start of session with wheelchair scale.   He currently weighs 139 #   Several transfers and sit to stands to adjust walker height as needed  Contact guard to stand by assist for each   Several sit to stand and wheelchair to machine transfers performed throughout session x With independence.      Plan for Next Visit:      Gait Training: for (12) minutes:  7/26/22- Ambulated with walker for 275 ft with stand by assist.        Limitation/Restriction for FOTO Amputation Survey     Therapist reviewed FOTO scores for  Hunter Schofield on 3/17/2022.   FOTO documents entered into [employer] - see Media section.     Limitation Score: 52%  4/14/22 - Limitation Score: 45%  6/7/22 - Limitation Score: 60%             Patient Education and Home Exercises     Home Exercises Provided and Patient Education Provided     Education provided:   -2/22/22 Patient educated on increasing length and consistency of stretching knees and hips to every 2 hours at home.  He was shown and demonstrated understanding of short arc quads, rolling toward prone and sitting in reclined position to stretch hip flexors.  -2/24/22 Patient given Home Exercise Program on exercises for lower extremities to be performed 2x15 3x's/day  See patient Instructions in EMR  -3/1/22 Re-inforced Home Exercise Program and asked patient to incorporate prone lying for longer periods of time to build up to 20 minutes per day.  3/3/22 - Discussed the option of dynasplints for bilateral lower extremities. Patient educated on the option of Dynasplints vs basic knee braces and encouraged to consider Dynasplints as a more aggressive and efficient way of properly positioning and dynamically stretching his legs outside of therapy.  3/8/22 - Patient was educated on Dynasplints with Andriy Stout (Rep).   He was shown pictures on ipad and Andriy and physical therapist discussed wearing schedule to make gains with his muscle length outside of therapy sessions.  Andriy informed patient that he would assist with collecting information regarding insurance coverage and provide him with any out of pocket costs so he can decide if he would like to pursue this option.  3/22/22 - Patient was educated that Andriy Stout (Rep for Dynasplint) messaged me that his splints have been approved and are being mailed this week.   3/24/22 - Patient was educated on Dynasplint management and importance of daily skin checks.  His tolerance of wearing splints for the first week will determine his wearing schedule for the  following weeks.  3/29/22 - Patient was educated on only wearing Dynasplints while lying down to get the most effective stretch to lower extremities.  3/31/22 - Patient was asked to reach out to Dynasplint rep (Andriy) to set up a time for Andriy to re-assess the positioning and fit of splints to see if he can feel more of a stretch with wearing schedule.  4/7/22 - Patient was asked to reach out to Dynasplint rep to make sure that splints are adjusted appropriately.  4/11/22 - PT recommended that patient follow up with Dynasplint rep for reassessment of splint fit.  4/14/22 - Patient informed that PT would reach out to Dynasplint rep again to have him schedule a visit.  He was encouraged to continue being active at home.  4/18/22 -  [facility] to set up a time for a consult that would work for his schedule.  4/28/22 - Patient educated on wearing Dynaspints for 2 hours/day for the next 4 days and then we will assess his ability to increase to 4 hours/day after next visit.  He verbalized good understanding of education from Andriy (Dynasplint) and was told to try Right splint on in new wear position first before increasing tension to 9/13.    5/3/22 - Patient educated on attempting standing at home at sink holding stand position for 10-15 seconds repeating 3x's and doing this in the morning and afternoon every day.   5/12/22 - Patient educated on the importance of walking with walker each session moving forward to try to get range applied to a functional task.  5/26/22 - Patient educated on adding walker to sit to stands at sink as a way of increasing his stand balance safely at home alone.  6/7/22 - Patient educated on his gains at this point and that PT is requesting more visits for a renewed Plan of Care.   6/10/22 - Patient educated on progressing to standing marches with walker at home and standing lateral foot taps with walker at home in same set up as before with kitchen counter in front  and wheelchair behind for safety.  6/21/22 - Patient educated on stair mobility sequence.   6/23/22 - Patient educated on the risk of falls with walking home alone at this time.     Home Exercise Provided: 2/24/22 Exercises were reviewed and Hunter was able to demonstrate them prior to the end of the session.  Hunter demonstrated good  understanding of the education provided.   ASSESSMENT     Patient responded well to treatment activities today.  He demonstrated improved gait distance as compared to previous sessions.  He would benefit from practice with ramps to better negotiate his indoor to outdoor home environment.  He is progressing well.    Pt prognosis is Fair  To Good    Pt will continue to benefit from skilled outpatient physical therapy to address the deficits listed in the problem list box on initial evaluation, provide pt/family education and to maximize pt's level of independence in the home and community environment.     Pt's spiritual, cultural and educational needs considered and pt agreeable to plan of care and goals.     Anticipated Barriers for therapy: co-morbidities, transportation assistance needed at times, lifestyle, potential contractures of knees/hips    GOALS:     Short Term Goals:  4 weeks Progress    1. Function: Patient will demonstrate improved function as indicated by a functional limitation score of less than or equal to 55 out of 100 on FOTO = 44% limitation PC   2. Mobility: Patient will improve sit to stand transfer with moderate assistance in order to progress towards independence with functional activities. (Met)  (Modified) - Patient will perform wheelchair to mat transfer with stand by assist to demonstrate improved independence with mobility. PC   3. Gait: Patient will ambulated 3 steps in parallel bars with Maximum assistance to demonstrate improved strength, posture and endurance met   5. HEP: Patient will demonstrate independence with HEP in order to progress toward  functional independence. met    6. Assess patient's needs for Dynasplints and set up consultation if needed. met       Long Term Goals:  8 weeks Progress   1. Function: Patient will demonstrate improved function as indicated by a functional limitation score of less than or equal to 60 out of 100 on FOTO = 40 % limitation PC   2. Mobility: Patient will improve AROM of bilateral knees to -10 degrees  in order to return to maximal functional potential and improve quality of life. PC   3. Functional Mobility - Patient will perform mat to wheelchair transfer with modified independence with walker in order to improve his independence with functional mobility. PC   4. Gait: Patient will ambulate 15 feet with rolling walker with moderate assistance to demonstrate improved strength, endurance and mobility. (Met)   (Modified) - Patient will ambulate 100 ft with walker incorporating turns with modified independence to negotiate home environment with independence. PC   5. HEP: Patient educated on HEP in preparation for d/c verbalizing and demonstrating good understanding of topics discussed.    PC   6.          Goals Key:  PC= progressing/continue;        PM= partially met;             DC= discontinue         PLAN     Continue Plan of Care (POC) and progress per patient tolerance. See Treatment section for exercise progression.  Outpatient Physical Therapy 2 times weekly for 8 weeks to include the following interventions: Electrical Stimulation Attended, Gait Training, Moist Heat/ Ice, Neuromuscular Re-ed, Orthotic Management and Training, Patient Education, Self Care, Therapeutic Activities, Therapeutic Exercise and Prosthetic Management.      Mindy Patino, PT, DPT

## 2022-07-28 ENCOUNTER — CLINICAL SUPPORT (OUTPATIENT)
Dept: REHABILITATION | Facility: HOSPITAL | Age: 82
End: 2022-07-28
Payer: MEDICARE

## 2022-07-28 DIAGNOSIS — Z74.09 DECREASED STRENGTH, ENDURANCE, AND MOBILITY: ICD-10-CM

## 2022-07-28 DIAGNOSIS — R68.89 DECREASED STRENGTH, ENDURANCE, AND MOBILITY: ICD-10-CM

## 2022-07-28 DIAGNOSIS — M25.662 DECREASED RANGE OF MOTION OF BOTH LOWER EXTREMITIES: Primary | ICD-10-CM

## 2022-07-28 DIAGNOSIS — R53.1 DECREASED STRENGTH, ENDURANCE, AND MOBILITY: ICD-10-CM

## 2022-07-28 DIAGNOSIS — M25.661 DECREASED RANGE OF MOTION OF BOTH LOWER EXTREMITIES: Primary | ICD-10-CM

## 2022-07-28 PROCEDURE — 97116 GAIT TRAINING THERAPY: CPT

## 2022-07-28 PROCEDURE — 97110 THERAPEUTIC EXERCISES: CPT

## 2022-07-28 NOTE — PROGRESS NOTES
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 He reports having a fall at home yesterday due to his faulty recliner   Response to previous treatment: good with no adverse effects.  Functional change:  He reports that he is feeling stronger with more confidence with standing and is able to stand for longer bouts at home.  He recently has been able to stand without using his hands on his countertop to pull into standing.  Pain: 0/10 at rest  Location: not applicable      OBJECTIVE     Objective Measures updated at progress report unless specified.  Re-Assessment for () minutes    Joint Measured 2/22/22 3/7/22 4/14/22 5/12/22   Left Hip Extension -20 -25 -20  -20   Right Hip Extension -25 -25 -25 -20   Left Knee Extension -48 -35 AROM, -30 PROM -15 -32 PROM   Right Knee Extension -45 -37 AROM, -34 PROM -12 - 35 PROM     3/3/22  In prone:   Hip Extension = - 20  Right, unable to get accurate measure on Left    5/17/22  In Standing frame:   Hip Extension = Right -10 , Left - 15  Knee Extension = Right -15 , Left -15    6/7/22   In Standing Frame:   Hip Extension = Right  -10, Left - 15  Knee Extension = Right -13, Left -15    Functional Mobility Assessment:  Bed to Wheelchair  transfer with walker with stand by assist to contact guard assist    STRENGTH:                  L/E MMT Right  2/18/22    Right  3/17/22 Right   4/14/22 Right  5/12/22 Right  6/7/22 Left  2/18/22 Left   3/17/22 Left   4/14/22 Left   5/12/22 Left  6/7/22   Hip Flexion  3+/5 4-/5 4-/5 4/5 4/5 3+/5 4-/5 4-/5 4/5 4/5   Hip Extension  2+/5 2+/5 2+/5 2+/5 3-/5 2+/5 2+/5 2+/5 2+/5 3-/5   Hip Abduction  2+/5 3-/5 3-/5 3-/5 4/5 sitting 2+/5 3-/5 3-/5 3-/5 4/5 sittting   Knee Extension 3-/5 3+ within available Range 4-/5 within available range 4-/5 within available range 4-/5 within available range 3-/5 3+ within available range 3+/5 within available range 3+/5 within available range 4-/5 within available range   Knee Flexion 4-/5 4/5 4+/5 4+/5 4+/5 4-/5 4/5 4/5 4+/5 4+/5   Ankle DF 3+/5 3+/5 within available range 3+/5 within available range (-10 degrees from neurtral) NT 4+/5 not applicable  Not applicable not applicable  not applicable  not applicable    Ankle PF 3+/5 4-/5 4/5 NT 5/5 not applicable  Not applicable not applicable  not applicable  not applicable           Treatment     Hunter received the treatments listed below:      THERAPEUTIC EXERCISES to develop strength, endurance, ROM, flexibility, posture and core stabilization for (25) minutes including:  Intervention Performed Today    short arc quad in supine  lower extremities over wedge 2x20 Right AROM, Left 2x20 Active Assistive for form and quality    sidelying toward prone   Stretch to Hip flexor 3x30 seconds each side   Quad set   2x10 Left     Sidelying hip extension   2x10 bilaterally with 10 seconds endrange stretch on last rep   Chamizal and Donning Left Prosthetic leg  Performed at edge of mat    Sitting in reclined position at edge of mat   Hip flexor stretch 3x30 seconds bilateral    Hip Abduction  In sidelying 2x10 bilateral    Bridge attempt  1x5   Posterior Pelvic Tilt Supine  2x5   Prone lying over light blue small triangle wedge  Holding position 2  minutes, then 4 minutes   Stretch to Hamstrings  3 x 1 minute Right lower extremity    Heelslide with forceful push towards extension  2x10 bilateral with 10 second enrange hold stretch on last rep    10 # over distal Left thigh  Stretch to Left hip flexor for over 5 minutes while exercising Right lower extremity    Prone Hamstring curl endrange for quad/hip flexor stretch  2x10 AAROM   lower extremity lifts towards extension in prone  2x10 AAROM   Prone hip flexor stretch   3x10 second holds bilateral    Standing frame raising to patient's tolerance    - Standing 2x's in standing frame holding each stand for 10-15  minutes working on the following:  - Horizontal Abduction with bilateral upper extremities 2x10 AROM   - terminal knee extension in stand position 2x20 bilateral   - Rows holding red theraband bilateral  2x10  - Pushing through upper extremities to achieve trunk extension 2x25  - cross body punch with 2# dumbbell 1x10 bilateral   - scap retraction 2x10  - attempted 1x10 shoulder rolls backward - patient had difficulty coordinating  - Overhead press with green tband bilateral 1x10  - Patient given feedback on standing frame position - working on increasing seat angle after 30 second rest between lifts (repeated 3-4 times)  - Lateral reaches 1x8 bilateral   - Beach Ball Volley 1x20 hits working on upright trunk posture  - Pulling with bilateral upper extremities on tray at front to pull hips forward from seat and extend knees 2x25  - Reaching Left and Right with cones to target to stretch lower extremities with each reach 2x10 cones to each side incorporating reach posteriorly    Shuttle X  X - 3x10 bilateral leg press with 4-5 band resistance   - 1x10 single leg press with 3 bands     NuStep  x 9 minutes at Level 3, working on alternating movement of lower extremities and stretch to bilateral knees   Stairs  - Step ups with Left lower extremity with bilateral upper extremity support 1x5   Standing Frame   Standing 1 x in standing frame working on holding endrange position of approximately 55 degree seat angle for 5 minutes   Standing in parallel bars working sit to stands           - standing 2x's  - Standing weights shifts Left to Right 1x10  - Standing knee extension 1x10  - Standing Left upper extremity lifts off bar 1x5  - Standing bilateral upper extremity lifts off bar 1x10 with PT helping to hold hips for support with minimum to moderate assist     * In standing frame:  Sitting position = 0 degree seat angle  Full upright stand position with hips and knees at 0 degrees = 90 degree seat angle  3/15/22 - 1st  standing frame was at 42 degree seat angle, last stand position was at 65 degree seat angle  3/17/22 - 1st  standing frame was at 48 degree seat angle, last stand position was at 70 degree seat angle  3/21/22 - 1st  standing frame was at 52 degree seat angle, last stand position was at 70 degree seat angle  3/29/22 - 1st  standing frame was at 60 degree seat angle, last stand position was at 72 degree seat angle  3/31/22 - 1st  standing frame was at 55 degree seat angle, last stand position was at 75 degree seat angle  4/7/22 - 1st  standing frame was at 60 degree seat angle, last stand position was at 80 degree seat angle  4/11/22 - 1st  standing frame was at 60 degree seat angle, last stand position was at 78 degree seat angle  4/14/22 - 1st  standing frame was at 55 degree seat angle, last stand position was at 86 degree seat angle  4/19/22 - 1st  standing frame was at 60 degree seat angle, last stand position was at 78 degree seat angle  4/22/22 - 1st  standing frame was at 65 degree seat angle, last stand position was at 81 degree seat angle  4/26/22 - 1st  standing frame was at 65 degree seat angle, last stand position was at 82 degree seat angle  5/3/22 - 1st  standing frame was at 65 degree seat angle,  last stand position was at 88 degree seat angle  5/5/22 - 1st  standing frame was at 65 degree seat angle, last stand position was at 84 degree seat angle  5/10/22 - 1st  standing frame was at 60 degree seat angle, last stand position was at 80 degree seat angle  5/12/22 - 1st  standing frame was at 65 degree seat angle, last stand position was at 80 degree seat angle  5/17/22 - 1st  standing frame was at 62 degree seat angle, last stand position was at 82 degree seat angle  5/19/22 - 1st  standing frame was at 60 degree seat angle, last stand position was at 80 degree seat angle  5/24/22 - 1st  standing frame was at 62 degree seat angle, last stand position was at 82 degree seat angle  5/26/22 - 1st  standing frame was at 60 degree seat angle, last stand position was at 80 degree seat angle   5/31/22 - 1st  standing frame was at 65 degree seat angle, last stand position was at 80 degree seat angle  6/2/22 - 1st  standing frame was at 70 degree seat angle, last stand position was at 82 degree seat angle  6/7/22 - 1st  standing frame was at 68 degree seat angle, last stand position was at 80 degree seat angle  6/10/22 - 1st  standing frame was at 60 degree seat angle, last stand position was at 80 degree seat angle  6/14/22 - 1st  standing frame was at 65 degree seat angle, last stand position was at 82 degree seat angle   6/16/22 - 1st  standing frame was at 70 degree seat angle, last stand position was at 84 degree seat angle  6/21/22 - 1st  standing frame was at 65 degree seat angle, last stand position was at 80 degree seat angle  Plan for Next Visit:      manual therapy techniques: Joint mobilizations, Manual traction and Soft tissue Mobilization were applied to the:(0) minutes, including:    Intervention Performed Today    Patella glides superiorly  2 minutes bilateral    Anterior tibia glide Grade  1-2  1 minute bilateral                                      Neuromuscular Re-Education activities to improve: Balance, Coordination, Sense, Proprioception and Posture for 0 minutes. The following activities were included:    Intervention Performed Today                                                THERAPEUTIC ACTIVITIES to improve dynamic and functional  performance for (2) minutes including:    Intervention Performed Today    Bed - wheelchair transfer   Practiced set up and sequence   Bed mobility sit to supine to sit  Practiced sequence    Rolling   3 x each side working on sequence to incorporate hip flexor stretch on each side. Then separate stretch performed as listed above   Standing in parallel bars  4 x's working on several weight shifts towards hip extension and knee extension while standing.   Standing in parallel bars performing step forward  With Right lower extremity 5x's with moderate assistance to maintain standing position   Dynasplint Consult via [facility] with Andriy to discuss fit     Sit to  parallel bars without assistance  2x's   Dynasplint Consult  Andriy Stout (Rep for Dynasplint) met with physical therapist and patient today during session to discuss options for dynasplints for bilateral knees due to patient's significant lack of range of motion.     Dynasplint Fitting  Andriyjohn Stout (Rep for Dynasplint) met with physical therapist and patient today to deliver and fit patient for bilateral knee dynasplints.  We discussed wearing time of 1-2 hours/day for the first week and then we would re-assess.  Patient was shown how to don each brace and straps were marked for future use.  The Dynasplint force was increased to 6 on the Left and 7 on the Right to start.  Extra padding was added to thin straps for skin protection.   Dynasplint Consult  Andriy Stout (Rep for Dynasplint) met with physical therapist and patient today during session to discuss proper fit and positioning of Dynasplint to  feel a better stretch at home. Pt only brought Left splint to session today and his splint was donned, straps were tightened and new black line added for patient to follow at home.  Left splint was increased from 7/13 to 9/13 tension.  He reports that he places a pillow under his knee at home and he was educated on better positioning of pillow under distal stump with prosthesis removed.  He also felt more of a stretch in long sitting vs supine and was asked to try this position at home for part of the stretch if able.    Standing at sink in Neuro Room working on stand posture and positioning   Simulating activity that he has been given to do for homework. 1x5 seconds, 1x10 seconds   Patient educated addition to Home Exercise Program   - Simulated sit to stand setup  From wheelchair to sink at home performing with walker in front to work on letting go of sink and holding on to walker.  Patient was educated on how to sit safely to prevent wheelchair from tipping backwards if he loses balance.  He was told to start with one hand on walker and one hand on counter top until he can progress to both hands on walker for support. This will be a way that he can safely challenge himself at home. Patient practiced standing to walker 3x's working on sequence and safety of task.   Patient expressed concerns about not knowing his current weight, so PT assisted patient with obtaining weight at start of session with wheelchair scale.   He currently weighs 139 #   Several transfers and sit to stands to adjust walker height as needed  Contact guard to stand by assist for each   Several sit to stand and wheelchair to machine transfers with and without walker performed throughout session x With independence.      Plan for Next Visit:      Gait Training: for (11) minutes:  7/28/22- Ambulated with walker for 150 ft with stand by assist. Ambulated up and down crosswalk ramp and over curb step in parking lot with contact guard to minimum  assist.       Limitation/Restriction for FOTO Amputation Survey     Therapist reviewed FOTO scores for Hunter Schofield on 3/17/2022.   FOTO documents entered into [employer] - see Media section.     Limitation Score: 52%  4/14/22 - Limitation Score: 45%  6/7/22 - Limitation Score: 60%             Patient Education and Home Exercises     Home Exercises Provided and Patient Education Provided     Education provided:   -2/22/22 Patient educated on increasing length and consistency of stretching knees and hips to every 2 hours at home.  He was shown and demonstrated understanding of short arc quads, rolling toward prone and sitting in reclined position to stretch hip flexors.  -2/24/22 Patient given Home Exercise Program on exercises for lower extremities to be performed 2x15 3x's/day  See patient Instructions in EMR  -3/1/22 Re-inforced Home Exercise Program and asked patient to incorporate prone lying for longer periods of time to build up to 20 minutes per day.  3/3/22 - Discussed the option of dynasplints for bilateral lower extremities. Patient educated on the option of Dynasplints vs basic knee braces and encouraged to consider Dynasplints as a more aggressive and efficient way of properly positioning and dynamically stretching his legs outside of therapy.  3/8/22 - Patient was educated on Dynasplints with Andriy Stout (Rep).   He was shown pictures on ipad and Andriy and physical therapist discussed wearing schedule to make gains with his muscle length outside of therapy sessions.  Andriy informed patient that he would assist with collecting information regarding insurance coverage and provide him with any out of pocket costs so he can decide if he would like to pursue this option.  3/22/22 - Patient was educated that Andriy Stout (Rep for Dynasplint) messaged me that his splints have been approved and are being mailed this week.   3/24/22 - Patient was educated on Dynasplint management and importance of daily skin checks.   His tolerance of wearing splints for the first week will determine his wearing schedule for the following weeks.  3/29/22 - Patient was educated on only wearing Dynasplints while lying down to get the most effective stretch to lower extremities.  3/31/22 - Patient was asked to reach out to Dynasplint rep (Andriy) to set up a time for Andriy to re-assess the positioning and fit of splints to see if he can feel more of a stretch with wearing schedule.  4/7/22 - [facility] sure that [facility]ints are adjusted appropriately.  4/11/22 - PT [facility].  4/14/22 - Patient informed that [facility] would [facility] to have him schedule a visit.  He was encouraged to continue being active at home.  4/18/22 -  Patient asked to contact Dynasplint rep to set up a time for a consult that would work for his schedule.  4/28/22 - Patient educated on wearing Dynaspints for 2 hours/day for the next 4 days and then we will assess his ability to increase to 4 hours/day after next visit.  He verbalized good understanding of education from Andriy (Dynasplint) and was told to try Right splint on in new wear position first before increasing tension to 9/13.    5/3/22 - Patient educated on attempting standing at home at sink holding stand position for 10-15 seconds repeating 3x's and doing this in the morning and afternoon every day.   5/12/22 - Patient educated on the importance of walking with walker each session moving forward to try to get range applied to a functional task.  5/26/22 - Patient educated on adding walker to sit to stands at sink as a way of increasing his stand balance safely at home alone.  6/7/22 - Patient educated on his gains at this point and that PT is requesting more visits for a renewed Plan of Care.   6/10/22 - Patient educated on progressing to standing marches with walker at home and  standing lateral foot taps with walker at home in same set up as before with kitchen counter in front and wheelchair behind for safety.  6/21/22 - Patient educated on stair mobility sequence.   6/23/22 - Patient educated on the risk of falls with walking home alone at this time.   7/28/22 - Patient educated on safety with gait at home and advancing gait challenges in therapy.  Home Exercise Provided: 2/24/22 Exercises were reviewed and Hunter was able to demonstrate them prior to the end of the session.  Hunter demonstrated good  understanding of the education provided.   ASSESSMENT     Patient responded well to treatment activities today.  He progressed with ambulating over outdoor environments today, but still needs practice on sequence and safety. He would benefit from practice with ramps to better negotiate his indoor to outdoor home environment.  He is progressing well.    Pt prognosis is Fair  To Good    Pt will continue to benefit from skilled outpatient physical therapy to address the deficits listed in the problem list box on initial evaluation, provide pt/family education and to maximize pt's level of independence in the home and community environment.     Pt's spiritual, cultural and educational needs considered and pt agreeable to plan of care and goals.     Anticipated Barriers for therapy: co-morbidities, transportation assistance needed at times, lifestyle, potential contractures of knees/hips    GOALS:     Short Term Goals:  4 weeks Progress    1. Function: Patient will demonstrate improved function as indicated by a functional limitation score of less than or equal to 55 out of 100 on FOTO = 44% limitation PC   2. Mobility: Patient will improve sit to stand transfer with moderate assistance in order to progress towards independence with functional activities. (Met)  (Modified) - Patient will perform wheelchair to mat transfer with stand by assist to demonstrate improved independence with mobility. PC    3. Gait: Patient will ambulated 3 steps in parallel bars with Maximum assistance to demonstrate improved strength, posture and endurance met   5. HEP: Patient will demonstrate independence with HEP in order to progress toward functional independence. met    6. Assess patient's needs for Dynasplints and set up consultation if needed. met       Long Term Goals:  8 weeks Progress   1. Function: Patient will demonstrate improved function as indicated by a functional limitation score of less than or equal to 60 out of 100 on FOTO = 40 % limitation PC   2. Mobility: Patient will improve AROM of bilateral knees to -10 degrees  in order to return to maximal functional potential and improve quality of life. PC   3. Functional Mobility - Patient will perform mat to wheelchair transfer with modified independence with walker in order to improve his independence with functional mobility. PC   4. Gait: Patient will ambulate 15 feet with rolling walker with moderate assistance to demonstrate improved strength, endurance and mobility. (Met)   (Modified) - Patient will ambulate 100 ft with walker incorporating turns with modified independence to negotiate home environment with independence. PC   5. HEP: Patient educated on HEP in preparation for d/c verbalizing and demonstrating good understanding of topics discussed.    PC   6.          Goals Key:  PC= progressing/continue;        PM= partially met;             DC= discontinue         PLAN     Continue Plan of Care (POC) and progress per patient tolerance. See Treatment section for exercise progression.  Outpatient Physical Therapy 2 times weekly for 8 weeks to include the following interventions: Electrical Stimulation Attended, Gait Training, Moist Heat/ Ice, Neuromuscular Re-ed, Orthotic Management and Training, Patient Education, Self Care, Therapeutic Activities, Therapeutic Exercise and Prosthetic Management.      Mindy Patino, PT, DPT

## 2022-08-02 ENCOUNTER — TELEPHONE (OUTPATIENT)
Dept: REHABILITATION | Facility: HOSPITAL | Age: 82
End: 2022-08-02
Payer: MEDICARE

## 2022-08-02 NOTE — TELEPHONE ENCOUNTER
Called regarding missing appointment regarding missing appointment today. patient reports falling asleep and missed his appointment.

## 2022-08-05 ENCOUNTER — CLINICAL SUPPORT (OUTPATIENT)
Dept: REHABILITATION | Facility: HOSPITAL | Age: 82
End: 2022-08-05
Attending: INTERNAL MEDICINE
Payer: MEDICARE

## 2022-08-05 DIAGNOSIS — Z74.09 DECREASED STRENGTH, ENDURANCE, AND MOBILITY: ICD-10-CM

## 2022-08-05 DIAGNOSIS — R68.89 DECREASED STRENGTH, ENDURANCE, AND MOBILITY: ICD-10-CM

## 2022-08-05 DIAGNOSIS — R53.1 DECREASED STRENGTH, ENDURANCE, AND MOBILITY: ICD-10-CM

## 2022-08-05 DIAGNOSIS — M25.662 DECREASED RANGE OF MOTION OF LEFT LOWER EXTREMITY: Primary | ICD-10-CM

## 2022-08-05 PROCEDURE — 97110 THERAPEUTIC EXERCISES: CPT | Mod: CQ

## 2022-08-05 PROCEDURE — 97116 GAIT TRAINING THERAPY: CPT | Mod: CQ

## 2022-08-05 NOTE — PROGRESS NOTES
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 Has been exercising at home but not today due to service people at his home doing a repair. Has not been laying on his stomach to stretch as often as he should.     Response to previous treatment: good with no adverse effects.    Functional change:  He reports that he is feeling stronger with more confidence with standing and is able to stand for longer bouts at home.  He recently has been able to stand without using his hands on his countertop to pull into standing.    Pain: 0/10 at rest  Location: not applicable      OBJECTIVE     Objective Measures updated at progress report unless specified.  Re-Assessment for (0) minutes    Treatment     Hunter received the treatments listed below:      THERAPEUTIC EXERCISES to develop strength, endurance, ROM, flexibility, posture and core stabilization for (25) minutes including:  Intervention Performed Today    short arc quad in supine  lower extremities over wedge 2x20 Right AROM, Left 2x20 Active Assistive for form and quality    sidelying toward prone   Stretch to Hip flexor 3x30 seconds each side   Quad set   2x10 Left     Sidelying hip extension   2x10 bilaterally with 10 seconds  endrange stretch on last rep   Alverda and Donning Left Prosthetic leg  Performed at edge of mat    Sitting in reclined position at edge of mat   Hip flexor stretch 3x30 seconds bilateral    Hip Abduction  In sidelying 2x10 bilateral    Bridge attempt  1x5   Posterior Pelvic Tilt Supine  2x5   Prone lying over light blue small triangle wedge  Holding position 2 minutes, then 4 minutes   Stretch to Hamstrings  3 x 1 minute Right lower extremity    Heelslide with forceful push towards extension x 2x10 bilateral with 10 second enrange hold stretch on last rep    10 # over distal Left thigh  Stretch to Left hip flexor for over 5 minutes while exercising Right lower extremity    Prone Hamstring curl endrange for quad/hip flexor stretch  2x10 AAROM   lower extremity lifts towards extension in prone  2x10 AAROM   Prone hip flexor stretch   3x10 second holds bilateral    Standing frame raising to patient's tolerance    - Standing 2x's in standing frame holding each stand for 10-15  minutes working on the following:  - Horizontal Abduction with bilateral upper extremities 2x10 AROM   - terminal knee extension in stand position 2x20 bilateral   - Rows holding red theraband bilateral  2x10  - Pushing through upper extremities to achieve trunk extension 2x25  - cross body punch with 2# dumbbell 1x10 bilateral   - scap retraction 2x10  - attempted 1x10 shoulder rolls backward - patient had difficulty coordinating  - Overhead press with green tband bilateral 1x10  - Patient given feedback on standing frame position - working on increasing seat angle after 30 second rest between lifts (repeated 3-4 times)  - Lateral reaches 1x8 bilateral   - Beach Ball Volley 1x20 hits working on upright trunk posture  - Pulling with bilateral upper extremities on tray at front to pull hips forward from seat and extend knees 2x25  - Reaching Left and Right with cones to target to stretch lower extremities with each reach 2x10 cones to each side  incorporating reach posteriorly    Shuttle X  x   - 3x10 bilateral leg press with 4 bands   -3x10 single leg press with 2.5 bands     NuStep  x 9 minutes at Level 3, working on alternating movement of lower extremities and stretch to bilateral knees   Stairs x - Step ups with Left lower extremity and down with right 4 steps with bilateral upper extremity support    Standing Frame  Standing 1 x in standing frame working on holding endrange position of approximately 55 degree seat angle for 5 minutes   Standing in parallel bars working sit to stands           - standing 2x's  - Standing weights shifts Left to Right 1x10  - Standing knee extension 1x10  - Standing Left upper extremity lifts off bar 1x5  - Standing bilateral upper extremity lifts off bar 1x10 with PT helping to hold hips for support with minimum to moderate assist     * In standing frame:  Sitting position = 0 degree seat angle  Full upright stand position with hips and knees at 0 degrees = 90 degree seat angle  3/15/22 - 1st  standing frame was at 42 degree seat angle, last stand position was at 65 degree seat angle  3/17/22 - 1st  standing frame was at 48 degree seat angle, last stand position was at 70 degree seat angle  3/21/22 - 1st  standing frame was at 52 degree seat angle, last stand position was at 70 degree seat angle  3/29/22 - 1st  standing frame was at 60 degree seat angle, last stand position was at 72 degree seat angle  3/31/22 - 1st  standing frame was at 55 degree seat angle, last stand position was at 75 degree seat angle  4/7/22 - 1st  standing frame was at 60 degree seat angle, last stand position was at 80 degree seat angle  4/11/22 - 1st  standing frame was at 60 degree seat angle, last stand position was at 78 degree seat angle  4/14/22 - 1st  standing frame was at 55 degree seat angle, last stand position was at 86 degree seat angle  4/19/22 - 1st  standing  frame was at 60 degree seat angle, last stand position was at 78 degree seat angle  4/22/22 - 1st  standing frame was at 65 degree seat angle, last stand position was at 81 degree seat angle  4/26/22 - 1st  standing frame was at 65 degree seat angle, last stand position was at 82 degree seat angle  5/3/22 - 1st  standing frame was at 65 degree seat angle, last stand position was at 88 degree seat angle  5/5/22 - 1st  standing frame was at 65 degree seat angle, last stand position was at 84 degree seat angle  5/10/22 - 1st  standing frame was at 60 degree seat angle, last stand position was at 80 degree seat angle  5/12/22 - 1st  standing frame was at 65 degree seat angle, last stand position was at 80 degree seat angle  5/17/22 - 1st  standing frame was at 62 degree seat angle, last stand position was at 82 degree seat angle  5/19/22 - 1st  standing frame was at 60 degree seat angle, last stand position was at 80 degree seat angle  5/24/22 - 1st  standing frame was at 62 degree seat angle, last stand position was at 82 degree seat angle  5/26/22 - 1st  standing frame was at 60 degree seat angle, last stand position was at 80 degree seat angle   5/31/22 - 1st  standing frame was at 65 degree seat angle, last stand position was at 80 degree seat angle  6/2/22 - 1st  standing frame was at 70 degree seat angle, last stand position was at 82 degree seat angle  6/7/22 - 1st  standing frame was at 68 degree seat angle, last stand position was at 80 degree seat angle  6/10/22 - 1st  standing frame was at 60 degree seat angle, last stand position was at 80 degree seat angle  6/14/22 - 1st  standing frame was at 65 degree seat angle, last stand position was at 82 degree seat angle   6/16/22 - 1st  standing frame was at 70 degree seat angle, last stand position was at 84 degree seat angle  6/21/22 -  1st  standing frame was at 65 degree seat angle, last stand position was at 80 degree seat angle  Plan for Next Visit:      THERAPEUTIC ACTIVITIES to improve dynamic and functional  performance for (25) minutes including:    Intervention Performed Today    Bed - wheelchair transfer   Practiced set up and sequence   Bed mobility sit to supine to sit  Practiced sequence    Rolling   3 x each side working on sequence to incorporate hip flexor stretch on each side. Then separate stretch performed as listed above   Standing in parallel bars  4 x's working on several weight shifts towards hip extension and knee extension while standing.   Standing in parallel bars performing step forward  With Right lower extremity 5x's with moderate assistance to maintain standing position   Dynasplint Consult via FaceTime with Andriy to discuss fit     Sit to  parallel bars without assistance  2x's   Dynasplint Consult  Andriy Stout (Rep for Dynasplint) met with physical therapist and patient today during session to discuss options for dynasplints for bilateral knees due to patient's significant lack of range of motion.     Dynasplint Fitting  Andriy Stout (Rep for Dynasplint) met with physical therapist and patient today to deliver and fit patient for bilateral knee dynasplints.  We discussed wearing time of 1-2 hours/day for the first week and then we would re-assess.  Patient was shown how to don each brace and straps were marked for future use.  The Dynasplint force was increased to 6 on the Left and 7 on the Right to start.  Extra padding was added to thin straps for skin protection.   Dynasplint Consult  Andriy Stout (Rep for Dynasplint) met with physical therapist and patient today during session to discuss proper fit and positioning of Dynasplint to feel a better stretch at home. Pt only brought Left splint to session today and his splint was donned, straps were tightened and new black line added for patient to follow  at home.  Left splint was increased from 7/13 to 9/13 tension.  He reports that he places a pillow under his knee at home and he was educated on better positioning of pillow under distal stump with prosthesis removed.  He also felt more of a stretch in long sitting vs supine and was asked to try this position at home for part of the stretch if able.    Standing at sink in Neuro Room working on stand posture and positioning   Simulating activity that he has been given to do for homework. 1x5 seconds, 1x10 seconds   Patient educated addition to Home Exercise Program   - Simulated sit to stand setup  From wheelchair to sink at home performing with walker in front to work on letting go of sink and holding on to walker.  Patient was educated on how to sit safely to prevent wheelchair from tipping backwards if he loses balance.  He was told to start with one hand on walker and one hand on counter top until he can progress to both hands on walker for support. This will be a way that he can safely challenge himself at home. Patient practiced standing to walker 3x's working on sequence and safety of task.   Patient expressed concerns about not knowing his current weight, so PT assisted patient with obtaining weight at start of session with wheelchair scale.   He currently weighs 139 #   Several transfers and sit to stands to adjust walker height as needed  Contact guard to stand by assist for each   Several sit to stand and wheelchair to machine transfers with and without walker performed throughout session x With independence.      Plan for Next Visit:      Gait Training: for (15) minutes:  7/28/22- Ambulated with walker for 170, 2 x at 30 feet all with stand by assist in the clinic.       Patient Education and Home Exercises     Home Exercises Provided and Patient Education Provided     Education provided:   -2/22/22 Patient educated on increasing length and consistency of stretching knees and hips to every 2 hours at  home.  He was shown and demonstrated understanding of short arc quads, rolling toward prone and sitting in reclined position to stretch hip flexors.  -2/24/22 Patient given Home Exercise Program on exercises for lower extremities to be performed 2x15 3x's/day  See patient Instructions in EMR  -3/1/22 Re-inforced Home Exercise Program and asked patient to incorporate prone lying for longer periods of time to build up to 20 minutes per day.  3/3/22 - Discussed the option of dynasplints for bilateral lower extremities. Patient educated on the option of Dynasplints vs basic knee braces and encouraged to consider Dynasplints as a more aggressive and efficient way of properly positioning and dynamically stretching his legs outside of therapy.  3/8/22 - Patient was educated on Dynasplints with Andriy Stout (Rep).   He was shown pictures on ipad and Andriy and physical therapist discussed wearing schedule to make gains with his muscle length outside of therapy sessions.  Andriy informed patient that he would assist with collecting information regarding insurance coverage and provide him with any out of pocket costs so he can decide if he would like to pursue this option.  3/22/22 - Patient was educated that Andriy Stout (Rep for Dynasplint) messaged me that his splints have been approved and are being mailed this week.   3/24/22 - Patient was educated on Dynasplint management and importance of daily skin checks.  His tolerance of wearing splints for the first week will determine his wearing schedule for the following weeks.  3/29/22 - Patient was educated on only wearing Dynasplints while lying down to get the most effective stretch to lower extremities.  3/31/22 - Patient was asked to reach out to Dynasplint rep (Andriy) to set up a time for Andriy to re-assess the positioning and fit of splints to see if he can feel more of a stretch with wearing schedule.  4/7/22 - Patient was asked to reach out to Dynasplint rep to make sure that  splints are adjusted appropriately.  4/11/22 - PT recommended that patient follow up with Dynasplint rep for reassessment of splint fit.  4/14/22 - Patient informed that PT would reach out to [facility] again to have him schedule a visit.  He was encouraged to continue being active at home.  4/18/22 -  Patient asked to contact Dynasplint rep to set up a time for a consult that would work for his schedule.  4/28/22 - Patient educated on wearing Dynaspints for 2 hours/day for the next 4 days and then we will assess his ability to increase to 4 hours/day after next visit.  He verbalized good understanding of education from Andriy (Alexa) and was told to try Right splint on in new wear position first before increasing tension to 9/13.    5/3/22 - Patient educated on attempting standing at home at sink holding stand position for 10-15 seconds repeating 3x's and doing this in the morning and afternoon every day.   5/12/22 - Patient educated on the importance of walking with walker each session moving forward to try to get range applied to a functional task.  5/26/22 - Patient educated on adding walker to sit to stands at sink as a way of increasing his stand balance safely at home alone.  6/7/22 - Patient educated on his gains at this point and that PT is requesting more visits for a renewed Plan of Care.   6/10/22 - Patient educated on progressing to standing marches with walker at home and standing lateral foot taps with walker at home in same set up as before with kitchen counter in front and wheelchair behind for safety.  6/21/22 - Patient educated on stair mobility sequence.   6/23/22 - Patient educated on the risk of falls with walking home alone at this time.   7/28/22 - Patient educated on safety with gait at home and advancing gait challenges in therapy.  Home Exercise Provided: 2/24/22 Exercises were reviewed and Hunter was able to demonstrate them prior to the end of the session.  Hunter demonstrated good   understanding of the education provided.   ASSESSMENT     Patient was able to be repositioned every 2-3 minutes on the Nustep in order for him to attain bilateral knee extension. He continutes to require instructions for upper extremity placement with sit to stand but not with stand to sit.      Pt prognosis is Fair  To Good    Pt will continue to benefit from skilled outpatient physical therapy to address the deficits listed in the problem list box on initial evaluation, provide pt/family education and to maximize pt's level of independence in the home and community environment.     Pt's spiritual, cultural and educational needs considered and pt agreeable to plan of care and goals.     Anticipated Barriers for therapy: co-morbidities, transportation assistance needed at times, lifestyle, potential contractures of knees/hips    GOALS:     Short Term Goals:  4 weeks Progress    1. Function: Patient will demonstrate improved function as indicated by a functional limitation score of less than or equal to 55 out of 100 on FOTO = 44% limitation PC   2. Mobility: Patient will improve sit to stand transfer with moderate assistance in order to progress towards independence with functional activities. (Met)  (Modified) - Patient will perform wheelchair to mat transfer with stand by assist to demonstrate improved independence with mobility. PC   3. Gait: Patient will ambulated 3 steps in parallel bars with Maximum assistance to demonstrate improved strength, posture and endurance met   5. HEP: Patient will demonstrate independence with HEP in order to progress toward functional independence. met    6. Assess patient's needs for Dynasplints and set up consultation if needed. met       Long Term Goals:  8 weeks Progress   1. Function: Patient will demonstrate improved function as indicated by a functional limitation score of less than or equal to 60 out of 100 on FOTO = 40 % limitation PC   2. Mobility: Patient will improve  AROM of bilateral knees to -10 degrees  in order to return to maximal functional potential and improve quality of life. PC   3. Functional Mobility - Patient will perform mat to wheelchair transfer with modified independence with walker in order to improve his independence with functional mobility. PC   4. Gait: Patient will ambulate 15 feet with rolling walker with moderate assistance to demonstrate improved strength, endurance and mobility. (Met)   (Modified) - Patient will ambulate 100 ft with walker incorporating turns with modified independence to negotiate home environment with independence. PC   5. HEP: Patient educated on HEP in preparation for d/c verbalizing and demonstrating good understanding of topics discussed.    PC   6.          Goals Key:  PC= progressing/continue;        PM= partially met;             DC= discontinue         PLAN     Continue Plan of Care (POC) and progress per patient tolerance. See Treatment section for exercise progression.  Outpatient Physical Therapy 2 times weekly for 8 weeks to include the following interventions: Electrical Stimulation Attended, Gait Training, Moist Heat/ Ice, Neuromuscular Re-ed, Orthotic Management and Training, Patient Education, Self Care, Therapeutic Activities, Therapeutic Exercise and Prosthetic Management.      Sheng Charles, PTA

## 2022-08-08 ENCOUNTER — CLINICAL SUPPORT (OUTPATIENT)
Dept: REHABILITATION | Facility: HOSPITAL | Age: 82
End: 2022-08-08
Payer: MEDICARE

## 2022-08-08 DIAGNOSIS — R53.1 DECREASED STRENGTH, ENDURANCE, AND MOBILITY: ICD-10-CM

## 2022-08-08 DIAGNOSIS — M25.661 DECREASED RANGE OF MOTION OF BOTH LOWER EXTREMITIES: Primary | ICD-10-CM

## 2022-08-08 DIAGNOSIS — M25.662 DECREASED RANGE OF MOTION OF BOTH LOWER EXTREMITIES: Primary | ICD-10-CM

## 2022-08-08 DIAGNOSIS — Z74.09 DECREASED STRENGTH, ENDURANCE, AND MOBILITY: ICD-10-CM

## 2022-08-08 DIAGNOSIS — R68.89 DECREASED STRENGTH, ENDURANCE, AND MOBILITY: ICD-10-CM

## 2022-08-08 PROCEDURE — 97530 THERAPEUTIC ACTIVITIES: CPT | Mod: KX

## 2022-08-08 PROCEDURE — 97110 THERAPEUTIC EXERCISES: CPT | Mod: KX

## 2022-08-08 NOTE — PROGRESS NOTES
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  He also reports having increased flexibility in his Left knee.     Response to previous treatment: good with no adverse effects.    Functional change:  He reports that he is feeling stronger with more confidence with standing and is able to stand for longer bouts at home.  He recently has been able to stand without using his hands on his countertop to pull into standing.    Pain: 0/10 at rest  Location: not applicable      OBJECTIVE     Objective Measures updated at progress report unless specified.  Re-Assessment for (25) minutes:  range of motion of bilateral knees measured with NuStep - Right = -20, Left = -23  Sitting with bilateral lower extremities over TBall: Right knee = -12, Left knee = -22  In supine: Right hip = -15 , Right knee = -30                   Left hip = -15, Left knee = -35  Functional Mobility: Transfers modified independent with walker and bed mobility with independence  Ambulation Re-Assessment - ambulating 150 ft with walker with supervision to modified independence.     Treatment     Hunter received the treatments listed below:      THERAPEUTIC EXERCISES to develop strength, endurance, ROM,  flexibility, posture and core stabilization for (13) minutes including:  Intervention Performed Today    short arc quad in supine  lower extremities over wedge 2x20 Right AROM, Left 2x20 Active Assistive for form and quality    sidelying toward prone   Stretch to Hip flexor 3x30 seconds each side   Quad set   2x10 Left     Sidelying hip extension   2x10 bilaterally with 10 seconds endrange stretch on last rep   [place] Left Prosthetic leg  Performed at edge of mat    Sitting in reclined position at edge of mat   Hip flexor stretch 3x30 seconds bilateral    Hip Abduction  In sidelying 2x10 bilateral    Bridge attempt  1x5   Posterior Pelvic Tilt Supine  2x5   Prone lying over light blue small triangle wedge  Holding position 2 minutes, then 4 minutes   Stretch to Hamstrings  3 x 1 minute Right lower extremity    Heelslide with forceful push towards extension  2x10 bilateral with 10 second enrange hold stretch on last rep    10 # over distal Left thigh  Stretch to Left hip flexor for over 5 minutes while exercising Right lower extremity    Prone Hamstring curl endrange for quad/hip flexor stretch  2x10 AAROM   lower extremity lifts towards extension in prone  2x10 AAROM   Prone hip flexor stretch   3x10 second holds bilateral    Standing frame raising to patient's tolerance    - Standing 2x's in standing frame holding each stand for 10-15  minutes working on the following:  - Horizontal Abduction with bilateral upper extremities 2x10 AROM   - terminal knee extension in stand position 2x20 bilateral   - Rows holding red theraband bilateral  2x10  - Pushing through upper extremities to achieve trunk extension 2x25  - cross body punch with 2# dumbbell 1x10 bilateral   - scap retraction 2x10  - attempted 1x10 shoulder rolls backward - patient had difficulty coordinating  - Overhead press with green tband bilateral 1x10  - Patient given feedback on standing frame position - working on increasing seat angle  after 30 second rest between lifts (repeated 3-4 times)  - Lateral reaches 1x8 bilateral   - [place] 1x20 hits working on upright trunk posture  - Pulling with bilateral upper extremities on tray at front to pull hips forward from seat and extend knees 2x25  - Reaching Left and Right with cones to target to stretch lower extremities with each reach 2x10 cones to each side incorporating reach posteriorly    Shuttle X  x   - 2x15 bilateral leg press with 4 bands   -2x10 single leg press with 2- 3 bands Left and 3 bands Right    NuStep  x 9 minutes at Level 3, working on alternating movement of lower extremities and stretch to bilateral knees   Stairs  - Step ups with Left lower extremity and down with right 4 steps with bilateral upper extremity support    Standing Frame  Standing 1 x in standing frame working on holding endrange position of approximately 55 degree seat angle for 5 minutes   Standing in parallel bars working sit to stands           - standing 2x's  - Standing weights shifts Left to Right 1x10  - Standing knee extension 1x10  - Standing Left upper extremity lifts off bar 1x5  - Standing bilateral upper extremity lifts off bar 1x10 with PT helping to hold hips for support with minimum to moderate assist     * In standing frame:  Sitting position = 0 degree seat angle  Full upright stand position with hips and knees at 0 degrees = 90 degree seat angle  3/15/22 - 1st  standing frame was at 42 degree seat angle, last stand position was at 65 degree seat angle  3/17/22 - 1st  standing frame was at 48 degree seat angle, last stand position was at 70 degree seat angle  3/21/22 - 1st  standing frame was at 52 degree seat angle, last stand position was at 70 degree seat angle  3/29/22 - 1st  standing frame was at 60 degree seat angle, last stand position was at 72 degree seat angle  3/31/22 - 1st  standing frame was at 55 degree seat angle, last stand position  was at 75 degree seat angle  4/7/22 - 1st  standing frame was at 60 degree seat angle, last stand position was at 80 degree seat angle  4/11/22 - 1st  standing frame was at 60 degree seat angle, last stand position was at 78 degree seat angle  4/14/22 - 1st  standing frame was at 55 degree seat angle, last stand position was at 86 degree seat angle  4/19/22 - 1st  standing frame was at 60 degree seat angle, last stand position was at 78 degree seat angle  4/22/22 - 1st  standing frame was at 65 degree seat angle, last stand position was at 81 degree seat angle  4/26/22 - 1st  standing frame was at 65 degree seat angle, last stand position was at 82 degree seat angle  5/3/22 - 1st  standing frame was at 65 degree seat angle, last stand position was at 88 degree seat angle  5/5/22 - 1st  standing frame was at 65 degree seat angle, last stand position was at 84 degree seat angle  5/10/22 - 1st  standing frame was at 60 degree seat angle, last stand position was at 80 degree seat angle  5/12/22 - 1st  standing frame was at 65 degree seat angle, last stand position was at 80 degree seat angle  5/17/22 - 1st  standing frame was at 62 degree seat angle, last stand position was at 82 degree seat angle  5/19/22 - 1st  standing frame was at 60 degree seat angle, last stand position was at 80 degree seat angle  5/24/22 - 1st  standing frame was at 62 degree seat angle, last stand position was at 82 degree seat angle  5/26/22 - 1st  standing frame was at 60 degree seat angle, last stand position was at 80 degree seat angle   5/31/22 - 1st  standing frame was at 65 degree seat angle, last stand position was at 80 degree seat angle  6/2/22 - 1st  standing frame was at 70 degree seat angle, last stand position was at 82 degree seat angle  6/7/22 - 1st  standing frame was at 68 degree seat angle,  last stand position was at 80 degree seat angle  6/10/22 - 1st  standing frame was at 60 degree seat angle, last stand position was at 80 degree seat angle  6/14/22 - 1st  standing frame was at 65 degree seat angle, last stand position was at 82 degree seat angle   6/16/22 - 1st  standing frame was at 70 degree seat angle, last stand position was at 84 degree seat angle  6/21/22 - 1st  standing frame was at 65 degree seat angle, last stand position was at 80 degree seat angle  Plan for Next Visit:      THERAPEUTIC ACTIVITIES to improve dynamic and functional  performance for () minutes including:    Intervention Performed Today    Bed - wheelchair transfer   Practiced set up and sequence   Bed mobility sit to supine to sit  Practiced sequence    Rolling   3 x each side working on sequence to incorporate hip flexor stretch on each side. Then separate stretch performed as listed above   Standing in parallel bars  4 x's working on several weight shifts towards hip extension and knee extension while standing.   Standing in parallel bars performing step forward  With Right lower extremity 5x's with moderate assistance to maintain standing position   Dynasplint Consult via [facility] with Andriy to discuss fit     Sit to  parallel bars without assistance  2x's   Dynasplint Consult  Andriy Stout (Rep for Dynasplint) met with physical therapist and patient today during session to discuss options for dynasplints for bilateral knees due to patient's significant lack of range of motion.     Dynasplint Fitting  Andriy Stout (Rep for Dynasplint) met with physical therapist and patient today to deliver and fit patient for bilateral knee dynasplints.  We discussed wearing time of 1-2 hours/day for the first week and then we would re-assess.  Patient was shown how to don each brace and straps were marked for future use.  The Dynasplint force was increased to 6 on the Left and 7 on the Right to  start.  Extra padding was added to thin straps for skin protection.   Dynasplint Consult  Andriy Stout (Rep for Dynasplint) met with physical therapist and patient today during session to discuss proper fit and positioning of Dynasplint to feel a better stretch at home. Pt only brought Left splint to session today and his splint was donned, straps were tightened and new black line added for patient to follow at home.  Left splint was increased from 7/13 to 9/13 tension.  He reports that he places a pillow under his knee at home and he was educated on better positioning of pillow under distal stump with prosthesis removed.  He also felt more of a stretch in long sitting vs supine and was asked to try this position at home for part of the stretch if able.    Standing at sink in Neuro Room working on stand posture and positioning   Simulating activity that he has been given to do for homework. 1x5 seconds, 1x10 seconds   Patient educated addition to Home Exercise Program   - Simulated sit to stand setup  From wheelchair to sink at home performing with walker in front to work on letting go of sink and holding on to walker.  Patient was educated on how to sit safely to prevent wheelchair from tipping backwards if he loses balance.  He was told to start with one hand on walker and one hand on counter top until he can progress to both hands on walker for support. This will be a way that he can safely challenge himself at home. Patient practiced standing to walker 3x's working on sequence and safety of task.   Patient expressed concerns about not knowing his current weight, so PT assisted patient with obtaining weight at start of session with wheelchair scale.   He currently weighs 139 #   Several transfers and sit to stands to adjust walker height as needed  Contact guard to stand by assist for each   Several sit to stand and wheelchair to machine transfers with and without walker performed throughout session  With  independence.      Plan for Next Visit:      Gait Training: for (0) minutes:        Patient Education and Home Exercises     Home Exercises Provided and Patient Education Provided     Education provided:   -2/22/22 Patient educated on increasing length and consistency of stretching knees and hips to every 2 hours at home.  He was shown and demonstrated understanding of short arc quads, rolling toward prone and sitting in reclined position to stretch hip flexors.  -2/24/22 Patient given Home Exercise Program on exercises for lower extremities to be performed 2x15 3x's/day  See patient Instructions in EMR  -3/1/22 Re-inforced Home Exercise Program and asked patient to incorporate prone lying for longer periods of time to build up to 20 minutes per day.  3/3/22 - Discussed the option of dynasplints for bilateral lower extremities. Patient educated on the option of Dynasplints vs basic knee braces and encouraged to consider Dynasplints as a more aggressive and efficient way of properly positioning and dynamically stretching his legs outside of therapy.  3/8/22 - Patient was educated on Dynasplints with Andriy Stout (Rep).   He was shown pictures on ipad and Andriy and physical therapist discussed wearing schedule to make gains with his muscle length outside of therapy sessions.  Andriy informed patient that he would assist with collecting information regarding insurance coverage and provide him with any out of pocket costs so he can decide if he would like to pursue this option.  3/22/22 - Patient was educated that Andriy Stout (Rep for Dynasplint) messaged me that his splints have been approved and are being mailed this week.   3/24/22 - Patient was educated on Dynasplint management and importance of daily skin checks.  His tolerance of wearing splints for the first week will determine his wearing schedule for the following weeks.  3/29/22 - Patient was educated on only wearing Dynasplints while lying down to get the most  effective stretch to lower extremities.  3/31/22 - Patient was asked to reach out to Dynasplint rep (Andriy) to set up a time for Andriy to re-assess the positioning and fit of splints to see if he can feel more of a stretch with wearing schedule.  4/7/22 - Patient was asked to reach out to Dynasplint rep to make sure that splints are adjusted appropriately.  4/11/22 - PT recommended that patient follow up with Dynasplint rep for reassessment of splint fit.  4/14/22 - Patient informed that PT would reach out to Dynasplint rep again to have him schedule a visit.  He was encouraged to continue being active at home.  4/18/22 -  Patient asked to contact Dynasplint rep to set up a time for a consult that would work for his schedule.  4/28/22 - Patient educated on wearing Dynaspints for 2 hours/day for the next 4 days and then we will assess his ability to increase to 4 hours/day after next visit.  He verbalized good understanding of education from Andriy (Dynasplint) and was told to try Right splint on in new wear position first before increasing tension to 9/13.    5/3/22 - Patient educated on attempting standing at home at sink holding stand position for 10-15 seconds repeating 3x's and doing this in the morning and afternoon every day.   5/12/22 - Patient educated on the importance of walking with walker each session moving forward to try to get range applied to a functional task.  5/26/22 - Patient educated on adding walker to sit to stands at sink as a way of increasing his stand balance safely at home alone.  6/7/22 - Patient educated on his gains at this point and that PT is requesting more visits for a renewed Plan of Care.   6/10/22 - Patient educated on progressing to standing marches with walker at home and standing lateral foot taps with walker at home in same set up as before with kitchen counter in front and wheelchair behind for safety.  6/21/22 - Patient educated on stair mobility sequence.   6/23/22 - Patient  educated on the risk of falls with walking home alone at this time.   7/28/22 - Patient educated on safety with gait at home and advancing gait challenges in therapy.  8/8/22 - Patient educated on proper technique of side lying hip extension and that Plan of Care would be extended for another month in preparation for discharge.   Home Exercise Provided: 2/24/22 Exercises were reviewed and Hunter was able to demonstrate them prior to the end of the session.  Hunter demonstrated good  understanding of the education provided.   ASSESSMENT     Patient demonstrated good participation and effort with all exercises and activities today.   He has gained functional range of motion of his lower extremities and is now able to carry over range of motion gains with functional activities of standing, gait and exercises.  He was able to perform NuStep with seat at #13 position for the first time today.  He has continued to progress with his endurance with all exercises, standing and gait.  On his last visit he was able to perform 4 stairs and has been progressing with community ambulation.  He has good potential to continue to progress with PT treatment to work towards mobility independence with walker.     Pt prognosis is Fair  To Good    Pt will continue to benefit from skilled outpatient physical therapy to address the deficits listed in the problem list box on initial evaluation, provide pt/family education and to maximize pt's level of independence in the home and community environment.     Pt's spiritual, cultural and educational needs considered and pt agreeable to plan of care and goals.     Anticipated Barriers for therapy: co-morbidities, transportation assistance needed at times, lifestyle, potential contractures of knees/hips    GOALS:     Short Term Goals:  4 weeks Progress    1. Function: Patient will demonstrate improved function as indicated by a functional limitation score of less than or equal to 55 out of 100 on  FOTO = 44% limitation PC   2. Mobility: Patient will improve sit to stand transfer with moderate assistance in order to progress towards independence with functional activities. (Met)  (Modified) - Patient will perform wheelchair to mat transfer with stand by assist to demonstrate improved independence with mobility. Met   3. Gait: Patient will ambulated 3 steps in parallel bars with Maximum assistance to demonstrate improved strength, posture and endurance met   5. HEP: Patient will demonstrate independence with HEP in order to progress toward functional independence. met    6. Assess patient's needs for Dynasplints and set up consultation if needed. met       Long Term Goals:  8 weeks Progress   1. Function: Patient will demonstrate improved function as indicated by a functional limitation score of less than or equal to 60 out of 100 on FOTO = 40 % limitation PC   2. Mobility: Patient will improve AROM of bilateral knees to -10 degrees  in order to return to maximal functional potential and improve quality of life. PC   3. Functional Mobility - Patient will perform mat to wheelchair transfer with modified independence with walker in order to improve his independence with functional mobility. Met   4. Gait: Patient will ambulate 15 feet with rolling walker with moderate assistance to demonstrate improved strength, endurance and mobility. (Met)   (Modified) - Patient will ambulate 100 ft with walker incorporating turns with modified independence to negotiate home environment with independence. Met   5. HEP: Patient educated on HEP in preparation for d/c verbalizing and demonstrating good understanding of topics discussed.    PC   6.          Goals Key:  PC= progressing/continue;        PM= partially met;             DC= discontinue         PLAN     Continue Plan of Care (POC) and progress per patient tolerance. See Treatment section for exercise progression.  Outpatient Physical Therapy 2 times weekly for 8 weeks to  include the following interventions: Electrical Stimulation Attended, Gait Training, Moist Heat/ Ice, Neuromuscular Re-ed, Orthotic Management and Training, Patient Education, Self Care, Therapeutic Activities, Therapeutic Exercise and Prosthetic Management.      Mindy Patino, PT

## 2022-08-08 NOTE — PLAN OF CARE
Outpatient Therapy Updated Plan of Care     Visit Date: 8/8/2022  Name: Hunter Schofield  Clinic Number: [id number]    Therapy Diagnosis:   Encounter Diagnoses   Name Primary?   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  Needing to change brief, clothes and bathe at bed level.       Subjective     Update: Patient reports that he would like to continue to work on safety, endurance and independence with walking with walker.    Objective     Update:   range of motion of bilateral knees measured with NuStep - Right = -20, Left = -23  Sitting with bilateral lower extremities over TBall: Right knee = -12, Left knee = -22  In supine: Right hip = -15 , Right knee = -30                   Left hip = -15, Left knee = -35  Functional Mobility: Transfers modified independent with walker and bed mobility with independence  Ambulation Re-Assessment - ambulating 150 ft with walker with supervision to modified independence.       Assessment     Update: Patient demonstrated good participation and effort with all exercises and activities today.   He has gained functional range of motion of his lower extremities and is now able to carry over range of motion gains with functional activities of standing, gait and exercises.  He was able to perform NuStep with seat at #13 position for the first time today.  He has continued to progress with his endurance with all exercises, standing and gait.  On his last visit he was able  to perform 4 stairs and has been progressing with community ambulation.  He has good potential to continue to progress with PT treatment to work towards mobility independence with walker.     GOALS:     Short Term Goals:  4 weeks Progress    1. Function: Patient will demonstrate improved function as indicated by a functional limitation score of less than or equal to 55 out of 100 on FOTO = 44% limitation PC   2. Mobility: Patient will improve sit to stand transfer with moderate assistance in order to progress towards independence with functional activities. (Met)  (Modified) - Patient will perform wheelchair to mat transfer with stand by assist to demonstrate improved independence with mobility. Met   3. Gait: Patient will ambulated 3 steps in parallel bars with Maximum assistance to demonstrate improved strength, posture and endurance met   5. HEP: Patient will demonstrate independence with HEP in order to progress toward functional independence. met    6. Assess patient's needs for Dynasplints and set up consultation if needed. met       Long Term Goals:  8 weeks Progress   1. Function: Patient will demonstrate improved function as indicated by a functional limitation score of less than or equal to 60 out of 100 on FOTO = 40 % limitation PC   2. Mobility: Patient will improve AROM of bilateral knees to -10 degrees  in order to return to maximal functional potential and improve quality of life. PC   3. Functional Mobility - Patient will perform mat to wheelchair transfer with modified independence with walker in order to improve his independence with functional mobility. Met   4. Gait: Patient will ambulate 15 feet with rolling walker with moderate assistance to demonstrate improved strength, endurance and mobility. (Met)   (Modified) - Patient will ambulate 100 ft with walker incorporating turns with modified independence to negotiate home environment with independence. Met   5. HEP: Patient educated on HEP in  preparation for d/c verbalizing and demonstrating good understanding of topics discussed.    PC   6.          Goals Key:  PC= progressing/continue;        PM= partially met;             DC= discontinue    Reasons for Recertification of Therapy:   To work on improving independence with gait with walker.     Plan     Updated Certification Period: 8/8/2022 to 9/30/22  Recommended Treatment Plan: 1-2 times per week for 8 weeks: Electrical Stimulation Attended, Gait Training, Manual Therapy, Moist Heat/ Ice, Neuromuscular Re-ed, Orthotic Management and Training, Patient Education, Self Care, Therapeutic Activities, Therapeutic Exercise and Dry Needling  Other Recommendations: None    Mindy Patino, PT  8/8/2022      I CERTIFY THE NEED FOR THESE SERVICES FURNISHED UNDER THIS PLAN OF TREATMENT AND WHILE UNDER MY CARE    Physician's comments:        Physician's Signature: ___________________________________________________

## 2022-08-12 ENCOUNTER — CLINICAL SUPPORT (OUTPATIENT)
Dept: REHABILITATION | Facility: HOSPITAL | Age: 82
End: 2022-08-12
Payer: MEDICARE

## 2022-08-12 DIAGNOSIS — R53.1 DECREASED STRENGTH, ENDURANCE, AND MOBILITY: ICD-10-CM

## 2022-08-12 DIAGNOSIS — M25.662 DECREASED RANGE OF MOTION OF BOTH LOWER EXTREMITIES: Primary | ICD-10-CM

## 2022-08-12 DIAGNOSIS — M25.661 DECREASED RANGE OF MOTION OF BOTH LOWER EXTREMITIES: Primary | ICD-10-CM

## 2022-08-12 DIAGNOSIS — Z74.09 DECREASED STRENGTH, ENDURANCE, AND MOBILITY: ICD-10-CM

## 2022-08-12 DIAGNOSIS — R68.89 DECREASED STRENGTH, ENDURANCE, AND MOBILITY: ICD-10-CM

## 2022-08-12 PROCEDURE — 97110 THERAPEUTIC EXERCISES: CPT

## 2022-08-12 PROCEDURE — 97530 THERAPEUTIC ACTIVITIES: CPT

## 2022-08-12 NOTE — PROGRESS NOTES
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  He reports that he has been compliant with exercises at home.     Response to previous treatment: good with no adverse effects.    Functional change:  He reports that he is feeling stronger with more confidence with standing and is able to stand for longer bouts at home.  He recently has been able to stand without using his hands on his countertop to pull into standing.    Pain: 0/10 at rest  Location: not applicable      OBJECTIVE     Objective Measures updated at progress report unless specified.  Re-Assessment for (25) minutes:  range of motion of bilateral knees measured with NuStep - Right = -20, Left = -23  Sitting with bilateral lower extremities over TBall: Right knee = -12, Left knee = -22  In supine: Right hip = -15 , Right knee = -30                   Left hip = -15, Left knee = -35  Functional Mobility: Transfers modified independent with walker and bed mobility with independence  Ambulation Re-Assessment - ambulating 150 ft with walker with supervision to modified independence.     Treatment     Hunter received the treatments listed below:      THERAPEUTIC EXERCISES to develop strength, endurance, ROM, flexibility, posture and core  stabilization for (29) minutes including:  Intervention Performed Today    short arc quad in supine  lower extremities over wedge 2x20 Right AROM, Left 2x20 Active Assistive for form and quality    sidelying toward prone   Stretch to Hip flexor 3x30 seconds each side   Quad set   2x10 Left     Sidelying hip extension   2x10 bilaterally with 10 seconds endrange stretch on last rep   [place] Left Prosthetic leg  Performed at edge of mat    Sitting in reclined position at edge of mat   Hip flexor stretch 3x30 seconds bilateral    Hip Abduction  In sidelying 2x10 bilateral    Bridge attempt  1x5   Posterior Pelvic Tilt Supine  2x5   Prone lying over light blue small triangle wedge  Holding position 2 minutes, then 4 minutes   Stretch to Hamstrings  3 x 1 minute Right lower extremity    Heelslide with forceful push towards extension  2x10 bilateral with 10 second enrange hold stretch on last rep    10 # over distal Left thigh  Stretch to Left hip flexor for over 5 minutes while exercising Right lower extremity    Prone Hamstring curl endrange for quad/hip flexor stretch  2x10 AAROM   lower extremity lifts towards extension in prone  2x10 AAROM   Prone hip flexor stretch   3x10 second holds bilateral    Standing frame raising to patient's tolerance    - Standing 2x's in standing frame holding each stand for 10-15  minutes working on the following:  - Horizontal Abduction with bilateral upper extremities 2x10 AROM   - terminal knee extension in stand position 2x20 bilateral   - Rows holding red theraband bilateral  2x10  - Pushing through upper extremities to achieve trunk extension 2x25  - cross body punch with 2# dumbbell 1x10 bilateral   - scap retraction 2x10  - attempted 1x10 shoulder rolls backward - patient had difficulty coordinating  - Overhead press with green tband bilateral 1x10  - Patient given feedback on standing frame position - working on increasing seat angle after 30 second rest between lifts  (repeated 3-4 times)  - Lateral reaches 1x8 bilateral   - [place] 1x20 hits working on upright trunk posture  - Pulling with bilateral upper extremities on tray at front to pull hips forward from seat and extend knees 2x25  - Reaching Left and Right with cones to target to stretch lower extremities with each reach 2x10 cones to each side incorporating reach posteriorly    Shuttle X  x - 2x15 bilateral leg press with 4 bands   -2x10 single leg press with 2- 3 bands Left and 3 bands Right    NuStep  x 10 minutes at Level 3, working on alternating movement of lower extremities and stretch to bilateral knees (able to scoot seat position back to #13 to increase stretch in lower extremities)   Stairs  - Step ups with Left lower extremity and down with right 4 steps with bilateral upper extremity support    Standing Frame  Standing 1 x in standing frame working on holding endrange position of approximately 55 degree seat angle for 5 minutes   Standing in parallel bars working sit to stands           - standing 2x's  - Standing weights shifts Left to Right 1x10  - Standing knee extension 1x10  - Standing Left upper extremity lifts off bar 1x5  - Standing bilateral upper extremity lifts off bar 1x10 with PT helping to hold hips for support with minimum to moderate assist   Nautilus leg extension x 3x10 with 1 plate of resistance     * In standing frame:  Sitting position = 0 degree seat angle  Full upright stand position with hips and knees at 0 degrees = 90 degree seat angle  3/15/22 - 1st  standing frame was at 42 degree seat angle, last stand position was at 65 degree seat angle  3/17/22 - 1st  standing frame was at 48 degree seat angle, last stand position was at 70 degree seat angle  3/21/22 - 1st  standing frame was at 52 degree seat angle, last stand position was at 70 degree seat angle  3/29/22 - 1st  standing frame was at 60 degree seat angle, last stand position was at 72  degree seat angle  3/31/22 - 1st  standing frame was at 55 degree seat angle, last stand position was at 75 degree seat angle  4/7/22 - 1st  standing frame was at 60 degree seat angle, last stand position was at 80 degree seat angle  4/11/22 - 1st  standing frame was at 60 degree seat angle, last stand position was at 78 degree seat angle  4/14/22 - 1st  standing frame was at 55 degree seat angle, last stand position was at 86 degree seat angle  4/19/22 - 1st  standing frame was at 60 degree seat angle, last stand position was at 78 degree seat angle  4/22/22 - 1st  standing frame was at 65 degree seat angle, last stand position was at 81 degree seat angle  4/26/22 - 1st  standing frame was at 65 degree seat angle, last stand position was at 82 degree seat angle  5/3/22 - 1st  standing frame was at 65 degree seat angle, last stand position was at 88 degree seat angle  5/5/22 - 1st  standing frame was at 65 degree seat angle, last stand position was at 84 degree seat angle  5/10/22 - 1st  standing frame was at 60 degree seat angle, last stand position was at 80 degree seat angle  5/12/22 - 1st  standing frame was at 65 degree seat angle, last stand position was at 80 degree seat angle  5/17/22 - 1st  standing frame was at 62 degree seat angle, last stand position was at 82 degree seat angle  5/19/22 - 1st  standing frame was at 60 degree seat angle, last stand position was at 80 degree seat angle  5/24/22 - 1st  standing frame was at 62 degree seat angle, last stand position was at 82 degree seat angle  5/26/22 - 1st  standing frame was at 60 degree seat angle, last stand position was at 80 degree seat angle   5/31/22 - 1st  standing frame was at 65 degree seat angle, last stand position was at 80 degree seat angle  6/2/22 - 1st  standing frame was at 70 degree seat angle, last stand  position was at 82 degree seat angle  6/7/22 - 1st  standing frame was at 68 degree seat angle, last stand position was at 80 degree seat angle  6/10/22 - 1st  standing frame was at 60 degree seat angle, last stand position was at 80 degree seat angle  6/14/22 - 1st  standing frame was at 65 degree seat angle, last stand position was at 82 degree seat angle   6/16/22 - 1st  standing frame was at 70 degree seat angle, last stand position was at 84 degree seat angle  6/21/22 - 1st  standing frame was at 65 degree seat angle, last stand position was at 80 degree seat angle  Plan for Next Visit:      THERAPEUTIC ACTIVITIES to improve dynamic and functional  performance for (10) minutes including:    Intervention Performed Today    Bed - wheelchair transfer   Practiced set up and sequence   Bed mobility sit to supine to sit  Practiced sequence    Rolling   3 x each side working on sequence to incorporate hip flexor stretch on each side. Then separate stretch performed as listed above   Standing in parallel bars  4 x's working on several weight shifts towards hip extension and knee extension while standing.   Standing in parallel bars performing step forward  With Right lower extremity 5x's with moderate assistance to maintain standing position   Dynasplint Consult via [facility] with Andriy to discuss fit     Sit to  parallel bars without assistance  2x's   Dynasplint Consult  Andriy Stout (Rep for Dynasplint) met with physical therapist and patient today during session to discuss options for dynasplints for bilateral knees due to patient's significant lack of range of motion.     Dynasplint Fitting  Andriy Stout (Rep for Dynasplint) met with physical therapist and patient today to deliver and fit patient for bilateral knee dynasplints.  We discussed wearing time of 1-2 hours/day for the first week and then we would re-assess.  Patient was shown how to don each brace and straps  were marked for future use.  The Dynasplint force was increased to 6 on the Left and 7 on the Right to start.  Extra padding was added to thin straps for skin protection.   Dynasplint Consult  Andriy Stout (Rep for Dynasplint) met with physical therapist and patient today during session to discuss proper fit and positioning of Dynasplint to feel a better stretch at home. Pt only brought Left splint to session today and his splint was donned, straps were tightened and new black line added for patient to follow at home.  Left splint was increased from 7/13 to 9/13 tension.  He reports that he places a pillow under his knee at home and he was educated on better positioning of pillow under distal stump with prosthesis removed.  He also felt more of a stretch in long sitting vs supine and was asked to try this position at home for part of the stretch if able.    Standing at sink in Neuro Room working on stand posture and positioning   Simulating activity that he has been given to do for homework. 1x5 seconds, 1x10 seconds   Patient educated addition to Home Exercise Program   - Simulated sit to stand setup  From wheelchair to sink at home performing with walker in front to work on letting go of sink and holding on to walker.  Patient was educated on how to sit safely to prevent wheelchair from tipping backwards if he loses balance.  He was told to start with one hand on walker and one hand on counter top until he can progress to both hands on walker for support. This will be a way that he can safely challenge himself at home. Patient practiced standing to walker 3x's working on sequence and safety of task.   Patient expressed concerns about not knowing his current weight, so PT assisted patient with obtaining weight at start of session with wheelchair scale.   He currently weighs 139 #   Several transfers and sit to stands to adjust walker height as needed  Contact guard to stand by assist for each   Several sit to stand  and wheelchair to machine transfers with and without walker performed throughout session  With independence.    Discussed Left prosthesis sleeve  x - It is currently to large/long and often curls.  Patient shared number of company as he has been unsuccessful with getting rep to deliver new one.      Plan for Next Visit:      Gait Training: for (5) minutes:  Ambulated on turf 2x 50 ft with walker with supervsion.      Patient Education and Home Exercises     Home Exercises Provided and Patient Education Provided     Education provided:   -2/22/22 Patient educated on increasing length and consistency of stretching knees and hips to every 2 hours at home.  He was shown and demonstrated understanding of short arc quads, rolling toward prone and sitting in reclined position to stretch hip flexors.  -2/24/22 Patient given Home Exercise Program on exercises for lower extremities to be performed 2x15 3x's/day  See patient Instructions in EMR  -3/1/22 Re-inforced Home Exercise Program and asked patient to incorporate prone lying for longer periods of time to build up to 20 minutes per day.  3/3/22 - Discussed the option of dynasplints for bilateral lower extremities. Patient educated on the option of Dynasplints vs basic knee braces and encouraged to consider Dynasplints as a more aggressive and efficient way of properly positioning and dynamically stretching his legs outside of therapy.  3/8/22 - Patient was educated on Dynasplints with Andriy Stout (Rep).   He was shown pictures on ipad and Andriy and physical therapist discussed wearing schedule to make gains with his muscle length outside of therapy sessions.  Andriy informed patient that he would assist with collecting information regarding insurance coverage and provide him with any out of pocket costs so he can decide if he would like to pursue this option.  3/22/22 - Patient was educated that Andriy Stout (Rep for Dynasplint) messaged me that his splints have been approved  and are being mailed this week.   3/24/22 - Patient was educated on Dynasplint management and importance of daily skin checks.  His tolerance of wearing splints for the first week will determine his wearing schedule for the following weeks.  3/29/22 - Patient was educated on only wearing Dynasplints while lying down to get the most effective stretch to lower extremities.  3/31/22 - [facility] to set up a time for Andriy to re-assess the positioning and fit of splints to see if he can feel more of a stretch with wearing schedule.  4/7/22 - [facility] sure that [facility]ints are adjusted appropriately.  4/11/22 - PT recommended that patient follow up with Dynasplint rep for reassessment of splint fit.  4/14/22 - Patient informed that PT would reach out to [facility] again to have him schedule a visit.  He was encouraged to continue being active at home.  4/18/22 -  Patient asked to contact Dynasplint rep to set up a time for a consult that would work for his schedule.  4/28/22 - Patient educated on wearing Dynaspints for 2 hours/day for the next 4 days and then we will assess his ability to increase to 4 hours/day after next visit.  He verbalized good understanding of education from Andriy (Dynasplint) and was told to try Right splint on in new wear position first before increasing tension to 9/13.    5/3/22 - Patient educated on attempting standing at home at sink holding stand position for 10-15 seconds repeating 3x's and doing this in the morning and afternoon every day.   5/12/22 - Patient educated on the importance of walking with walker each session moving forward to try to get range applied to a functional task.  5/26/22 - Patient educated on adding walker to sit to stands at sink as a way of increasing his stand balance safely at home alone.  6/7/22 - Patient educated on his gains at this point and that PT is requesting more  visits for a renewed Plan of Care.   6/10/22 - Patient educated on progressing to standing marches with walker at home and standing lateral foot taps with walker at home in same set up as before with kitchen counter in front and wheelchair behind for safety.  6/21/22 - Patient educated on stair mobility sequence.   6/23/22 - Patient educated on the risk of falls with walking home alone at this time.   7/28/22 - Patient educated on safety with gait at home and advancing gait challenges in therapy.  8/8/22 - Patient educated on proper technique of side lying hip extension and that Plan of Care would be extended for another month in preparation for discharge.   8/12/22 - Discussed contacting prosthetic company to replace sleeve as it is too large and long.      Home Exercise Provided: 2/24/22 Exercises were reviewed and Hunter was able to demonstrate them prior to the end of the session.  Hunter demonstrated good  understanding of the education provided.   ASSESSMENT     Patient demonstrated good participation and effort with all exercises today.  He was able to progress to the leg extension machine without difficulty and is improving with his stand posture for gait.  He had good confidence ambulating on uneven surface today.     Pt prognosis is Fair  To Good    Pt will continue to benefit from skilled outpatient physical therapy to address the deficits listed in the problem list box on initial evaluation, provide pt/family education and to maximize pt's level of independence in the home and community environment.     Pt's spiritual, cultural and educational needs considered and pt agreeable to plan of care and goals.     Anticipated Barriers for therapy: co-morbidities, transportation assistance needed at times, lifestyle, potential contractures of knees/hips    GOALS:     Short Term Goals:  4 weeks Progress    1. Function: Patient will demonstrate improved function as indicated by a functional limitation score of less  than or equal to 55 out of 100 on FOTO = 44% limitation PC   2. Mobility: Patient will improve sit to stand transfer with moderate assistance in order to progress towards independence with functional activities. (Met)  (Modified) - Patient will perform wheelchair to mat transfer with stand by assist to demonstrate improved independence with mobility. Met   3. Gait: Patient will ambulated 3 steps in parallel bars with Maximum assistance to demonstrate improved strength, posture and endurance met   5. HEP: Patient will demonstrate independence with HEP in order to progress toward functional independence. met    6. Assess patient's needs for Dynasplints and set up consultation if needed. met       Long Term Goals:  8 weeks Progress   1. Function: Patient will demonstrate improved function as indicated by a functional limitation score of less than or equal to 60 out of 100 on FOTO = 40 % limitation PC   2. Mobility: Patient will improve AROM of bilateral knees to -10 degrees  in order to return to maximal functional potential and improve quality of life. PC   3. Functional Mobility - Patient will perform mat to wheelchair transfer with modified independence with walker in order to improve his independence with functional mobility. Met   4. Gait: Patient will ambulate 15 feet with rolling walker with moderate assistance to demonstrate improved strength, endurance and mobility. (Met)   (Modified) - Patient will ambulate 100 ft with walker incorporating turns with modified independence to negotiate home environment with independence. Met   5. HEP: Patient educated on HEP in preparation for d/c verbalizing and demonstrating good understanding of topics discussed.    PC   6.          Goals Key:  PC= progressing/continue;        PM= partially met;             DC= discontinue         PLAN     Continue Plan of Care (POC) and progress per patient tolerance. See Treatment section for exercise progression.  Outpatient Physical  Therapy 2 times weekly for 8 weeks to include the following interventions: Electrical Stimulation Attended, Gait Training, Moist Heat/ Ice, Neuromuscular Re-ed, Orthotic Management and Training, Patient Education, Self Care, Therapeutic Activities, Therapeutic Exercise and Prosthetic Management.      Mindy Patino, PT, DPT

## 2022-08-15 ENCOUNTER — CLINICAL SUPPORT (OUTPATIENT)
Dept: REHABILITATION | Facility: HOSPITAL | Age: 82
End: 2022-08-15
Payer: MEDICARE

## 2022-08-15 DIAGNOSIS — M25.662 DECREASED RANGE OF MOTION OF BOTH LOWER EXTREMITIES: Primary | ICD-10-CM

## 2022-08-15 DIAGNOSIS — R68.89 DECREASED STRENGTH, ENDURANCE, AND MOBILITY: ICD-10-CM

## 2022-08-15 DIAGNOSIS — M25.661 DECREASED RANGE OF MOTION OF BOTH LOWER EXTREMITIES: Primary | ICD-10-CM

## 2022-08-15 DIAGNOSIS — R53.1 DECREASED STRENGTH, ENDURANCE, AND MOBILITY: ICD-10-CM

## 2022-08-15 DIAGNOSIS — Z74.09 DECREASED STRENGTH, ENDURANCE, AND MOBILITY: ICD-10-CM

## 2022-08-15 PROCEDURE — 97110 THERAPEUTIC EXERCISES: CPT | Mod: KX

## 2022-08-16 NOTE — PROGRESS NOTES
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  He reports that he has been compliant with exercises at home.     Response to previous treatment: good with no adverse effects.    Functional change:  He reports that he is feeling stronger with more confidence with standing and is able to stand for longer bouts at home.  He recently has been able to stand without using his hands on his countertop to pull into standing.    Pain: 0/10 at rest  Location: not applicable      OBJECTIVE     Objective Measures updated at progress report unless specified.  Re-Assessment for () minutes:  range of motion of bilateral knees measured with NuStep - Right = -20, Left = -23  Sitting with bilateral lower extremities over TBall: Right knee = -12, Left knee = -22  In supine: Right hip = -15 , Right knee = -30                   Left hip = -15, Left knee = -35  Functional Mobility: Transfers modified independent with walker and bed mobility with independence  Ambulation Re-Assessment - ambulating 150 ft with walker with supervision to modified independence.     Treatment     Hunter received the treatments listed below:      THERAPEUTIC EXERCISES to develop strength, endurance, ROM, flexibility, posture and core  stabilization for (39) minutes including:  Intervention Performed Today    short arc quad in supine  lower extremities over wedge 2x20 Right AROM, Left 2x20 Active Assistive for form and quality    sidelying toward prone   Stretch to Hip flexor 3x30 seconds each side   Quad set   2x10 Left     Sidelying hip extension   2x10 bilaterally with 10 seconds endrange stretch on last rep   [place] Left Prosthetic leg  Performed at edge of mat    Sitting in reclined position at edge of mat   Hip flexor stretch 3x30 seconds bilateral    Hip Abduction  In sidelying 2x10 bilateral    Bridge attempt  1x5   Posterior Pelvic Tilt Supine  2x5   Prone lying over light blue small triangle wedge  Holding position 2 minutes, then 4 minutes   Stretch to Hamstrings  3 x 1 minute Right lower extremity    Heelslide with forceful push towards extension  2x10 bilateral with 10 second enrange hold stretch on last rep    10 # over distal Left thigh  Stretch to Left hip flexor for over 5 minutes while exercising Right lower extremity    Prone Hamstring curl endrange for quad/hip flexor stretch  2x10 AAROM   lower extremity lifts towards extension in prone  2x10 AAROM   Prone hip flexor stretch   3x10 second holds bilateral    Standing frame raising to patient's tolerance    - Standing 2x's in standing frame holding each stand for 10-15  minutes working on the following:  - Horizontal Abduction with bilateral upper extremities 2x10 AROM   - terminal knee extension in stand position 2x20 bilateral   - Rows holding red theraband bilateral  2x10  - Pushing through upper extremities to achieve trunk extension 2x25  - cross body punch with 2# dumbbell 1x10 bilateral   - scap retraction 2x10  - attempted 1x10 shoulder rolls backward - patient had difficulty coordinating  - Overhead press with green tband bilateral 1x10  - Patient given feedback on standing frame position - working on increasing seat angle after 30 second rest between lifts  (repeated 3-4 times)  - Lateral reaches 1x8 bilateral   - [place] 1x20 hits working on upright trunk posture  - Pulling with bilateral upper extremities on tray at front to pull hips forward from seat and extend knees 2x25  - Reaching Left and Right with cones to target to stretch lower extremities with each reach 2x10 cones to each side incorporating reach posteriorly    Shuttle X  x - 1x15 bilateral leg press with 4 bands   -2x10 single leg press with 3-4 bands for each leg    NuStep  x 10 minutes at Level 3, working on alternating movement of lower extremities and stretch to bilateral knees (able to scoot seat position back to #14 to increase stretch in lower extremities)   Stairs  - Step ups with Left lower extremity and down with right 4 steps with bilateral upper extremity support    Standing Frame  Standing 1 x in standing frame working on holding endrange position of approximately 55 degree seat angle for 5 minutes   Standing in parallel bars working sit to stands           - standing 2x's  - Standing weights shifts Left to Right 1x10  - Standing knee extension 1x10  - Standing Left upper extremity lifts off bar 1x5  - Standing bilateral upper extremity lifts off bar 1x10 with PT helping to hold hips for support with minimum to moderate assist   Nautilus leg extension x 2x10, 1x5 with 1 plate of resistance     * In standing frame:  Sitting position = 0 degree seat angle  Full upright stand position with hips and knees at 0 degrees = 90 degree seat angle  3/15/22 - 1st  standing frame was at 42 degree seat angle, last stand position was at 65 degree seat angle  3/17/22 - 1st  standing frame was at 48 degree seat angle, last stand position was at 70 degree seat angle  3/21/22 - 1st  standing frame was at 52 degree seat angle, last stand position was at 70 degree seat angle  3/29/22 - 1st  standing frame was at 60 degree seat angle, last stand position was at 72 degree  seat angle  3/31/22 - 1st  standing frame was at 55 degree seat angle, last stand position was at 75 degree seat angle  4/7/22 - 1st  standing frame was at 60 degree seat angle, last stand position was at 80 degree seat angle  4/11/22 - 1st  standing frame was at 60 degree seat angle, last stand position was at 78 degree seat angle  4/14/22 - 1st  standing frame was at 55 degree seat angle, last stand position was at 86 degree seat angle  4/19/22 - 1st  standing frame was at 60 degree seat angle, last stand position was at 78 degree seat angle  4/22/22 - 1st  standing frame was at 65 degree seat angle, last stand position was at 81 degree seat angle  4/26/22 - 1st  standing frame was at 65 degree seat angle, last stand position was at 82 degree seat angle  5/3/22 - 1st  standing frame was at 65 degree seat angle, last stand position was at 88 degree seat angle  5/5/22 - 1st  standing frame was at 65 degree seat angle, last stand position was at 84 degree seat angle  5/10/22 - 1st  standing frame was at 60 degree seat angle, last stand position was at 80 degree seat angle  5/12/22 - 1st  standing frame was at 65 degree seat angle, last stand position was at 80 degree seat angle  5/17/22 - 1st  standing frame was at 62 degree seat angle, last stand position was at 82 degree seat angle  5/19/22 - 1st  standing frame was at 60 degree seat angle, last stand position was at 80 degree seat angle  5/24/22 - 1st  standing frame was at 62 degree seat angle, last stand position was at 82 degree seat angle  5/26/22 - 1st  standing frame was at 60 degree seat angle, last stand position was at 80 degree seat angle   5/31/22 - 1st  standing frame was at 65 degree seat angle, last stand position was at 80 degree seat angle  6/2/22 - 1st  standing frame was at 70 degree seat angle, last stand  position was at 82 degree seat angle  6/7/22 - 1st  standing frame was at 68 degree seat angle, last stand position was at 80 degree seat angle  6/10/22 - 1st  standing frame was at 60 degree seat angle, last stand position was at 80 degree seat angle  6/14/22 - 1st  standing frame was at 65 degree seat angle, last stand position was at 82 degree seat angle   6/16/22 - 1st  standing frame was at 70 degree seat angle, last stand position was at 84 degree seat angle  6/21/22 - 1st  standing frame was at 65 degree seat angle, last stand position was at 80 degree seat angle  Plan for Next Visit:      THERAPEUTIC ACTIVITIES to improve dynamic and functional  performance for () minutes including:    Intervention Performed Today    Bed - wheelchair transfer   Practiced set up and sequence   Bed mobility sit to supine to sit  Practiced sequence    Rolling   3 x each side working on sequence to incorporate hip flexor stretch on each side. Then separate stretch performed as listed above   Standing in parallel bars  4 x's working on several weight shifts towards hip extension and knee extension while standing.   Standing in parallel bars performing step forward  With Right lower extremity 5x's with moderate assistance to maintain standing position   Dynasplint Consult via [facility] with Andriy to discuss fit     Sit to  parallel bars without assistance  2x's   Dynasplint Consult  Andriy Stout (Rep for Dynasplint) met with physical therapist and patient today during session to discuss options for dynasplints for bilateral knees due to patient's significant lack of range of motion.     Dynasplint Fitting  Andriy Stout (Rep for Dynasplint) met with physical therapist and patient today to deliver and fit patient for bilateral knee dynasplints.  We discussed wearing time of 1-2 hours/day for the first week and then we would re-assess.  Patient was shown how to don each brace and straps  were marked for future use.  The Dynasplint force was increased to 6 on the Left and 7 on the Right to start.  Extra padding was added to thin straps for skin protection.   Dynasplint Consult  Andriy Stout (Rep for Dynasplint) met with physical therapist and patient today during session to discuss proper fit and positioning of Dynasplint to feel a better stretch at home. Pt only brought Left splint to session today and his splint was donned, straps were tightened and new black line added for patient to follow at home.  Left splint was increased from 7/13 to 9/13 tension.  He reports that he places a pillow under his knee at home and he was educated on better positioning of pillow under distal stump with prosthesis removed.  He also felt more of a stretch in long sitting vs supine and was asked to try this position at home for part of the stretch if able.    Standing at sink in Neuro Room working on stand posture and positioning   Simulating activity that he has been given to do for homework. 1x5 seconds, 1x10 seconds   Patient educated addition to Home Exercise Program   - Simulated sit to stand setup  From wheelchair to sink at home performing with walker in front to work on letting go of sink and holding on to walker.  Patient was educated on how to sit safely to prevent wheelchair from tipping backwards if he loses balance.  He was told to start with one hand on walker and one hand on counter top until he can progress to both hands on walker for support. This will be a way that he can safely challenge himself at home. Patient practiced standing to walker 3x's working on sequence and safety of task.   Patient expressed concerns about not knowing his current weight, so PT assisted patient with obtaining weight at start of session with wheelchair scale.   He currently weighs 139 #   Several transfers and sit to stands to adjust walker height as needed  Contact guard to stand by assist for each   Several sit to stand  and wheelchair to machine transfers with and without walker performed throughout session  With independence.    Discussed Left prosthesis sleeve   - It is currently to large/long and often curls.  Patient shared number of company as he has been unsuccessful with getting rep to deliver new one.      Plan for Next Visit:      Gait Training: for (5) minutes:  Ambulated on turf 2x 50 ft with walker with supervsion.      Patient Education and Home Exercises     Home Exercises Provided and Patient Education Provided     Education provided:   -2/22/22 Patient educated on increasing length and consistency of stretching knees and hips to every 2 hours at home.  He was shown and demonstrated understanding of short arc quads, rolling toward prone and sitting in reclined position to stretch hip flexors.  -2/24/22 Patient given Home Exercise Program on exercises for lower extremities to be performed 2x15 3x's/day  See patient Instructions in EMR  -3/1/22 Re-inforced Home Exercise Program and asked patient to incorporate prone lying for longer periods of time to build up to 20 minutes per day.  3/3/22 - Discussed the option of dynasplints for bilateral lower extremities. Patient educated on the option of Dynasplints vs basic knee braces and encouraged to consider Dynasplints as a more aggressive and efficient way of properly positioning and dynamically stretching his legs outside of therapy.  3/8/22 - Patient was educated on Dynasplints with Andriy Stout (Rep).   He was shown pictures on ipad and Andriy and physical therapist discussed wearing schedule to make gains with his muscle length outside of therapy sessions.  Andriy informed patient that he would assist with collecting information regarding insurance coverage and provide him with any out of pocket costs so he can decide if he would like to pursue this option.  3/22/22 - Patient was educated that Andriy Stout (Rep for Dynasplint) messaged me that his splints have been approved  and are being mailed this week.   3/24/22 - Patient was educated on Dynasplint management and importance of daily skin checks.  His tolerance of wearing splints for the first week will determine his wearing schedule for the following weeks.  3/29/22 - Patient was educated on only wearing Dynasplints while lying down to get the most effective stretch to lower extremities.  3/31/22 - [facility] to set up a time for Andriy to re-assess the positioning and fit of splints to see if he can feel more of a stretch with wearing schedule.  4/7/22 - [facility] sure that [facility]ints are adjusted appropriately.  4/11/22 - PT recommended that patient follow up with Dynasplint rep for reassessment of splint fit.  4/14/22 - Patient informed that PT would reach out to [facility] again to have him schedule a visit.  He was encouraged to continue being active at home.  4/18/22 -  Patient asked to contact Dynasplint rep to set up a time for a consult that would work for his schedule.  4/28/22 - Patient educated on wearing Dynaspints for 2 hours/day for the next 4 days and then we will assess his ability to increase to 4 hours/day after next visit.  He verbalized good understanding of education from Andriy (Dynasplint) and was told to try Right splint on in new wear position first before increasing tension to 9/13.    5/3/22 - Patient educated on attempting standing at home at sink holding stand position for 10-15 seconds repeating 3x's and doing this in the morning and afternoon every day.   5/12/22 - Patient educated on the importance of walking with walker each session moving forward to try to get range applied to a functional task.  5/26/22 - Patient educated on adding walker to sit to stands at sink as a way of increasing his stand balance safely at home alone.  6/7/22 - Patient educated on his gains at this point and that PT is requesting more  visits for a renewed Plan of Care.   6/10/22 - Patient educated on progressing to standing marches with walker at home and standing lateral foot taps with walker at home in same set up as before with kitchen counter in front and wheelchair behind for safety.  6/21/22 - Patient educated on stair mobility sequence.   6/23/22 - Patient educated on the risk of falls with walking home alone at this time.   7/28/22 - Patient educated on safety with gait at home and advancing gait challenges in therapy.  8/8/22 - Patient educated on proper technique of side lying hip extension and that Plan of Care would be extended for another month in preparation for discharge.   8/12/22 - Discussed contacting prosthetic company to replace sleeve as it is too large and long.      Home Exercise Provided: 2/24/22 Exercises were reviewed and Hunter was able to demonstrate them prior to the end of the session.  Hunter demonstrated good  understanding of the education provided.   ASSESSMENT     Patient demonstrated good participation and effort with all exercises today.  He demonstrated improved speed with gait and was able to tolerate NuStep seat at #14 today.  He is progressing well.     Pt prognosis is Fair  To Good    Pt will continue to benefit from skilled outpatient physical therapy to address the deficits listed in the problem list box on initial evaluation, provide pt/family education and to maximize pt's level of independence in the home and community environment.     Pt's spiritual, cultural and educational needs considered and pt agreeable to plan of care and goals.     Anticipated Barriers for therapy: co-morbidities, transportation assistance needed at times, lifestyle, potential contractures of knees/hips    GOALS:     Short Term Goals:  4 weeks Progress    1. Function: Patient will demonstrate improved function as indicated by a functional limitation score of less than or equal to 55 out of 100 on FOTO = 44% limitation PC    2. Mobility: Patient will improve sit to stand transfer with moderate assistance in order to progress towards independence with functional activities. (Met)  (Modified) - Patient will perform wheelchair to mat transfer with stand by assist to demonstrate improved independence with mobility. Met   3. Gait: Patient will ambulated 3 steps in parallel bars with Maximum assistance to demonstrate improved strength, posture and endurance met   5. HEP: Patient will demonstrate independence with HEP in order to progress toward functional independence. met    6. Assess patient's needs for Dynasplints and set up consultation if needed. met       Long Term Goals:  8 weeks Progress   1. Function: Patient will demonstrate improved function as indicated by a functional limitation score of less than or equal to 60 out of 100 on FOTO = 40 % limitation PC   2. Mobility: Patient will improve AROM of bilateral knees to -10 degrees  in order to return to maximal functional potential and improve quality of life. PC   3. Functional Mobility - Patient will perform mat to wheelchair transfer with modified independence with walker in order to improve his independence with functional mobility. Met   4. Gait: Patient will ambulate 15 feet with rolling walker with moderate assistance to demonstrate improved strength, endurance and mobility. (Met)   (Modified) - Patient will ambulate 100 ft with walker incorporating turns with modified independence to negotiate home environment with independence. Met   5. HEP: Patient educated on HEP in preparation for d/c verbalizing and demonstrating good understanding of topics discussed.    PC   6.          Goals Key:  PC= progressing/continue;        PM= partially met;             DC= discontinue         PLAN     Continue Plan of Care (POC) and progress per patient tolerance. See Treatment section for exercise progression.  Outpatient Physical Therapy 2 times weekly for 8 weeks to include the following  interventions: Electrical Stimulation Attended, Gait Training, Moist Heat/ Ice, Neuromuscular Re-ed, Orthotic Management and Training, Patient Education, Self Care, Therapeutic Activities, Therapeutic Exercise and Prosthetic Management.      Mindy Patino, PT, DPT

## 2022-08-19 ENCOUNTER — DOCUMENTATION ONLY (OUTPATIENT)
Dept: REHABILITATION | Facility: HOSPITAL | Age: 82
End: 2022-08-19
Payer: MEDICARE

## 2022-08-19 NOTE — PROGRESS NOTES
Patient was scheduled for therapy but propels himself in his wheelchair and was not able to attend due to weather.

## 2022-08-22 ENCOUNTER — CLINICAL SUPPORT (OUTPATIENT)
Dept: REHABILITATION | Facility: HOSPITAL | Age: 82
End: 2022-08-22
Payer: MEDICARE

## 2022-08-22 DIAGNOSIS — Z74.09 DECREASED STRENGTH, ENDURANCE, AND MOBILITY: ICD-10-CM

## 2022-08-22 DIAGNOSIS — R68.89 DECREASED STRENGTH, ENDURANCE, AND MOBILITY: ICD-10-CM

## 2022-08-22 DIAGNOSIS — M25.662 DECREASED RANGE OF MOTION OF BOTH LOWER EXTREMITIES: Primary | ICD-10-CM

## 2022-08-22 DIAGNOSIS — M25.661 DECREASED RANGE OF MOTION OF BOTH LOWER EXTREMITIES: Primary | ICD-10-CM

## 2022-08-22 DIAGNOSIS — R53.1 DECREASED STRENGTH, ENDURANCE, AND MOBILITY: ICD-10-CM

## 2022-08-22 PROCEDURE — 97116 GAIT TRAINING THERAPY: CPT | Mod: KX

## 2022-08-22 PROCEDURE — 97110 THERAPEUTIC EXERCISES: CPT | Mod: KX

## 2022-08-22 NOTE — PROGRESS NOTES
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  He reports that he has been compliant with exercises at home.     Response to previous treatment: good with no adverse effects.    Functional change:  He reports that he is feeling stronger with more confidence with standing and is able to stand for longer bouts at home.  He recently has been able to stand without using his hands on his countertop to pull into standing.    Pain: 0/10 at rest  Location: not applicable      OBJECTIVE     Objective Measures updated at progress report unless specified.  Re-Assessment for () minutes:  range of motion of bilateral knees measured with NuStep - Right = -20, Left = -23  Sitting with bilateral lower extremities over TBall: Right knee = -12, Left knee = -22  In supine: Right hip = -15 , Right knee = -30                   Left hip = -15, Left knee = -35  Functional Mobility: Transfers modified independent with walker and bed mobility with independence  Ambulation Re-Assessment - ambulating 150 ft with walker with supervision to modified independence.     Treatment     Hunter received the treatments listed below:      THERAPEUTIC EXERCISES to develop strength, endurance, ROM, flexibility, posture and core  stabilization for (26) minutes including:  Intervention Performed Today    short arc quad in supine  lower extremities over wedge 2x20 Right AROM, Left 2x20 Active Assistive for form and quality    sidelying toward prone   Stretch to Hip flexor 3x30 seconds each side   Quad set   2x10 Left     Sidelying hip extension   2x10 bilaterally with 10 seconds endrange stretch on last rep   [place] Left Prosthetic leg  Performed at edge of mat    Sitting in reclined position at edge of mat   Hip flexor stretch 3x30 seconds bilateral    Hip Abduction  In sidelying 2x10 bilateral    Bridge attempt  1x5   Posterior Pelvic Tilt Supine  2x5   Prone lying over light blue small triangle wedge  Holding position 2 minutes, then 4 minutes   Stretch to Hamstrings  3 x 1 minute Right lower extremity    Heelslide with forceful push towards extension  2x10 bilateral with 10 second enrange hold stretch on last rep    10 # over distal Left thigh  Stretch to Left hip flexor for over 5 minutes while exercising Right lower extremity    Prone Hamstring curl endrange for quad/hip flexor stretch  2x10 AAROM   lower extremity lifts towards extension in prone  2x10 AAROM   Prone hip flexor stretch   3x10 second holds bilateral    Standing frame raising to patient's tolerance    - Standing 2x's in standing frame holding each stand for 10-15  minutes working on the following:  - Horizontal Abduction with bilateral upper extremities 2x10 AROM   - terminal knee extension in stand position 2x20 bilateral   - Rows holding red theraband bilateral  2x10  - Pushing through upper extremities to achieve trunk extension 2x25  - cross body punch with 2# dumbbell 1x10 bilateral   - scap retraction 2x10  - attempted 1x10 shoulder rolls backward - patient had difficulty coordinating  - Overhead press with green tband bilateral 1x10  - Patient given feedback on standing frame position - working on increasing seat angle after 30 second rest between lifts  (repeated 3-4 times)  - Lateral reaches 1x8 bilateral   - [place] 1x20 hits working on upright trunk posture  - Pulling with bilateral upper extremities on tray at front to pull hips forward from seat and extend knees 2x25  - Reaching Left and Right with cones to target to stretch lower extremities with each reach 2x10 cones to each side incorporating reach posteriorly    Shuttle X  x - 1x20, 1x10 bilateral leg press with 4 bands   -2x10 single leg press with 3-4 bands for each leg    NuStep  x 10 minutes at Level 2-3, working on alternating movement of lower extremities and stretch to bilateral knees (able to scoot seat position back to #14 to increase stretch in lower extremities)   Stairs  - Step ups with Left lower extremity and down with right 4 steps with bilateral upper extremity support    Standing Frame  Standing 1 x in standing frame working on holding endrange position of approximately 55 degree seat angle for 5 minutes   Standing in parallel bars working sit to stands           - standing 2x's  - Standing weights shifts Left to Right 1x10  - Standing knee extension 1x10  - Standing Left upper extremity lifts off bar 1x5  - Standing bilateral upper extremity lifts off bar 1x10 with PT helping to hold hips for support with minimum to moderate assist   Nautilus leg extension x  1x5 with 1 plate of resistance - machine malfunction, unable to continue     * In standing frame:  Sitting position = 0 degree seat angle  Full upright stand position with hips and knees at 0 degrees = 90 degree seat angle  3/15/22 - 1st  standing frame was at 42 degree seat angle, last stand position was at 65 degree seat angle  3/17/22 - 1st  standing frame was at 48 degree seat angle, last stand position was at 70 degree seat angle  3/21/22 - 1st  standing frame was at 52 degree seat angle, last stand position was at 70 degree seat angle  3/29/22 - 1st  standing frame was at 60 degree seat  angle, last stand position was at 72 degree seat angle  3/31/22 - 1st  standing frame was at 55 degree seat angle, last stand position was at 75 degree seat angle  4/7/22 - 1st  standing frame was at 60 degree seat angle, last stand position was at 80 degree seat angle  4/11/22 - 1st  standing frame was at 60 degree seat angle, last stand position was at 78 degree seat angle  4/14/22 - 1st  standing frame was at 55 degree seat angle, last stand position was at 86 degree seat angle  4/19/22 - 1st  standing frame was at 60 degree seat angle, last stand position was at 78 degree seat angle  4/22/22 - 1st  standing frame was at 65 degree seat angle, last stand position was at 81 degree seat angle  4/26/22 - 1st  standing frame was at 65 degree seat angle, last stand position was at 82 degree seat angle  5/3/22 - 1st  standing frame was at 65 degree seat angle, last stand position was at 88 degree seat angle  5/5/22 - 1st  standing frame was at 65 degree seat angle, last stand position was at 84 degree seat angle  5/10/22 - 1st  standing frame was at 60 degree seat angle, last stand position was at 80 degree seat angle  5/12/22 - 1st  standing frame was at 65 degree seat angle, last stand position was at 80 degree seat angle  5/17/22 - 1st  standing frame was at 62 degree seat angle, last stand position was at 82 degree seat angle  5/19/22 - 1st  standing frame was at 60 degree seat angle, last stand position was at 80 degree seat angle  5/24/22 - 1st  standing frame was at 62 degree seat angle, last stand position was at 82 degree seat angle  5/26/22 - 1st  standing frame was at 60 degree seat angle, last stand position was at 80 degree seat angle   5/31/22 - 1st  standing frame was at 65 degree seat angle, last stand position was at 80 degree seat angle  6/2/22 - 1st  standing frame  was at 70 degree seat angle, last stand position was at 82 degree seat angle  6/7/22 - 1st  standing frame was at 68 degree seat angle, last stand position was at 80 degree seat angle  6/10/22 - 1st  standing frame was at 60 degree seat angle, last stand position was at 80 degree seat angle  6/14/22 - 1st  standing frame was at 65 degree seat angle, last stand position was at 82 degree seat angle   6/16/22 - 1st  standing frame was at 70 degree seat angle, last stand position was at 84 degree seat angle  6/21/22 - 1st  standing frame was at 65 degree seat angle, last stand position was at 80 degree seat angle  Plan for Next Visit:      THERAPEUTIC ACTIVITIES to improve dynamic and functional  performance for () minutes including:    Intervention Performed Today    Bed - wheelchair transfer   Practiced set up and sequence   Bed mobility sit to supine to sit  Practiced sequence    Rolling   3 x each side working on sequence to incorporate hip flexor stretch on each side. Then separate stretch performed as listed above   Standing in parallel bars  4 x's working on several weight shifts towards hip extension and knee extension while standing.   Standing in parallel bars performing step forward  With Right lower extremity 5x's with moderate assistance to maintain standing position   [facility] with Andriy to discuss fit     Sit to  parallel bars without assistance  2x's   Dynasplint Consult  Andriy Stout (Rep for Dynasplint) met with physical therapist and patient today during session to discuss options for dynasplints for bilateral knees due to patient's significant lack of range of motion.     Dynasplint Fitting  Andriy Stout (Rep for Dynasplint) met with physical therapist and patient today to deliver and fit patient for bilateral knee dynasplints.  We discussed wearing time of 1-2 hours/day for the first week and then we would re-assess.  Patient was  shown how to don each brace and straps were marked for future use.  The Dynasplint force was increased to 6 on the Left and 7 on the Right to start.  Extra padding was added to thin straps for skin protection.   Dynasplint Consult  Andriy Stout (Rep for Dynasplint) met with physical therapist and patient today during session to discuss proper fit and positioning of Dynasplint to feel a better stretch at home. Pt only brought Left splint to session today and his splint was donned, straps were tightened and new black line added for patient to follow at home.  Left splint was increased from 7/13 to 9/13 tension.  He reports that he places a pillow under his knee at home and he was educated on better positioning of pillow under distal stump with prosthesis removed.  He also felt more of a stretch in long sitting vs supine and was asked to try this position at home for part of the stretch if able.    Standing at sink in Neuro Room working on stand posture and positioning   Simulating activity that he has been given to do for homework. 1x5 seconds, 1x10 seconds   Patient educated addition to Home Exercise Program   - Simulated sit to stand setup  From wheelchair to sink at home performing with walker in front to work on letting go of sink and holding on to walker.  Patient was educated on how to sit safely to prevent wheelchair from tipping backwards if he loses balance.  He was told to start with one hand on walker and one hand on counter top until he can progress to both hands on walker for support. This will be a way that he can safely challenge himself at home. Patient practiced standing to walker 3x's working on sequence and safety of task.   Patient expressed concerns about not knowing his current weight, so PT assisted patient with obtaining weight at start of session with wheelchair scale.   He currently weighs 139 #   Several transfers and sit to stands to adjust walker height as needed  Contact guard to stand by  assist for each   Several sit to stand and wheelchair to machine transfers with and without walker performed throughout session  With independence.    Discussed Left prosthesis sleeve   - It is currently to large/long and often curls.  Patient shared number of company as he has been unsuccessful with getting rep to deliver new one.      Plan for Next Visit:      Gait Training: for (18) minutes:  - Ambulated on 120 ft with walker on tile floor then 50 ft with walker on turf with supervision to stand by assist.  - Parallel bars: working on ambulating forward 6x's with 1-2 upper extremity support, side stepping 2x's        Patient Education and Home Exercises     Home Exercises Provided and Patient Education Provided     Education provided:   -2/22/22 Patient educated on increasing length and consistency of stretching knees and hips to every 2 hours at home.  He was shown and demonstrated understanding of short arc quads, rolling toward prone and sitting in reclined position to stretch hip flexors.  -2/24/22 Patient given Home Exercise Program on exercises for lower extremities to be performed 2x15 3x's/day  See patient Instructions in EMR  -3/1/22 Re-inforced Home Exercise Program and asked patient to incorporate prone lying for longer periods of time to build up to 20 minutes per day.  3/3/22 - Discussed the option of dynasplints for bilateral lower extremities. Patient educated on the option of Dynasplints vs basic knee braces and encouraged to consider Dynasplints as a more aggressive and efficient way of properly positioning and dynamically stretching his legs outside of therapy.  3/8/22 - Patient was educated on Dynasplints with Andriy Gould).   He was shown pictures on ipad and Andriy and physical therapist discussed wearing schedule to make gains with his muscle length outside of therapy sessions.  Andriy informed patient that he would assist with collecting information regarding insurance coverage and provide  him with any out of pocket costs so he can decide if he would like to pursue this option.  3/22/22 - Patient was educated that Andriy Stout (Rep for Dynasplint) messaged me that his splints have been approved and are being mailed this week.   3/24/22 - Patient was educated on Dynasplint management and importance of daily skin checks.  His tolerance of wearing splints for the first week will determine his wearing schedule for the following weeks.  3/29/22 - Patient was educated on only wearing Dynasplints while lying down to get the most effective stretch to lower extremities.  3/31/22 - Patient was asked to reach out to Dynasplint rep (Andriy) to set up a time for Andriy to re-assess the positioning and fit of splints to see if he can feel more of a stretch with wearing schedule.  4/7/22 - Patient was asked to reach out to [employer] rep to make sure that splints are adjusted appropriately.  4/11/22 - PT recommended that patient follow up with Dynasplint rep for reassessment of splint fit.  4/14/22 - Patient informed that PT would reach out to [employer] rep again to have him schedule a visit.  He was encouraged to continue being active at home.  4/18/22 -  Patient asked to contact [employer] rep to set up a time for a consult that would work for his schedule.  4/28/22 - Patient educated on wearing Dynaspints for 2 hours/day for the next 4 days and then we will assess his ability to increase to 4 hours/day after next visit.  He verbalized good understanding of education from Andriy ([employer]) and was told to try Right splint on in new wear position first before increasing tension to 9/13.    5/3/22 - Patient educated on attempting standing at home at sink holding stand position for 10-15 seconds repeating 3x's and doing this in the morning and afternoon every day.   5/12/22 - Patient educated on the importance of walking with walker each session moving forward to try to get range applied to a functional task.  5/26/22 -  Patient educated on adding walker to sit to stands at sink as a way of increasing his stand balance safely at home alone.  6/7/22 - Patient educated on his gains at this point and that PT is requesting more visits for a renewed Plan of Care.   6/10/22 - Patient educated on progressing to standing marches with walker at home and standing lateral foot taps with walker at home in same set up as before with kitchen counter in front and wheelchair behind for safety.  6/21/22 - Patient educated on stair mobility sequence.   6/23/22 - Patient educated on the risk of falls with walking home alone at this time.   7/28/22 - Patient educated on safety with gait at home and advancing gait challenges in therapy.  8/8/22 - Patient educated on proper technique of side lying hip extension and that Plan of Care would be extended for another month in preparation for discharge.   8/12/22 - Discussed contacting prosthetic company to replace sleeve as it is too large and long.      Home Exercise Provided: 2/24/22 Exercises were reviewed and Hunter was able to demonstrate them prior to the end of the session.  Hunter demonstrated good  understanding of the education provided.   ASSESSMENT     Patient demonstrated good participation and effort.  He progressed to ambulating in parallel bars with less upper extremity assistance today.  He is continuing to make gains with PT.      Pt prognosis is Fair  To Good    Pt will continue to benefit from skilled outpatient physical therapy to address the deficits listed in the problem list box on initial evaluation, provide pt/family education and to maximize pt's level of independence in the home and community environment.     Pt's spiritual, cultural and educational needs considered and pt agreeable to plan of care and goals.     Anticipated Barriers for therapy: co-morbidities, transportation assistance needed at times, lifestyle, potential contractures of knees/hips    GOALS:     Short Term Goals:   4 weeks Progress    1. Function: Patient will demonstrate improved function as indicated by a functional limitation score of less than or equal to 55 out of 100 on FOTO = 44% limitation PC   2. Mobility: Patient will improve sit to stand transfer with moderate assistance in order to progress towards independence with functional activities. (Met)  (Modified) - Patient will perform wheelchair to mat transfer with stand by assist to demonstrate improved independence with mobility. Met   3. Gait: Patient will ambulated 3 steps in parallel bars with Maximum assistance to demonstrate improved strength, posture and endurance met   5. HEP: Patient will demonstrate independence with HEP in order to progress toward functional independence. met    6. Assess patient's needs for Dynasplints and set up consultation if needed. met       Long Term Goals:  8 weeks Progress   1. Function: Patient will demonstrate improved function as indicated by a functional limitation score of less than or equal to 60 out of 100 on FOTO = 40 % limitation PC   2. Mobility: Patient will improve AROM of bilateral knees to -10 degrees  in order to return to maximal functional potential and improve quality of life. PC   3. Functional Mobility - Patient will perform mat to wheelchair transfer with modified independence with walker in order to improve his independence with functional mobility. Met   4. Gait: Patient will ambulate 15 feet with rolling walker with moderate assistance to demonstrate improved strength, endurance and mobility. (Met)   (Modified) - Patient will ambulate 100 ft with walker incorporating turns with modified independence to negotiate home environment with independence. Met   5. HEP: Patient educated on HEP in preparation for d/c verbalizing and demonstrating good understanding of topics discussed.    PC   6.          Goals Key:  PC= progressing/continue;        PM= partially met;             DC= discontinue         PLAN      Continue Plan of Care (POC) and progress per patient tolerance. See Treatment section for exercise progression.  Outpatient Physical Therapy 2 times weekly for 8 weeks to include the following interventions: Electrical Stimulation Attended, Gait Training, Moist Heat/ Ice, Neuromuscular Re-ed, Orthotic Management and Training, Patient Education, Self Care, Therapeutic Activities, Therapeutic Exercise and Prosthetic Management.      Mindy Patino, PT, DPT

## 2022-08-29 ENCOUNTER — CLINICAL SUPPORT (OUTPATIENT)
Dept: REHABILITATION | Facility: HOSPITAL | Age: 82
End: 2022-08-29
Payer: MEDICARE

## 2022-08-29 DIAGNOSIS — M25.661 DECREASED RANGE OF MOTION OF BOTH LOWER EXTREMITIES: Primary | ICD-10-CM

## 2022-08-29 DIAGNOSIS — M25.662 DECREASED RANGE OF MOTION OF BOTH LOWER EXTREMITIES: Primary | ICD-10-CM

## 2022-08-29 DIAGNOSIS — R68.89 DECREASED STRENGTH, ENDURANCE, AND MOBILITY: ICD-10-CM

## 2022-08-29 DIAGNOSIS — R53.1 DECREASED STRENGTH, ENDURANCE, AND MOBILITY: ICD-10-CM

## 2022-08-29 DIAGNOSIS — Z74.09 DECREASED STRENGTH, ENDURANCE, AND MOBILITY: ICD-10-CM

## 2022-08-29 PROCEDURE — 97110 THERAPEUTIC EXERCISES: CPT | Mod: KX

## 2022-08-29 PROCEDURE — 97116 GAIT TRAINING THERAPY: CPT | Mod: KX

## 2022-08-29 PROCEDURE — 97530 THERAPEUTIC ACTIVITIES: CPT | Mod: KX

## 2022-08-29 NOTE — PROGRESS NOTES
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  He reports that he has been compliant with exercises at home.  His Left lower extremity prosthesis was adjusted last week and the fit has been improved.  While on the machines today, he reported that his Left prosthesis had rotated toward the Left causing toe to turn.     Response to previous treatment: good with no adverse effects.    Functional change:  He reports that he is feeling stronger with more confidence with standing and is able to stand for longer bouts at home.  He recently has been able to stand without using his hands on his countertop to pull into standing.    Pain: 0/10 at rest  Location: not applicable      OBJECTIVE     Objective Measures updated at progress report unless specified.  Re-Assessment for () minutes:  range of motion of bilateral knees measured with NuStep - Right = -20, Left = -23  Sitting with bilateral lower extremities over TBall: Right knee = -12, Left knee = -22  In supine: Right hip = -15 , Right knee = -30                   Left hip = -15, Left knee = -35  Functional Mobility: Transfers modified independent with walker and bed mobility with independence  Ambulation Re-Assessment - ambulating 150 ft with walker  with supervision to modified independence.     Treatment     Hunter received the treatments listed below:      THERAPEUTIC EXERCISES to develop strength, endurance, ROM, flexibility, posture and core stabilization for (19) minutes including:  Intervention Performed Today    short arc quad in supine  lower extremities over wedge 2x20 Right AROM, Left 2x20 Active Assistive for form and quality    sidelying toward prone   Stretch to Hip flexor 3x30 seconds each side   Quad set   2x10 Left     Sidelying hip extension   2x10 bilaterally with 10 seconds endrange stretch on last rep   Kimball and Donning Left Prosthetic leg  Performed at edge of mat    Sitting in reclined position at edge of mat   Hip flexor stretch 3x30 seconds bilateral    Hip Abduction  In sidelying 2x10 bilateral    Bridge attempt  1x5   Posterior Pelvic Tilt Supine  2x5   Prone lying over light blue small triangle wedge  Holding position 2 minutes, then 4 minutes   Stretch to Hamstrings  3 x 1 minute Right lower extremity    Heelslide with forceful push towards extension  2x10 bilateral with 10 second enrange hold stretch on last rep    10 # over distal Left thigh  Stretch to Left hip flexor for over 5 minutes while exercising Right lower extremity    Prone Hamstring curl endrange for quad/hip flexor stretch  2x10 AAROM   lower extremity lifts towards extension in prone  2x10 AAROM   Prone hip flexor stretch   3x10 second holds bilateral    Standing frame raising to patient's tolerance    - Standing 2x's in standing frame holding each stand for 10-15  minutes working on the following:  - Horizontal Abduction with bilateral upper extremities 2x10 AROM   - terminal knee extension in stand position 2x20 bilateral   - Rows holding red theraband bilateral  2x10  - Pushing through upper extremities to achieve trunk extension 2x25  - cross body punch with 2# dumbbell 1x10 bilateral   - scap retraction 2x10  - attempted 1x10 shoulder rolls backward - patient  had difficulty coordinating  - Overhead press with green tband bilateral 1x10  - Patient given feedback on standing frame position - working on increasing seat angle after 30 second rest between lifts (repeated 3-4 times)  - Lateral reaches 1x8 bilateral   - Beach Ball Volley 1x20 hits working on upright trunk posture  - Pulling with bilateral upper extremities on tray at front to pull hips forward from seat and extend knees 2x25  - Reaching Left and Right with cones to target to stretch lower extremities with each reach 2x10 cones to each side incorporating reach posteriorly    Shuttle X  x - 2x20 bilateral leg press with 4 bands   -2x10 single leg press with 3 bands for each leg    NuStep  x 12 minutes at Level 2-3, working on alternating movement of lower extremities and stretch to bilateral knees (able to scoot seat position back to #14 to increase stretch in lower extremities)   Stairs  - Step ups with Left lower extremity and down with right 4 steps with bilateral upper extremity support    Standing Frame  Standing 1 x in standing frame working on holding endrange position of approximately 55 degree seat angle for 5 minutes   Standing in parallel bars working sit to stands           - standing 2x's  - Standing weights shifts Left to Right 1x10  - Standing knee extension 1x10  - Standing Left upper extremity lifts off bar 1x5  - Standing bilateral upper extremity lifts off bar 1x10 with PT helping to hold hips for support with minimum to moderate assist   Nautilus leg extension   1x5 with 1 plate of resistance - machine malfunction, unable to continue     * In standing frame:  Sitting position = 0 degree seat angle  Full upright stand position with hips and knees at 0 degrees = 90 degree seat angle  3/15/22 - 1st  standing frame was at 42 degree seat angle, last stand position was at 65 degree seat angle  3/17/22 - 1st  standing frame was at 48 degree seat angle, last stand position was at 70  degree seat angle  3/21/22 - 1st  standing frame was at 52 degree seat angle, last stand position was at 70 degree seat angle  3/29/22 - 1st  standing frame was at 60 degree seat angle, last stand position was at 72 degree seat angle  3/31/22 - 1st  standing frame was at 55 degree seat angle, last stand position was at 75 degree seat angle  4/7/22 - 1st  standing frame was at 60 degree seat angle, last stand position was at 80 degree seat angle  4/11/22 - 1st  standing frame was at 60 degree seat angle, last stand position was at 78 degree seat angle  4/14/22 - 1st  standing frame was at 55 degree seat angle, last stand position was at 86 degree seat angle  4/19/22 - 1st  standing frame was at 60 degree seat angle, last stand position was at 78 degree seat angle  4/22/22 - 1st  standing frame was at 65 degree seat angle, last stand position was at 81 degree seat angle  4/26/22 - 1st  standing frame was at 65 degree seat angle, last stand position was at 82 degree seat angle  5/3/22 - 1st  standing frame was at 65 degree seat angle, last stand position was at 88 degree seat angle  5/5/22 - 1st  standing frame was at 65 degree seat angle, last stand position was at 84 degree seat angle  5/10/22 - 1st  standing frame was at 60 degree seat angle, last stand position was at 80 degree seat angle  5/12/22 - 1st  standing frame was at 65 degree seat angle, last stand position was at 80 degree seat angle  5/17/22 - 1st  standing frame was at 62 degree seat angle, last stand position was at 82 degree seat angle  5/19/22 - 1st  standing frame was at 60 degree seat angle, last stand position was at 80 degree seat angle  5/24/22 - 1st  standing frame was at 62 degree seat angle, last stand position was at 82 degree seat angle  5/26/22 - 1st  standing frame was at 60 degree seat angle, last stand  position was at 80 degree seat angle   5/31/22 - 1st  standing frame was at 65 degree seat angle, last stand position was at 80 degree seat angle  6/2/22 - 1st  standing frame was at 70 degree seat angle, last stand position was at 82 degree seat angle  6/7/22 - 1st  standing frame was at 68 degree seat angle, last stand position was at 80 degree seat angle  6/10/22 - 1st  standing frame was at 60 degree seat angle, last stand position was at 80 degree seat angle  6/14/22 - 1st  standing frame was at 65 degree seat angle, last stand position was at 82 degree seat angle   6/16/22 - 1st  standing frame was at 70 degree seat angle, last stand position was at 84 degree seat angle  6/21/22 - 1st  standing frame was at 65 degree seat angle, last stand position was at 80 degree seat angle  Plan for Next Visit:      THERAPEUTIC ACTIVITIES to improve dynamic and functional  performance for (8) minutes including:    Intervention Performed Today    Bed - wheelchair transfer   Practiced set up and sequence   Bed mobility sit to supine to sit  Practiced sequence    Rolling   3 x each side working on sequence to incorporate hip flexor stretch on each side. Then separate stretch performed as listed above   Standing in parallel bars  4 x's working on several weight shifts towards hip extension and knee extension while standing.   Standing in parallel bars performing step forward  With Right lower extremity 5x's with moderate assistance to maintain standing position   Dynasplint Consult via Rubina with Andriy to discuss fit     Sit to  parallel bars without assistance  2x's   Dynasplint Consult  Andriy Stout (Rep for Dynasplint) met with physical therapist and patient today during session to discuss options for dynasplints for bilateral knees due to patient's significant lack of range of motion.     Dynasplint Fitting  Andriy Stout (Rep for Dynasplint) met with physical  therapist and patient today to deliver and fit patient for bilateral knee dynasplints.  We discussed wearing time of 1-2 hours/day for the first week and then we would re-assess.  Patient was shown how to don each brace and straps were marked for future use.  The Dynasplint force was increased to 6 on the Left and 7 on the Right to start.  Extra padding was added to thin straps for skin protection.   Dynasplint Consult  Andriy Stout (Rep for Dynasplint) met with physical therapist and patient today during session to discuss proper fit and positioning of Dynasplint to feel a better stretch at home. Pt only brought Left splint to session today and his splint was donned, straps were tightened and new black line added for patient to follow at home.  Left splint was increased from 7/13 to 9/13 tension.  He reports that he places a pillow under his knee at home and he was educated on better positioning of pillow under distal stump with prosthesis removed.  He also felt more of a stretch in long sitting vs supine and was asked to try this position at home for part of the stretch if able.    Standing at sink in Neuro Room working on stand posture and positioning   Simulating activity that he has been given to do for homework. 1x5 seconds, 1x10 seconds   Patient educated addition to Home Exercise Program   - Simulated sit to stand setup  From wheelchair to sink at home performing with walker in front to work on letting go of sink and holding on to walker.  Patient was educated on how to sit safely to prevent wheelchair from tipping backwards if he loses balance.  He was told to start with one hand on walker and one hand on counter top until he can progress to both hands on walker for support. This will be a way that he can safely challenge himself at home. Patient practiced standing to walker 3x's working on sequence and safety of task.   Patient expressed concerns about not knowing his current weight, so PT assisted patient  with obtaining weight at start of session with wheelchair scale.   He currently weighs 139 #   Several transfers and sit to stands to adjust walker height as needed  Contact guard to stand by assist for each   Several sit to stand and wheelchair to machine transfers with and without walker performed throughout session  With independence.    Discussed Left prosthesis sleeve   - It is currently to large/long and often curls.  Patient shared number of company as he has been unsuccessful with getting rep to deliver new one.    Left Prosthesis adjusted to turn foot to neutral, standing and stepping between each adjustment to correct position of leg x 2 adjustments.      Plan for Next Visit:      Gait Training: for (18) minutes:  - Ambulated on 120 ft with walker on tile floor supervision to stand by assist.  - Parallel bars: working on ambulating forward and backward 3 x's with 1-2 upper extremity support, side stepping 4x's  with 1-2 upper extremity support      Patient Education and Home Exercises     Home Exercises Provided and Patient Education Provided     Education provided:   -2/22/22 Patient educated on increasing length and consistency of stretching knees and hips to every 2 hours at home.  He was shown and demonstrated understanding of short arc quads, rolling toward prone and sitting in reclined position to stretch hip flexors.  -2/24/22 Patient given Home Exercise Program on exercises for lower extremities to be performed 2x15 3x's/day  See patient Instructions in EMR  -3/1/22 Re-inforced Home Exercise Program and asked patient to incorporate prone lying for longer periods of time to build up to 20 minutes per day.  3/3/22 - Discussed the option of dynasplints for bilateral lower extremities. Patient educated on the option of Dynasplints vs basic knee braces and encouraged to consider Dynasplints as a more aggressive and efficient way of properly positioning and dynamically stretching his legs outside of  therapy.  3/8/22 - Patient was educated on Dynasplints with Andriy Stout (Rep).   He was shown pictures on ipad and Andriy and physical therapist discussed wearing schedule to make gains with his muscle length outside of therapy sessions.  Andriy informed patient that he would assist with collecting information regarding insurance coverage and provide him with any out of pocket costs so he can decide if he would like to pursue this option.  3/22/22 - Patient was educated that Andriy Stout (Rep for Dynasplint) messaged me that his splints have been approved and are being mailed this week.   3/24/22 - Patient was educated on Dynasplint management and importance of daily skin checks.  His tolerance of wearing splints for the first week will determine his wearing schedule for the following weeks.  3/29/22 - Patient was educated on only wearing Dynasplints while lying down to get the most effective stretch to lower extremities.  3/31/22 - Patient was [facility] (Andriy) to set up a time for Andriy to re-assess the positioning and fit of splints to see if he can feel more of a stretch with wearing schedule.  4/7/22 - Patient was asked to reach out to [facility] to make sure that splints are adjusted appropriately.  4/11/22 - PT recommended that patient follow up with Dynasplint rep for reassessment of splint fit.  4/14/22 - Patient informed that PT would reach out to [facility] again to have him schedule a visit.  He was encouraged to continue being active at home.  4/18/22 -  Patient asked to contact Dynasplint rep to set up a time for a consult that would work for his schedule.  4/28/22 - Patient educated on wearing Dynaspints for 2 hours/day for the next 4 days and then we will assess his ability to increase to 4 hours/day after next visit.  He verbalized good understanding of education from Andriy (Dynasplint) and was told to try Right splint on in new wear position first before increasing  tension to 9/13.    5/3/22 - Patient educated on attempting standing at home at sink holding stand position for 10-15 seconds repeating 3x's and doing this in the morning and afternoon every day.   5/12/22 - Patient educated on the importance of walking with walker each session moving forward to try to get range applied to a functional task.  5/26/22 - Patient educated on adding walker to sit to stands at sink as a way of increasing his stand balance safely at home alone.  6/7/22 - Patient educated on his gains at this point and that PT is requesting more visits for a renewed Plan of Care.   6/10/22 - Patient educated on progressing to standing marches with walker at home and standing lateral foot taps with walker at home in same set up as before with kitchen counter in front and wheelchair behind for safety.  6/21/22 - Patient educated on stair mobility sequence.   6/23/22 - Patient educated on the risk of falls with walking home alone at this time.   7/28/22 - Patient educated on safety with gait at home and advancing gait challenges in therapy.  8/8/22 - Patient educated on proper technique of side lying hip extension and that Plan of Care would be extended for another month in preparation for discharge.   8/12/22 - Discussed contacting prosthetic company to replace sleeve as it is too large and long.      Home Exercise Provided: 2/24/22 Exercises were reviewed and Hunter was able to demonstrate them prior to the end of the session.  Hunter demonstrated good  understanding of the education provided.   ASSESSMENT     Patient demonstrated good participation and effort.  He progressed to ambulating in parallel bars with less upper extremity assistance today.  His Left prosthesis was adjusted to allow a neutral foot with gait and standing.  He had good participation with all tasks.    Pt prognosis is Fair  To Good    Pt will continue to benefit from skilled outpatient physical therapy to address the deficits listed in  the problem list box on initial evaluation, provide pt/family education and to maximize pt's level of independence in the home and community environment.     Pt's spiritual, cultural and educational needs considered and pt agreeable to plan of care and goals.     Anticipated Barriers for therapy: co-morbidities, transportation assistance needed at times, lifestyle, potential contractures of knees/hips    GOALS:     Short Term Goals:  4 weeks Progress    Function: Patient will demonstrate improved function as indicated by a functional limitation score of less than or equal to 55 out of 100 on FOTO = 44% limitation PC   Mobility: Patient will improve sit to stand transfer with moderate assistance in order to progress towards independence with functional activities. (Met)  (Modified) - Patient will perform wheelchair to mat transfer with stand by assist to demonstrate improved independence with mobility. Met   Gait: Patient will ambulated 3 steps in parallel bars with Maximum assistance to demonstrate improved strength, posture and endurance met   HEP: Patient will demonstrate independence with HEP in order to progress toward functional independence. met    Assess patient's needs for Dynasplints and set up consultation if needed. met       Long Term Goals:  8 weeks Progress   Function: Patient will demonstrate improved function as indicated by a functional limitation score of less than or equal to 60 out of 100 on FOTO = 40 % limitation PC   Mobility: Patient will improve AROM of bilateral knees to -10 degrees  in order to return to maximal functional potential and improve quality of life. PC   Functional Mobility - Patient will perform mat to wheelchair transfer with modified independence with walker in order to improve his independence with functional mobility. Met   Gait: Patient will ambulate 15 feet with rolling walker with moderate assistance to demonstrate improved strength, endurance and mobility. (Met)    (Modified) - Patient will ambulate 100 ft with walker incorporating turns with modified independence to negotiate home environment with independence. Met   HEP: Patient educated on HEP in preparation for d/c verbalizing and demonstrating good understanding of topics discussed.    PC            Goals Key:  PC= progressing/continue;        PM= partially met;             DC= discontinue         PLAN     Continue Plan of Care (POC) and progress per patient tolerance. See Treatment section for exercise progression.  Outpatient Physical Therapy 2 times weekly for 8 weeks to include the following interventions: Electrical Stimulation Attended, Gait Training, Moist Heat/ Ice, Neuromuscular Re-ed, Orthotic Management and Training, Patient Education, Self Care, Therapeutic Activities, Therapeutic Exercise and Prosthetic Management.      Mindy Patino, PT, DPT

## 2022-09-02 ENCOUNTER — CLINICAL SUPPORT (OUTPATIENT)
Dept: REHABILITATION | Facility: HOSPITAL | Age: 82
End: 2022-09-02
Payer: MEDICARE

## 2022-09-02 DIAGNOSIS — M25.662 DECREASED RANGE OF MOTION OF BOTH LOWER EXTREMITIES: Primary | ICD-10-CM

## 2022-09-02 DIAGNOSIS — R53.1 DECREASED STRENGTH, ENDURANCE, AND MOBILITY: ICD-10-CM

## 2022-09-02 DIAGNOSIS — R68.89 DECREASED STRENGTH, ENDURANCE, AND MOBILITY: ICD-10-CM

## 2022-09-02 DIAGNOSIS — M25.661 DECREASED RANGE OF MOTION OF BOTH LOWER EXTREMITIES: Primary | ICD-10-CM

## 2022-09-02 DIAGNOSIS — Z74.09 DECREASED STRENGTH, ENDURANCE, AND MOBILITY: ICD-10-CM

## 2022-09-02 PROCEDURE — 97116 GAIT TRAINING THERAPY: CPT

## 2022-09-02 PROCEDURE — 97110 THERAPEUTIC EXERCISES: CPT

## 2022-09-02 PROCEDURE — 97530 THERAPEUTIC ACTIVITIES: CPT

## 2022-09-02 NOTE — PROGRESS NOTES
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  He reports that he has been walking indoor and outdoor at home, but is hesitant to walk down driveway due to incline.     Response to previous treatment: good with no adverse effects.    Functional change:  He reports that he is feeling stronger with more confidence with standing/walking and is able to stand for longer bouts at home.  He reports have increased confidence and comfort level with walking.     Pain: 0/10 at rest  Location: not applicable      OBJECTIVE     Objective Measures updated at progress report unless specified.  Re-Assessment for () minutes:  range of motion of bilateral knees measured with NuStep - Right = -20, Left = -23  Sitting with bilateral lower extremities over TBall: Right knee = -12, Left knee = -22  In supine: Right hip = -15 , Right knee = -30                   Left hip = -15, Left knee = -35  Functional Mobility: Transfers modified independent with walker and bed mobility with independence  Ambulation Re-Assessment - ambulating 150 ft with walker with supervision to modified independence.     Treatment     Hunter received the treatments listed below:      THERAPEUTIC EXERCISES to develop strength, endurance, ROM,  flexibility, posture and core stabilization for (15) minutes including:  Intervention Performed Today    short arc quad in supine  lower extremities over wedge 2x20 Right AROM, Left 2x20 Active Assistive for form and quality    sidelying toward prone   Stretch to Hip flexor 3x30 seconds each side   Quad set   2x10 Left     Sidelying hip extension   2x10 bilaterally with 10 seconds endrange stretch on last rep   [place] Left Prosthetic leg  Performed at edge of mat    Sitting in reclined position at edge of mat   Hip flexor stretch 3x30 seconds bilateral    Hip Abduction  In sidelying 2x10 bilateral    Bridge attempt  1x5   Posterior Pelvic Tilt Supine  2x5   Prone lying over light blue small triangle wedge  Holding position 2 minutes, then 4 minutes   Stretch to Hamstrings  3 x 1 minute Right lower extremity    Heelslide with forceful push towards extension  2x10 bilateral with 10 second enrange hold stretch on last rep    10 # over distal Left thigh  Stretch to Left hip flexor for over 5 minutes while exercising Right lower extremity    Prone Hamstring curl endrange for quad/hip flexor stretch  2x10 AAROM   lower extremity lifts towards extension in prone  2x10 AAROM   Prone hip flexor stretch   3x10 second holds bilateral    Standing frame raising to patient's tolerance    - Standing 2x's in standing frame holding each stand for 10-15  minutes working on the following:  - Horizontal Abduction with bilateral upper extremities 2x10 AROM   - terminal knee extension in stand position 2x20 bilateral   - Rows holding red theraband bilateral  2x10  - Pushing through upper extremities to achieve trunk extension 2x25  - cross body punch with 2# dumbbell 1x10 bilateral   - scap retraction 2x10  - attempted 1x10 shoulder rolls backward - patient had difficulty coordinating  - Overhead press with green tband bilateral 1x10  - Patient given feedback on standing frame position - working on increasing seat angle  after 30 second rest between lifts (repeated 3-4 times)  - Lateral reaches 1x8 bilateral   - [place] 1x20 hits working on upright trunk posture  - Pulling with bilateral upper extremities on tray at front to pull hips forward from seat and extend knees 2x25  - Reaching Left and Right with cones to target to stretch lower extremities with each reach 2x10 cones to each side incorporating reach posteriorly    Shuttle X  X - 2x20 bilateral leg press with 4 bands   -1x10 single leg press with 3 bands for each leg    NuStep  x 12 minutes at Level 2-4, working on alternating movement of lower extremities and stretch to bilateral knees (able to scoot seat position back to #14 to increase stretch in lower extremities)   Stairs  - Step ups with Left lower extremity and down with right 4 steps with bilateral upper extremity support    Standing Frame  Standing 1 x in standing frame working on holding endrange position of approximately 55 degree seat angle for 5 minutes   Standing in parallel bars working sit to stands           - standing 2x's  - Standing weights shifts Left to Right 1x10  - Standing knee extension 1x10  - Standing Left upper extremity lifts off bar 1x5  - Standing bilateral upper extremity lifts off bar 1x10 with PT helping to hold hips for support with minimum to moderate assist   Nautilus leg extension   1x5 with 1 plate of resistance - machine malfunction, unable to continue     * In standing frame:  Sitting position = 0 degree seat angle  Full upright stand position with hips and knees at 0 degrees = 90 degree seat angle  3/15/22 - 1st  standing frame was at 42 degree seat angle, last stand position was at 65 degree seat angle  3/17/22 - 1st  standing frame was at 48 degree seat angle, last stand position was at 70 degree seat angle  3/21/22 - 1st  standing frame was at 52 degree seat angle, last stand position was at 70 degree seat angle  3/29/22 - 1st  standing  frame was at 60 degree seat angle, last stand position was at 72 degree seat angle  3/31/22 - 1st  standing frame was at 55 degree seat angle, last stand position was at 75 degree seat angle  4/7/22 - 1st  standing frame was at 60 degree seat angle, last stand position was at 80 degree seat angle  4/11/22 - 1st  standing frame was at 60 degree seat angle, last stand position was at 78 degree seat angle  4/14/22 - 1st  standing frame was at 55 degree seat angle, last stand position was at 86 degree seat angle  4/19/22 - 1st  standing frame was at 60 degree seat angle, last stand position was at 78 degree seat angle  4/22/22 - 1st  standing frame was at 65 degree seat angle, last stand position was at 81 degree seat angle  4/26/22 - 1st  standing frame was at 65 degree seat angle, last stand position was at 82 degree seat angle  5/3/22 - 1st  standing frame was at 65 degree seat angle, last stand position was at 88 degree seat angle  5/5/22 - 1st  standing frame was at 65 degree seat angle, last stand position was at 84 degree seat angle  5/10/22 - 1st  standing frame was at 60 degree seat angle, last stand position was at 80 degree seat angle  5/12/22 - 1st  standing frame was at 65 degree seat angle, last stand position was at 80 degree seat angle  5/17/22 - 1st  standing frame was at 62 degree seat angle, last stand position was at 82 degree seat angle  5/19/22 - 1st  standing frame was at 60 degree seat angle, last stand position was at 80 degree seat angle  5/24/22 - 1st  standing frame was at 62 degree seat angle, last stand position was at 82 degree seat angle  5/26/22 - 1st  standing frame was at 60 degree seat angle, last stand position was at 80 degree seat angle   5/31/22 - 1st  standing frame was at 65 degree seat angle, last stand position was at 80 degree seat angle  6/2/22 -  1st  standing frame was at 70 degree seat angle, last stand position was at 82 degree seat angle  6/7/22 - 1st  standing frame was at 68 degree seat angle, last stand position was at 80 degree seat angle  6/10/22 - 1st  standing frame was at 60 degree seat angle, last stand position was at 80 degree seat angle  6/14/22 - 1st  standing frame was at 65 degree seat angle, last stand position was at 82 degree seat angle   6/16/22 - 1st  standing frame was at 70 degree seat angle, last stand position was at 84 degree seat angle  6/21/22 - 1st  standing frame was at 65 degree seat angle, last stand position was at 80 degree seat angle  Plan for Next Visit:      THERAPEUTIC ACTIVITIES to improve dynamic and functional  performance for (15) minutes including:    Intervention Performed Today    Bed - wheelchair transfer   Practiced set up and sequence   Bed mobility sit to supine to sit  Practiced sequence    Rolling   3 x each side working on sequence to incorporate hip flexor stretch on each side. Then separate stretch performed as listed above   Standing in parallel bars  4 x's working on several weight shifts towards hip extension and knee extension while standing.   Standing in parallel bars performing step forward  With Right lower extremity 5x's with moderate assistance to maintain standing position   Dynasplint [facility] with Andriy to discuss fit     Sit to  parallel bars without assistance  2x's   Dynasplint Consult  Andriy Stout (Rep for Dynasplint) met with physical therapist and patient today during session to discuss options for dynasplints for bilateral knees due to patient's significant lack of range of motion.     Dynasplint Fitting  Andriy Stout (Rep for Dynasplint) met with physical therapist and patient today to deliver and fit patient for bilateral knee dynasplints.  We discussed wearing time of 1-2 hours/day for the first week and then we  would re-assess.  Patient was shown how to don each brace and straps were marked for future use.  The Dynasplint force was increased to 6 on the Left and 7 on the Right to start.  Extra padding was added to thin straps for skin protection.   Dynasplint Consult  Andriy Stout (Rep for Dynasplint) met with physical therapist and patient today during session to discuss proper fit and positioning of Dynasplint to feel a better stretch at home. Pt only brought Left splint to session today and his splint was donned, straps were tightened and new black line added for patient to follow at home.  Left splint was increased from 7/13 to 9/13 tension.  He reports that he places a pillow under his knee at home and he was educated on better positioning of pillow under distal stump with prosthesis removed.  He also felt more of a stretch in long sitting vs supine and was asked to try this position at home for part of the stretch if able.    Standing at sink in Neuro Room working on stand posture and positioning   Simulating activity that he has been given to do for homework. 1x5 seconds, 1x10 seconds   Patient educated addition to Home Exercise Program   - Simulated sit to stand setup  From wheelchair to sink at home performing with walker in front to work on letting go of sink and holding on to walker.  Patient was educated on how to sit safely to prevent wheelchair from tipping backwards if he loses balance.  He was told to start with one hand on walker and one hand on counter top until he can progress to both hands on walker for support. This will be a way that he can safely challenge himself at home. Patient practiced standing to walker 3x's working on sequence and safety of task.   Patient expressed concerns about not knowing his current weight, so PT assisted patient with obtaining weight at start of session with wheelchair scale.   He currently weighs 139 #   Several transfers and sit to stands to adjust walker height as  needed  Contact guard to stand by assist for each   Several sit to stand and wheelchair to machine transfers with and without walker performed throughout session  With independence.    Discussed Left prosthesis sleeve   - It is currently to large/long and often curls.  Patient shared number of company as he has been unsuccessful with getting rep to deliver new one.    Left Prosthesis adjusted to turn foot to neutral, standing and stepping between each adjustment to correct position of leg  2 adjustments.    Discussed full below knee amputation program to work on at home in preparation for discharge x Patient verbalized good understanding of program     Plan for Next Visit:      Gait Training: for (15) minutes:  - Ambulated on 50ft, 120ft, 80ft with walker on tile floor supervision to stand by assist.  - Parallel bars: working on ambulating forward and backward 3 x's with 1-2 upper extremity support, side stepping 4x's  with 1-2 upper extremity support      Patient Education and Home Exercises     Home Exercises Provided and Patient Education Provided     Education provided:   -2/22/22 Patient educated on increasing length and consistency of stretching knees and hips to every 2 hours at home.  He was shown and demonstrated understanding of short arc quads, rolling toward prone and sitting in reclined position to stretch hip flexors.  -2/24/22 Patient given Home Exercise Program on exercises for lower extremities to be performed 2x15 3x's/day  See patient Instructions in EMR  -3/1/22 Re-inforced Home Exercise Program and asked patient to incorporate prone lying for longer periods of time to build up to 20 minutes per day.  3/3/22 - Discussed the option of dynasplints for bilateral lower extremities. Patient educated on the option of Dynasplints vs basic knee braces and encouraged to consider Dynasplints as a more aggressive and efficient way of properly positioning and dynamically stretching his legs outside of  therapy.  3/8/22 - Patient was educated on Dynasplints with Andriy Stout (Rep).   He was shown pictures on ipad and Andriy and physical therapist discussed wearing schedule to make gains with his muscle length outside of therapy sessions.  Andriy informed patient that he would assist with collecting information regarding insurance coverage and provide him with any out of pocket costs so he can decide if he would like to pursue this option.  3/22/22 - Patient was educated that Andriy Stout (Rep for Dynasplint) messaged me that his splints have been approved and are being mailed this week.   3/24/22 - Patient was educated on Dynasplint management and importance of daily skin checks.  His tolerance of wearing splints for the first week will determine his wearing schedule for the following weeks.  3/29/22 - Patient was educated on only wearing Dynasplints while lying down to get the most effective stretch to lower extremities.  3/31/22 - [facility] was [facility] (And[facility] to set up a time for Andriy to re-assess the positioning and fit of splints to see if he can feel more of a stretch with wearing schedule.  4/7/22 - Patient was asked to reach out to [facility] to make sure that splints are adjusted appropriately.  4/11/22 - PT recommended that patient follow up with Dynasplint rep for reassessment of splint fit.  4/14/22 - Patient informed that PT would reach out to [facility] again to have him schedule a visit.  He was encouraged to continue being active at home.  4/18/22 -  Patient asked to contact Dynasplint rep to set up a time for a consult that would work for his schedule.  4/28/22 - Patient educated on wearing Dynaspints for 2 hours/day for the next 4 days and then we will assess his ability to increase to 4 hours/day after next visit.  He verbalized good understanding of education from Andriy (Dynasplint) and was told to try Right splint on in new wear position first before increasing  tension to 9/13.    5/3/22 - Patient educated on attempting standing at home at sink holding stand position for 10-15 seconds repeating 3x's and doing this in the morning and afternoon every day.   5/12/22 - Patient educated on the importance of walking with walker each session moving forward to try to get range applied to a functional task.  5/26/22 - Patient educated on adding walker to sit to stands at sink as a way of increasing his stand balance safely at home alone.  6/7/22 - Patient educated on his gains at this point and that PT is requesting more visits for a renewed Plan of Care.   6/10/22 - Patient educated on progressing to standing marches with walker at home and standing lateral foot taps with walker at home in same set up as before with kitchen counter in front and wheelchair behind for safety.  6/21/22 - Patient educated on stair mobility sequence.   6/23/22 - Patient educated on the risk of falls with walking home alone at this time.   7/28/22 - Patient educated on safety with gait at home and advancing gait challenges in therapy.  8/8/22 - Patient educated on proper technique of side lying hip extension and that Plan of Care would be extended for another month in preparation for discharge.   8/12/22 - Discussed contacting prosthetic company to replace sleeve as it is too large and long.   9/2/22 - Discussed full below knee amputation home program in preparation for discharge.      Home Exercise Provided:  Exercises were reviewed and Hunter was able to verbalize good understanding prior to the end of the session.  Hunter demonstrated good  understanding of the education provided.   ASSESSMENT     Patient demonstrated good participation and endurance with all tasks today.  He is demonstrating improved control with gait, but still lacks heel strike on Left and may need prosthetic foot to be shifted forward to provide more stability with gait. He is progressing well.    Pt prognosis is Fair  To  Good    Pt will continue to benefit from skilled outpatient physical therapy to address the deficits listed in the problem list box on initial evaluation, provide pt/family education and to maximize pt's level of independence in the home and community environment.     Pt's spiritual, cultural and educational needs considered and pt agreeable to plan of care and goals.     Anticipated Barriers for therapy: co-morbidities, transportation assistance needed at times, lifestyle, potential contractures of knees/hips    GOALS:     Short Term Goals:  4 weeks Progress    Function: Patient will demonstrate improved function as indicated by a functional limitation score of less than or equal to 55 out of 100 on FOTO = 44% limitation PC   Mobility: Patient will improve sit to stand transfer with moderate assistance in order to progress towards independence with functional activities. (Met)  (Modified) - Patient will perform wheelchair to mat transfer with stand by assist to demonstrate improved independence with mobility. Met   Gait: Patient will ambulated 3 steps in parallel bars with Maximum assistance to demonstrate improved strength, posture and endurance met   HEP: Patient will demonstrate independence with HEP in order to progress toward functional independence. met    Assess patient's needs for Dynasplints and set up consultation if needed. met       Long Term Goals:  8 weeks Progress   Function: Patient will demonstrate improved function as indicated by a functional limitation score of less than or equal to 60 out of 100 on FOTO = 40 % limitation PC   Mobility: Patient will improve AROM of bilateral knees to -10 degrees  in order to return to maximal functional potential and improve quality of life. PC   Functional Mobility - Patient will perform mat to wheelchair transfer with modified independence with walker in order to improve his independence with functional mobility. Met   Gait: Patient will ambulate 15 feet with  rolling walker with moderate assistance to demonstrate improved strength, endurance and mobility. (Met)   (Modified) - Patient will ambulate 100 ft with walker incorporating turns with modified independence to negotiate home environment with independence. Met   HEP: Patient educated on HEP in preparation for d/c verbalizing and demonstrating good understanding of topics discussed.    PC            Goals Key:  PC= progressing/continue;        PM= partially met;             DC= discontinue         PLAN     Continue Plan of Care (POC) and progress per patient tolerance. See Treatment section for exercise progression.  Outpatient Physical Therapy 2 times weekly for 8 weeks to include the following interventions: Electrical Stimulation Attended, Gait Training, Moist Heat/ Ice, Neuromuscular Re-ed, Orthotic Management and Training, Patient Education, Self Care, Therapeutic Activities, Therapeutic Exercise and Prosthetic Management.      Mindy Patino, PT, DPT

## 2022-09-06 ENCOUNTER — CLINICAL SUPPORT (OUTPATIENT)
Dept: REHABILITATION | Facility: HOSPITAL | Age: 82
End: 2022-09-06
Payer: MEDICARE

## 2022-09-06 DIAGNOSIS — M25.661 DECREASED RANGE OF MOTION OF BOTH LOWER EXTREMITIES: Primary | ICD-10-CM

## 2022-09-06 DIAGNOSIS — R68.89 DECREASED STRENGTH, ENDURANCE, AND MOBILITY: ICD-10-CM

## 2022-09-06 DIAGNOSIS — M25.662 DECREASED RANGE OF MOTION OF BOTH LOWER EXTREMITIES: Primary | ICD-10-CM

## 2022-09-06 DIAGNOSIS — Z74.09 DECREASED STRENGTH, ENDURANCE, AND MOBILITY: ICD-10-CM

## 2022-09-06 DIAGNOSIS — R53.1 DECREASED STRENGTH, ENDURANCE, AND MOBILITY: ICD-10-CM

## 2022-09-06 PROCEDURE — 97110 THERAPEUTIC EXERCISES: CPT | Mod: KX

## 2022-09-06 PROCEDURE — 97116 GAIT TRAINING THERAPY: CPT | Mod: KX

## 2022-09-06 NOTE — PROGRESS NOTES
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  He reports that he has been walking indoor and outdoor at home and progressing to driveway and sidewalk walking.  He presents to therapy with his neighbor that assisted with pushing him to therapy today.     Response to previous treatment: good with no adverse effects.    Functional change:  He reports that he is feeling stronger with more confidence with standing/walking and is able to stand for longer bouts at home.  He reports have increased confidence and comfort level with walking.     Pain: 0/10 at rest  Location: not applicable      OBJECTIVE     Objective Measures updated at progress report unless specified.  Re-Assessment for () minutes:  range of motion of bilateral knees measured with NuStep - Right = -20, Left = -23  Sitting with bilateral lower extremities over TBall: Right knee = -12, Left knee = -22  In supine: Right hip = -15 , Right knee = -30                   Left hip = -15, Left knee = -35  Functional Mobility: Transfers modified independent with walker and bed mobility with independence  Ambulation Re-Assessment - ambulating 150 ft with walker with supervision to modified independence.     Treatment     Hunter received the treatments listed below:       THERAPEUTIC EXERCISES to develop strength, endurance, ROM, flexibility, posture and core stabilization for (30) minutes including:  Intervention Performed Today    short arc quad in supine  lower extremities over wedge 2x20 Right AROM, Left 2x20 Active Assistive for form and quality    sidelying toward prone   Stretch to Hip flexor 3x30 seconds each side   Quad set   2x10 Left     Sidelying hip extension   2x10 bilaterally with 10 seconds endrange stretch on last rep   [place] Left Prosthetic leg  Performed at edge of mat    Sitting in reclined position at edge of mat   Hip flexor stretch 3x30 seconds bilateral    Hip Abduction  In sidelying 2x10 bilateral    Bridge attempt  1x5   Posterior Pelvic Tilt Supine  2x5   Prone lying over light blue small triangle wedge  Holding position 2 minutes, then 4 minutes   Stretch to Hamstrings  3 x 1 minute Right lower extremity    Heelslide with forceful push towards extension  2x10 bilateral with 10 second enrange hold stretch on last rep    10 # over distal Left thigh  Stretch to Left hip flexor for over 5 minutes while exercising Right lower extremity    Prone Hamstring curl endrange for quad/hip flexor stretch  2x10 AAROM   lower extremity lifts towards extension in prone  2x10 AAROM   Prone hip flexor stretch   3x10 second holds bilateral    Standing frame raising to patient's tolerance    - Standing 2x's in standing frame holding each stand for 10-15  minutes working on the following:  - Horizontal Abduction with bilateral upper extremities 2x10 AROM   - terminal knee extension in stand position 2x20 bilateral   - Rows holding red theraband bilateral  2x10  - Pushing through upper extremities to achieve trunk extension 2x25  - cross body punch with 2# dumbbell 1x10 bilateral   - scap retraction 2x10  - attempted 1x10 shoulder rolls backward - patient had difficulty coordinating  - Overhead press with green tband bilateral 1x10  - Patient given feedback on  standing frame position - working on increasing seat angle after 30 second rest between lifts (repeated 3-4 times)  - Lateral reaches 1x8 bilateral   - [place] 1x20 hits working on upright trunk posture  - Pulling with bilateral upper extremities on tray at front to pull hips forward from seat and extend knees 2x25  - Reaching Left and Right with cones to target to stretch lower extremities with each reach 2x10 cones to each side incorporating reach posteriorly    Shuttle X  X - 2x15 bilateral leg press with 4 bands   -2x10 single leg press with 3 bands for each leg    NuStep  x 10 minutes at Level 1-3, working on alternating movement of lower extremities and stretch to bilateral knees (able to scoot seat position back to #14 to increase stretch in lower extremities)   Parallel Bars x - Standing heelcord stretch 3x10 seconds Right   - step ups to 4 in step 10x's bilateral         Stairs  - Step ups with Left lower extremity and down with right 4 steps with bilateral upper extremity support    Standing Frame  Standing 1 x in standing frame working on holding endrange position of approximately 55 degree seat angle for 5 minutes   Standing in parallel bars working sit to stands           - standing 2x's  - Standing weights shifts Left to Right 1x10  - Standing knee extension 1x10  - Standing Left upper extremity lifts off bar 1x5  - Standing bilateral upper extremity lifts off bar 1x10 with PT helping to hold hips for support with minimum to moderate assist   Nautilus leg extension   1x5 with 1 plate of resistance - machine malfunction, unable to continue     * In standing frame:  Sitting position = 0 degree seat angle  Full upright stand position with hips and knees at 0 degrees = 90 degree seat angle  3/15/22 - 1st  standing frame was at 42 degree seat angle, last stand position was at 65 degree seat angle  3/17/22 - 1st  standing frame was at 48 degree seat angle, last stand position was at  70 degree seat angle  3/21/22 - 1st  standing frame was at 52 degree seat angle, last stand position was at 70 degree seat angle  3/29/22 - 1st  standing frame was at 60 degree seat angle, last stand position was at 72 degree seat angle  3/31/22 - 1st  standing frame was at 55 degree seat angle, last stand position was at 75 degree seat angle  4/7/22 - 1st  standing frame was at 60 degree seat angle, last stand position was at 80 degree seat angle  4/11/22 - 1st  standing frame was at 60 degree seat angle, last stand position was at 78 degree seat angle  4/14/22 - 1st  standing frame was at 55 degree seat angle, last stand position was at 86 degree seat angle  4/19/22 - 1st  standing frame was at 60 degree seat angle, last stand position was at 78 degree seat angle  4/22/22 - 1st  standing frame was at 65 degree seat angle, last stand position was at 81 degree seat angle  4/26/22 - 1st  standing frame was at 65 degree seat angle, last stand position was at 82 degree seat angle  5/3/22 - 1st  standing frame was at 65 degree seat angle, last stand position was at 88 degree seat angle  5/5/22 - 1st  standing frame was at 65 degree seat angle, last stand position was at 84 degree seat angle  5/10/22 - 1st  standing frame was at 60 degree seat angle, last stand position was at 80 degree seat angle  5/12/22 - 1st  standing frame was at 65 degree seat angle, last stand position was at 80 degree seat angle  5/17/22 - 1st  standing frame was at 62 degree seat angle, last stand position was at 82 degree seat angle  5/19/22 - 1st  standing frame was at 60 degree seat angle, last stand position was at 80 degree seat angle  5/24/22 - 1st  standing frame was at 62 degree seat angle, last stand position was at 82 degree seat angle  5/26/22 - 1st  standing frame was at 60 degree seat angle, last  stand position was at 80 degree seat angle   5/31/22 - 1st  standing frame was at 65 degree seat angle, last stand position was at 80 degree seat angle  6/2/22 - 1st  standing frame was at 70 degree seat angle, last stand position was at 82 degree seat angle  6/7/22 - 1st  standing frame was at 68 degree seat angle, last stand position was at 80 degree seat angle  6/10/22 - 1st  standing frame was at 60 degree seat angle, last stand position was at 80 degree seat angle  6/14/22 - 1st  standing frame was at 65 degree seat angle, last stand position was at 82 degree seat angle   6/16/22 - 1st  standing frame was at 70 degree seat angle, last stand position was at 84 degree seat angle  6/21/22 - 1st  standing frame was at 65 degree seat angle, last stand position was at 80 degree seat angle  Plan for Next Visit:      THERAPEUTIC ACTIVITIES to improve dynamic and functional  performance for () minutes including:    Intervention Performed Today    Bed - wheelchair transfer   Practiced set up and sequence   Bed mobility sit to supine to sit  Practiced sequence    Rolling   3 x each side working on sequence to incorporate hip flexor stretch on each side. Then separate stretch performed as listed above   Standing in parallel bars  4 x's working on several weight shifts towards hip extension and knee extension while standing.   Standing in parallel bars performing step forward  With Right lower extremity 5x's with moderate assistance to maintain standing position   Dynasplint Consult via Rubina with Andriy to discuss fit     Sit to  parallel bars without assistance  2x's   Dynasplint Consult  Andriy Stout (Rep for Dynasplint) met with physical therapist and patient today during session to discuss options for dynasplints for bilateral knees due to patient's significant lack of range of motion.     Dynasplint Fitting  Andriy Stout (Rep for Dynasplint) met with  physical therapist and patient today to deliver and fit patient for bilateral knee dynasplints.  We discussed wearing time of 1-2 hours/day for the first week and then we would re-assess.  Patient was shown how to don each brace and straps were marked for future use.  The Dynasplint force was increased to 6 on the Left and 7 on the Right to start.  Extra padding was added to thin straps for skin protection.   Dynasplint Consult  Andriy Stout (Rep for Dynasplint) met with physical therapist and patient today during session to discuss proper fit and positioning of Dynasplint to feel a better stretch at home. Pt only brought Left splint to session today and his splint was donned, straps were tightened and new black line added for patient to follow at home.  Left splint was increased from 7/13 to 9/13 tension.  He reports that he places a pillow under his knee at home and he was educated on better positioning of pillow under distal stump with prosthesis removed.  He also felt more of a stretch in long sitting vs supine and was asked to try this position at home for part of the stretch if able.    Standing at sink in Neuro Room working on stand posture and positioning   Simulating activity that he has been given to do for homework. 1x5 seconds, 1x10 seconds   Patient educated addition to Home Exercise Program   - Simulated sit to stand setup  From wheelchair to sink at home performing with walker in front to work on letting go of sink and holding on to walker.  Patient was educated on how to sit safely to prevent wheelchair from tipping backwards if he loses balance.  He was told to start with one hand on walker and one hand on counter top until he can progress to both hands on walker for support. This will be a way that he can safely challenge himself at home. Patient practiced standing to walker 3x's working on sequence and safety of task.   Patient expressed concerns about not knowing his current weight, so PT assisted  patient with obtaining weight at start of session with wheelchair scale.   He currently weighs 139 #   Several transfers and sit to stands to adjust walker height as needed  Contact guard to stand by assist for each   Several sit to stand and wheelchair to machine transfers with and without walker performed throughout session  With independence.    Discussed Left prosthesis sleeve   - It is currently to large/long and often curls.  Patient shared number of company as he has been unsuccessful with getting rep to deliver new one.    Left Prosthesis adjusted to turn foot to neutral, standing and stepping between each adjustment to correct position of leg  2 adjustments.    Discussed full below knee amputation program to work on at home in preparation for discharge  Patient verbalized good understanding of program     Plan for Next Visit:      Gait Training: for (15) minutes:  - Ambulated 150ft with walker with stand by assist to supervision then 2x30 ft with quad cane with minimum to moderate assist  - Parallel bars: working on ambulating forward and backward 3 x's with 1-2 upper extremity support, side stepping 4x's  with 1-2 upper extremity support      Patient Education and Home Exercises     Home Exercises Provided and Patient Education Provided     Education provided:   -2/22/22 Patient educated on increasing length and consistency of stretching knees and hips to every 2 hours at home.  He was shown and demonstrated understanding of short arc quads, rolling toward prone and sitting in reclined position to stretch hip flexors.  -2/24/22 Patient given Home Exercise Program on exercises for lower extremities to be performed 2x15 3x's/day  See patient Instructions in EMR  -3/1/22 Re-inforced Home Exercise Program and asked patient to incorporate prone lying for longer periods of time to build up to 20 minutes per day.  3/3/22 - Discussed the option of dynasplints for bilateral lower extremities. Patient educated on  the option of Dynasplints vs basic knee braces and encouraged to consider Dynasplints as a more aggressive and efficient way of properly positioning and dynamically stretching his legs outside of therapy.  3/8/22 - Patient was educated on Dynasplints with Andriy Stout (Rep).   He was shown pictures on ipad and Andriy and physical therapist discussed wearing schedule to make gains with his muscle length outside of therapy sessions.  Andriy informed patient that he would assist with collecting information regarding insurance coverage and provide him with any out of pocket costs so he can decide if he would like to pursue this option.  3/22/22 - Patient was educated that Andriy Stout (Rep for Dynasplint) messaged me that his splints have been approved and are being mailed this week.   3/24/22 - Patient was educated on Dynasplint management and importance of daily skin checks.  His tolerance of wearing splints for the first week will determine his wearing schedule for the following weeks.  3/29/22 - Patient was educated on only wearing Dynasplints while lying down to get the most effective stretch to lower extremities.  3/31/22 - Patient was asked to reach out to Dynasplint rep (Andriy) to set up a time for Andriy to re-assess the positioning and fit of splints to see if he can feel more of a stretch with wearing schedule.  4/7/22 - Patient was asked to reach out to [facility] rep to make sure that splints are adjusted appropriately.  4/11/22 - PT recommended that patient follow up with [facility] rep for reassessment of splint fit.  4/14/22 - Patient informed that PT would reach out to [facility] again to have him schedule a visit.  He was encouraged to continue being active at home.  4/18/22 -  Patient asked to contact Dynasplint rep to set up a time for a consult that would work for his schedule.  4/28/22 - Patient educated on wearing Dynaspints for 2 hours/day for the next 4 days and then we will assess his ability to  increase to 4 hours/day after next visit.  He verbalized good understanding of education from Andriy (Alexa) and was told to try Right splint on in new wear position first before increasing tension to 9/13.    5/3/22 - Patient educated on attempting standing at home at sink holding stand position for 10-15 seconds repeating 3x's and doing this in the morning and afternoon every day.   5/12/22 - Patient educated on the importance of walking with walker each session moving forward to try to get range applied to a functional task.  5/26/22 - Patient educated on adding walker to sit to stands at sink as a way of increasing his stand balance safely at home alone.  6/7/22 - Patient educated on his gains at this point and that PT is requesting more visits for a renewed Plan of Care.   6/10/22 - Patient educated on progressing to standing marches with walker at home and standing lateral foot taps with walker at home in same set up as before with kitchen counter in front and wheelchair behind for safety.  6/21/22 - Patient educated on stair mobility sequence.   6/23/22 - Patient educated on the risk of falls with walking home alone at this time.   7/28/22 - Patient educated on safety with gait at home and advancing gait challenges in therapy.  8/8/22 - Patient educated on proper technique of side lying hip extension and that Plan of Care would be extended for another month in preparation for discharge.   8/12/22 - Discussed contacting prosthetic company to replace sleeve as it is too large and long.   9/2/22 - Discussed full below knee amputation home program in preparation for discharge.      Home Exercise Provided:  Exercises were reviewed and Hunter was able to verbalize good understanding prior to the end of the session.  Hunter demonstrated good  understanding of the education provided.   ASSESSMENT     Patient demonstrated good participation and endurance with all tasks today.  He is demonstrating improved control  with gait, but still lacks heel strike on Left and may need prosthetic foot to be shifted forward to provide more stability with gait. He was able to progress to quad cane, but still needs increased assistance due to balance issues.     Pt prognosis is Fair  To Good    Pt will continue to benefit from skilled outpatient physical therapy to address the deficits listed in the problem list box on initial evaluation, provide pt/family education and to maximize pt's level of independence in the home and community environment.     Pt's spiritual, cultural and educational needs considered and pt agreeable to plan of care and goals.     Anticipated Barriers for therapy: co-morbidities, transportation assistance needed at times, lifestyle, potential contractures of knees/hips    GOALS:     Short Term Goals:  4 weeks Progress    Function: Patient will demonstrate improved function as indicated by a functional limitation score of less than or equal to 55 out of 100 on FOTO = 44% limitation PC   Mobility: Patient will improve sit to stand transfer with moderate assistance in order to progress towards independence with functional activities. (Met)  (Modified) - Patient will perform wheelchair to mat transfer with stand by assist to demonstrate improved independence with mobility. Met   Gait: Patient will ambulated 3 steps in parallel bars with Maximum assistance to demonstrate improved strength, posture and endurance met   HEP: Patient will demonstrate independence with HEP in order to progress toward functional independence. met    Assess patient's needs for Dynasplints and set up consultation if needed. met       Long Term Goals:  8 weeks Progress   Function: Patient will demonstrate improved function as indicated by a functional limitation score of less than or equal to 60 out of 100 on FOTO = 40 % limitation PC   Mobility: Patient will improve AROM of bilateral knees to -10 degrees  in order to return to maximal functional  potential and improve quality of life. PC   Functional Mobility - Patient will perform mat to wheelchair transfer with modified independence with walker in order to improve his independence with functional mobility. Met   Gait: Patient will ambulate 15 feet with rolling walker with moderate assistance to demonstrate improved strength, endurance and mobility. (Met)   (Modified) - Patient will ambulate 100 ft with walker incorporating turns with modified independence to negotiate home environment with independence. Met   HEP: Patient educated on HEP in preparation for d/c verbalizing and demonstrating good understanding of topics discussed.    PC            Goals Key:  PC= progressing/continue;        PM= partially met;             DC= discontinue         PLAN     Continue Plan of Care (POC) and progress per patient tolerance. See Treatment section for exercise progression.  Outpatient Physical Therapy 2 times weekly for 8 weeks to include the following interventions: Electrical Stimulation Attended, Gait Training, Moist Heat/ Ice, Neuromuscular Re-ed, Orthotic Management and Training, Patient Education, Self Care, Therapeutic Activities, Therapeutic Exercise and Prosthetic Management.      Mindy Patino, PT, DPT

## 2022-09-09 ENCOUNTER — CLINICAL SUPPORT (OUTPATIENT)
Dept: REHABILITATION | Facility: HOSPITAL | Age: 82
End: 2022-09-09
Payer: MEDICARE

## 2022-09-09 DIAGNOSIS — M25.661 DECREASED RANGE OF MOTION OF BOTH LOWER EXTREMITIES: Primary | ICD-10-CM

## 2022-09-09 DIAGNOSIS — R68.89 DECREASED STRENGTH, ENDURANCE, AND MOBILITY: ICD-10-CM

## 2022-09-09 DIAGNOSIS — Z74.09 DECREASED STRENGTH, ENDURANCE, AND MOBILITY: ICD-10-CM

## 2022-09-09 DIAGNOSIS — M25.662 DECREASED RANGE OF MOTION OF BOTH LOWER EXTREMITIES: Primary | ICD-10-CM

## 2022-09-09 DIAGNOSIS — R53.1 DECREASED STRENGTH, ENDURANCE, AND MOBILITY: ICD-10-CM

## 2022-09-09 PROCEDURE — 97110 THERAPEUTIC EXERCISES: CPT | Mod: KX

## 2022-09-09 PROCEDURE — 97116 GAIT TRAINING THERAPY: CPT | Mod: KX

## 2022-09-09 NOTE — PROGRESS NOTES
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  He reports that he has been walking indoor and outdoor at home and progressing to driveway and sidewalk walking.       Response to previous treatment: good with no adverse effects.    Functional change:  He reports that he is feeling stronger with more confidence with standing/walking and is able to stand for longer bouts at home.  He reports have increased confidence and comfort level with walking.     Pain: 0/10 at rest  Location: not applicable      OBJECTIVE     Objective Measures updated at progress report unless specified.  Re-Assessment for (5) minutes:  range of motion of bilateral knees measured with NuStep - Right = -20, Left = -23  Sitting with bilateral lower extremities over TBall: Right knee = -12, Left knee = -22  In supine: Right hip = -15 , Right knee = -30                   Left hip = -15, Left knee = -35  Functional Mobility: Transfers modified independent with walker and bed mobility with independence  Ambulation Re-Assessment - ambulating 150 ft with walker with supervision to modified independence.     Supine: Right knee Extension = -25, Left knee extension = -30    Treatment     Hunter received the treatments listed below:       THERAPEUTIC EXERCISES to develop strength, endurance, ROM, flexibility, posture and core stabilization for (43) minutes including:  Intervention Performed Today    short arc quad in supine  lower extremities over wedge 2x20 Right AROM, Left 2x20 Active Assistive for form and quality    sidelying toward prone   Stretch to Hip flexor 3x30 seconds each side   Quad set   2x10 Left     Sidelying hip extension   2x10 bilaterally with 10 seconds endrange stretch on last rep   [place] [facility] [place] Left Prosthetic leg  Performed at edge of mat    Sitting in reclined position at edge of mat   Hip flexor stretch 3x30 seconds bilateral    Hip Abduction  In sidelying 2x10 bilateral    Bridge attempt  1x5   Posterior Pelvic Tilt Supine  2x5   Prone lying over light blue small triangle wedge  Holding position 2 minutes, then 4 minutes   Stretch to Hamstrings  3 x 1 minute Right lower extremity    Heelslide with forceful push towards extension  2x10 bilateral with 10 second enrange hold stretch on last rep    10 # over distal Left thigh  Stretch to Left hip flexor for over 5 minutes while exercising Right lower extremity    Prone Hamstring curl endrange for quad/hip flexor stretch  2x10 AAROM   lower extremity lifts towards extension in prone  2x10 AAROM   Prone hip flexor stretch   3x10 second holds bilateral    Standing frame raising to patient's tolerance    - Standing 2x's in standing frame holding each stand for 10-15  minutes working on the following:  - Horizontal Abduction with bilateral upper extremities 2x10 AROM   - terminal knee extension in stand position 2x20 bilateral   - Rows holding red theraband bilateral  2x10  - Pushing through upper extremities to achieve trunk extension 2x25  - cross body punch with 2# dumbbell 1x10 bilateral   - scap retraction 2x10  - attempted 1x10 shoulder rolls backward - patient had difficulty coordinating  - Overhead press with green tband bilateral 1x10  - Patient given feedback on  standing frame position - working on increasing seat angle after 30 second rest between lifts (repeated 3-4 times)  - Lateral reaches 1x8 bilateral   - [place] 1x20 hits working on upright trunk posture  - Pulling with bilateral upper extremities on tray at front to pull hips forward from seat and extend knees 2x25  - Reaching Left and Right with cones to target to stretch lower extremities with each reach 2x10 cones to each side incorporating reach posteriorly    Shuttle X  X - 2x15 bilateral leg press with 4 bands   -2x15 single leg press with 3 bands for each leg    NuStep  x 11 minutes at Level 4, working on alternating movement of lower extremities and stretch to bilateral knees (able to scoot seat position back to #14 to increase stretch in lower extremities)   Parallel Bars x - Standing heelcord stretch 3x10 seconds Right   - step ups to 4 in step 10x's bilateral    Supine  x - Heel slides 1x10 bilateral with end range 10 second hold    Sitting edge of mat  X    X - mini lifts from raised surface, progressively lowering height after each set 3x5   - sit to stand from raised surface that progressively lowered after each successful stand 4x's    Stairs  - Step ups with Left lower extremity and down with right 4 steps with bilateral upper extremity support    Standing Frame  Standing 1 x in standing frame working on holding endrange position of approximately 55 degree seat angle for 5 minutes   Standing in parallel bars working sit to stands           - standing 2x's  - Standing weights shifts Left to Right 1x10  - Standing knee extension 1x10  - Standing Left upper extremity lifts off bar 1x5  - Standing bilateral upper extremity lifts off bar 1x10 with PT helping to hold hips for support with minimum to moderate assist   Nautilus leg extension   1x5 with 1 plate of resistance - machine malfunction, unable to continue     * In standing frame:  Sitting position = 0 degree seat angle  Full upright stand  position with hips and knees at 0 degrees = 90 degree seat angle  3/15/22 - 1st  standing frame was at 42 degree seat angle, last stand position was at 65 degree seat angle  3/17/22 - 1st  standing frame was at 48 degree seat angle, last stand position was at 70 degree seat angle  3/21/22 - 1st  standing frame was at 52 degree seat angle, last stand position was at 70 degree seat angle  3/29/22 - 1st  standing frame was at 60 degree seat angle, last stand position was at 72 degree seat angle  3/31/22 - 1st  standing frame was at 55 degree seat angle, last stand position was at 75 degree seat angle  4/7/22 - 1st  standing frame was at 60 degree seat angle, last stand position was at 80 degree seat angle  4/11/22 - 1st  standing frame was at 60 degree seat angle, last stand position was at 78 degree seat angle  4/14/22 - 1st  standing frame was at 55 degree seat angle, last stand position was at 86 degree seat angle  4/19/22 - 1st  standing frame was at 60 degree seat angle, last stand position was at 78 degree seat angle  4/22/22 - 1st  standing frame was at 65 degree seat angle, last stand position was at 81 degree seat angle  4/26/22 - 1st  standing frame was at 65 degree seat angle, last stand position was at 82 degree seat angle  5/3/22 - 1st  standing frame was at 65 degree seat angle, last stand position was at 88 degree seat angle  5/5/22 - 1st  standing frame was at 65 degree seat angle, last stand position was at 84 degree seat angle  5/10/22 - 1st  standing frame was at 60 degree seat angle, last stand position was at 80 degree seat angle  5/12/22 - 1st  standing frame was at 65 degree seat angle, last stand position was at 80 degree seat angle  5/17/22 - 1st  standing frame was at 62 degree seat angle, last stand position was at 82 degree seat angle  5/19/22 - 1st   standing frame was at 60 degree seat angle, last stand position was at 80 degree seat angle  5/24/22 - 1st  standing frame was at 62 degree seat angle, last stand position was at 82 degree seat angle  5/26/22 - 1st  standing frame was at 60 degree seat angle, last stand position was at 80 degree seat angle   5/31/22 - 1st  standing frame was at 65 degree seat angle, last stand position was at 80 degree seat angle  6/2/22 - 1st  standing frame was at 70 degree seat angle, last stand position was at 82 degree seat angle  6/7/22 - 1st  standing frame was at 68 degree seat angle, last stand position was at 80 degree seat angle  6/10/22 - 1st  standing frame was at 60 degree seat angle, last stand position was at 80 degree seat angle  6/14/22 - 1st  standing frame was at 65 degree seat angle, last stand position was at 82 degree seat angle   6/16/22 - 1st  standing frame was at 70 degree seat angle, last stand position was at 84 degree seat angle  6/21/22 - 1st  standing frame was at 65 degree seat angle, last stand position was at 80 degree seat angle  Plan for Next Visit:      THERAPEUTIC ACTIVITIES to improve dynamic and functional  performance for () minutes including:    Intervention Performed Today    Bed - wheelchair transfer   Practiced set up and sequence   Bed mobility sit to supine to sit  Practiced sequence    Rolling   3 x each side working on sequence to incorporate hip flexor stretch on each side. Then separate stretch performed as listed above   Standing in parallel bars  4 x's working on several weight shifts towards hip extension and knee extension while standing.   Standing in parallel bars performing step forward  With Right lower extremity 5x's with moderate assistance to maintain standing position   Dynasplint Consult via [facility] with Andriy to discuss fit     Sit to  parallel bars without assistance  2x's   Dynasplint  Consult  Andriy Stout (Rep for Dynasplint) met with physical therapist and patient today during session to discuss options for dynasplints for bilateral knees due to patient's significant lack of range of motion.     Dynasplint Fitting  Andriy Stout (Rep for Dynasplint) met with physical therapist and patient today to deliver and fit patient for bilateral knee dynasplints.  We discussed wearing time of 1-2 hours/day for the first week and then we would re-assess.  Patient was shown how to don each brace and straps were marked for future use.  The Dynasplint force was increased to 6 on the Left and 7 on the Right to start.  Extra padding was added to thin straps for skin protection.   Dynasplint Consult  Andriy Stout (Rep for Dynasplint) met with physical therapist and patient today during session to discuss proper fit and positioning of Dynasplint to feel a better stretch at home. Pt only brought Left splint to session today and his splint was donned, straps were tightened and new black line added for patient to follow at home.  Left splint was increased from 7/13 to 9/13 tension.  He reports that he places a pillow under his knee at home and he was educated on better positioning of pillow under distal stump with prosthesis removed.  He also felt more of a stretch in long sitting vs supine and was asked to try this position at home for part of the stretch if able.    Standing at sink in Neuro Room working on stand posture and positioning   Simulating activity that he has been given to do for homework. 1x5 seconds, 1x10 seconds   Patient educated addition to Home Exercise Program   - Simulated sit to stand setup  From wheelchair to sink at home performing with walker in front to work on letting go of sink and holding on to walker.  Patient was educated on how to sit safely to prevent wheelchair from tipping backwards if he loses balance.  He was told to start with one hand on walker and one hand on counter top until he  can progress to both hands on walker for support. This will be a way that he can safely challenge himself at home. Patient practiced standing to walker 3x's working on sequence and safety of task.   Patient expressed concerns about not knowing his current weight, so PT assisted patient with obtaining weight at start of session with wheelchair scale.   He currently weighs 139 #   Several transfers and sit to stands to adjust walker height as needed  Contact guard to stand by assist for each   Several sit to stand and wheelchair to machine transfers with and without walker performed throughout session  With independence.    Discussed Left prosthesis sleeve   - It is currently to large/long and often curls.  Patient shared number of company as he has been unsuccessful with getting rep to deliver new one.    Left Prosthesis adjusted to turn foot to neutral, standing and stepping between each adjustment to correct position of leg  2 adjustments.    Discussed full below knee amputation program to work on at home in preparation for discharge  Patient verbalized good understanding of program     Plan for Next Visit:      Gait Training: for (12) minutes:  - Ambulated 200ft with walker with stand by assist to supervision  - Parallel bars: working on ambulating forward and backward 3 x's with 1-2 upper extremity support, side stepping 4x's  with 1-2 upper extremity support      Patient Education and Home Exercises     Home Exercises Provided and Patient Education Provided     Education provided:   -2/22/22 Patient educated on increasing length and consistency of stretching knees and hips to every 2 hours at home.  He was shown and demonstrated understanding of short arc quads, rolling toward prone and sitting in reclined position to stretch hip flexors.  -2/24/22 Patient given Home Exercise Program on exercises for lower extremities to be performed 2x15 3x's/day  See patient Instructions in EMR  -3/1/22 Re-inforced Home  Exercise Program and asked patient to incorporate prone lying for longer periods of time to build up to 20 minutes per day.  3/3/22 - Discussed the option of dynasplints for bilateral lower extremities. Patient educated on the option of Dynasplints vs basic knee braces and encouraged to consider Dynasplints as a more aggressive and efficient way of properly positioning and dynamically stretching his legs outside of therapy.  3/8/22 - Patient was educated on Dynasplints with Andriy Stout (Rep).   He was shown pictures on ipad and Andriy and physical therapist discussed wearing schedule to make gains with his muscle length outside of therapy sessions.  Andriy informed patient that he would assist with collecting information regarding insurance coverage and provide him with any out of pocket costs so he can decide if he would like to pursue this option.  3/22/22 - Patient was educated that Andriy Stout (Rep for Dynasplint) messaged me that his splints have been approved and are being mailed this week.   3/24/22 - Patient was educated on Dynasplint management and importance of daily skin checks.  His tolerance of wearing splints for the first week will determine his wearing schedule for the following weeks.  3/29/22 - Patient was educated on only wearing Dynasplints while lying down to get the most effective stretch to lower extremities.  3/31/22 - Patient was asked to reach out to Dynasplint rep (Andriy) to set up a time for Andriy to re-assess the positioning and fit of splints to see if he can feel more of a stretch with wearing schedule.  4/7/22 - Patient was asked to reach out to Dynasplint rep to make sure that splints are adjusted appropriately.  4/11/22 - PT recommended that patient follow up with Dynasplint rep for reassessment of splint fit.  4/14/22 - Patient informed that PT would reach out to Dynasplint rep again to have him schedule a visit.  He was encouraged to continue being active at home.  4/18/22 -  Patient  asked to contact Dynasplint rep to set up a time for a consult that would work for his schedule.  4/28/22 - Patient educated on wearing Dynaspints for 2 hours/day for the next 4 days and then we will assess his ability to increase to 4 hours/day after next visit.  He verbalized good understanding of education from Adnriy (Alexa) and was told to try Right splint on in new wear position first before increasing tension to 9/13.    5/3/22 - Patient educated on attempting standing at home at sink holding stand position for 10-15 seconds repeating 3x's and doing this in the morning and afternoon every day.   5/12/22 - Patient educated on the importance of walking with walker each session moving forward to try to get range applied to a functional task.  5/26/22 - Patient educated on adding walker to sit to stands at sink as a way of increasing his stand balance safely at home alone.  6/7/22 - Patient educated on his gains at this point and that PT is requesting more visits for a renewed Plan of Care.   6/10/22 - Patient educated on progressing to standing marches with walker at home and standing lateral foot taps with walker at home in same set up as before with kitchen counter in front and wheelchair behind for safety.  6/21/22 - Patient educated on stair mobility sequence.   6/23/22 - Patient educated on the risk of falls with walking home alone at this time.   7/28/22 - Patient educated on safety with gait at home and advancing gait challenges in therapy.  8/8/22 - Patient educated on proper technique of side lying hip extension and that Plan of Care would be extended for another month in preparation for discharge.   8/12/22 - Discussed contacting prosthetic company to replace sleeve as it is too large and long.   9/2/22 - Discussed full below knee amputation home program in preparation for discharge.      Home Exercise Provided:  Exercises were reviewed and Hunter was able to verbalize good understanding prior to the  end of the session.  Hunter demonstrated good  understanding of the education provided.   ASSESSMENT     Patient demonstrated good participation and endurance with all tasks today.  He is demonstrating improved control with gait, but still lacks heel strike on Left and may need prosthetic foot to be shifted forward to provide more stability with gait. He demonstrated improved knee extension of 5 degrees bilateral since last assessed.  He pushes himself at home and is very compliant with his daily exercises.  He is progressing well with PT.    Pt prognosis is Fair  To Good    Pt will continue to benefit from skilled outpatient physical therapy to address the deficits listed in the problem list box on initial evaluation, provide pt/family education and to maximize pt's level of independence in the home and community environment.     Pt's spiritual, cultural and educational needs considered and pt agreeable to plan of care and goals.     Anticipated Barriers for therapy: co-morbidities, transportation assistance needed at times, lifestyle, potential contractures of knees/hips    GOALS:     Short Term Goals:  4 weeks Progress    Function: Patient will demonstrate improved function as indicated by a functional limitation score of less than or equal to 55 out of 100 on FOTO = 44% limitation PC   Mobility: Patient will improve sit to stand transfer with moderate assistance in order to progress towards independence with functional activities. (Met)  (Modified) - Patient will perform wheelchair to mat transfer with stand by assist to demonstrate improved independence with mobility. Met   Gait: Patient will ambulated 3 steps in parallel bars with Maximum assistance to demonstrate improved strength, posture and endurance met   HEP: Patient will demonstrate independence with HEP in order to progress toward functional independence. met    Assess patient's needs for Dynasplints and set up consultation if needed. met       Long  Term Goals:  8 weeks Progress   Function: Patient will demonstrate improved function as indicated by a functional limitation score of less than or equal to 60 out of 100 on FOTO = 40 % limitation PC   Mobility: Patient will improve AROM of bilateral knees to -10 degrees  in order to return to maximal functional potential and improve quality of life. PC   Functional Mobility - Patient will perform mat to wheelchair transfer with modified independence with walker in order to improve his independence with functional mobility. Met   Gait: Patient will ambulate 15 feet with rolling walker with moderate assistance to demonstrate improved strength, endurance and mobility. (Met)   (Modified) - Patient will ambulate 100 ft with walker incorporating turns with modified independence to negotiate home environment with independence. Met   HEP: Patient educated on HEP in preparation for d/c verbalizing and demonstrating good understanding of topics discussed.    Met            Goals Key:  PC= progressing/continue;        PM= partially met;             DC= discontinue         PLAN     Continue Plan of Care (POC) and progress per patient tolerance. See Treatment section for exercise progression.  Outpatient Physical Therapy 2 times weekly for 8 weeks to include the following interventions: Electrical Stimulation Attended, Gait Training, Moist Heat/ Ice, Neuromuscular Re-ed, Orthotic Management and Training, Patient Education, Self Care, Therapeutic Activities, Therapeutic Exercise and Prosthetic Management.      Mindy Patino, PT, DPT

## 2022-09-12 ENCOUNTER — CLINICAL SUPPORT (OUTPATIENT)
Dept: REHABILITATION | Facility: HOSPITAL | Age: 82
End: 2022-09-12
Payer: MEDICARE

## 2022-09-12 DIAGNOSIS — M25.661 DECREASED RANGE OF MOTION OF BOTH LOWER EXTREMITIES: Primary | ICD-10-CM

## 2022-09-12 DIAGNOSIS — R53.1 DECREASED STRENGTH, ENDURANCE, AND MOBILITY: ICD-10-CM

## 2022-09-12 DIAGNOSIS — M25.662 DECREASED RANGE OF MOTION OF BOTH LOWER EXTREMITIES: Primary | ICD-10-CM

## 2022-09-12 DIAGNOSIS — R68.89 DECREASED STRENGTH, ENDURANCE, AND MOBILITY: ICD-10-CM

## 2022-09-12 DIAGNOSIS — Z74.09 DECREASED STRENGTH, ENDURANCE, AND MOBILITY: ICD-10-CM

## 2022-09-12 PROCEDURE — 97110 THERAPEUTIC EXERCISES: CPT

## 2022-09-12 PROCEDURE — 97116 GAIT TRAINING THERAPY: CPT

## 2022-09-12 NOTE — PROGRESS NOTES
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  He reports that he has been walking indoor and outdoor at home and progressing to driveway and sidewalk walking.   He was able to perform sit to stands at home without his hands     Response to previous treatment: good with no adverse effects.    Functional change:  He reports that he is feeling stronger with more confidence with standing/walking and is able to stand for longer bouts at home.  He reports have increased confidence and comfort level with walking.  He is able to stand with only 1 upper extremity support with improved confidence and is able to perform sit to stand with no upper extremity support.      Pain: 0/10 at rest  Location: not applicable      OBJECTIVE     Objective Measures updated at progress report unless specified.  Re-Assessment for () minutes:  range of motion of bilateral knees measured with NuStep - Right = -20, Left = -23  Sitting with bilateral lower extremities over TBall: Right knee = -12, Left knee = -22  In supine: Right hip = -15 , Right knee = -30                   Left hip = -15, Left knee = -35  Functional Mobility: Transfers modified independent with walker and bed mobility with independence  Ambulation Re-Assessment -  ambulating 150 ft with walker with supervision to modified independence.     Supine: Right knee Extension = -25, Left knee extension = -30    Treatment     Hunter received the treatments listed below:      THERAPEUTIC EXERCISES to develop strength, endurance, ROM, flexibility, posture and core stabilization for (40) minutes including:  Intervention Performed Today    short arc quad in supine  lower extremities over wedge 2x20 Right AROM, Left 2x20 Active Assistive for form and quality    sidelying toward prone   Stretch to Hip flexor 3x30 seconds each side   Quad set   2x10 Left     Sidelying hip extension   2x10 bilaterally with 10 seconds endrange stretch on last rep   [place] Left Prosthetic leg  Performed at edge of mat    Sitting in reclined position at edge of mat   Hip flexor stretch 3x30 seconds bilateral    Hip Abduction  In sidelying 2x10 bilateral    Bridge attempt  1x5   Posterior Pelvic Tilt Supine  2x5   Prone lying over light blue small triangle wedge  Holding position 2 minutes, then 4 minutes   Stretch to Hamstrings  3 x 1 minute Right lower extremity    Heelslide with forceful push towards extension  2x10 bilateral with 10 second enrange hold stretch on last rep    10 # over distal Left thigh  Stretch to Left hip flexor for over 5 minutes while exercising Right lower extremity    Prone Hamstring curl endrange for quad/hip flexor stretch  2x10 AAROM   lower extremity lifts towards extension in prone  2x10 AAROM   Prone hip flexor stretch   3x10 second holds bilateral    Standing frame raising to patient's tolerance    - Standing 2x's in standing frame holding each stand for 10-15  minutes working on the following:  - Horizontal Abduction with bilateral upper extremities 2x10 AROM   - terminal knee extension in stand position 2x20 bilateral   - Rows holding red theraband bilateral  2x10  - Pushing through upper extremities to achieve trunk extension 2x25  - cross body punch with 2#  dumbbell 1x10 bilateral   - scap retraction 2x10  - attempted 1x10 shoulder rolls backward - patient had difficulty coordinating  - Overhead press with green tband bilateral 1x10  - Patient given feedback on standing frame position - working on increasing seat angle after 30 second rest between lifts (repeated 3-4 times)  - Lateral reaches 1x8 bilateral   - [place] 1x20 hits working on upright trunk posture  - Pulling with bilateral upper extremities on tray at front to pull hips forward from seat and extend knees 2x25  - Reaching Left and Right with cones to target to stretch lower extremities with each reach 2x10 cones to each side incorporating reach posteriorly    Shuttle X  X - 1x15, 1x10 bilateral leg press with 4-5 bands   -2x10 single leg press with 3 bands for each leg    NuStep  x 10 minutes at Level 4-5, working on alternating movement of lower extremities and stretch to bilateral knees (able to scoot seat position back to #14 to increase stretch in lower extremities)   Parallel Bars  - Standing heelcord stretch 3x10 seconds Right   - step ups to 4 in step 10x's bilateral    Supine   - Heel slides 1x10 bilateral with end range 10 second hold    Sitting edge of mat  X    X - mini lifts from raised surface, progressively lowering height after each set 3x5   - sit to stand from raised surface that progressively lowered after each successful stand 4x's    Stairs  - Step ups with Left lower extremity and down with right 4 steps with bilateral upper extremity support    Standing Frame  Standing 1 x in standing frame working on holding endrange position of approximately 55 degree seat angle for 5 minutes   Standing in parallel bars working sit to stands           - standing 2x's  - Standing weights shifts Left to Right 1x10  - Standing knee extension 1x10  - Standing Left upper extremity lifts off bar 1x5  - Standing bilateral upper extremity lifts off bar 1x10 with PT helping to hold hips for support  with minimum to moderate assist   Nautilus leg extension   1x5 with 1 plate of resistance - machine malfunction, unable to continue   Sitting on gray ball X  X  X    X - weight shifts Left and Right 1x10  - weight shifts forward/back 1x10  - single leg lifts 1x5 bilateral then 5 seconds hold 2x's bilateral   - lunge over forward leg 1x10 bilateral    Sitting long arc quad  X  X - 1x10 bilateral with 5#  - 10 second end range hold wit 5# bilateral           * In standing frame:  Sitting position = 0 degree seat angle  Full upright stand position with hips and knees at 0 degrees = 90 degree seat angle  3/15/22 - 1st  standing frame was at 42 degree seat angle, last stand position was at 65 degree seat angle  3/17/22 - 1st  standing frame was at 48 degree seat angle, last stand position was at 70 degree seat angle  3/21/22 - 1st  standing frame was at 52 degree seat angle, last stand position was at 70 degree seat angle  3/29/22 - 1st  standing frame was at 60 degree seat angle, last stand position was at 72 degree seat angle  3/31/22 - 1st  standing frame was at 55 degree seat angle, last stand position was at 75 degree seat angle  4/7/22 - 1st  standing frame was at 60 degree seat angle, last stand position was at 80 degree seat angle  4/11/22 - 1st  standing frame was at 60 degree seat angle, last stand position was at 78 degree seat angle  4/14/22 - 1st  standing frame was at 55 degree seat angle, last stand position was at 86 degree seat angle  4/19/22 - 1st  standing frame was at 60 degree seat angle, last stand position was at 78 degree seat angle  4/22/22 - 1st  standing frame was at 65 degree seat angle, last stand position was at 81 degree seat angle  4/26/22 - 1st  standing frame was at 65 degree seat angle, last stand position was at 82 degree seat angle  5/3/22 - 1st  standing frame was at 65 degree seat angle,  last stand position was at 88 degree seat angle  5/5/22 - 1st  standing frame was at 65 degree seat angle, last stand position was at 84 degree seat angle  5/10/22 - 1st  standing frame was at 60 degree seat angle, last stand position was at 80 degree seat angle  5/12/22 - 1st  standing frame was at 65 degree seat angle, last stand position was at 80 degree seat angle  5/17/22 - 1st  standing frame was at 62 degree seat angle, last stand position was at 82 degree seat angle  5/19/22 - 1st  standing frame was at 60 degree seat angle, last stand position was at 80 degree seat angle  5/24/22 - 1st  standing frame was at 62 degree seat angle, last stand position was at 82 degree seat angle  5/26/22 - 1st  standing frame was at 60 degree seat angle, last stand position was at 80 degree seat angle   5/31/22 - 1st  standing frame was at 65 degree seat angle, last stand position was at 80 degree seat angle  6/2/22 - 1st  standing frame was at 70 degree seat angle, last stand position was at 82 degree seat angle  6/7/22 - 1st  standing frame was at 68 degree seat angle, last stand position was at 80 degree seat angle  6/10/22 - 1st  standing frame was at 60 degree seat angle, last stand position was at 80 degree seat angle  6/14/22 - 1st  standing frame was at 65 degree seat angle, last stand position was at 82 degree seat angle   6/16/22 - 1st  standing frame was at 70 degree seat angle, last stand position was at 84 degree seat angle  6/21/22 - 1st  standing frame was at 65 degree seat angle, last stand position was at 80 degree seat angle  Plan for Next Visit:      THERAPEUTIC ACTIVITIES to improve dynamic and functional  performance for () minutes including:    Intervention Performed Today    Bed - wheelchair transfer   Practiced set up and sequence   Bed mobility sit to supine to sit  Practiced sequence     Rolling   3 x each side working on sequence to incorporate hip flexor stretch on each side. Then separate stretch performed as listed above   Standing in parallel bars  4 x's working on several weight shifts towards hip extension and knee extension while standing.   Standing in parallel bars performing step forward  With Right lower extremity 5x's with moderate assistance to maintain standing position   Dynasplint Consult via FaceTime with Andriy to discuss fit     Sit to  parallel bars without assistance  2x's   Dynasplint Consult  Andriy Stout (Rep for Dynasplint) met with physical therapist and patient today during session to discuss options for dynasplints for bilateral knees due to patient's significant lack of range of motion.     Dynasplint Fitting  Andriyjohn Stout (Rep for Dynasplint) met with physical therapist and patient today to deliver and fit patient for bilateral knee dynasplints.  We discussed wearing time of 1-2 hours/day for the first week and then we would re-assess.  Patient was shown how to don each brace and straps were marked for future use.  The Dynasplint force was increased to 6 on the Left and 7 on the Right to start.  Extra padding was added to thin straps for skin protection.   Dynasplint Consult  Andriy Stout (Rep for Dynasplint) met with physical therapist and patient today during session to discuss proper fit and positioning of Dynasplint to feel a better stretch at home. Pt only brought Left splint to session today and his splint was donned, straps were tightened and new black line added for patient to follow at home.  Left splint was increased from 7/13 to 9/13 tension.  He reports that he places a pillow under his knee at home and he was educated on better positioning of pillow under distal stump with prosthesis removed.  He also felt more of a stretch in long sitting vs supine and was asked to try this position at home for part of the stretch if able.    Standing at sink in  Neuro Room working on stand posture and positioning   Simulating activity that he has been given to do for homework. 1x5 seconds, 1x10 seconds   Patient educated addition to Home Exercise Program   - Simulated sit to stand setup  From wheelchair to sink at home performing with walker in front to work on letting go of sink and holding on to walker.  Patient was educated on how to sit safely to prevent wheelchair from tipping backwards if he loses balance.  He was told to start with one hand on walker and one hand on counter top until he can progress to both hands on walker for support. This will be a way that he can safely challenge himself at home. Patient practiced standing to walker 3x's working on sequence and safety of task.   Patient expressed concerns about not knowing his current weight, so PT assisted patient with obtaining weight at start of session with wheelchair scale.   He currently weighs 139 #   Several transfers and sit to stands to adjust walker height as needed  Contact guard to stand by assist for each   Several sit to stand and wheelchair to machine transfers with and without walker performed throughout session  With independence.    Discussed Left prosthesis sleeve   - It is currently to large/long and often curls.  Patient shared number of company as he has been unsuccessful with getting rep to deliver new one.    Left Prosthesis adjusted to turn foot to neutral, standing and stepping between each adjustment to correct position of leg  2 adjustments.    Discussed full below knee amputation program to work on at home in preparation for discharge  Patient verbalized good understanding of program     Plan for Next Visit:      Gait Training: for (15) minutes:  - Ambulated 150ft with walker with stand by assist to supervision  - Ambulating over 4 hurdles on the turf with walker with minimum assist   - Stand pivot transfer with quad cane working on stand posture, balance and weight shifts stepping  from mat to wheelchair on the Right side.       Patient Education and Home Exercises     Home Exercises Provided and Patient Education Provided     Education provided:   -2/22/22 Patient educated on increasing length and consistency of stretching knees and hips to every 2 hours at home.  He was shown and demonstrated understanding of short arc quads, rolling toward prone and sitting in reclined position to stretch hip flexors.  -2/24/22 Patient given Home Exercise Program on exercises for lower extremities to be performed 2x15 3x's/day  See patient Instructions in EMR  -3/1/22 Re-inforced Home Exercise Program and asked patient to incorporate prone lying for longer periods of time to build up to 20 minutes per day.  3/3/22 - Discussed the option of dynasplints for bilateral lower extremities. Patient educated on the option of Dynasplints vs basic knee braces and encouraged to consider Dynasplints as a more aggressive and efficient way of properly positioning and dynamically stretching his legs outside of therapy.  3/8/22 - Patient was educated on Dynasplints with Andriy Stout (Rep).   He was shown pictures on ipad and Andriy and physical therapist discussed wearing schedule to make gains with his muscle length outside of therapy sessions.  Andriy informed patient that he would assist with collecting information regarding insurance coverage and provide him with any out of pocket costs so he can decide if he would like to pursue this option.  3/22/22 - Patient was educated that Andriy Stout (Rep for Dynasplint) messaged me that his splints have been approved and are being mailed this week.   3/24/22 - Patient was educated on Dynasplint management and importance of daily skin checks.  His tolerance of wearing splints for the first week will determine his wearing schedule for the following weeks.  3/29/22 - Patient was educated on only wearing Dynasplints while lying down to get the most effective stretch to lower  extremities.  3/31/22 - Patient was asked to reach out to Dynasplint rep (Andriy) to set up a time for Andriy to re-assess the positioning and fit of splints to see if he can feel more of a stretch with wearing schedule.  4/7/22 - Patient was asked to reach out to Dynasplint rep to make sure that splints are adjusted appropriately.  4/11/22 - PT recommended that patient follow up with Dynasplint rep for reassessment of splint fit.  4/14/22 - Patient informed that PT would reach out to Dynasplint rep again to have him schedule a visit.  He was encouraged to continue being active at home.  4/18/22 -  [facility] to set up a time for a consult that would work for his schedule.  4/28/22 - Patient educated on wearing Dynaspints for 2 hours/day for the next 4 days and then we will assess his ability to increase to 4 hours/day after next visit.  He verbalized good understanding of education from Andriy (Dynasplint) and was told to try Right splint on in new wear position first before increasing tension to 9/13.    5/3/22 - Patient educated on attempting standing at home at sink holding stand position for 10-15 seconds repeating 3x's and doing this in the morning and afternoon every day.   5/12/22 - Patient educated on the importance of walking with walker each session moving forward to try to get range applied to a functional task.  5/26/22 - Patient educated on adding walker to sit to stands at sink as a way of increasing his stand balance safely at home alone.  6/7/22 - Patient educated on his gains at this point and that PT is requesting more visits for a renewed Plan of Care.   6/10/22 - Patient educated on progressing to standing marches with walker at home and standing lateral foot taps with walker at home in same set up as before with kitchen counter in front and wheelchair behind for safety.  6/21/22 - Patient educated on stair mobility sequence.   6/23/22 - Patient educated on the risk of  falls with walking home alone at this time.   7/28/22 - Patient educated on safety with gait at home and advancing gait challenges in therapy.  8/8/22 - Patient educated on proper technique of side lying hip extension and that Plan of Care would be extended for another month in preparation for discharge.   8/12/22 - Discussed contacting prosthetic company to replace sleeve as it is too large and long.   9/2/22 - Discussed full below knee amputation home program in preparation for discharge.      Home Exercise Provided:  Exercises were reviewed and Hunter was able to verbalize good understanding prior to the end of the session.  Hunter demonstrated good  understanding of the education provided.   ASSESSMENT     Patient demonstrated good participation and endurance with all tasks today.  He continues to need assistance with quad cane tasks and has more independence and safety with walker.  He was able to carryover technique with sit to stand, but is still hesitant with weight shifts forward.  He is progressing well.     Pt prognosis is Fair  To Good    Pt will continue to benefit from skilled outpatient physical therapy to address the deficits listed in the problem list box on initial evaluation, provide pt/family education and to maximize pt's level of independence in the home and community environment.     Pt's spiritual, cultural and educational needs considered and pt agreeable to plan of care and goals.     Anticipated Barriers for therapy: co-morbidities, transportation assistance needed at times, lifestyle, potential contractures of knees/hips    GOALS:     Short Term Goals:  4 weeks Progress    Function: Patient will demonstrate improved function as indicated by a functional limitation score of less than or equal to 55 out of 100 on FOTO = 44% limitation PC   Mobility: Patient will improve sit to stand transfer with moderate assistance in order to progress towards independence with functional activities.  (Met)  (Modified) - Patient will perform wheelchair to mat transfer with stand by assist to demonstrate improved independence with mobility. Met   Gait: Patient will ambulated 3 steps in parallel bars with Maximum assistance to demonstrate improved strength, posture and endurance met   HEP: Patient will demonstrate independence with HEP in order to progress toward functional independence. met    Assess patient's needs for Dynasplints and set up consultation if needed. met       Long Term Goals:  8 weeks Progress   Function: Patient will demonstrate improved function as indicated by a functional limitation score of less than or equal to 60 out of 100 on FOTO = 40 % limitation PC   Mobility: Patient will improve AROM of bilateral knees to -10 degrees  in order to return to maximal functional potential and improve quality of life. PC   Functional Mobility - Patient will perform mat to wheelchair transfer with modified independence with walker in order to improve his independence with functional mobility. Met   Gait: Patient will ambulate 15 feet with rolling walker with moderate assistance to demonstrate improved strength, endurance and mobility. (Met)   (Modified) - Patient will ambulate 100 ft with walker incorporating turns with modified independence to negotiate home environment with independence. Met   HEP: Patient educated on HEP in preparation for d/c verbalizing and demonstrating good understanding of topics discussed.    Met            Goals Key:  PC= progressing/continue;        PM= partially met;             DC= discontinue         PLAN     Continue Plan of Care (POC) and progress per patient tolerance. See Treatment section for exercise progression.  Outpatient Physical Therapy 2 times weekly for 8 weeks to include the following interventions: Electrical Stimulation Attended, Gait Training, Moist Heat/ Ice, Neuromuscular Re-ed, Orthotic Management and Training, Patient Education, Self Care, Therapeutic  Activities, Therapeutic Exercise and Prosthetic Management.      Mindy Patino, PT, DPT

## 2022-09-19 ENCOUNTER — CLINICAL SUPPORT (OUTPATIENT)
Dept: REHABILITATION | Facility: HOSPITAL | Age: 82
End: 2022-09-19
Payer: MEDICARE

## 2022-09-19 DIAGNOSIS — M25.661 DECREASED RANGE OF MOTION OF BOTH LOWER EXTREMITIES: Primary | ICD-10-CM

## 2022-09-19 DIAGNOSIS — R53.1 DECREASED STRENGTH, ENDURANCE, AND MOBILITY: ICD-10-CM

## 2022-09-19 DIAGNOSIS — M25.662 DECREASED RANGE OF MOTION OF BOTH LOWER EXTREMITIES: Primary | ICD-10-CM

## 2022-09-19 DIAGNOSIS — Z74.09 DECREASED STRENGTH, ENDURANCE, AND MOBILITY: ICD-10-CM

## 2022-09-19 DIAGNOSIS — R68.89 DECREASED STRENGTH, ENDURANCE, AND MOBILITY: ICD-10-CM

## 2022-09-19 PROCEDURE — 97110 THERAPEUTIC EXERCISES: CPT

## 2022-09-19 PROCEDURE — 97530 THERAPEUTIC ACTIVITIES: CPT

## 2022-09-19 NOTE — PROGRESS NOTES
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  He reports that he has been walking indoor and outdoor at home and progressing to driveway and sidewalk walking.   He was able to perform sit to stands at home without his hands. He reports that he fell at his home last week. He canceled his last PT visit as a result.  He has been taking Tylenol to help with his pain at home.      Response to previous treatment: good with no adverse effects.    Functional change:  He reports that he is feeling stronger with more confidence with standing/walking and is able to stand for longer bouts at home.  He reports have increased confidence and comfort level with walking.  He is able to stand with only 1 upper extremity support with improved confidence and is able to perform sit to stand with no upper extremity support.      Pain: 3-4/10 at rest  Location: Left hip with palpation    OBJECTIVE     Objective Measures updated at progress report unless specified.  Re-Assessment for () minutes:  range of motion of bilateral knees measured with NuStep - Right = -20, Left = -23  Sitting with bilateral lower extremities over TBall: Right knee = -12, Left knee = -22  In supine: Right hip = -15 , Right knee = -30                   Left  hip = -15, Left knee = -35  Functional Mobility: Transfers modified independent with walker and bed mobility with independence  Ambulation Re-Assessment - ambulating 150 ft with walker with supervision to modified independence.     Supine: Right knee Extension = -25, Left knee extension = -30    9/19/22:   Patient presents with bruising around his Right eye, Left elbow abrasion and complaints of Left hip pain with palpation.  Skin inspection to Left hip and no redness or bruising noted.     Treatment     Hunter received the treatments listed below:      THERAPEUTIC EXERCISES to develop strength, endurance, ROM, flexibility, posture and core stabilization for (16) minutes including:  Intervention Performed Today    short arc quad in supine  lower extremities over wedge 2x20 Right AROM, Left 2x20 Active Assistive for form and quality    sidelying toward prone   Stretch to Hip flexor 3x30 seconds each side   Quad set   2x10 Left     Sidelying hip extension   2x10 bilaterally with 10 seconds endrange stretch on last rep   Cherry and Donning Left Prosthetic leg  Performed at edge of mat    Sitting in reclined position at edge of mat   Hip flexor stretch 3x30 seconds bilateral    Hip Abduction  In sidelying 2x10 bilateral    Bridge attempt  1x5   Posterior Pelvic Tilt Supine  2x5   Prone lying over light blue small triangle wedge  Holding position 2 minutes, then 4 minutes   Stretch to Hamstrings  3 x 1 minute Right lower extremity    Heelslide with forceful push towards extension  2x10 bilateral with 10 second enrange hold stretch on last rep    10 # over distal Left thigh  Stretch to Left hip flexor for over 5 minutes while exercising Right lower extremity    Prone Hamstring curl endrange for quad/hip flexor stretch  2x10 AAROM   lower extremity lifts towards extension in prone  2x10 AAROM   Prone hip flexor stretch   3x10 second holds bilateral    Standing frame raising to patient's tolerance    - Standing 2x's in  standing frame holding each stand for 10-15  minutes working on the following:  - Horizontal Abduction with bilateral upper extremities 2x10 AROM   - terminal knee extension in stand position 2x20 bilateral   - Rows holding red theraband bilateral  2x10  - Pushing through upper extremities to achieve trunk extension 2x25  - cross body punch with 2# dumbbell 1x10 bilateral   - scap retraction 2x10  - attempted 1x10 shoulder rolls backward - patient had difficulty coordinating  - Overhead press with green tband bilateral 1x10  - Patient given feedback on standing frame position - working on increasing seat angle after 30 second rest between lifts (repeated 3-4 times)  - Lateral reaches 1x8 bilateral   - Beach Ball Volley 1x20 hits working on upright trunk posture  - Pulling with bilateral upper extremities on tray at front to pull hips forward from seat and extend knees 2x25  - Reaching Left and Right with cones to target to stretch lower extremities with each reach 2x10 cones to each side incorporating reach posteriorly    Shuttle X  X - 2x15 bilateral leg press with 4 bands   -1x10 single leg press with 3 bands for each leg    NuStep  x 12 minutes at Level 1-5, working on alternating movement of lower extremities and stretch to bilateral knees (able to scoot seat position back to #14 to increase stretch in lower extremities)   Parallel Bars  - Standing heelcord stretch 3x10 seconds Right   - step ups to 4 in step 10x's bilateral    Supine   - Heel slides 1x10 bilateral with end range 10 second hold    Sitting edge of mat   - mini lifts from raised surface, progressively lowering height after each set 3x5   - sit to stand from raised surface that progressively lowered after each successful stand 4x's    Stairs  - Step ups with Left lower extremity and down with right 4 steps with bilateral upper extremity support    Standing Frame  Standing 1 x in standing frame working on holding endrange position of approximately  55 degree seat angle for 5 minutes   Standing in parallel bars working sit to stands           - standing 2x's  - Standing weights shifts Left to Right 1x10  - Standing knee extension 1x10  - Standing Left upper extremity lifts off bar 1x5  - Standing bilateral upper extremity lifts off bar 1x10 with PT helping to hold hips for support with minimum to moderate assist   Nautilus leg extension   1x5 with 1 plate of resistance - machine malfunction, unable to continue   Sitting on gray ball  - weight shifts Left and Right 1x10  - weight shifts forward/back 1x10  - single leg lifts 1x5 bilateral then 5 seconds hold 2x's bilateral   - lunge over forward leg 1x10 bilateral    Sitting long arc quad   - 1x10 bilateral with 5#  - 10 second end range hold wit 5# bilateral           * In standing frame:  Sitting position = 0 degree seat angle  Full upright stand position with hips and knees at 0 degrees = 90 degree seat angle  3/15/22 - 1st  standing frame was at 42 degree seat angle, last stand position was at 65 degree seat angle  3/17/22 - 1st  standing frame was at 48 degree seat angle, last stand position was at 70 degree seat angle  3/21/22 - 1st  standing frame was at 52 degree seat angle, last stand position was at 70 degree seat angle  3/29/22 - 1st  standing frame was at 60 degree seat angle, last stand position was at 72 degree seat angle  3/31/22 - 1st  standing frame was at 55 degree seat angle, last stand position was at 75 degree seat angle  4/7/22 - 1st  standing frame was at 60 degree seat angle, last stand position was at 80 degree seat angle  4/11/22 - 1st  standing frame was at 60 degree seat angle, last stand position was at 78 degree seat angle  4/14/22 - 1st  standing frame was at 55 degree seat angle, last stand position was at 86 degree seat angle  4/19/22 - 1st  standing frame was at 60 degree seat angle, last stand position was  at 78 degree seat angle  4/22/22 - 1st  standing frame was at 65 degree seat angle, last stand position was at 81 degree seat angle  4/26/22 - 1st  standing frame was at 65 degree seat angle, last stand position was at 82 degree seat angle  5/3/22 - 1st  standing frame was at 65 degree seat angle, last stand position was at 88 degree seat angle  5/5/22 - 1st  standing frame was at 65 degree seat angle, last stand position was at 84 degree seat angle  5/10/22 - 1st  standing frame was at 60 degree seat angle, last stand position was at 80 degree seat angle  5/12/22 - 1st  standing frame was at 65 degree seat angle, last stand position was at 80 degree seat angle  5/17/22 - 1st  standing frame was at 62 degree seat angle, last stand position was at 82 degree seat angle  5/19/22 - 1st  standing frame was at 60 degree seat angle, last stand position was at 80 degree seat angle  5/24/22 - 1st  standing frame was at 62 degree seat angle, last stand position was at 82 degree seat angle  5/26/22 - 1st  standing frame was at 60 degree seat angle, last stand position was at 80 degree seat angle   5/31/22 - 1st  standing frame was at 65 degree seat angle, last stand position was at 80 degree seat angle  6/2/22 - 1st  standing frame was at 70 degree seat angle, last stand position was at 82 degree seat angle  6/7/22 - 1st  standing frame was at 68 degree seat angle, last stand position was at 80 degree seat angle  6/10/22 - 1st  standing frame was at 60 degree seat angle, last stand position was at 80 degree seat angle  6/14/22 - 1st  standing frame was at 65 degree seat angle, last stand position was at 82 degree seat angle   6/16/22 - 1st  standing frame was at 70 degree seat angle, last stand position was at 84 degree seat angle  6/21/22 - 1st  standing frame was at 65 degree seat angle,  last stand position was at 80 degree seat angle  Plan for Next Visit:      THERAPEUTIC ACTIVITIES to improve dynamic and functional  performance for (40) minutes including:    Intervention Performed Today    Bed - wheelchair transfer   Practiced set up and sequence   Bed mobility sit to supine to sit  Practiced sequence    Rolling   3 x each side working on sequence to incorporate hip flexor stretch on each side. Then separate stretch performed as listed above   Standing in parallel bars  4 x's working on several weight shifts towards hip extension and knee extension while standing.   Standing in parallel bars performing step forward  With Right lower extremity 5x's with moderate assistance to maintain standing position   Dynasplint Consult via FaceTime with Andriy to discuss fit     Sit to  parallel bars without assistance  2x's   Dynasplint Consult  Andriy Stout (Rep for Dynasplint) met with physical therapist and patient today during session to discuss options for dynasplints for bilateral knees due to patient's significant lack of range of motion.     Dynasplint Fitting  Andriyjohn Stout (Rep for Dynasplint) met with physical therapist and patient today to deliver and fit patient for bilateral knee dynasplints.  We discussed wearing time of 1-2 hours/day for the first week and then we would re-assess.  Patient was shown how to don each brace and straps were marked for future use.  The Dynasplint force was increased to 6 on the Left and 7 on the Right to start.  Extra padding was added to thin straps for skin protection.   Dynasplint Consult  Andriy Stout (Rep for Dynasplint) met with physical therapist and patient today during session to discuss proper fit and positioning of Dynasplint to feel a better stretch at home. Pt only brought Left splint to session today and his splint was donned, straps were tightened and new black line added for patient to follow at home.  Left splint was increased from 7/13 to 9/13  tension.  He reports that he places a pillow under his knee at home and he was educated on better positioning of pillow under distal stump with prosthesis removed.  He also felt more of a stretch in long sitting vs supine and was asked to try this position at home for part of the stretch if able.    Standing at sink in Neuro Room working on stand posture and positioning   Simulating activity that he has been given to do for homework. 1x5 seconds, 1x10 seconds   Patient educated addition to Home Exercise Program   - Simulated sit to stand setup  From wheelchair to sink at home performing with walker in front to work on letting go of sink and holding on to walker.  Patient was educated on how to sit safely to prevent wheelchair from tipping backwards if he loses balance.  He was told to start with one hand on walker and one hand on counter top until he can progress to both hands on walker for support. This will be a way that he can safely challenge himself at home. Patient practiced standing to walker 3x's working on sequence and safety of task.   Patient expressed concerns about not knowing his current weight, so PT assisted patient with obtaining weight at start of session with wheelchair scale.   He currently weighs 139 #   Several transfers and sit to stands to adjust walker height as needed  Contact guard to stand by assist for each   Several sit to stand and wheelchair to machine transfers with and without walker performed throughout session  With independence.    Discussed Left prosthesis sleeve   - It is currently to large/long and often curls.  Patient shared number of company as he has been unsuccessful with getting rep to deliver new one.    Left Prosthesis adjusted to turn foot to neutral, standing and stepping between each adjustment to correct position of leg  2 adjustments.    Discussed full below knee amputation program to work on at home in preparation for discharge  Patient verbalized good  understanding of program   Skin inspection of Left hip  x - see objective session at start of note   Bed mobility and transfer X      X   - Patient transferred 6x's during session with independence and no increased complaints of pain  - he transitioned through sit to supine, rolling to Right and supine to sit with independence    Patient education x - Patient discussed fall from last week, PT recommended that he always keep cell phone on his person in case this happens again.  He was also educated that if the pain persists, he may need to follow up with MD for an X-ray.      Plan for Next Visit:      Gait Training: for () minutes:  - Ambulated 150ft with walker with stand by assist to supervision  - Ambulating over 4 hurdles on the turf with walker with minimum assist   - Stand pivot transfer with quad cane working on stand posture, balance and weight shifts stepping from mat to wheelchair on the Right side.       Patient Education and Home Exercises     Home Exercises Provided and Patient Education Provided     Education provided:   -2/22/22 Patient educated on increasing length and consistency of stretching knees and hips to every 2 hours at home.  He was shown and demonstrated understanding of short arc quads, rolling toward prone and sitting in reclined position to stretch hip flexors.  -2/24/22 Patient given Home Exercise Program on exercises for lower extremities to be performed 2x15 3x's/day  See patient Instructions in EMR  -3/1/22 Re-inforced Home Exercise Program and asked patient to incorporate prone lying for longer periods of time to build up to 20 minutes per day.  3/3/22 - Discussed the option of dynasplints for bilateral lower extremities. Patient educated on the option of Dynasplints vs basic knee braces and encouraged to consider Dynasplints as a more aggressive and efficient way of properly positioning and dynamically stretching his legs outside of therapy.  3/8/22 - Patient was educated on  Dynasplints with Andriy Stout (Rep).   He was shown pictures on ipad and Andriy and physical therapist discussed wearing schedule to make gains with his muscle length outside of therapy sessions.  Andriy informed patient that he would assist with collecting information regarding insurance coverage and provide him with any out of pocket costs so he can decide if he would like to pursue this option.  3/22/22 - Patient was educated that Andriy Stout (Rep for Dynasplint) messaged me that his splints have been approved and are being mailed this week.   3/24/22 - Patient was educated on Dynasplint management and importance of daily skin checks.  His tolerance of wearing splints for the first week will determine his wearing schedule for the following weeks.  3/29/22 - Patient was educated on only wearing Dynasplints while lying down to get the most effective stretch to lower extremities.  3/31/22 - [facility] to set up a time for Andriy to re-assess the positioning and fit of splints to see if he can feel more of a stretch with wearing schedule.  4/7/22 - [facility] sure that splints are adjusted appropriately.  4/11/22 - PT recommended that patient follow up with Dynasplint rep for reassessment of splint fit.  4/14/22 - Patient informed that PT would [facility] to have him schedule a visit.  He was encouraged to continue being active at home.  4/18/22 -  Patient asked to contact Dynasplint rep to set up a time for a consult that would work for his schedule.  4/28/22 - Patient educated on wearing Dynaspints for 2 hours/day for the next 4 days and then we will assess his ability to increase to 4 hours/day after next visit.  He verbalized good understanding of education from Andriy (Ricardolinjohn) and was told to try Right splint on in new wear position first before increasing tension to 9/13.    5/3/22 - Patient educated on  attempting standing at home at sink holding stand position for 10-15 seconds repeating 3x's and doing this in the morning and afternoon every day.   5/12/22 - Patient educated on the importance of walking with walker each session moving forward to try to get range applied to a functional task.  5/26/22 - Patient educated on adding walker to sit to stands at sink as a way of increasing his stand balance safely at home alone.  6/7/22 - Patient educated on his gains at this point and that PT is requesting more visits for a renewed Plan of Care.   6/10/22 - Patient educated on progressing to standing marches with walker at home and standing lateral foot taps with walker at home in same set up as before with kitchen counter in front and wheelchair behind for safety.  6/21/22 - Patient educated on stair mobility sequence.   6/23/22 - Patient educated on the risk of falls with walking home alone at this time.   7/28/22 - Patient educated on safety with gait at home and advancing gait challenges in therapy.  8/8/22 - Patient educated on proper technique of side lying hip extension and that Plan of Care would be extended for another month in preparation for discharge.   8/12/22 - Discussed contacting prosthetic company to replace sleeve as it is too large and long.   9/2/22 - Discussed full below knee amputation home program in preparation for discharge.    9/19/22 - Patient educated on fall safety (see therapeutic activity section)    Home Exercise Provided:  Exercises were reviewed and Hunter was able to verbalize good understanding prior to the end of the session.  Hunter demonstrated good  understanding of the education provided.   ASSESSMENT     Patient presented with report of having a fall last week.  He had increase soreness at home, but this has improved over the week.  He also had pain with palpation to Left hip but no bruising or redness was noted with skin inspection and patient reported no increased Left hip pain  with standing, transfers, NuMotion or shuttle machine. He has good potential to build up his endurance as he recovers from the fall.     Pt prognosis is Fair  To Good    Pt will continue to benefit from skilled outpatient physical therapy to address the deficits listed in the problem list box on initial evaluation, provide pt/family education and to maximize pt's level of independence in the home and community environment.     Pt's spiritual, cultural and educational needs considered and pt agreeable to plan of care and goals.     Anticipated Barriers for therapy: co-morbidities, transportation assistance needed at times, lifestyle, potential contractures of knees/hips    GOALS:     Short Term Goals:  4 weeks Progress    Function: Patient will demonstrate improved function as indicated by a functional limitation score of less than or equal to 55 out of 100 on FOTO = 44% limitation PC   Mobility: Patient will improve sit to stand transfer with moderate assistance in order to progress towards independence with functional activities. (Met)  (Modified) - Patient will perform wheelchair to mat transfer with stand by assist to demonstrate improved independence with mobility. Met   Gait: Patient will ambulated 3 steps in parallel bars with Maximum assistance to demonstrate improved strength, posture and endurance met   HEP: Patient will demonstrate independence with HEP in order to progress toward functional independence. met    Assess patient's needs for Dynasplints and set up consultation if needed. met       Long Term Goals:  8 weeks Progress   Function: Patient will demonstrate improved function as indicated by a functional limitation score of less than or equal to 60 out of 100 on FOTO = 40 % limitation PC   Mobility: Patient will improve AROM of bilateral knees to -10 degrees  in order to return to maximal functional potential and improve quality of life. PC   Functional Mobility - Patient will perform mat to  wheelchair transfer with modified independence with walker in order to improve his independence with functional mobility. Met   Gait: Patient will ambulate 15 feet with rolling walker with moderate assistance to demonstrate improved strength, endurance and mobility. (Met)   (Modified) - Patient will ambulate 100 ft with walker incorporating turns with modified independence to negotiate home environment with independence. Met   HEP: Patient educated on HEP in preparation for d/c verbalizing and demonstrating good understanding of topics discussed.    Met            Goals Key:  PC= progressing/continue;        PM= partially met;             DC= discontinue         PLAN     Continue Plan of Care (POC) and progress per patient tolerance. See Treatment section for exercise progression.  Outpatient Physical Therapy 2 times weekly for 8 weeks to include the following interventions: Electrical Stimulation Attended, Gait Training, Moist Heat/ Ice, Neuromuscular Re-ed, Orthotic Management and Training, Patient Education, Self Care, Therapeutic Activities, Therapeutic Exercise and Prosthetic Management.      Mindy Patino, PT, DPT

## 2022-09-23 ENCOUNTER — CLINICAL SUPPORT (OUTPATIENT)
Dept: REHABILITATION | Facility: HOSPITAL | Age: 82
End: 2022-09-23
Payer: MEDICARE

## 2022-09-23 DIAGNOSIS — Z74.09 DECREASED STRENGTH, ENDURANCE, AND MOBILITY: ICD-10-CM

## 2022-09-23 DIAGNOSIS — R53.1 DECREASED STRENGTH, ENDURANCE, AND MOBILITY: ICD-10-CM

## 2022-09-23 DIAGNOSIS — M25.662 DECREASED RANGE OF MOTION OF BOTH LOWER EXTREMITIES: Primary | ICD-10-CM

## 2022-09-23 DIAGNOSIS — R68.89 DECREASED STRENGTH, ENDURANCE, AND MOBILITY: ICD-10-CM

## 2022-09-23 DIAGNOSIS — M25.661 DECREASED RANGE OF MOTION OF BOTH LOWER EXTREMITIES: Primary | ICD-10-CM

## 2022-09-23 PROCEDURE — 97110 THERAPEUTIC EXERCISES: CPT | Mod: KX

## 2022-09-23 PROCEDURE — 97116 GAIT TRAINING THERAPY: CPT | Mod: KX

## 2022-09-23 NOTE — PROGRESS NOTES
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  He reports that he has been walking indoor and outdoor at home and progressing to driveway and sidewalk walking.   He was able to perform sit to stands at home without his hands. He reports that he fell at his home last week. He reports that he has not needed Tylenol over the last few days and is recovering well from his recent fall.     Response to previous treatment: good with no adverse effects.    Functional change:  He reports that he is feeling stronger with more confidence with standing/walking and is able to stand for longer bouts at home.  He reports have increased confidence and comfort level with walking.  He is able to stand with only 1 upper extremity support with improved confidence and is able to perform sit to stand with no upper extremity support.      Pain: 3-4/10 at rest  Location: Left hip with palpation    OBJECTIVE     Objective Measures updated at progress report unless specified.  Re-Assessment for () minutes:  range of motion of bilateral knees measured with NuStep - Right = -20, Left = -23  Sitting with bilateral lower extremities over TBall: Right knee = -12, Left knee = -22  In supine: Right hip = -15 , Right knee = -30                    Left hip = -15, Left knee = -35  Functional Mobility: Transfers modified independent with walker and bed mobility with independence  Ambulation Re-Assessment - ambulating 150 ft with walker with supervision to modified independence.     Supine: Right knee Extension = -25, Left knee extension = -30    9/19/22:   Patient presents with bruising around his Right eye, Left elbow abrasion and complaints of Left hip pain with palpation.  Skin inspection to Left hip and no redness or bruising noted.     Treatment     Hunter received the treatments listed below:      THERAPEUTIC EXERCISES to develop strength, endurance, ROM, flexibility, posture and core stabilization for (20) minutes including:  Intervention Performed Today    short arc quad in supine  lower extremities over wedge 2x20 Right AROM, Left 2x20 Active Assistive for form and quality    sidelying toward prone   Stretch to Hip flexor 3x30 seconds each side   Quad set   2x10 Left     Sidelying hip extension   2x10 bilaterally with 10 seconds endrange stretch on last rep   [place] Left Prosthetic leg  Performed at edge of mat    Sitting in reclined position at edge of mat   Hip flexor stretch 3x30 seconds bilateral    Hip Abduction  In sidelying 2x10 bilateral    Bridge attempt  1x5   Posterior Pelvic Tilt Supine  2x5   Prone lying over light blue small triangle wedge  Holding position 2 minutes, then 4 minutes   Stretch to Hamstrings  3 x 1 minute Right lower extremity    Heelslide with forceful push towards extension  2x10 bilateral with 10 second enrange hold stretch on last rep    10 # over distal Left thigh  Stretch to Left hip flexor for over 5 minutes while exercising Right lower extremity    Prone Hamstring curl endrange for quad/hip flexor stretch  2x10 AAROM   lower extremity lifts towards extension in prone  2x10 AAROM   Prone hip flexor stretch   3x10 second holds bilateral    Standing frame raising to patient's tolerance    - Standing 2x's in  standing frame holding each stand for 10-15  minutes working on the following:  - Horizontal Abduction with bilateral upper extremities 2x10 AROM   - terminal knee extension in stand position 2x20 bilateral   - Rows holding red theraband bilateral  2x10  - Pushing through upper extremities to achieve trunk extension 2x25  - cross body punch with 2# dumbbell 1x10 bilateral   - scap retraction 2x10  - attempted 1x10 shoulder rolls backward - patient had difficulty coordinating  - Overhead press with green tband bilateral 1x10  - Patient given feedback on standing frame position - working on increasing seat angle after 30 second rest between lifts (repeated 3-4 times)  - Lateral reaches 1x8 bilateral   - Beach Ball [place]ley 1x20 hits working on upright trunk posture  - Pulling with bilateral upper extremities on tray at front to pull hips forward from seat and extend knees 2x25  - Reaching Left and Right with cones to target to stretch lower extremities with each reach 2x10 cones to each side incorporating reach posteriorly    Shuttle  - 2x15 bilateral leg press with 4 bands   -1x10 single leg press with 3 bands for each leg    NuStep  x 10 minutes at Level 3-5, working on alternating movement of lower extremities and stretch to bilateral knees (able to scoot seat position back to #14 to increase stretch in lower extremities)   Step ups to a 6 in step in the parallel bars x - 1x10 bilateral    Stairs x 4 stairs with bilateral rails with Contact Guard Assist   Parallel Bars  - Standing heelcord stretch 3x10 seconds Right   - step ups to 4 in step 10x's bilateral    Supine   - Heel slides 1x10 bilateral with end range 10 second hold    Sitting edge of mat   - mini lifts from raised surface, progressively lowering height after each set 3x5   - sit to stand from raised surface that progressively lowered after each successful stand 4x's    Stairs  - Step ups with Left lower extremity and down with right 4 steps with bilateral  upper extremity support    Standing Frame  Standing 1 x in standing frame working on holding endrange position of approximately 55 degree seat angle for 5 minutes   Standing in parallel bars working sit to stands           - standing 2x's  - Standing weights shifts Left to Right 1x10  - Standing knee extension 1x10  - Standing Left upper extremity lifts off bar 1x5  - Standing bilateral upper extremity lifts off bar 1x10 with PT helping to hold hips for support with minimum to moderate assist   Nautilus leg extension   1x5 with 1 plate of resistance - machine malfunction, unable to continue   Sitting on gray ball  - weight shifts Left and Right 1x10  - weight shifts forward/back 1x10  - single leg lifts 1x5 bilateral then 5 seconds hold 2x's bilateral   - lunge over forward leg 1x10 bilateral    Sitting long arc quad   - 1x10 bilateral with 5#  - 10 second end range hold wit 5# bilateral           * In standing frame:  Sitting position = 0 degree seat angle  Full upright stand position with hips and knees at 0 degrees = 90 degree seat angle  3/15/22 - 1st  standing frame was at 42 degree seat angle, last stand position was at 65 degree seat angle  3/17/22 - 1st  standing frame was at 48 degree seat angle, last stand position was at 70 degree seat angle  3/21/22 - 1st  standing frame was at 52 degree seat angle, last stand position was at 70 degree seat angle  3/29/22 - 1st  standing frame was at 60 degree seat angle, last stand position was at 72 degree seat angle  3/31/22 - 1st  standing frame was at 55 degree seat angle, last stand position was at 75 degree seat angle  4/7/22 - 1st  standing frame was at 60 degree seat angle, last stand position was at 80 degree seat angle  4/11/22 - 1st  standing frame was at 60 degree seat angle, last stand position was at 78 degree seat angle  4/14/22 - 1st  standing frame was at 55 degree seat angle, last stand  position was at 86 degree seat angle  4/19/22 - 1st  standing frame was at 60 degree seat angle, last stand position was at 78 degree seat angle  4/22/22 - 1st  standing frame was at 65 degree seat angle, last stand position was at 81 degree seat angle  4/26/22 - 1st  standing frame was at 65 degree seat angle, last stand position was at 82 degree seat angle  5/3/22 - 1st  standing frame was at 65 degree seat angle, last stand position was at 88 degree seat angle  5/5/22 - 1st  standing frame was at 65 degree seat angle, last stand position was at 84 degree seat angle  5/10/22 - 1st  standing frame was at 60 degree seat angle, last stand position was at 80 degree seat angle  5/12/22 - 1st  standing frame was at 65 degree seat angle, last stand position was at 80 degree seat angle  5/17/22 - 1st  standing frame was at 62 degree seat angle, last stand position was at 82 degree seat angle  5/19/22 - 1st  standing frame was at 60 degree seat angle, last stand position was at 80 degree seat angle  5/24/22 - 1st  standing frame was at 62 degree seat angle, last stand position was at 82 degree seat angle  5/26/22 - 1st  standing frame was at 60 degree seat angle, last stand position was at 80 degree seat angle   5/31/22 - 1st  standing frame was at 65 degree seat angle, last stand position was at 80 degree seat angle  6/2/22 - 1st  standing frame was at 70 degree seat angle, last stand position was at 82 degree seat angle  6/7/22 - 1st  standing frame was at 68 degree seat angle, last stand position was at 80 degree seat angle  6/10/22 - 1st  standing frame was at 60 degree seat angle, last stand position was at 80 degree seat angle  6/14/22 - 1st  standing frame was at 65 degree seat angle, last stand position was at 82 degree seat angle   6/16/22 - 1st  standing frame was at 70 degree  seat angle, last stand position was at 84 degree seat angle  6/21/22 - 1st  standing frame was at 65 degree seat angle, last stand position was at 80 degree seat angle  Plan for Next Visit:      THERAPEUTIC ACTIVITIES to improve dynamic and functional  performance for (0) minutes including:    Intervention Performed Today    Bed - wheelchair transfer   Practiced set up and sequence   Bed mobility sit to supine to sit  Practiced sequence    Rolling   3 x each side working on sequence to incorporate hip flexor stretch on each side. Then separate stretch performed as listed above   Standing in parallel bars  4 x's working on several weight shifts towards hip extension and knee extension while standing.   Standing in parallel bars performing step forward  With Right lower extremity 5x's with moderate assistance to maintain standing position   [facility] to discuss fit     Sit to  parallel bars without assistance  2x's   Dynasplint Consult  Andriy Stout (Rep for Dynasplint) met with physical therapist and patient today during session to discuss options for dynasplints for bilateral knees due to patient's significant lack of range of motion.     Dynasplint Fitting  Andriyjohn Stout (Rep for Dynasplint) met with physical therapist and patient today to deliver and fit patient for bilateral knee dynasplints.  We discussed wearing time of 1-2 hours/day for the first week and then we would re-assess.  Patient was shown how to don each brace and straps were marked for future use.  The Dynasplint force was increased to 6 on the Left and 7 on the Right to start.  Extra padding was added to thin straps for skin protection.   Dynasplint Consult  Andriy Stout (Rep for Dynasplint) met with physical therapist and patient today during session to discuss proper fit and positioning of Dynasplint to feel a better stretch at home. Pt only brought Left splint to session today and his splint was  donned, straps were tightened and new black line added for patient to follow at home.  Left splint was increased from 7/13 to 9/13 tension.  He reports that he places a pillow under his knee at home and he was educated on better positioning of pillow under distal stump with prosthesis removed.  He also felt more of a stretch in long sitting vs supine and was asked to try this position at home for part of the stretch if able.    Standing at sink in Neuro Room working on stand posture and positioning   Simulating activity that he has been given to do for homework. 1x5 seconds, 1x10 seconds   Patient educated addition to Home Exercise Program   - Simulated sit to stand setup  From wheelchair to sink at home performing with walker in front to work on letting go of sink and holding on to walker.  Patient was educated on how to sit safely to prevent wheelchair from tipping backwards if he loses balance.  He was told to start with one hand on walker and one hand on counter top until he can progress to both hands on walker for support. This will be a way that he can safely challenge himself at home. Patient practiced standing to walker 3x's working on sequence and safety of task.   Patient expressed concerns about not knowing his current weight, so PT assisted patient with obtaining weight at start of session with wheelchair scale.   He currently weighs 139 #   Several transfers and sit to stands to adjust walker height as needed  Contact guard to stand by assist for each   Several sit to stand and wheelchair to machine transfers with and without walker performed throughout session  With independence.    Discussed Left prosthesis sleeve   - It is currently to large/long and often curls.  Patient shared number of company as he has been unsuccessful with getting rep to deliver new one.    Left Prosthesis adjusted to turn foot to neutral, standing and stepping between each adjustment to correct position of leg  2 adjustments.     Discussed full below knee amputation program to work on at home in preparation for discharge  Patient verbalized good understanding of program   Skin inspection of Left hip   - see objective session at start of note   Bed mobility and transfer    - Patient transferred 6x's during session with independence and no increased complaints of pain  - he transitioned through sit to supine, rolling to Right and supine to sit with independence    Patient education  - Patient discussed fall from last week, PT recommended that he always keep cell phone on his person in case this happens again.  He was also educated that if the pain persists, he may need to follow up with MD for an X-ray.      Plan for Next Visit:      Gait Training: for (25) minutes:  - Ambulated 150ft with walker with stand by assist to supervision  - side stepping in parallel bars 4x's the length of parallel bars  - backward walking 2x's the length of parallel bars  - ambulating forward without upper extremity support       Patient Education and Home Exercises     Home Exercises Provided and Patient Education Provided     Education provided:   -2/22/22 Patient educated on increasing length and consistency of stretching knees and hips to every 2 hours at home.  He was shown and demonstrated understanding of short arc quads, rolling toward prone and sitting in reclined position to stretch hip flexors.  -2/24/22 Patient given Home Exercise Program on exercises for lower extremities to be performed 2x15 3x's/day  See patient Instructions in EMR  -3/1/22 Re-inforced Home Exercise Program and asked patient to incorporate prone lying for longer periods of time to build up to 20 minutes per day.  3/3/22 - Discussed the option of dynasplints for bilateral lower extremities. Patient educated on the option of Dynasplints vs basic knee braces and encouraged to consider Dynasplints as a more aggressive and efficient way of properly positioning and dynamically stretching  his legs outside of therapy.  3/8/22 - Patient was educated on Dynasplints with Andriy Stout (Rep).   He was shown pictures on ipad and Andriy and physical therapist discussed wearing schedule to make gains with his muscle length outside of therapy sessions.  Andriy informed patient that he would assist with collecting information regarding insurance coverage and provide him with any out of pocket costs so he can decide if he would like to pursue this option.  3/22/22 - Patient was educated that Andriy Stout (Rep for Dynasplint) messaged me that his splints have been approved and are being mailed this week.   3/24/22 - Patient was educated on Dynasplint management and importance of daily skin checks.  His tolerance of wearing splints for the first week will determine his wearing schedule for the following weeks.  3/29/22 - Patient was educated on only wearing Dynasplints while lying down to get the most effective stretch to lower extremities.  3/31/22 - [facility] to set up a time for Andriy to re-assess the positioning and fit of splints to see if he can feel more of a stretch with wearing schedule.  4/7/22 - Patient was asked to reach out to [facility] to make sure that splints are adjusted appropriately.  4/11/22 - PT recommended that patient follow up with Dynasplint rep for reassessment of splint fit.  4/14/22 - Patient informed that PT would reach out to [facility] again to have him schedule a visit.  He was encouraged to continue being active at home.  4/18/22 -  Patient asked to contact Dynasplint rep to set up a time for a consult that would work for his schedule.  4/28/22 - Patient educated on wearing Dynaspints for 2 hours/day for the next 4 days and then we will assess his ability to increase to 4 hours/day after next visit.  He verbalized good understanding of education from Andriy (Dynasplinjohn) and was told to try Right splint on in new wear position first  before increasing tension to 9/13.    5/3/22 - Patient educated on attempting standing at home at sink holding stand position for 10-15 seconds repeating 3x's and doing this in the morning and afternoon every day.   5/12/22 - Patient educated on the importance of walking with walker each session moving forward to try to get range applied to a functional task.  5/26/22 - Patient educated on adding walker to sit to stands at sink as a way of increasing his stand balance safely at home alone.  6/7/22 - Patient educated on his gains at this point and that PT is requesting more visits for a renewed Plan of Care.   6/10/22 - Patient educated on progressing to standing marches with walker at home and standing lateral foot taps with walker at home in same set up as before with kitchen counter in front and wheelchair behind for safety.  6/21/22 - Patient educated on stair mobility sequence.   6/23/22 - Patient educated on the risk of falls with walking home alone at this time.   7/28/22 - Patient educated on safety with gait at home and advancing gait challenges in therapy.  8/8/22 - Patient educated on proper technique of side lying hip extension and that Plan of Care would be extended for another month in preparation for discharge.   8/12/22 - Discussed contacting prosthetic company to replace sleeve as it is too large and long.   9/2/22 - Discussed full below knee amputation home program in preparation for discharge.    9/19/22 - Patient educated on fall safety (see therapeutic activity section)    Home Exercise Provided:  Exercises were reviewed and Hunter was able to verbalize good understanding prior to the end of the session.  Hunter demonstrated good  understanding of the education provided.   ASSESSMENT     Patient presented with report of having a fall last week.  He had increase soreness at home, but this has improved over the week.  He also had pain with palpation to Left hip but no bruising or redness was noted  with skin inspection and patient reported no increased Left hip pain with standing, transfers, NuMotion or shuttle machine. He has good potential to build up his endurance as he recovers from the fall.     Pt prognosis is Fair  To Good    Pt will continue to benefit from skilled outpatient physical therapy to address the deficits listed in the problem list box on initial evaluation, provide pt/family education and to maximize pt's level of independence in the home and community environment.     Pt's spiritual, cultural and educational needs considered and pt agreeable to plan of care and goals.     Anticipated Barriers for therapy: co-morbidities, transportation assistance needed at times, lifestyle, potential contractures of knees/hips    GOALS:     Short Term Goals:  4 weeks Progress    Function: Patient will demonstrate improved function as indicated by a functional limitation score of less than or equal to 55 out of 100 on FOTO = 44% limitation PC   Mobility: Patient will improve sit to stand transfer with moderate assistance in order to progress towards independence with functional activities. (Met)  (Modified) - Patient will perform wheelchair to mat transfer with stand by assist to demonstrate improved independence with mobility. Met   Gait: Patient will ambulated 3 steps in parallel bars with Maximum assistance to demonstrate improved strength, posture and endurance met   HEP: Patient will demonstrate independence with HEP in order to progress toward functional independence. met    Assess patient's needs for Dynasplints and set up consultation if needed. met       Long Term Goals:  8 weeks Progress   Function: Patient will demonstrate improved function as indicated by a functional limitation score of less than or equal to 60 out of 100 on FOTO = 40 % limitation PC   Mobility: Patient will improve AROM of bilateral knees to -10 degrees  in order to return to maximal functional potential and improve quality of  life. PC   Functional Mobility - Patient will perform mat to wheelchair transfer with modified independence with walker in order to improve his independence with functional mobility. Met   Gait: Patient will ambulate 15 feet with rolling walker with moderate assistance to demonstrate improved strength, endurance and mobility. (Met)   (Modified) - Patient will ambulate 100 ft with walker incorporating turns with modified independence to negotiate home environment with independence. Met   HEP: Patient educated on HEP in preparation for d/c verbalizing and demonstrating good understanding of topics discussed.    Met            Goals Key:  PC= progressing/continue;        PM= partially met;             DC= discontinue         PLAN     Continue Plan of Care (POC) and progress per patient tolerance. See Treatment section for exercise progression.  Outpatient Physical Therapy 2 times weekly for 8 weeks to include the following interventions: Electrical Stimulation Attended, Gait Training, Moist Heat/ Ice, Neuromuscular Re-ed, Orthotic Management and Training, Patient Education, Self Care, Therapeutic Activities, Therapeutic Exercise and Prosthetic Management.      Mindy Patino, PT, DPT

## 2022-09-26 ENCOUNTER — CLINICAL SUPPORT (OUTPATIENT)
Dept: REHABILITATION | Facility: HOSPITAL | Age: 82
End: 2022-09-26
Payer: MEDICARE

## 2022-09-26 DIAGNOSIS — M25.661 DECREASED RANGE OF MOTION OF BOTH LOWER EXTREMITIES: Primary | ICD-10-CM

## 2022-09-26 DIAGNOSIS — M25.662 DECREASED RANGE OF MOTION OF BOTH LOWER EXTREMITIES: Primary | ICD-10-CM

## 2022-09-26 DIAGNOSIS — R68.89 DECREASED STRENGTH, ENDURANCE, AND MOBILITY: ICD-10-CM

## 2022-09-26 DIAGNOSIS — R53.1 DECREASED STRENGTH, ENDURANCE, AND MOBILITY: ICD-10-CM

## 2022-09-26 DIAGNOSIS — Z74.09 DECREASED STRENGTH, ENDURANCE, AND MOBILITY: ICD-10-CM

## 2022-09-26 PROCEDURE — 97110 THERAPEUTIC EXERCISES: CPT

## 2022-09-26 PROCEDURE — 97116 GAIT TRAINING THERAPY: CPT

## 2022-09-26 NOTE — PROGRESS NOTES
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  He reports that he has been walking indoor and outdoor at home and building up his endurance and confidence for outdoor walking again.   He was able to perform sit to stands at home without his hands.     Response to previous treatment: good with no adverse effects.    Functional change:  He reports that he is feeling stronger with more confidence with standing/walking and is able to stand for longer bouts at home.  He reports have increased confidence and comfort level with walking.  He is able to stand with only 1 upper extremity support with improved confidence and is able to perform sit to stand with no upper extremity support.      Pain: 3-4/10 at rest  Location: Left hip with palpation    OBJECTIVE     Objective Measures updated at progress report unless specified.  Re-Assessment for () minutes:  range of motion of bilateral knees measured with NuStep - Right = -20, Left = -23  Sitting with bilateral lower extremities over TBall: Right knee = -12, Left knee = -22  In supine: Right hip = -15 , Right knee = -30                   Left hip = -15, Left knee = -35  Functional Mobility: Transfers modified independent with walker and bed mobility with  independence  Ambulation Re-Assessment - ambulating 150 ft with walker with supervision to modified independence.     Supine: Right knee Extension = -25, Left knee extension = -30    9/19/22:   Patient presents with bruising around his Right eye, Left elbow abrasion and complaints of Left hip pain with palpation.  Skin inspection to Left hip and no redness or bruising noted.     Treatment     Hunter received the treatments listed below:      THERAPEUTIC EXERCISES to develop strength, endurance, ROM, flexibility, posture and core stabilization for (29) minutes including:  Intervention Performed Today    short arc quad in supine  lower extremities over wedge 2x20 Right AROM, Left 2x20 Active Assistive for form and quality    sidelying toward prone   Stretch to Hip flexor 3x30 seconds each side   Quad set   2x10 Left     Sidelying hip extension   2x10 bilaterally with 10 seconds endrange stretch on last rep   [place] Left Prosthetic leg  Performed at edge of mat    Sitting in reclined position at edge of mat   Hip flexor stretch 3x30 seconds bilateral    Hip Abduction  In sidelying 2x10 bilateral    Bridge attempt  1x5   Posterior Pelvic Tilt Supine  2x5   Prone lying over light blue small triangle wedge  Holding position 2 minutes, then 4 minutes   Stretch to Hamstrings  3 x 1 minute Right lower extremity    Heelslide with forceful push towards extension  2x10 bilateral with 10 second enrange hold stretch on last rep    10 # over distal Left thigh  Stretch to Left hip flexor for over 5 minutes while exercising Right lower extremity    Prone Hamstring curl endrange for quad/hip flexor stretch  2x10 AAROM   lower extremity lifts towards extension in prone  2x10 AAROM   Prone hip flexor stretch   3x10 second holds bilateral    Standing frame raising to patient's tolerance    - Standing 2x's in standing frame holding each stand for 10-15  minutes working on the following:  - Horizontal Abduction with bilateral  upper extremities 2x10 AROM   - terminal knee extension in stand position 2x20 bilateral   - Rows holding red theraband bilateral  2x10  - Pushing through upper extremities to achieve trunk extension 2x25  - cross body punch with 2# dumbbell 1x10 bilateral   - scap retraction 2x10  - attempted 1x10 shoulder rolls backward - patient had difficulty coordinating  - Overhead press with green tband bilateral 1x10  - Patient given feedback on standing frame position - working on increasing seat angle after 30 second rest between lifts (repeated 3-4 times)  - Lateral reaches 1x8 bilateral   - [place] 1x20 hits working on upright trunk posture  - Pulling with bilateral upper extremities on tray at front to pull hips forward from seat and extend knees 2x25  - Reaching Left and Right with cones to target to stretch lower extremities with each reach 2x10 cones to each side incorporating reach posteriorly    Shuttle  - 2x15 bilateral leg press with 4 bands   -1x10 single leg press with 3 bands for each leg    NuStep  x 10 minutes at Level 5, working on alternating movement of lower extremities and stretch to bilateral knees (able to scoot seat position back to #14 to increase stretch in lower extremities)   Step ups to a 4 in step in the parallel bars x - 1x10 Left    Stairs  4 stairs with bilateral rails with Contact Guard Assist   Parallel Bars     X   - Standing heelcord stretch 3x10 seconds Right   - step ups to 4 in step 10x's bilateral   - Stepping over 4 short hurdles forward and backward 1x each   Supine   - Heel slides 1x10 bilateral with end range 10 second hold    Sitting edge of mat   - mini lifts from raised surface, progressively lowering height after each set 3x5   - sit to stand from raised surface that progressively lowered after each successful stand 4x's    Stairs  - Step ups with Left lower extremity and down with right 4 steps with bilateral upper extremity support    Standing Frame  Standing 1 x  in standing frame working on holding endrange position of approximately 55 degree seat angle for 5 minutes   Standing in parallel bars working sit to stands           - standing 2x's  - Standing weights shifts Left to Right 1x10  - Standing knee extension 1x10  - Standing Left upper extremity lifts off bar 1x5  - Standing bilateral upper extremity lifts off bar 1x10 with PT helping to hold hips for support with minimum to moderate assist   Nautilus leg extension x  2x10 with 1 plate of resistance    Sitting on gray ball  - weight shifts Left and Right 1x10  - weight shifts forward/back 1x10  - single leg lifts 1x5 bilateral then 5 seconds hold 2x's bilateral   - lunge over forward leg 1x10 bilateral    Sitting long arc quad   - 1x10 bilateral with 5#  - 10 second end range hold wit 5# bilateral    Sitting to squat without upper extremity support from 24 in box  x - 2x5   Air Ex Beam X  X - side stepping 1x   - tandem walking 2x's      * In standing frame:  Sitting position = 0 degree seat angle  Full upright stand position with hips and knees at 0 degrees = 90 degree seat angle  3/15/22 - 1st  standing frame was at 42 degree seat angle, last stand position was at 65 degree seat angle  3/17/22 - 1st  standing frame was at 48 degree seat angle, last stand position was at 70 degree seat angle  3/21/22 - 1st  standing frame was at 52 degree seat angle, last stand position was at 70 degree seat angle  3/29/22 - 1st  standing frame was at 60 degree seat angle, last stand position was at 72 degree seat angle  3/31/22 - 1st  standing frame was at 55 degree seat angle, last stand position was at 75 degree seat angle  4/7/22 - 1st  standing frame was at 60 degree seat angle, last stand position was at 80 degree seat angle  4/11/22 - 1st  standing frame was at 60 degree seat angle, last stand position was at 78 degree seat angle  4/14/22 - 1st  standing frame was  at 55 degree seat angle, last stand position was at 86 degree seat angle  4/19/22 - 1st  standing frame was at 60 degree seat angle, last stand position was at 78 degree seat angle  4/22/22 - 1st  standing frame was at 65 degree seat angle, last stand position was at 81 degree seat angle  4/26/22 - 1st  standing frame was at 65 degree seat angle, last stand position was at 82 degree seat angle  5/3/22 - 1st  standing frame was at 65 degree seat angle, last stand position was at 88 degree seat angle  5/5/22 - 1st  standing frame was at 65 degree seat angle, last stand position was at 84 degree seat angle  5/10/22 - 1st  standing frame was at 60 degree seat angle, last stand position was at 80 degree seat angle  5/12/22 - 1st  standing frame was at 65 degree seat angle, last stand position was at 80 degree seat angle  5/17/22 - 1st  standing frame was at 62 degree seat angle, last stand position was at 82 degree seat angle  5/19/22 - 1st  standing frame was at 60 degree seat angle, last stand position was at 80 degree seat angle  5/24/22 - 1st  standing frame was at 62 degree seat angle, last stand position was at 82 degree seat angle  5/26/22 - 1st  standing frame was at 60 degree seat angle, last stand position was at 80 degree seat angle   5/31/22 - 1st  standing frame was at 65 degree seat angle, last stand position was at 80 degree seat angle  6/2/22 - 1st  standing frame was at 70 degree seat angle, last stand position was at 82 degree seat angle  6/7/22 - 1st  standing frame was at 68 degree seat angle, last stand position was at 80 degree seat angle  6/10/22 - 1st  standing frame was at 60 degree seat angle, last stand position was at 80 degree seat angle  6/14/22 - 1st  standing frame was at 65 degree seat angle, last stand position was at 82 degree seat angle   6/16/22 - 1st stand  in standing frame was at 70 degree seat angle, last stand position was at 84 degree seat angle  6/21/22 - 1st  standing frame was at 65 degree seat angle, last stand position was at 80 degree seat angle  Plan for Next Visit:      THERAPEUTIC ACTIVITIES to improve dynamic and functional  performance for (0) minutes including:    Intervention Performed Today    Bed - wheelchair transfer   Practiced set up and sequence   Bed mobility sit to supine to sit  Practiced sequence    Rolling   3 x each side working on sequence to incorporate hip flexor stretch on each side. Then separate stretch performed as listed above   Standing in parallel bars  4 x's working on several weight shifts towards hip extension and knee extension while standing.   Standing in parallel bars performing step forward  With Right lower extremity 5x's with moderate assistance to maintain standing position   Dynasplint Consult via FaceTime with Andriy to discuss fit     Sit to  parallel bars without assistance  2x's   Dynasplint Consult  Andriy Stout (Rep for Dynasplint) met with physical therapist and patient today during session to discuss options for dynasplints for bilateral knees due to patient's significant lack of range of motion.     Dynasplint Fitting  Andriyjohn Stout (Rep for Dynasplint) met with physical therapist and patient today to deliver and fit patient for bilateral knee dynasplints.  We discussed wearing time of 1-2 hours/day for the first week and then we would re-assess.  Patient was shown how to don each brace and straps were marked for future use.  The Dynasplint force was increased to 6 on the Left and 7 on the Right to start.  Extra padding was added to thin straps for skin protection.   Dynasplint Consult  Andriy Stout (Rep for Dynasplint) met with physical therapist and patient today during session to discuss proper fit and positioning of Dynasplint to feel a better stretch at home. Pt only brought Left splint to  session today and his splint was donned, straps were tightened and new black line added for patient to follow at home.  Left splint was increased from 7/13 to 9/13 tension.  He reports that he places a pillow under his knee at home and he was educated on better positioning of pillow under distal stump with prosthesis removed.  He also felt more of a stretch in long sitting vs supine and was asked to try this position at home for part of the stretch if able.    Standing at sink in Neuro Room working on stand posture and positioning   Simulating activity that he has been given to do for homework. 1x5 seconds, 1x10 seconds   Patient educated addition to Home Exercise Program   - Simulated sit to stand setup  From wheelchair to sink at home performing with walker in front to work on letting go of sink and holding on to walker.  Patient was educated on how to sit safely to prevent wheelchair from tipping backwards if he loses balance.  He was told to start with one hand on walker and one hand on counter top until he can progress to both hands on walker for support. This will be a way that he can safely challenge himself at home. Patient practiced standing to walker 3x's working on sequence and safety of task.   Patient expressed concerns about not knowing his current weight, so PT assisted patient with obtaining weight at start of session with wheelchair scale.   He currently weighs 139 #   Several transfers and sit to stands to adjust walker height as needed  Contact guard to stand by assist for each   Several sit to stand and wheelchair to machine transfers with and without walker performed throughout session  With independence.    Discussed Left prosthesis sleeve   - It is currently to large/long and often curls.  Patient shared number of company as he has been unsuccessful with getting rep to deliver new one.    Left Prosthesis adjusted to turn foot to neutral, standing and stepping between each adjustment to correct  position of leg  2 adjustments.    Discussed full below knee amputation program to work on at home in preparation for discharge  Patient verbalized good understanding of program   Skin inspection of Left hip   - see objective session at start of note   Bed mobility and transfer    - Patient transferred 6x's during session with independence and no increased complaints of pain  - he transitioned through sit to supine, rolling to Right and supine to sit with independence    Patient education  - Patient discussed fall from last week, PT recommended that he always keep cell phone on his person in case this happens again.  He was also educated that if the pain persists, he may need to follow up with MD for an X-ray.      Plan for Next Visit:      Gait Training: for (10) minutes:  - Ambulated 100ft, 50 ft with walker with stand by assist to supervision  - side stepping in parallel bars 1x's the length of parallel bars  - ambulating forward with 1 upper extremity support 2x's       Patient Education and Home Exercises     Home Exercises Provided and Patient Education Provided     Education provided:   -2/22/22 Patient educated on increasing length and consistency of stretching knees and hips to every 2 hours at home.  He was shown and demonstrated understanding of short arc quads, rolling toward prone and sitting in reclined position to stretch hip flexors.  -2/24/22 Patient given Home Exercise Program on exercises for lower extremities to be performed 2x15 3x's/day  See patient Instructions in EMR  -3/1/22 Re-inforced Home Exercise Program and asked patient to incorporate prone lying for longer periods of time to build up to 20 minutes per day.  3/3/22 - Discussed the option of dynasplints for bilateral lower extremities. Patient educated on the option of Dynasplints vs basic knee braces and encouraged to consider Dynasplints as a more aggressive and efficient way of properly positioning and dynamically stretching his legs  outside of therapy.  3/8/22 - Patient was educated on Dynasplints with Andriy Stout (Rep).   He was shown pictures on ipad and Andriy and physical therapist discussed wearing schedule to make gains with his muscle length outside of therapy sessions.  Andriy informed patient that he would assist with collecting information regarding insurance coverage and provide him with any out of pocket costs so he can decide if he would like to pursue this option.  3/22/22 - Patient was educated that Andriy Stout (Rep for Dynasplint) messaged me that his splints have been approved and are being mailed this week.   3/24/22 - Patient was educated on Dynasplint management and importance of daily skin checks.  His tolerance of wearing splints for the first week will determine his wearing schedule for the following weeks.  3/29/22 - Patient was educated on only wearing Dynasplints while lying down to get the most effective stretch to lower extremities.  3/31/22 - Patient was asked to reach out to [facility] rep (Andriy) to set up a time for Andriy to re-assess the positioning and fit of splints to see if he can feel more of a stretch with wearing schedule.  4/7/22 - Patient was asked to reach out to [facility] to make sure that splints are adjusted appropriately.  4/11/22 - PT recommended that patient follow up with Dynasplint rep for reassessment of splint fit.  4/14/22 - Patient informed that PT would reach out to [facility] again to have him schedule a visit.  He was encouraged to continue being active at home.  4/18/22 -  Patient asked to contact Dynasplint rep to set up a time for a consult that would work for his schedule.  4/28/22 - Patient educated on wearing Dynaspints for 2 hours/day for the next 4 days and then we will assess his ability to increase to 4 hours/day after next visit.  He verbalized good understanding of education from Andriy (Dynasplinjohn) and was told to try Right splint on in new wear position first before  increasing tension to 9/13.    5/3/22 - Patient educated on attempting standing at home at sink holding stand position for 10-15 seconds repeating 3x's and doing this in the morning and afternoon every day.   5/12/22 - Patient educated on the importance of walking with walker each session moving forward to try to get range applied to a functional task.  5/26/22 - Patient educated on adding walker to sit to stands at sink as a way of increasing his stand balance safely at home alone.  6/7/22 - Patient educated on his gains at this point and that PT is requesting more visits for a renewed Plan of Care.   6/10/22 - Patient educated on progressing to standing marches with walker at home and standing lateral foot taps with walker at home in same set up as before with kitchen counter in front and wheelchair behind for safety.  6/21/22 - Patient educated on stair mobility sequence.   6/23/22 - Patient educated on the risk of falls with walking home alone at this time.   7/28/22 - Patient educated on safety with gait at home and advancing gait challenges in therapy.  8/8/22 - Patient educated on proper technique of side lying hip extension and that Plan of Care would be extended for another month in preparation for discharge.   8/12/22 - Discussed contacting prosthetic company to replace sleeve as it is too large and long.   9/2/22 - Discussed full below knee amputation home program in preparation for discharge.    9/19/22 - Patient educated on fall safety (see therapeutic activity section)    Home Exercise Provided:  Exercises were reviewed and Hunter was able to verbalize good understanding prior to the end of the session.  Hunter demonstrated good  understanding of the education provided.   ASSESSMENT     Patient responded well to session today with good effort, participation and endurance.  He was able to progress with standing challenges at parallel bars and required fewer rest breaks in session.  He also demonstrated  improved quality of movement with leg extension on Nautilus machine.      Pt prognosis is Fair  To Good    Pt will continue to benefit from skilled outpatient physical therapy to address the deficits listed in the problem list box on initial evaluation, provide pt/family education and to maximize pt's level of independence in the home and community environment.     Pt's spiritual, cultural and educational needs considered and pt agreeable to plan of care and goals.     Anticipated Barriers for therapy: co-morbidities, transportation assistance needed at times, lifestyle, potential contractures of knees/hips    GOALS:     Short Term Goals:  4 weeks Progress    Function: Patient will demonstrate improved function as indicated by a functional limitation score of less than or equal to 55 out of 100 on FOTO = 44% limitation PC   Mobility: Patient will improve sit to stand transfer with moderate assistance in order to progress towards independence with functional activities. (Met)  (Modified) - Patient will perform wheelchair to mat transfer with stand by assist to demonstrate improved independence with mobility. Met   Gait: Patient will ambulated 3 steps in parallel bars with Maximum assistance to demonstrate improved strength, posture and endurance met   HEP: Patient will demonstrate independence with HEP in order to progress toward functional independence. met    Assess patient's needs for Dynasplints and set up consultation if needed. met       Long Term Goals:  8 weeks Progress   Function: Patient will demonstrate improved function as indicated by a functional limitation score of less than or equal to 60 out of 100 on FOTO = 40 % limitation PC   Mobility: Patient will improve AROM of bilateral knees to -10 degrees  in order to return to maximal functional potential and improve quality of life. PC   Functional Mobility - Patient will perform mat to wheelchair transfer with modified independence with walker in order to  improve his independence with functional mobility. Met   Gait: Patient will ambulate 15 feet with rolling walker with moderate assistance to demonstrate improved strength, endurance and mobility. (Met)   (Modified) - Patient will ambulate 100 ft with walker incorporating turns with modified independence to negotiate home environment with independence. Met   HEP: Patient educated on HEP in preparation for d/c verbalizing and demonstrating good understanding of topics discussed.    Met            Goals Key:  PC= progressing/continue;        PM= partially met;             DC= discontinue         PLAN     Continue Plan of Care (POC) and progress per patient tolerance. See Treatment section for exercise progression.  Outpatient Physical Therapy 2 times weekly for 8 weeks to include the following interventions: Electrical Stimulation Attended, Gait Training, Moist Heat/ Ice, Neuromuscular Re-ed, Orthotic Management and Training, Patient Education, Self Care, Therapeutic Activities, Therapeutic Exercise and Prosthetic Management.      Mindy Patino, PT, DPT

## 2022-09-30 ENCOUNTER — DOCUMENTATION ONLY (OUTPATIENT)
Dept: REHABILITATION | Facility: HOSPITAL | Age: 82
End: 2022-09-30
Payer: MEDICARE

## 2022-09-30 ENCOUNTER — CLINICAL SUPPORT (OUTPATIENT)
Dept: REHABILITATION | Facility: HOSPITAL | Age: 82
End: 2022-09-30
Payer: MEDICARE

## 2022-09-30 DIAGNOSIS — R68.89 DECREASED STRENGTH, ENDURANCE, AND MOBILITY: ICD-10-CM

## 2022-09-30 DIAGNOSIS — R53.1 DECREASED STRENGTH, ENDURANCE, AND MOBILITY: ICD-10-CM

## 2022-09-30 DIAGNOSIS — M25.662 DECREASED RANGE OF MOTION OF BOTH LOWER EXTREMITIES: Primary | ICD-10-CM

## 2022-09-30 DIAGNOSIS — M25.661 DECREASED RANGE OF MOTION OF BOTH LOWER EXTREMITIES: Primary | ICD-10-CM

## 2022-09-30 DIAGNOSIS — Z74.09 DECREASED STRENGTH, ENDURANCE, AND MOBILITY: ICD-10-CM

## 2022-09-30 PROCEDURE — 97530 THERAPEUTIC ACTIVITIES: CPT | Mod: KX

## 2022-09-30 PROCEDURE — 97110 THERAPEUTIC EXERCISES: CPT | Mod: KX

## 2022-09-30 PROCEDURE — 97116 GAIT TRAINING THERAPY: CPT | Mod: KX

## 2022-09-30 NOTE — PLAN OF CARE
Outpatient Therapy Updated Plan of Care     Visit Date: 9/30/2022  Name: Hunter Schofield  Clinic Number: [id number]    Therapy Diagnosis:   Encounter Diagnoses   Name Primary?    Decreased range of motion of both lower extremities Yes    Decreased strength, endurance, and mobility      Physician: Fallon Dudley*    Physician Orders: PT Eval and Treat   Medical Diagnosis from Referral: s/p Left BKA  Evaluation Date: 2/18/2022    Total Visits Received: 58  Cancelled Visits: 2  No Show Visits: 0    Current Certification Period:  8/8/22 to 9/30/22  Precautions:  Left below knee amputation, falls, standard, incontinent at times  Visits from Evaluation Date:  57  Functional Level Prior to Evaluation:  Independent within his home, using wheelchair for all functional mobility.  Needing to change brief, clothes and bathe at bed level.      Subjective     Update: Patient reports that he feels stronger and more confident with gait.  He would like to continue to work on lower extremity range of motion with bilateral knee and hips as well as balance with mobility tasks.     Objective     Update:   9/30/22:  Sitting: Knee extension = -18 Right, -23 Left   Supine: Hip extension = -18 Right, -28 Left; Knee extension = -15 Right, -20 Left   Can now perform sit to stand transfer from raised seat surface (AirEx pad on chair seat) without upper extremity support  Hip flexion 4/5 bilateral, knee flexion 5/5, Right dorsiflexion 4- and Right plantarflexion 4+  Ambulating 200 ft with walker with stand by assist to supervision    Assessment     Update:      Patient responded well to session today with good effort, participation and endurance.  He responded well to re-assessments and has had a slight improvement with lower extremity range of motion.  He continues to be limited by his hamstring and hip flexor tightness as well as his overall activity level at home. Patient was educated on results of re-assessments, importance of  continuing proper stretching technique at home for hips and knees.  Also discussed increasing activity level throughout the day to spend less time in wheelchair and more time walking and sitting on other surfaces in his home.  Over this past plan of care he has continued to demonstrate gains with his strength, endurance and independence with mobility.  He has had 1 fall with no significant injury but it has lowered his confidence with outdoor ambulation.  He has good potential to continue to progress with PT treatment.     Short Term Goals:  4 weeks Progress    Function: Patient will demonstrate improved function as indicated by a functional limitation score of less than or equal to 55 out of 100 on FOTO = 44% limitation PC   Mobility: Patient will improve sit to stand transfer with moderate assistance in order to progress towards independence with functional activities. (Met)  (Modified) - Patient will perform wheelchair to mat transfer with stand by assist to demonstrate improved independence with mobility. Met   Gait: Patient will ambulated 3 steps in parallel bars with Maximum assistance to demonstrate improved strength, posture and endurance met   HEP: Patient will demonstrate independence with HEP in order to progress toward functional independence. met    Assess patient's needs for [employer] and set up consultation if needed. met       Long Term Goals:  8 weeks Progress   Function: Patient will demonstrate improved function as indicated by a functional limitation score of less than or equal to 60 out of 100 on FOTO = 40 % limitation PC   Mobility: Patient will improve AROM of bilateral knees to -10 degrees  in order to return to maximal functional potential and improve quality of life. PC   Functional Mobility - Patient will perform mat to wheelchair transfer with modified independence with walker in order to improve his independence with functional mobility. Met   Gait: Patient will ambulate 15 feet with  rolling walker with moderate assistance to demonstrate improved strength, endurance and mobility. (Met)   (Modified) - Patient will ambulate 100 ft with walker incorporating turns with modified independence to negotiate home environment with independence. Met   HEP: Patient educated on HEP in preparation for d/c verbalizing and demonstrating good understanding of topics discussed.    Met            Goals Key:  PC= progressing/continue;        PM= partially met;             DC= discontinue  Reasons for Recertification of Therapy:   To work on further strengthening, range of motion in bilateral hips and knees and balance with mobility.     Plan     Updated Certification Period: 9/30/2022 to 11/24/22  Recommended Treatment Plan: 2 times per week for 8 weeks: Electrical Stimulation Attended, Gait Training, Manual Therapy, Moist Heat/ Ice, Neuromuscular Re-ed, Orthotic Management and Training, Patient Education, Self Care, Therapeutic Activities, Therapeutic Exercise, and Dry Needling  Other Recommendations: none    Mindy Patino, PT  9/30/2022      I CERTIFY THE NEED FOR THESE SERVICES FURNISHED UNDER THIS PLAN OF TREATMENT AND WHILE UNDER MY CARE    Physician's comments:        Physician's Signature: ___________________________________________________

## 2022-09-30 NOTE — PROGRESS NOTES
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  He reports that he has been walking indoor at home and building up his endurance and confidence for outdoor walking again.   He was able to perform sit to stands at home without his hands.     Response to previous treatment: good with no adverse effects.    Functional change:  He reports that he is feeling stronger with more confidence with standing/walking and is able to stand for longer bouts at home.  He reports have increased confidence and comfort level with walking.  He is able to stand with only 1 upper extremity support with improved confidence and is able to perform sit to stand with no upper extremity support.      Pain: 0/10 at rest  Location:     OBJECTIVE     Objective Measures updated at progress report unless specified.  Re-Assessment for (25) minutes:    8/8/22  range of motion of bilateral knees measured with NuStep - Right = -20, Left = -23  Sitting with bilateral lower extremities over TBall: Right knee = -12, Left knee = -22  In supine: Right hip = -15 , Right knee = -30                   Left hip = -15, Left knee = -35  Functional Mobility: Transfers modified independent with walker and bed mobility with independence  Ambulation  Re-Assessment - ambulating 150 ft with walker with supervision to modified independence.   Supine: Right knee Extension = -25, Left knee extension = -30    9/19/22:   Patient presents with bruising around his Right eye, Left elbow abrasion and complaints of Left hip pain with palpation.  Skin inspection to Left hip and no redness or bruising noted.     9/30/22:  Sitting: Knee extension = -18 Right, -23 Left   Supine: Hip extension = -18 Right, -28 Left; Knee extension = -15 Right, -20 Left   Can now perform sit to stand transfer from raised seat surface (AirEx pad on chair seat) without upper extremity support  Hip flexion 4/5 bilateral, knee flexion 5/5, Right dorsiflexion 4- and Right plantarflexion 4+  Treatment     Hunter received the treatments listed below:      THERAPEUTIC EXERCISES to develop strength, endurance, ROM, flexibility, posture and core stabilization for (20) minutes including:  Intervention Performed Today    short arc quad in supine  lower extremities over wedge 2x20 Right AROM, Left 2x20 Active Assistive for form and quality    sidelying toward prone   Stretch to Hip flexor 3x30 seconds each side   Quad set   2x10 Left     Sidelying hip extension   2x10 bilaterally with 10 seconds endrange stretch on last rep   [place] Left Prosthetic leg  Performed at edge of mat    Sitting in reclined position at edge of mat   Hip flexor stretch 3x30 seconds bilateral    Hip Abduction  In sidelying 2x10 bilateral    Bridge attempt  1x5   Posterior Pelvic Tilt Supine  2x5   Prone lying over light blue small triangle wedge  Holding position 2 minutes, then 4 minutes   Stretch to Hamstrings  3 x 1 minute Right lower extremity    Heelslide with forceful push towards extension  2x10 bilateral with 10 second enrange hold stretch on last rep    10 # over distal Left thigh  Stretch to Left hip flexor for over 5 minutes while exercising Right lower extremity    Prone Hamstring curl endrange for quad/hip  flexor stretch  2x10 AAROM   lower extremity lifts towards extension in prone  2x10 AAROM   Prone hip flexor stretch   3x10 second holds bilateral    Standing frame raising to patient's tolerance    - Standing 2x's in standing frame holding each stand for 10-15  minutes working on the following:  - Horizontal Abduction with bilateral upper extremities 2x10 AROM   - terminal knee extension in stand position 2x20 bilateral   - Rows holding red theraband bilateral  2x10  - Pushing through upper extremities to achieve trunk extension 2x25  - cross body punch with 2# dumbbell 1x10 bilateral   - scap retraction 2x10  - attempted 1x10 shoulder rolls backward - patient had difficulty coordinating  - Overhead press with green tband bilateral 1x10  - Patient given feedback on standing frame position - working on increasing seat angle after 30 second rest between lifts (repeated 3-4 times)  - Lateral reaches 1x8 bilateral   - [place] 1x20 hits working on upright trunk posture  - Pulling with bilateral upper extremities on tray at front to pull hips forward from seat and extend knees 2x25  - Reaching Left and Right with cones to target to stretch lower extremities with each reach 2x10 cones to each side incorporating reach posteriorly    Shuttle X  X   - 1x15 bilateral leg press with 4 bands   -1x10 single leg press with 3 bands for each leg    NuStep  x 10 minutes at Level 4-5, working on alternating movement of lower extremities and stretch to bilateral knees (able to scoot seat position back to #14 to increase stretch in lower extremities)   Step ups to a 4 in step in the parallel bars  - 1x10 Left    Stairs  4 stairs with bilateral rails with Contact Guard Assist   Parallel Bars        - Standing heelcord stretch 3x10 seconds Right   - step ups to 4 in step 10x's bilateral   - Stepping over 4 short hurdles forward and backward 1x each   Supine   - Heel slides 1x10 bilateral with end range 10 second hold    Sitting  edge of mat   - mini lifts from raised surface, progressively lowering height after each set 3x5   - sit to stand from raised surface that progressively lowered after each successful stand 4x's    Stairs  - Step ups with Left lower extremity and down with right 4 steps with bilateral upper extremity support    Standing Frame  Standing 1 x in standing frame working on holding endrange position of approximately 55 degree seat angle for 5 minutes   Standing in parallel bars working sit to stands           - standing 2x's  - Standing weights shifts Left to Right 1x10  - Standing knee extension 1x10  - Standing Left upper extremity lifts off bar 1x5  - Standing bilateral upper extremity lifts off bar 1x10 with PT helping to hold hips for support with minimum to moderate assist   Nautilus leg extension   2x10 with 1 plate of resistance    Sitting on gray ball  - weight shifts Left and Right 1x10  - weight shifts forward/back 1x10  - single leg lifts 1x5 bilateral then 5 seconds hold 2x's bilateral   - lunge over forward leg 1x10 bilateral    Sitting long arc quad   - 1x10 bilateral with 5#  - 10 second end range hold wit 5# bilateral    Sitting to squat without upper extremity support from raised chair x - 1x5   Air Ex Beam  - side stepping 1x   - tandem walking 2x's      * In standing frame:  Sitting position = 0 degree seat angle  Full upright stand position with hips and knees at 0 degrees = 90 degree seat angle  3/15/22 - 1st  standing frame was at 42 degree seat angle, last stand position was at 65 degree seat angle  3/17/22 - 1st  standing frame was at 48 degree seat angle, last stand position was at 70 degree seat angle  3/21/22 - 1st  standing frame was at 52 degree seat angle, last stand position was at 70 degree seat angle  3/29/22 - 1st  standing frame was at 60 degree seat angle, last stand position was at 72 degree seat angle  3/31/22 - 1st  standing frame was at 55  degree seat angle, last stand position was at 75 degree seat angle  4/7/22 - 1st  standing frame was at 60 degree seat angle, last stand position was at 80 degree seat angle  4/11/22 - 1st  standing frame was at 60 degree seat angle, last stand position was at 78 degree seat angle  4/14/22 - 1st  standing frame was at 55 degree seat angle, last stand position was at 86 degree seat angle  4/19/22 - 1st  standing frame was at 60 degree seat angle, last stand position was at 78 degree seat angle  4/22/22 - 1st  standing frame was at 65 degree seat angle, last stand position was at 81 degree seat angle  4/26/22 - 1st  standing frame was at 65 degree seat angle, last stand position was at 82 degree seat angle  5/3/22 - 1st  standing frame was at 65 degree seat angle, last stand position was at 88 degree seat angle  5/5/22 - 1st  standing frame was at 65 degree seat angle, last stand position was at 84 degree seat angle  5/10/22 - 1st  standing frame was at 60 degree seat angle, last stand position was at 80 degree seat angle  5/12/22 - 1st  standing frame was at 65 degree seat angle, last stand position was at 80 degree seat angle  5/17/22 - 1st  standing frame was at 62 degree seat angle, last stand position was at 82 degree seat angle  5/19/22 - 1st  standing frame was at 60 degree seat angle, last stand position was at 80 degree seat angle  5/24/22 - 1st  standing frame was at 62 degree seat angle, last stand position was at 82 degree seat angle  5/26/22 - 1st  standing frame was at 60 degree seat angle, last stand position was at 80 degree seat angle   5/31/22 - 1st  standing frame was at 65 degree seat angle, last stand position was at 80 degree seat angle  6/2/22 - 1st  standing frame was at 70 degree seat angle, last stand position was at 82 degree seat angle  6/7/22 - 1st   standing frame was at 68 degree seat angle, last stand position was at 80 degree seat angle  6/10/22 - 1st  standing frame was at 60 degree seat angle, last stand position was at 80 degree seat angle  6/14/22 - 1st  standing frame was at 65 degree seat angle, last stand position was at 82 degree seat angle   6/16/22 - 1st  standing frame was at 70 degree seat angle, last stand position was at 84 degree seat angle  6/21/22 - 1st  standing frame was at 65 degree seat angle, last stand position was at 80 degree seat angle  Plan for Next Visit:      THERAPEUTIC ACTIVITIES to improve dynamic and functional  performance for (20) minutes including:    Intervention Performed Today    Bed - wheelchair transfer   Practiced set up and sequence   Bed mobility sit to supine to sit  Practiced sequence    Rolling   3 x each side working on sequence to incorporate hip flexor stretch on each side. Then separate stretch performed as listed above   Standing in parallel bars  4 x's working on several weight shifts towards hip extension and knee extension while standing.   Standing in parallel bars performing step forward  With Right lower extremity 5x's with moderate assistance to maintain standing position   Dynasplint Consult via [facility] with Andriy to discuss fit     Sit to  parallel bars without assistance  2x's   Dynasplint Consult  Andriy Stout (Rep for Dynasplint) met with physical therapist and patient today during session to discuss options for dynasplints for bilateral knees due to patient's significant lack of range of motion.     Dynasplint Fitting  Andriy Stout (Rep for Dynasplint) met with physical therapist and patient today to deliver and fit patient for bilateral knee dynasplints.  We discussed wearing time of 1-2 hours/day for the first week and then we would re-assess.  Patient was shown how to don each brace and straps were marked for future use.  The Dynasplint force was  increased to 6 on the Left and 7 on the Right to start.  Extra padding was added to thin straps for skin protection.   Dynasplint Consult  Andriy Stout (Rep for Dynasplint) met with physical therapist and patient today during session to discuss proper fit and positioning of Dynasplint to feel a better stretch at home. Pt only brought Left splint to session today and his splint was donned, straps were tightened and new black line added for patient to follow at home.  Left splint was increased from 7/13 to 9/13 tension.  He reports that he places a pillow under his knee at home and he was educated on better positioning of pillow under distal stump with prosthesis removed.  He also felt more of a stretch in long sitting vs supine and was asked to try this position at home for part of the stretch if able.    Standing at sink in Neuro Room working on stand posture and positioning   Simulating activity that he has been given to do for homework. 1x5 seconds, 1x10 seconds   Patient educated addition to Home Exercise Program   - Simulated sit to stand setup  From wheelchair to sink at home performing with walker in front to work on letting go of sink and holding on to walker.  Patient was educated on how to sit safely to prevent wheelchair from tipping backwards if he loses balance.  He was told to start with one hand on walker and one hand on counter top until he can progress to both hands on walker for support. This will be a way that he can safely challenge himself at home. Patient practiced standing to walker 3x's working on sequence and safety of task.   Patient expressed concerns about not knowing his current weight, so PT assisted patient with obtaining weight at start of session with wheelchair scale.   He currently weighs 139 #   Several transfers and sit to stands to adjust walker height as needed  Contact guard to stand by assist for each   Several sit to stand and wheelchair to machine transfers with and without  walker performed throughout session  With independence.    Discussed Left prosthesis sleeve   - It is currently to large/long and often curls.  Patient shared number of company as he has been unsuccessful with getting rep to deliver new one.    Left Prosthesis adjusted to turn foot to neutral, standing and stepping between each adjustment to correct position of leg  2 adjustments.    Discussed full below knee amputation program to work on at home in preparation for discharge  Patient verbalized good understanding of program   Skin inspection of Left hip   - see objective session at start of note   Bed mobility and transfer    - Patient transferred 6x's during session with independence and no increased complaints of pain  - he transitioned through sit to supine, rolling to Right and supine to sit with independence    Patient education  - Patient discussed fall from last week, PT recommended that he always keep cell phone on his person in case this happens again.  He was also educated that if the pain persists, he may need to follow up with MD for an X-ray.    Transfer from wheelchair to chair seat to mat then back to chair and back to wheelchair x Supervision to modified independence   Bed mobility sit to supine to sit  x Independent    [place]/donning prosthetic Left lower extremity with [place] x For measuring Left lower extremity    Patient education on results of re-assessments, importance of continuing proper stretching technique at home for hips and knees, practiced set-up. Also discussed increasing activity level throughout the day to spend less time in wheelchair and more time walking and sitting on other surfaces in his home. x Patient verbalized good understanding.      Plan for Next Visit:      Gait Training: for (12) minutes:  - Ambulated 200ft, 80 ft with walker with stand by assist to supervision    Patient Education and Home Exercises     Home Exercises Provided and Patient Education Provided      Education provided:   -2/22/22 Patient educated on increasing length and consistency of stretching knees and hips to every 2 hours at home.  He was shown and demonstrated understanding of short arc quads, rolling toward prone and sitting in reclined position to stretch hip flexors.  -2/24/22 Patient given Home Exercise Program on exercises for lower extremities to be performed 2x15 3x's/day  See patient Instructions in EMR  -3/1/22 Re-inforced Home Exercise Program and asked patient to incorporate prone lying for longer periods of time to build up to 20 minutes per day.  3/3/22 - Discussed the option of dynasplints for bilateral lower extremities. Patient educated on the option of Dynasplints vs basic knee braces and encouraged to consider Dynasplints as a more aggressive and efficient way of properly positioning and dynamically stretching his legs outside of therapy.  3/8/22 - Patient was educated on Dynasplints with Andriy Stout ().   He was shown pictures on ipad and Andriy and physical therapist discussed wearing schedule to make gains with his muscle length outside of therapy sessions.  Andriy informed patient that he would assist with collecting information regarding insurance coverage and provide him with any out of pocket costs so he can decide if he would like to pursue this option.  3/22/22 - Patient was educated that Andriy Stout (Rep for Dynasplint) messaged me that his splints have been approved and are being mailed this week.   3/24/22 - Patient was educated on Dynasplint management and importance of daily skin checks.  His tolerance of wearing splints for the first week will determine his wearing schedule for the following weeks.  3/29/22 - Patient was educated on only wearing Dynasplints while lying down to get the most effective stretch to lower extremities.  3/31/22 - Patient was asked to reach out to Dynasplint rep (Andriy) to set up a time for Andriy to re-assess the positioning and fit of splints  to see if he can feel more of a stretch with wearing schedule.  4/7/22 - [facility] that [facility]ints are [facility] appropriately.  4/11/22 - PT recommended that [facility] for reassessment of splint fit.  4/14/22 - Patient informed that PT would reach [facility] to [facility] again to have him schedule a visit.  He was encouraged to continue being active at home.  4/18/22 -  Patient asked to contact Dynasplint rep to set up a time for a consult that would work for his schedule.  4/28/22 - Patient educated on wearing Dynaspints for 2 hours/day for the next 4 days and then we will assess his ability to increase to 4 hours/day after next visit.  He verbalized good understanding of education from Andriy ([employer]) and was told to try Right splint on in new wear position first before increasing tension to 9/13.    5/3/22 - Patient educated on attempting standing at home at sink holding stand position for 10-15 seconds repeating 3x's and doing this in the morning and afternoon every day.   5/12/22 - Patient educated on the importance of walking with walker each session moving forward to try to get range applied to a functional task.  5/26/22 - Patient educated on adding walker to sit to stands at sink as a way of increasing his stand balance safely at home alone.  6/7/22 - Patient educated on his gains at this point and that PT is requesting more visits for a renewed Plan of Care.   6/10/22 - Patient educated on progressing to standing marches with walker at home and standing lateral foot taps with walker at home in same set up as before with kitchen counter in front and wheelchair behind for safety.  6/21/22 - Patient educated on stair mobility sequence.   6/23/22 - Patient educated on the risk of falls with walking home alone at this time.   7/28/22 - Patient educated on safety with gait at home and advancing gait challenges in therapy.  8/8/22 -  Patient educated on proper technique of side lying hip extension and that Plan of Care would be extended for another month in preparation for discharge.   8/12/22 - Discussed contacting prosthetic company to replace sleeve as it is too large and long.   9/2/22 - Discussed full below knee amputation home program in preparation for discharge.    9/19/22 - Patient educated on fall safety (see therapeutic activity section)  9/30/22 - See above in therapeutic activity section    Home Exercise Provided:  Exercises were reviewed and Hunter was able to verbalize good understanding prior to the end of the session.  Hunter demonstrated good  understanding of the education provided.   ASSESSMENT     Patient responded well to session today with good effort, participation and endurance.  He responded well to re-assessments and has had a slight improvement with lower extremity range of motion.  He continues to be limited by his hamstring and hip flexor tightness as well as his overall activity level at home. Patient was educated on results of re-assessments, importance of continuing proper stretching technique at home for hips and knees.  Also discussed increasing activity level throughout the day to spend less time in wheelchair and more time walking and sitting on other surfaces in his home.  Over this past plan of care he has continued to demonstrate gains with his strength, endurance and independence with mobility.  He has had 1 fall with no significant injury but it has lowered his confidence with outdoor ambulation.  He has good potential to continue to progress with PT treatment.     Pt prognosis is Fair  To Good    Pt will continue to benefit from skilled outpatient physical therapy to address the deficits listed in the problem list box on initial evaluation, provide pt/family education and to maximize pt's level of independence in the home and community environment.     Pt's spiritual, cultural and educational needs  considered and pt agreeable to plan of care and goals.     Anticipated Barriers for therapy: co-morbidities, transportation assistance needed at times, lifestyle, potential contractures of knees/hips    GOALS:     Short Term Goals:  4 weeks Progress    Function: Patient will demonstrate improved function as indicated by a functional limitation score of less than or equal to 55 out of 100 on FOTO = 44% limitation PC   Mobility: Patient will improve sit to stand transfer with moderate assistance in order to progress towards independence with functional activities. (Met)  (Modified) - Patient will perform wheelchair to mat transfer with stand by assist to demonstrate improved independence with mobility. Met   Gait: Patient will ambulated 3 steps in parallel bars with Maximum assistance to demonstrate improved strength, posture and endurance met   HEP: Patient will demonstrate independence with HEP in order to progress toward functional independence. met    Assess patient's needs for Dynasplints and set up consultation if needed. met       Long Term Goals:  8 weeks Progress   Function: Patient will demonstrate improved function as indicated by a functional limitation score of less than or equal to 60 out of 100 on FOTO = 40 % limitation PC   Mobility: Patient will improve AROM of bilateral knees to -10 degrees  in order to return to maximal functional potential and improve quality of life. PC   Functional Mobility - Patient will perform mat to wheelchair transfer with modified independence with walker in order to improve his independence with functional mobility. Met   Gait: Patient will ambulate 15 feet with rolling walker with moderate assistance to demonstrate improved strength, endurance and mobility. (Met)   (Modified) - Patient will ambulate 100 ft with walker incorporating turns with modified independence to negotiate home environment with independence. Met   HEP: Patient educated on HEP in preparation for d/c  verbalizing and demonstrating good understanding of topics discussed.    Met            Goals Key:  PC= progressing/continue;        PM= partially met;             DC= discontinue         PLAN     Continue Plan of Care (POC) and progress per patient tolerance. See Treatment section for exercise progression.  Outpatient Physical Therapy 2 times weekly for 8 weeks to include the following interventions: Electrical Stimulation Attended, Gait Training, Moist Heat/ Ice, Neuromuscular Re-ed, Orthotic Management and Training, Patient Education, Self Care, Therapeutic Activities, Therapeutic Exercise and Prosthetic Management.      Mindy Patino, PT, DPT

## 2022-10-03 ENCOUNTER — CLINICAL SUPPORT (OUTPATIENT)
Dept: REHABILITATION | Facility: HOSPITAL | Age: 82
End: 2022-10-03
Payer: MEDICARE

## 2022-10-03 DIAGNOSIS — M25.661 DECREASED RANGE OF MOTION OF BOTH LOWER EXTREMITIES: Primary | ICD-10-CM

## 2022-10-03 DIAGNOSIS — Z74.09 DECREASED STRENGTH, ENDURANCE, AND MOBILITY: ICD-10-CM

## 2022-10-03 DIAGNOSIS — R68.89 DECREASED STRENGTH, ENDURANCE, AND MOBILITY: ICD-10-CM

## 2022-10-03 DIAGNOSIS — M25.662 DECREASED RANGE OF MOTION OF BOTH LOWER EXTREMITIES: Primary | ICD-10-CM

## 2022-10-03 DIAGNOSIS — R53.1 DECREASED STRENGTH, ENDURANCE, AND MOBILITY: ICD-10-CM

## 2022-10-03 PROCEDURE — 97110 THERAPEUTIC EXERCISES: CPT | Mod: KX

## 2022-10-03 PROCEDURE — 97116 GAIT TRAINING THERAPY: CPT | Mod: KX

## 2022-10-03 NOTE — PROGRESS NOTES
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  He reports that he has been walking indoor at home and building up his endurance and confidence for outdoor walking again.   He was able to perform sit to stands at home without his hands.     Response to previous treatment: good with no adverse effects.    Functional change:  He reports that he is feeling stronger with more confidence with standing/walking and is able to stand for longer bouts at home.  He reports have increased confidence and comfort level with walking.  He is able to stand with only 1 upper extremity support with improved confidence and is able to perform sit to stand with no upper extremity support.      Pain: 0/10 at rest  Location:     OBJECTIVE     Objective Measures updated at progress report unless specified.  Re-Assessment for () minutes:    8/8/22  range of motion of bilateral knees measured with NuStep - Right = -20, Left = -23  Sitting with bilateral lower extremities over TBall: Right knee = -12, Left knee = -22  In supine: Right hip = -15 , Right knee = -30                   Left hip = -15, Left knee = -35  Functional Mobility: Transfers modified independent with walker and bed mobility with independence  Ambulation Re-Assessment  - ambulating 150 ft with walker with supervision to modified independence.   Supine: Right knee Extension = -25, Left knee extension = -30    9/19/22:   Patient presents with bruising around his Right eye, Left elbow abrasion and complaints of Left hip pain with palpation.  Skin inspection to Left hip and no redness or bruising noted.     9/30/22:  Sitting: Knee extension = -18 Right, -23 Left   Supine: Hip extension = -18 Right, -28 Left; Knee extension = -15 Right, -20 Left   Can now perform sit to stand transfer from raised seat surface (AirEx pad on chair seat) without upper extremity support  Hip flexion 4/5 bilateral, knee flexion 5/5, Right dorsiflexion 4- and Right plantarflexion 4+  Treatment     Hunter received the treatments listed below:      THERAPEUTIC EXERCISES to develop strength, endurance, ROM, flexibility, posture and core stabilization for (30) minutes including:  Intervention Performed Today    short arc quad in supine  lower extremities over wedge 2x20 Right AROM, Left 2x20 Active Assistive for form and quality    sidelying toward prone   Stretch to Hip flexor 3x30 seconds each side   Quad set   2x10 Left     Sidelying hip extension   2x10 bilaterally with 10 seconds endrange stretch on last rep   [place] Left Prosthetic leg  Performed at edge of mat    Sitting in reclined position at edge of mat   Hip flexor stretch 3x30 seconds bilateral    Hip Abduction  In sidelying 2x10 bilateral    Bridge attempt  1x5   Posterior Pelvic Tilt Supine  2x5   Prone lying over light blue small triangle wedge  Holding position 2 minutes, then 4 minutes   Stretch to Hamstrings  3 x 1 minute Right lower extremity    Heelslide with forceful push towards extension  2x10 bilateral with 10 second enrange hold stretch on last rep    10 # over distal Left thigh  Stretch to Left hip flexor for over 5 minutes while exercising Right lower extremity    Prone Hamstring curl endrange for quad/hip flexor stretch   2x10 AAROM   lower extremity lifts towards extension in prone  2x10 AAROM   Prone hip flexor stretch   3x10 second holds bilateral    Standing frame raising to patient's tolerance    - Standing 2x's in standing frame holding each stand for 10-15  minutes working on the following:  - Horizontal Abduction with bilateral upper extremities 2x10 AROM   - terminal knee extension in stand position 2x20 bilateral   - Rows holding red theraband bilateral  2x10  - Pushing through upper extremities to achieve trunk extension 2x25  - cross body punch with 2# dumbbell 1x10 bilateral   - scap retraction 2x10  - attempted 1x10 shoulder rolls backward - patient had difficulty coordinating  - Overhead press with green tband bilateral 1x10  - Patient given feedback on standing frame position - working on increasing seat angle after 30 second rest between lifts (repeated 3-4 times)  - Lateral reaches 1x8 bilateral   - [place] 1x20 hits working on upright trunk posture  - Pulling with bilateral upper extremities on tray at front to pull hips forward from seat and extend knees 2x25  - Reaching Left and Right with cones to target to stretch lower extremities with each reach 2x10 cones to each side incorporating reach posteriorly    Shuttle  - 1x15 bilateral leg press with 4 bands   -1x10 single leg press with 3 bands for each leg    NuStep  x 10 minutes at Level 4-5, working on alternating movement of lower extremities and stretch to bilateral knees (able to scoot seat position back to #15 to increase stretch in lower extremities)   Step ups to a 4 in step in the parallel bars  - 1x10 Left    Stairs  4 stairs with bilateral rails with Contact Guard Assist   Parallel Bars        - Standing heelcord stretch 3x10 seconds Right   - step ups to 4 in step 10x's bilateral   - Stepping over 4 short hurdles forward and backward 1x each   Supine   - Heel slides 1x10 bilateral with end range 10 second hold    Sitting edge of mat   - mini  lifts from raised surface, progressively lowering height after each set 3x5   - sit to stand from raised surface that progressively lowered after each successful stand 4x's    Stairs  - Step ups with Left lower extremity and down with right 4 steps with bilateral upper extremity support    Standing Frame  Standing 1 x in standing frame working on holding endrange position of approximately 55 degree seat angle for 5 minutes   Standing in parallel bars working sit to stands           - standing 2x's  - Standing weights shifts Left to Right 1x10  - Standing knee extension 1x10  - Standing Left upper extremity lifts off bar 1x5  - Standing bilateral upper extremity lifts off bar 1x10 with PT helping to hold hips for support with minimum to moderate assist   Nautilus leg extension  - 1x10 with 1 plate of resistance   - 1x10 with 2 plates of resistance   - then 1x10 lowering weight with eccentric quad control with 2 plates of resistance   Sitting on gray ball  - weight shifts Left and Right 1x10  - weight shifts forward/back 1x10  - single leg lifts 1x5 bilateral then 5 seconds hold 2x's bilateral   - lunge over forward leg 1x10 bilateral    Sitting long arc quad   - 1x10 bilateral with 5#  - 10 second end range hold wit 5# bilateral    Sitting to squat without upper extremity support from raised chair x - 2x5   Air Ex Beam X  X - side stepping 1x   - tandem walking 2x's      * In standing frame:  Sitting position = 0 degree seat angle  Full upright stand position with hips and knees at 0 degrees = 90 degree seat angle  3/15/22 - 1st  standing frame was at 42 degree seat angle, last stand position was at 65 degree seat angle  3/17/22 - 1st  standing frame was at 48 degree seat angle, last stand position was at 70 degree seat angle  3/21/22 - 1st  standing frame was at 52 degree seat angle, last stand position was at 70 degree seat angle  3/29/22 - 1st  standing frame was at 60 degree seat  angle, last stand position was at 72 degree seat angle  3/31/22 - 1st  standing frame was at 55 degree seat angle, last stand position was at 75 degree seat angle  4/7/22 - 1st  standing frame was at 60 degree seat angle, last stand position was at 80 degree seat angle  4/11/22 - 1st  standing frame was at 60 degree seat angle, last stand position was at 78 degree seat angle  4/14/22 - 1st  standing frame was at 55 degree seat angle, last stand position was at 86 degree seat angle  4/19/22 - 1st  standing frame was at 60 degree seat angle, last stand position was at 78 degree seat angle  4/22/22 - 1st  standing frame was at 65 degree seat angle, last stand position was at 81 degree seat angle  4/26/22 - 1st  standing frame was at 65 degree seat angle, last stand position was at 82 degree seat angle  5/3/22 - 1st  standing frame was at 65 degree seat angle, last stand position was at 88 degree seat angle  5/5/22 - 1st  standing frame was at 65 degree seat angle, last stand position was at 84 degree seat angle  5/10/22 - 1st  standing frame was at 60 degree seat angle, last stand position was at 80 degree seat angle  5/12/22 - 1st  standing frame was at 65 degree seat angle, last stand position was at 80 degree seat angle  5/17/22 - 1st  standing frame was at 62 degree seat angle, last stand position was at 82 degree seat angle  5/19/22 - 1st  standing frame was at 60 degree seat angle, last stand position was at 80 degree seat angle  5/24/22 - 1st  standing frame was at 62 degree seat angle, last stand position was at 82 degree seat angle  5/26/22 - 1st  standing frame was at 60 degree seat angle, last stand position was at 80 degree seat angle   5/31/22 - 1st  standing frame was at 65 degree seat angle, last stand position was at 80 degree seat angle  6/2/22 - 1st  standing frame  was at 70 degree seat angle, last stand position was at 82 degree seat angle  6/7/22 - 1st  standing frame was at 68 degree seat angle, last stand position was at 80 degree seat angle  6/10/22 - 1st  standing frame was at 60 degree seat angle, last stand position was at 80 degree seat angle  6/14/22 - 1st  standing frame was at 65 degree seat angle, last stand position was at 82 degree seat angle   6/16/22 - 1st  standing frame was at 70 degree seat angle, last stand position was at 84 degree seat angle  6/21/22 - 1st  standing frame was at 65 degree seat angle, last stand position was at 80 degree seat angle  Plan for Next Visit:      THERAPEUTIC ACTIVITIES to improve dynamic and functional  performance for () minutes including:    Intervention Performed Today    Bed - wheelchair transfer   Practiced set up and sequence   Bed mobility sit to supine to sit  Practiced sequence    Rolling   3 x each side working on sequence to incorporate hip flexor stretch on each side. Then separate stretch performed as listed above   Standing in parallel bars  4 x's working on several weight shifts towards hip extension and knee extension while standing.   Standing in parallel bars performing step forward  With Right lower extremity 5x's with moderate assistance to maintain standing position   [facility] to discuss fit     Sit to  parallel bars without assistance  2x's   Dynasplint Consult  Andriy Stout (Rep for Dynasplint) met with physical therapist and patient today during session to discuss options for dynasplints for bilateral knees due to patient's significant lack of range of motion.     Dynasplint Fitting  Andriy Stout (Rep for Dynasplint) met with physical therapist and patient today to deliver and fit patient for bilateral knee dynasplints.  We discussed wearing time of 1-2 hours/day for the first week and then we would re-assess.  Patient was  shown how to don each brace and straps were marked for future use.  The Dynasplint force was increased to 6 on the Left and 7 on the Right to start.  Extra padding was added to thin straps for skin protection.   Dynasplint Consult  Andriy Stout (Rep for Dynasplint) met with physical therapist and patient today during session to discuss proper fit and positioning of Dynasplint to feel a better stretch at home. Pt only brought Left splint to session today and his splint was donned, straps were tightened and new black line added for patient to follow at home.  Left splint was increased from 7/13 to 9/13 tension.  He reports that he places a pillow under his knee at home and he was educated on better positioning of pillow under distal stump with prosthesis removed.  He also felt more of a stretch in long sitting vs supine and was asked to try this position at home for part of the stretch if able.    Standing at sink in Neuro Room working on stand posture and positioning   Simulating activity that he has been given to do for homework. 1x5 seconds, 1x10 seconds   Patient educated addition to Home Exercise Program   - Simulated sit to stand setup  From wheelchair to sink at home performing with walker in front to work on letting go of sink and holding on to walker.  Patient was educated on how to sit safely to prevent wheelchair from tipping backwards if he loses balance.  He was told to start with one hand on walker and one hand on counter top until he can progress to both hands on walker for support. This will be a way that he can safely challenge himself at home. Patient practiced standing to walker 3x's working on sequence and safety of task.   Patient expressed concerns about not knowing his current weight, so PT assisted patient with obtaining weight at start of session with wheelchair scale.   He currently weighs 139 #   Several transfers and sit to stands to adjust walker height as needed  Contact guard to stand by  assist for each   Several sit to stand and wheelchair to machine transfers with and without walker performed throughout session  With independence.    Discussed Left prosthesis sleeve   - It is currently to large/long and often curls.  Patient shared number of company as he has been unsuccessful with getting rep to deliver new one.    Left Prosthesis adjusted to turn foot to neutral, standing and stepping between each adjustment to correct position of leg  2 adjustments.    Discussed full below knee amputation program to work on at home in preparation for discharge  Patient verbalized good understanding of program   Skin inspection of Left hip   - see objective session at start of note   Bed mobility and transfer    - Patient transferred 6x's during session with independence and no increased complaints of pain  - he transitioned through sit to supine, rolling to Right and supine to sit with independence    Patient education  - Patient discussed fall from last week, PT recommended that he always keep cell phone on his person in case this happens again.  He was also educated that if the pain persists, he may need to follow up with MD for an X-ray.    Transfer from wheelchair to chair seat to mat then back to chair and back to wheelchair  Supervision to modified independence   Bed mobility sit to supine to sit   Independent    [place]/donning prosthetic Left lower extremity with [place]  For measuring Left lower extremity    Patient education on results of re-assessments, importance of continuing proper stretching technique at home for hips and knees, practiced set-up. Also discussed increasing activity level throughout the day to spend less time in wheelchair and more time walking and sitting on other surfaces in his home.  Patient verbalized good understanding.      Plan for Next Visit:      Gait Training: for (14) minutes:  - Ambulated 150ft, 50 ft with walker with stand by assist to supervision on flat floor  and turf surfaces for each walk.   - Parallel bars: side stepping 1x each direction, backward walking 1x, forward walking with minimal upper extremity support 1x    Patient Education and Home Exercises     Home Exercises Provided and Patient Education Provided     Education provided:   -2/22/22 Patient educated on increasing length and consistency of stretching knees and hips to every 2 hours at home.  He was shown and demonstrated understanding of short arc quads, rolling toward prone and sitting in reclined position to stretch hip flexors.  -2/24/22 Patient given Home Exercise Program on exercises for lower extremities to be performed 2x15 3x's/day  See patient Instructions in EMR  -3/1/22 Re-inforced Home Exercise Program and asked patient to incorporate prone lying for longer periods of time to build up to 20 minutes per day.  3/3/22 - Discussed the option of dynasplints for bilateral lower extremities. Patient educated on the option of Dynasplints vs basic knee braces and encouraged to consider Dynasplints as a more aggressive and efficient way of properly positioning and dynamically stretching his legs outside of therapy.  3/8/22 - Patient was educated on Dynasplints with Andriy Stout (Rep).   He was shown pictures on ipad and Andriy and physical therapist discussed wearing schedule to make gains with his muscle length outside of therapy sessions.  Andriy informed patient that he would assist with collecting information regarding insurance coverage and provide him with any out of pocket costs so he can decide if he would like to pursue this option.  3/22/22 - Patient was educated that Andriy Stout (Rep for Dynasplint) messaged me that his splints have been approved and are being mailed this week.   3/24/22 - Patient was educated on Dynasplint management and importance of daily skin checks.  His tolerance of wearing splints for the first week will determine his wearing schedule for the following weeks.  3/29/22 -  Patient was educated on only wearing Dynasplints while lying down to get the most effective stretch to lower extremities.  3/31/22 - Patient was asked to reach out to Dynasplint rep (Andriy) to set up a time for Andriy to re-assess the positioning and fit of splints to see if he can feel more of a stretch with wearing schedule.  4/7/22 - Patient was asked to reach out to Dynasplint rep to make sure that splints are adjusted appropriately.  4/11/22 - PT recommended that patient follow up with Dynasplint rep for reassessment of splint fit.  4/14/22 - Patient informed that PT would reach out to Dynasplint rep again to have him schedule a visit.  He was encouraged to continue being active at home.  4/18/22 -  Patient asked to contact Dynasplint rep to set up a time for a consult that would work for his schedule.  4/28/22 - Patient educated on wearing Dynaspints for 2 hours/day for the next 4 days and then we will assess his ability to increase to 4 hours/day after next visit.  He verbalized good understanding of education from Andriy (Dynasplint) and was told to try Right splint on in new wear position first before increasing tension to 9/13.    5/3/22 - Patient educated on attempting standing at home at sink holding stand position for 10-15 seconds repeating 3x's and doing this in the morning and afternoon every day.   5/12/22 - Patient educated on the importance of walking with walker each session moving forward to try to get range applied to a functional task.  5/26/22 - Patient educated on adding walker to sit to stands at sink as a way of increasing his stand balance safely at home alone.  6/7/22 - Patient educated on his gains at this point and that PT is requesting more visits for a renewed Plan of Care.   6/10/22 - Patient educated on progressing to standing marches with walker at home and standing lateral foot taps with walker at home in same set up as before with kitchen counter in front and wheelchair behind for  safety.  6/21/22 - Patient educated on stair mobility sequence.   6/23/22 - Patient educated on the risk of falls with walking home alone at this time.   7/28/22 - Patient educated on safety with gait at home and advancing gait challenges in therapy.  8/8/22 - Patient educated on proper technique of side lying hip extension and that Plan of Care would be extended for another month in preparation for discharge.   8/12/22 - Discussed contacting prosthetic company to replace sleeve as it is too large and long.   9/2/22 - Discussed full below knee amputation home program in preparation for discharge.    9/19/22 - Patient educated on fall safety (see therapeutic activity section)  9/30/22 - See above in therapeutic activity section    Home Exercise Provided:  Exercises were reviewed and Hunter was able to verbalize good understanding prior to the end of the session.  Hunter demonstrated good  understanding of the education provided.   ASSESSMENT     Patient responded well to session today with good effort, participation and endurance.  He is demonstrating improved confidence with standing and transfers.  He would continue to benefit from working through standing balance challenges to better anticipated loss of balance.      Pt prognosis is Fair  To Good    Pt will continue to benefit from skilled outpatient physical therapy to address the deficits listed in the problem list box on initial evaluation, provide pt/family education and to maximize pt's level of independence in the home and community environment.     Pt's spiritual, cultural and educational needs considered and pt agreeable to plan of care and goals.     Anticipated Barriers for therapy: co-morbidities, transportation assistance needed at times, lifestyle, potential contractures of knees/hips    GOALS:     Short Term Goals:  4 weeks Progress    Function: Patient will demonstrate improved function as indicated by a functional limitation score of less than or  equal to 55 out of 100 on FOTO = 44% limitation PC   Mobility: Patient will improve sit to stand transfer with moderate assistance in order to progress towards independence with functional activities. (Met)  (Modified) - Patient will perform wheelchair to mat transfer with stand by assist to demonstrate improved independence with mobility. Met   Gait: Patient will ambulated 3 steps in parallel bars with Maximum assistance to demonstrate improved strength, posture and endurance met   HEP: Patient will demonstrate independence with HEP in order to progress toward functional independence. met    Assess patient's needs for Dynasplints and set up consultation if needed. met       Long Term Goals:  8 weeks Progress   Function: Patient will demonstrate improved function as indicated by a functional limitation score of less than or equal to 60 out of 100 on FOTO = 40 % limitation PC   Mobility: Patient will improve AROM of bilateral knees to -10 degrees  in order to return to maximal functional potential and improve quality of life. PC   Functional Mobility - Patient will perform mat to wheelchair transfer with modified independence with walker in order to improve his independence with functional mobility. Met   Gait: Patient will ambulate 15 feet with rolling walker with moderate assistance to demonstrate improved strength, endurance and mobility. (Met)   (Modified) - Patient will ambulate 100 ft with walker incorporating turns with modified independence to negotiate home environment with independence. Met   HEP: Patient educated on HEP in preparation for d/c verbalizing and demonstrating good understanding of topics discussed.    Met            Goals Key:  PC= progressing/continue;        PM= partially met;             DC= discontinue         PLAN     Continue Plan of Care (POC) and progress per patient tolerance. See Treatment section for exercise progression.  Outpatient Physical Therapy 2 times weekly for 8 weeks to  include the following interventions: Electrical Stimulation Attended, Gait Training, Moist Heat/ Ice, Neuromuscular Re-ed, Orthotic Management and Training, Patient Education, Self Care, Therapeutic Activities, Therapeutic Exercise and Prosthetic Management.      Mindy Patino, PT, DPT

## 2022-10-07 ENCOUNTER — CLINICAL SUPPORT (OUTPATIENT)
Dept: REHABILITATION | Facility: HOSPITAL | Age: 82
End: 2022-10-07
Payer: MEDICARE

## 2022-10-07 DIAGNOSIS — Z74.09 DECREASED STRENGTH, ENDURANCE, AND MOBILITY: ICD-10-CM

## 2022-10-07 DIAGNOSIS — R53.1 DECREASED STRENGTH, ENDURANCE, AND MOBILITY: ICD-10-CM

## 2022-10-07 DIAGNOSIS — R68.89 DECREASED STRENGTH, ENDURANCE, AND MOBILITY: ICD-10-CM

## 2022-10-07 DIAGNOSIS — M25.661 DECREASED RANGE OF MOTION OF BOTH LOWER EXTREMITIES: Primary | ICD-10-CM

## 2022-10-07 DIAGNOSIS — M25.662 DECREASED RANGE OF MOTION OF BOTH LOWER EXTREMITIES: Primary | ICD-10-CM

## 2022-10-07 PROCEDURE — 97110 THERAPEUTIC EXERCISES: CPT | Mod: KX

## 2022-10-07 PROCEDURE — 97116 GAIT TRAINING THERAPY: CPT | Mod: KX

## 2022-10-07 NOTE — PROGRESS NOTES
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  He reports that he has been walking indoor at home and building up his endurance and confidence for outdoor walking again.   He was able to perform sit to stands at home without his hands. He has been consistently stretching his knees towards extension at home.     Response to previous treatment: good with no adverse effects.    Functional change:  He reports that he is feeling stronger with more confidence with standing/walking and is able to stand for longer bouts at home.  He reports have increased confidence and comfort level with walking.  He is able to stand with only 1 upper extremity support with improved confidence and is able to perform sit to stand with no upper extremity support.      Pain: 0/10 at rest  Location:     OBJECTIVE     Objective Measures updated at progress report unless specified.  Re-Assessment for () minutes:    8/8/22  range of motion of bilateral knees measured with NuStep - Right = -20, Left = -23  Sitting with bilateral lower extremities over TBall: Right knee = -12, Left knee = -22  In supine: Right hip = -15 , Right knee = -30                   Left hip = -15, Left knee = -35  Functional Mobility: Transfers modified independent  with walker and bed mobility with independence  Ambulation Re-Assessment - ambulating 150 ft with walker with supervision to modified independence.   Supine: Right knee Extension = -25, Left knee extension = -30    9/19/22:   Patient presents with bruising around his Right eye, Left elbow abrasion and complaints of Left hip pain with palpation.  Skin inspection to Left hip and no redness or bruising noted.     9/30/22:  Sitting: Knee extension = -18 Right, -23 Left   Supine: Hip extension = -18 Right, -28 Left; Knee extension = -15 Right, -20 Left   Can now perform sit to stand transfer from raised seat surface (AirEx pad on chair seat) without upper extremity support  Hip flexion 4/5 bilateral, knee flexion 5/5, Right dorsiflexion 4- and Right plantarflexion 4+  Treatment     Hunter received the treatments listed below:      THERAPEUTIC EXERCISES to develop strength, endurance, ROM, flexibility, posture and core stabilization for (34) minutes including:  Intervention Performed Today    short arc quad in supine  lower extremities over wedge 2x20 Right AROM, Left 2x20 Active Assistive for form and quality    sidelying toward prone   Stretch to Hip flexor 3x30 seconds each side   Quad set   2x10 Left     Sidelying hip extension   2x10 bilaterally with 10 seconds endrange stretch on last rep   [place] Left Prosthetic leg  Performed at edge of mat    Sitting in reclined position at edge of mat   Hip flexor stretch 3x30 seconds bilateral    Hip Abduction  In sidelying 2x10 bilateral    Bridge attempt  1x5   Posterior Pelvic Tilt Supine  2x5   Prone lying over light blue small triangle wedge  Holding position 2 minutes, then 4 minutes   Stretch to Hamstrings  3 x 1 minute Right lower extremity    Heelslide with forceful push towards extension  2x10 bilateral with 10 second enrange hold stretch on last rep    10 # over distal Left thigh  Stretch to Left hip flexor for over 5 minutes while exercising Right lower  extremity    Prone Hamstring curl endrange for quad/hip flexor stretch  2x10 AAROM   lower extremity lifts towards extension in prone  2x10 AAROM   Prone hip flexor stretch   3x10 second holds bilateral    Standing frame raising to patient's tolerance    - Standing 2x's in standing frame holding each stand for 10-15  minutes working on the following:  - Horizontal Abduction with bilateral upper extremities 2x10 AROM   - terminal knee extension in stand position 2x20 bilateral   - Rows holding red theraband bilateral  2x10  - Pushing through upper extremities to achieve trunk extension 2x25  - cross body punch with 2# dumbbell 1x10 bilateral   - scap retraction 2x10  - attempted 1x10 shoulder rolls backward - patient had difficulty coordinating  - Overhead press with green tband bilateral 1x10  - Patient given feedback on standing frame position - working on increasing seat angle after 30 second rest between lifts (repeated 3-4 times)  - Lateral reaches 1x8 bilateral   - Beach Ball Volley 1x20 hits working on upright trunk posture  - Pulling with bilateral upper extremities on tray at front to pull hips forward from seat and extend knees 2x25  - Reaching Left and Right with cones to target to stretch lower extremities with each reach 2x10 cones to each side incorporating reach posteriorly    Shuttle  - 3x10 bilateral leg press with 4 bands   -1x10 single leg press with 3 bands for each leg    NuStep  x 10 minutes at Level 4-5, working on alternating movement of lower extremities and stretch to bilateral knees (able to scoot seat position back to #15 to increase stretch in lower extremities)   Step ups to a 4 in step in the parallel bars  - 1x10 Left    Stairs  4 stairs with bilateral rails with Contact Guard Assist   Parallel Bars        - Standing heelcord stretch 3x10 seconds Right   - step ups to 4 in step 10x's bilateral   - Stepping over 4 short hurdles forward and backward 1x each   Supine   - Heel slides 1x10  "bilateral with end range 10 second hold    Sitting edge of mat   - mini lifts from raised surface, progressively lowering height after each set 3x5   - sit to stand from raised surface that progressively lowered after each successful stand 4x's    Stairs  - Step ups with Left lower extremity and down with right 4 steps with bilateral upper extremity support    Standing Frame  Standing 1 x in standing frame working on holding endrange position of approximately 55 degree seat angle for 5 minutes   Standing in parallel bars working sit to stands           - standing 2x's  - Standing weights shifts Left to Right 1x10  - Standing knee extension 1x10  - Standing Left upper extremity lifts off bar 1x5  - Standing bilateral upper extremity lifts off bar 1x10 with PT helping to hold hips for support with minimum to moderate assist   Nautilus leg extension X  X - 2x10 with 1 plate of resistance   - 1x5 with 2 plates of resistance   - then 1x10 lowering weight with eccentric quad control with 2 plates of resistance   Sitting on gray ball  - weight shifts Left and Right 1x10  - weight shifts forward/back 1x10  - single leg lifts 1x5 bilateral then 5 seconds hold 2x's bilateral   - lunge over forward leg 1x10 bilateral    Sitting long arc quad   - 1x10 bilateral with 5#  - 10 second end range hold wit 5# bilateral    Sitting to squat without upper extremity support  X  X - 1x5 from 24" box  - 1x5 from 20" box   Air Ex Beam   X - side stepping 1x   - tandem walking x's      * In standing frame:  Sitting position = 0 degree seat angle  Full upright stand position with hips and knees at 0 degrees = 90 degree seat angle  3/15/22 - 1st  standing frame was at 42 degree seat angle, last stand position was at 65 degree seat angle  3/17/22 - 1st  standing frame was at 48 degree seat angle, last stand position was at 70 degree seat angle  3/21/22 - 1st  standing frame was at 52 degree seat angle, last stand " position was at 70 degree seat angle  3/29/22 - 1st  standing frame was at 60 degree seat angle, last stand position was at 72 degree seat angle  3/31/22 - 1st  standing frame was at 55 degree seat angle, last stand position was at 75 degree seat angle  4/7/22 - 1st  standing frame was at 60 degree seat angle, last stand position was at 80 degree seat angle  4/11/22 - 1st  standing frame was at 60 degree seat angle, last stand position was at 78 degree seat angle  4/14/22 - 1st  standing frame was at 55 degree seat angle, last stand position was at 86 degree seat angle  4/19/22 - 1st  standing frame was at 60 degree seat angle, last stand position was at 78 degree seat angle  4/22/22 - 1st  standing frame was at 65 degree seat angle, last stand position was at 81 degree seat angle  4/26/22 - 1st  standing frame was at 65 degree seat angle, last stand position was at 82 degree seat angle  5/3/22 - 1st  standing frame was at 65 degree seat angle, last stand position was at 88 degree seat angle  5/5/22 - 1st  standing frame was at 65 degree seat angle, last stand position was at 84 degree seat angle  5/10/22 - 1st  standing frame was at 60 degree seat angle, last stand position was at 80 degree seat angle  5/12/22 - 1st  standing frame was at 65 degree seat angle, last stand position was at 80 degree seat angle  5/17/22 - 1st  standing frame was at 62 degree seat angle, last stand position was at 82 degree seat angle  5/19/22 - 1st  standing frame was at 60 degree seat angle, last stand position was at 80 degree seat angle  5/24/22 - 1st  standing frame was at 62 degree seat angle, last stand position was at 82 degree seat angle  5/26/22 - 1st  standing frame was at 60 degree seat angle, last stand position was at 80 degree seat angle   5/31/22 - 1st  standing frame was at 65 degree  seat angle, last stand position was at 80 degree seat angle  6/2/22 - 1st  standing frame was at 70 degree seat angle, last stand position was at 82 degree seat angle  6/7/22 - 1st  standing frame was at 68 degree seat angle, last stand position was at 80 degree seat angle  6/10/22 - 1st  standing frame was at 60 degree seat angle, last stand position was at 80 degree seat angle  6/14/22 - 1st  standing frame was at 65 degree seat angle, last stand position was at 82 degree seat angle   6/16/22 - 1st  standing frame was at 70 degree seat angle, last stand position was at 84 degree seat angle  6/21/22 - 1st  standing frame was at 65 degree seat angle, last stand position was at 80 degree seat angle  Plan for Next Visit:      THERAPEUTIC ACTIVITIES to improve dynamic and functional  performance for () minutes including:    Intervention Performed Today    Bed - wheelchair transfer   Practiced set up and sequence   Bed mobility sit to supine to sit  Practiced sequence    Rolling   3 x each side working on sequence to incorporate hip flexor stretch on each side. Then separate stretch performed as listed above   Standing in parallel bars  4 x's working on several weight shifts towards hip extension and knee extension while standing.   Standing in parallel bars performing step forward  With Right lower extremity 5x's with moderate assistance to maintain standing position   Dynasplint Consult via [facility] with Andriy to discuss fit     Sit to  parallel bars without assistance  2x's   Dynasplint Consult  Andriy Stout (Rep for Dynasplint) met with physical therapist and patient today during session to discuss options for dynasplints for bilateral knees due to patient's significant lack of range of motion.     Dynasplint Fitting  Andriy Stout (Rep for Dynasplint) met with physical therapist and patient today to deliver and fit patient for bilateral knee dynasplints.  We  discussed wearing time of 1-2 hours/day for the first week and then we would re-assess.  Patient was shown how to don each brace and straps were marked for future use.  The Dynasplint force was increased to 6 on the Left and 7 on the Right to start.  Extra padding was added to thin straps for skin protection.   Dynasplint Consult  Andriy Stout (Rep for Dynasplint) met with physical therapist and patient today during session to discuss proper fit and positioning of Dynasplint to feel a better stretch at home. Pt only brought Left splint to session today and his splint was donned, straps were tightened and new black line added for patient to follow at home.  Left splint was increased from 7/13 to 9/13 tension.  He reports that he places a pillow under his knee at home and he was educated on better positioning of pillow under distal stump with prosthesis removed.  He also felt more of a stretch in long sitting vs supine and was asked to try this position at home for part of the stretch if able.    Standing at sink in Neuro Room working on stand posture and positioning   Simulating activity that he has been given to do for homework. 1x5 seconds, 1x10 seconds   Patient educated addition to Home Exercise Program   - Simulated sit to stand setup  From wheelchair to sink at home performing with walker in front to work on letting go of sink and holding on to walker.  Patient was educated on how to sit safely to prevent wheelchair from tipping backwards if he loses balance.  He was told to start with one hand on walker and one hand on counter top until he can progress to both hands on walker for support. This will be a way that he can safely challenge himself at home. Patient practiced standing to walker 3x's working on sequence and safety of task.   Patient expressed concerns about not knowing his current weight, so PT assisted patient with obtaining weight at start of session with wheelchair scale.   He currently weighs 139  #   Several transfers and sit to stands to adjust walker height as needed  Contact guard to stand by assist for each   Several sit to stand and wheelchair to machine transfers with and without walker performed throughout session  With independence.    Discussed Left prosthesis sleeve   - It is currently to large/long and often curls.  Patient shared number of company as he has been unsuccessful with getting rep to deliver new one.    Left Prosthesis adjusted to turn foot to neutral, standing and stepping between each adjustment to correct position of leg  2 adjustments.    Discussed full below knee amputation program to work on at home in preparation for discharge  Patient verbalized good understanding of program   Skin inspection of Left hip   - see objective session at start of note   Bed mobility and transfer    - Patient transferred 6x's during session with independence and no increased complaints of pain  - he transitioned through sit to supine, rolling to Right and supine to sit with independence    Patient education  - Patient discussed fall from last week, PT recommended that he always keep cell phone on his person in case this happens again.  He was also educated that if the pain persists, he may need to follow up with MD for an X-ray.    Transfer from wheelchair to chair seat to mat then back to chair and back to wheelchair  Supervision to modified independence   Bed mobility sit to supine to sit   Independent    [place]/donning prosthetic Left lower extremity with [place]  For measuring Left lower extremity    Patient education on results of re-assessments, importance of continuing proper stretching technique at home for hips and knees, practiced set-up. Also discussed increasing activity level throughout the day to spend less time in wheelchair and more time walking and sitting on other surfaces in his home.  Patient verbalized good understanding.      Plan for Next Visit:      Gait Training: for  (14) minutes:  - Ambulated 150ft, 50 ft with walker with stand by assist to supervision on flat floor and turf surfaces for each walk.   - Parallel bars: side stepping 1x each direction, backward walking 1x, forward walking with minimal upper extremity support 1x    Patient Education and Home Exercises     Home Exercises Provided and Patient Education Provided     Education provided:   -2/22/22 Patient educated on increasing length and consistency of stretching knees and hips to every 2 hours at home.  He was shown and demonstrated understanding of short arc quads, rolling toward prone and sitting in reclined position to stretch hip flexors.  -2/24/22 Patient given Home Exercise Program on exercises for lower extremities to be performed 2x15 3x's/day  See patient Instructions in EMR  -3/1/22 Re-inforced Home Exercise Program and asked patient to incorporate prone lying for longer periods of time to build up to 20 minutes per day.  3/3/22 - Discussed the option of dynasplints for bilateral lower extremities. Patient educated on the option of Dynasplints vs basic knee braces and encouraged to consider Dynasplints as a more aggressive and efficient way of properly positioning and dynamically stretching his legs outside of therapy.  3/8/22 - Patient was educated on Dynasplints with Andriy Stout (Rep).   He was shown pictures on ipad and Andriy and physical therapist discussed wearing schedule to make gains with his muscle length outside of therapy sessions.  Andriy informed patient that he would assist with collecting information regarding insurance coverage and provide him with any out of pocket costs so he can decide if he would like to pursue this option.  3/22/22 - Patient was educated that Andriy Stout (Rep for Dynasplint) messaged me that his splints have been approved and are being mailed this week.   3/24/22 - Patient was educated on Dynasplint management and importance of daily skin checks.  His tolerance of wearing  splints for the first week will determine his wearing schedule for the following weeks.  3/29/22 - Patient was educated on only wearing Dynasplints while lying down to get the most effective stretch to lower extremities.  3/31/22 - Patient was asked to reach out to Dynasplint rep (Andriy) to set up a time for Andriy to re-assess the positioning and fit of splints to see if he can feel more of a stretch with wearing schedule.  4/7/22 - Patient was asked to reach out to Dynasplint rep to make sure that splints are adjusted appropriately.  4/11/22 - PT [facility] fit.  4/14/22 - Patient informed that [facility] have him schedule a [facility].  He was encouraged to continue being active at home.  4/18/22 -  [facility] to [facility] a time for a consult that would work for his schedule.  4/28/22 - Patient educated on wearing Dynaspints for 2 hours/day for the next 4 days and then we will assess his ability to increase to 4 hours/day after next visit.  He verbalized good understanding of education from Andriy (Dynasplint) and was told to try Right splint on in new wear position first before increasing tension to 9/13.    5/3/22 - Patient educated on attempting standing at home at sink holding stand position for 10-15 seconds repeating 3x's and doing this in the morning and afternoon every day.   5/12/22 - Patient educated on the importance of walking with walker each session moving forward to try to get range applied to a functional task.  5/26/22 - Patient educated on adding walker to sit to stands at sink as a way of increasing his stand balance safely at home alone.  6/7/22 - Patient educated on his gains at this point and that PT is requesting more visits for a renewed Plan of Care.   6/10/22 - Patient educated on progressing to standing marches with walker at home and standing lateral foot taps with  walker at home in same set up as before with kitchen counter in front and wheelchair behind for safety.  6/21/22 - Patient educated on stair mobility sequence.   6/23/22 - Patient educated on the risk of falls with walking home alone at this time.   7/28/22 - Patient educated on safety with gait at home and advancing gait challenges in therapy.  8/8/22 - Patient educated on proper technique of side lying hip extension and that Plan of Care would be extended for another month in preparation for discharge.   8/12/22 - Discussed contacting prosthetic company to replace sleeve as it is too large and long.   9/2/22 - Discussed full below knee amputation home program in preparation for discharge.    9/19/22 - Patient educated on fall safety (see therapeutic activity section)  9/30/22 - See above in therapeutic activity section    Home Exercise Provided:  Exercises were reviewed and Hunter was able to verbalize good understanding prior to the end of the session.  Hunter demonstrated good  understanding of the education provided.   ASSESSMENT     Patient responded well to session today with good effort, participation and endurance.  He is demonstrating improved confidence with standing and transfers.  He demonstrated improved Left prosthetic foot contact with gait with increased Left knee extension with stance phase.  He would continue to benefit from working through standing balance challenges to better anticipated loss of balance.      Pt prognosis is Fair  To Good    Pt will continue to benefit from skilled outpatient physical therapy to address the deficits listed in the problem list box on initial evaluation, provide pt/family education and to maximize pt's level of independence in the home and community environment.     Pt's spiritual, cultural and educational needs considered and pt agreeable to plan of care and goals.     Anticipated Barriers for therapy: co-morbidities, transportation assistance needed at times,  lifestyle, potential contractures of knees/hips    GOALS:     Short Term Goals:  4 weeks Progress    Function: Patient will demonstrate improved function as indicated by a functional limitation score of less than or equal to 55 out of 100 on FOTO = 44% limitation PC   Mobility: Patient will improve sit to stand transfer with moderate assistance in order to progress towards independence with functional activities. (Met)  (Modified) - Patient will perform wheelchair to mat transfer with stand by assist to demonstrate improved independence with mobility. Met   Gait: Patient will ambulated 3 steps in parallel bars with Maximum assistance to demonstrate improved strength, posture and endurance met   HEP: Patient will demonstrate independence with HEP in order to progress toward functional independence. met    Assess patient's needs for Dynasplints and set up consultation if needed. met       Long Term Goals:  8 weeks Progress   Function: Patient will demonstrate improved function as indicated by a functional limitation score of less than or equal to 60 out of 100 on FOTO = 40 % limitation PC   Mobility: Patient will improve AROM of bilateral knees to -10 degrees  in order to return to maximal functional potential and improve quality of life. PC   Functional Mobility - Patient will perform mat to wheelchair transfer with modified independence with walker in order to improve his independence with functional mobility. Met   Gait: Patient will ambulate 15 feet with rolling walker with moderate assistance to demonstrate improved strength, endurance and mobility. (Met)   (Modified) - Patient will ambulate 100 ft with walker incorporating turns with modified independence to negotiate home environment with independence. Met   HEP: Patient educated on HEP in preparation for d/c verbalizing and demonstrating good understanding of topics discussed.    Met            Goals Key:  PC= progressing/continue;        PM= partially met;              DC= discontinue         PLAN     Continue Plan of Care (POC) and progress per patient tolerance. See Treatment section for exercise progression.  Outpatient Physical Therapy 2 times weekly for 8 weeks to include the following interventions: Electrical Stimulation Attended, Gait Training, Moist Heat/ Ice, Neuromuscular Re-ed, Orthotic Management and Training, Patient Education, Self Care, Therapeutic Activities, Therapeutic Exercise and Prosthetic Management.      Mindy Patino, PT, DPT

## 2022-10-10 ENCOUNTER — CLINICAL SUPPORT (OUTPATIENT)
Dept: REHABILITATION | Facility: HOSPITAL | Age: 82
End: 2022-10-10
Payer: MEDICARE

## 2022-10-10 DIAGNOSIS — M25.661 DECREASED RANGE OF MOTION OF BOTH LOWER EXTREMITIES: Primary | ICD-10-CM

## 2022-10-10 DIAGNOSIS — R68.89 DECREASED STRENGTH, ENDURANCE, AND MOBILITY: ICD-10-CM

## 2022-10-10 DIAGNOSIS — R53.1 DECREASED STRENGTH, ENDURANCE, AND MOBILITY: ICD-10-CM

## 2022-10-10 DIAGNOSIS — M25.662 DECREASED RANGE OF MOTION OF BOTH LOWER EXTREMITIES: Primary | ICD-10-CM

## 2022-10-10 DIAGNOSIS — Z74.09 DECREASED STRENGTH, ENDURANCE, AND MOBILITY: ICD-10-CM

## 2022-10-10 PROCEDURE — 97116 GAIT TRAINING THERAPY: CPT | Mod: KX

## 2022-10-10 PROCEDURE — 97110 THERAPEUTIC EXERCISES: CPT | Mod: KX

## 2022-10-10 NOTE — PROGRESS NOTES
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  He reports that he has been walking indoor at home and building up his endurance and confidence for outdoor walking again.   He was able to perform sit to stands at home without his hands. He has been consistently stretching his knees towards extension at home.     Response to previous treatment: good with no adverse effects.    Functional change:  He reports that he is feeling stronger with more confidence with standing/walking and is able to stand for longer bouts at home.  He reports have increased confidence and comfort level with walking.  He is able to stand with only 1 upper extremity support with improved confidence and is able to perform sit to stand with no upper extremity support.      Pain: 0/10 at rest  Location: not applicable     OBJECTIVE     Objective Measures updated at progress report unless specified.  Re-Assessment for () minutes:    8/8/22  range of motion of bilateral knees measured with NuStep - Right = -20, Left = -23  Sitting with bilateral lower extremities over TBall: Right knee = -12, Left knee = -22  In supine: Right hip = -15 , Right knee = -30                   Left hip = -15, Left knee = -35  Functional Mobility: Transfers  modified independent with walker and bed mobility with independence  Ambulation Re-Assessment - ambulating 150 ft with walker with supervision to modified independence.   Supine: Right knee Extension = -25, Left knee extension = -30    9/19/22:   Patient presents with bruising around his Right eye, Left elbow abrasion and complaints of Left hip pain with palpation.  Skin inspection to Left hip and no redness or bruising noted.     9/30/22:  Sitting: Knee extension = -18 Right, -23 Left   Supine: Hip extension = -18 Right, -28 Left; Knee extension = -15 Right, -20 Left   Can now perform sit to stand transfer from raised seat surface (AirEx pad on chair seat) without upper extremity support  Hip flexion 4/5 bilateral, knee flexion 5/5, Right dorsiflexion 4- and Right plantarflexion 4+  Treatment     Hunter received the treatments listed below:      THERAPEUTIC EXERCISES to develop strength, endurance, ROM, flexibility, posture and core stabilization for (34) minutes including:  Intervention Performed Today    short arc quad in supine  lower extremities over wedge 2x20 Right AROM, Left 2x20 Active Assistive for form and quality    sidelying toward prone   Stretch to Hip flexor 3x30 seconds each side   Quad set   2x10 Left     Sidelying hip extension   2x10 bilaterally with 10 seconds endrange stretch on last rep   Vilas and Donning Left Prosthetic leg  Performed at edge of mat    Sitting in reclined position at edge of mat   Hip flexor stretch 3x30 seconds bilateral    Hip Abduction  In sidelying 2x10 bilateral    Bridge attempt  1x5   Posterior Pelvic Tilt Supine  2x5   Prone lying over light blue small triangle wedge  Holding position 2 minutes, then 4 minutes   Stretch to Hamstrings  3 x 1 minute Right lower extremity    Heelslide with forceful push towards extension  2x10 bilateral with 10 second enrange hold stretch on last rep    10 # over distal Left thigh  Stretch to Left hip flexor for over 5 minutes while  exercising Right lower extremity    Prone Hamstring curl endrange for quad/hip flexor stretch  2x10 AAROM   lower extremity lifts towards extension in prone  2x10 AAROM   Prone hip flexor stretch   3x10 second holds bilateral    Standing frame raising to patient's tolerance    - Standing 2x's in standing frame holding each stand for 10-15  minutes working on the following:  - Horizontal Abduction with bilateral upper extremities 2x10 AROM   - terminal knee extension in stand position 2x20 bilateral   - Rows holding red theraband bilateral  2x10  - Pushing through upper extremities to achieve trunk extension 2x25  - cross body punch with 2# dumbbell 1x10 bilateral   - scap retraction 2x10  - attempted 1x10 shoulder rolls backward - patient had difficulty coordinating  - Overhead press with green tband bilateral 1x10  - Patient given feedback on standing frame position - working on increasing seat angle after 30 second rest between lifts (repeated 3-4 times)  - Lateral reaches 1x8 bilateral   - Beach Ball Volley 1x20 hits working on upright trunk posture  - Pulling with bilateral upper extremities on tray at front to pull hips forward from seat and extend knees 2x25  - Reaching Left and Right with cones to target to stretch lower extremities with each reach 2x10 cones to each side incorporating reach posteriorly    Shuttle X  X   - 2x15 bilateral leg press with 5 bands   -2x15 single leg press with 3 bands for each leg    NuStep  X   12 minutes at Level 4-5, working on alternating movement of lower extremities and stretch to bilateral knees (able to scoot seat position back to #14 to increase stretch in lower extremities)   Step ups to a 4 in step in the parallel bars  - 1x10 Left    Stairs  4 stairs with bilateral rails with Contact Guard Assist   Parallel Bars        - Standing heelcord stretch 3x10 seconds Right   - step ups to 4 in step 10x's bilateral   - Stepping over 4 short hurdles forward and backward 1x each  "  Supine   - Heel slides 1x10 bilateral with end range 10 second hold    Sitting edge of mat   - mini lifts from raised surface, progressively lowering height after each set 3x5   - sit to stand from raised surface that progressively lowered after each successful stand 4x's    Stairs  - Step ups with Left lower extremity and down with right 4 steps with bilateral upper extremity support    Standing Frame  Standing 1 x in standing frame working on holding endrange position of approximately 55 degree seat angle for 5 minutes   Standing in parallel bars working sit to stands           - standing 2x's  - Standing weights shifts Left to Right 1x10  - Standing knee extension 1x10  - Standing Left upper extremity lifts off bar 1x5  - Standing bilateral upper extremity lifts off bar 1x10 with PT helping to hold hips for support with minimum to moderate assist   Nautilus leg extension X  X - 1x10 with 1 plate of resistance   - 3x5 with 2 plates of resistance   - then 1x10 lowering weight with eccentric quad control with 2 plates of resistance   Sitting on gray ball  - weight shifts Left and Right 1x10  - weight shifts forward/back 1x10  - single leg lifts 1x5 bilateral then 5 seconds hold 2x's bilateral   - lunge over forward leg 1x10 bilateral    Sitting long arc quad   - 1x10 bilateral with 5#  - 10 second end range hold wit 5# bilateral    Sitting to squat without upper extremity support   - 1x5 from 24" box  - 1x5 from 20" box   Air Ex Beam    - side stepping 1x   - tandem walking x's      * In standing frame:  Sitting position = 0 degree seat angle  Full upright stand position with hips and knees at 0 degrees = 90 degree seat angle  3/15/22 - 1st  standing frame was at 42 degree seat angle, last stand position was at 65 degree seat angle  3/17/22 - 1st  standing frame was at 48 degree seat angle, last stand position was at 70 degree seat angle  3/21/22 - 1st  standing frame was at 52 degree seat " angle, last stand position was at 70 degree seat angle  3/29/22 - 1st  standing frame was at 60 degree seat angle, last stand position was at 72 degree seat angle  3/31/22 - 1st  standing frame was at 55 degree seat angle, last stand position was at 75 degree seat angle  4/7/22 - 1st  standing frame was at 60 degree seat angle, last stand position was at 80 degree seat angle  4/11/22 - 1st  standing frame was at 60 degree seat angle, last stand position was at 78 degree seat angle  4/14/22 - 1st  standing frame was at 55 degree seat angle, last stand position was at 86 degree seat angle  4/19/22 - 1st  standing frame was at 60 degree seat angle, last stand position was at 78 degree seat angle  4/22/22 - 1st  standing frame was at 65 degree seat angle, last stand position was at 81 degree seat angle  4/26/22 - 1st  standing frame was at 65 degree seat angle, last stand position was at 82 degree seat angle  5/3/22 - 1st  standing frame was at 65 degree seat angle, last stand position was at 88 degree seat angle  5/5/22 - 1st  standing frame was at 65 degree seat angle, last stand position was at 84 degree seat angle  5/10/22 - 1st  standing frame was at 60 degree seat angle, last stand position was at 80 degree seat angle  5/12/22 - 1st  standing frame was at 65 degree seat angle, last stand position was at 80 degree seat angle  5/17/22 - 1st  standing frame was at 62 degree seat angle, last stand position was at 82 degree seat angle  5/19/22 - 1st  standing frame was at 60 degree seat angle, last stand position was at 80 degree seat angle  5/24/22 - 1st  standing frame was at 62 degree seat angle, last stand position was at 82 degree seat angle  5/26/22 - 1st  standing frame was at 60 degree seat angle, last stand position was at 80 degree seat angle   5/31/22 - 1st  standing frame  was at 65 degree seat angle, last stand position was at 80 degree seat angle  6/2/22 - 1st  standing frame was at 70 degree seat angle, last stand position was at 82 degree seat angle  6/7/22 - 1st  standing frame was at 68 degree seat angle, last stand position was at 80 degree seat angle  6/10/22 - 1st  standing frame was at 60 degree seat angle, last stand position was at 80 degree seat angle  6/14/22 - 1st  standing frame was at 65 degree seat angle, last stand position was at 82 degree seat angle   6/16/22 - 1st  standing frame was at 70 degree seat angle, last stand position was at 84 degree seat angle  6/21/22 - 1st  standing frame was at 65 degree seat angle, last stand position was at 80 degree seat angle  Plan for Next Visit:      THERAPEUTIC ACTIVITIES to improve dynamic and functional  performance for () minutes including:    Intervention Performed Today    Bed - wheelchair transfer   Practiced set up and sequence   Bed mobility sit to supine to sit  Practiced sequence    Rolling   3 x each side working on sequence to incorporate hip flexor stretch on each side. Then separate stretch performed as listed above   Standing in parallel bars  4 x's working on several weight shifts towards hip extension and knee extension while standing.   Standing in parallel bars performing step forward  With Right lower extremity 5x's with moderate assistance to maintain standing position   Dynasplint Consult via FaceTime with Andriy to discuss fit     Sit to  parallel bars without assistance  2x's   Dynasplint Consult  Andriy Stout (Rep for Dynasplint) met with physical therapist and patient today during session to discuss options for dynasplints for bilateral knees due to patient's significant lack of range of motion.     Dynasplint Fitting  Andriy Stout (Rep for Dynasplint) met with physical therapist and patient today to deliver and fit patient for bilateral knee  dynasplints.  We discussed wearing time of 1-2 hours/day for the first week and then we would re-assess.  Patient was shown how to don each brace and straps were marked for future use.  The Dynasplint force was increased to 6 on the Left and 7 on the Right to start.  Extra padding was added to thin straps for skin protection.   Dynasplint Consult  Andriy Stout (Rep for Dynasplint) met with physical therapist and patient today during session to discuss proper fit and positioning of Dynasplint to feel a better stretch at home. Pt only brought Left splint to session today and his splint was donned, straps were tightened and new black line added for patient to follow at home.  Left splint was increased from 7/13 to 9/13 tension.  He reports that he places a pillow under his knee at home and he was educated on better positioning of pillow under distal stump with prosthesis removed.  He also felt more of a stretch in long sitting vs supine and was asked to try this position at home for part of the stretch if able.    Standing at sink in Neuro Room working on stand posture and positioning   Simulating activity that he has been given to do for homework. 1x5 seconds, 1x10 seconds   Patient educated addition to Home Exercise Program   - Simulated sit to stand setup  From wheelchair to sink at home performing with walker in front to work on letting go of sink and holding on to walker.  Patient was educated on how to sit safely to prevent wheelchair from tipping backwards if he loses balance.  He was told to start with one hand on walker and one hand on counter top until he can progress to both hands on walker for support. This will be a way that he can safely challenge himself at home. Patient practiced standing to walker 3x's working on sequence and safety of task.   Patient expressed concerns about not knowing his current weight, so PT assisted patient with obtaining weight at start of session with wheelchair scale.   He  currently weighs 139 #   Several transfers and sit to stands to adjust walker height as needed  Contact guard to stand by assist for each   Several sit to stand and wheelchair to machine transfers with and without walker performed throughout session  With independence.    Discussed Left prosthesis sleeve   - It is currently to large/long and often curls.  Patient shared number of company as he has been unsuccessful with getting rep to deliver new one.    Left Prosthesis adjusted to turn foot to neutral, standing and stepping between each adjustment to correct position of leg  2 adjustments.    Discussed full below knee amputation program to work on at home in preparation for discharge  Patient verbalized good understanding of program   Skin inspection of Left hip   - see objective session at start of note   Bed mobility and transfer    - Patient transferred 6x's during session with independence and no increased complaints of pain  - he transitioned through sit to supine, rolling to Right and supine to sit with independence    Patient education  - Patient discussed fall from last week, PT recommended that he always keep cell phone on his person in case this happens again.  He was also educated that if the pain persists, he may need to follow up with MD for an X-ray.    Transfer from wheelchair to chair seat to mat then back to chair and back to wheelchair  Supervision to modified independence   Bed mobility sit to supine to sit   Independent    [place]/donning prosthetic Left lower extremity with [place]  For measuring Left lower extremity    Patient education on results of re-assessments, importance of continuing proper stretching technique at home for hips and knees, practiced set-up. Also discussed increasing activity level throughout the day to spend less time in wheelchair and more time walking and sitting on other surfaces in his home.  Patient verbalized good understanding.      Plan for Next Visit:       Gait Training: for (14) minutes:  - Ambulated 150ft, 50 ft with walker with stand by assist to supervision on flat floor and turf surfaces for each walk.   - Parallel bars: side stepping 1x each direction, backward walking 1x, forward walking with minimal upper extremity support 1x    Patient Education and Home Exercises     Home Exercises Provided and Patient Education Provided     Education provided:   -2/22/22 Patient educated on increasing length and consistency of stretching knees and hips to every 2 hours at home.  He was shown and demonstrated understanding of short arc quads, rolling toward prone and sitting in reclined position to stretch hip flexors.  -2/24/22 Patient given Home Exercise Program on exercises for lower extremities to be performed 2x15 3x's/day  See patient Instructions in EMR  -3/1/22 Re-inforced Home Exercise Program and asked patient to incorporate prone lying for longer periods of time to build up to 20 minutes per day.  3/3/22 - Discussed the option of dynasplints for bilateral lower extremities. Patient educated on the option of Dynasplints vs basic knee braces and encouraged to consider Dynasplints as a more aggressive and efficient way of properly positioning and dynamically stretching his legs outside of therapy.  3/8/22 - Patient was educated on Dynasplints with Andriy Stout (Rep).   He was shown pictures on ipad and Andriy and physical therapist discussed wearing schedule to make gains with his muscle length outside of therapy sessions.  Andriy informed patient that he would assist with collecting information regarding insurance coverage and provide him with any out of pocket costs so he can decide if he would like to pursue this option.  3/22/22 - Patient was educated that Andriy Stout (Rep for Dynasplint) messaged me that his splints have been approved and are being mailed this week.   3/24/22 - Patient was educated on Dynasplint management and importance of daily skin checks.   His tolerance of wearing splints for the first week will determine his wearing schedule for the following weeks.  3/29/22 - Patient was educated on only wearing Dynasplints while lying down to get the most effective stretch to lower extremities.  3/31/22 - Patient was asked to reach out to Dynasplint rep (Andriy) to set up a time for Andriy to re-assess the positioning and fit of splints to see if he can feel more of a stretch with wearing schedule.  4/7/22 - [facility].  4/11/22 - PT [facility].  4/14/22 - Patient informed that PT would [facility] again to have him schedule a visit.  He was encouraged to continue being active at home.  4/18/22 -  Patient asked to contact Dynasplint rep to set up a time for a consult that would work for his schedule.  4/28/22 - Patient educated on wearing Dynaspints for 2 hours/day for the next 4 days and then we will assess his ability to increase to 4 hours/day after next visit.  He verbalized good understanding of education from Andriy (Dynasplint) and was told to try Right splint on in new wear position first before increasing tension to 9/13.    5/3/22 - Patient educated on attempting standing at home at sink holding stand position for 10-15 seconds repeating 3x's and doing this in the morning and afternoon every day.   5/12/22 - Patient educated on the importance of walking with walker each session moving forward to try to get range applied to a functional task.  5/26/22 - Patient educated on adding walker to sit to stands at sink as a way of increasing his stand balance safely at home alone.  6/7/22 - Patient educated on his gains at this point and that PT is requesting more visits for a renewed Plan of Care.   6/10/22 - Patient educated on progressing to standing marches with walker at home and  standing lateral foot taps with walker at home in same set up as before with kitchen counter in front and wheelchair behind for safety.  6/21/22 - Patient educated on stair mobility sequence.   6/23/22 - Patient educated on the risk of falls with walking home alone at this time.   7/28/22 - Patient educated on safety with gait at home and advancing gait challenges in therapy.  8/8/22 - Patient educated on proper technique of side lying hip extension and that Plan of Care would be extended for another month in preparation for discharge.   8/12/22 - Discussed contacting prosthetic company to replace sleeve as it is too large and long.   9/2/22 - Discussed full below knee amputation home program in preparation for discharge.    9/19/22 - Patient educated on fall safety (see therapeutic activity section)  9/30/22 - See above in therapeutic activity section  10/10/22 - Discussed incorporating outdoor walking in future sessions.    Home Exercise Provided:  Exercises were reviewed and Hunter was able to verbalize good understanding prior to the end of the session.  Hunter demonstrated good  understanding of the education provided.   ASSESSMENT     Patient responded well to session today with good effort, participation and endurance.  He is demonstrating improved confidence with standing and transfers.  He carried over improved Left prosthetic foot contact with gait with increased Left knee extension with stance phase.  He would continue to benefit from working through standing balance challenges to better anticipated loss of balance.      Pt prognosis is Fair  To Good    Pt will continue to benefit from skilled outpatient physical therapy to address the deficits listed in the problem list box on initial evaluation, provide pt/family education and to maximize pt's level of independence in the home and community environment.     Pt's spiritual, cultural and educational needs considered and pt agreeable to plan of care and  goals.     Anticipated Barriers for therapy: co-morbidities, transportation assistance needed at times, lifestyle, potential contractures of knees/hips    GOALS:     Short Term Goals:  4 weeks Progress    Function: Patient will demonstrate improved function as indicated by a functional limitation score of less than or equal to 55 out of 100 on FOTO = 44% limitation PC   Mobility: Patient will improve sit to stand transfer with moderate assistance in order to progress towards independence with functional activities. (Met)  (Modified) - Patient will perform wheelchair to mat transfer with stand by assist to demonstrate improved independence with mobility. Met   Gait: Patient will ambulated 3 steps in parallel bars with Maximum assistance to demonstrate improved strength, posture and endurance met   HEP: Patient will demonstrate independence with HEP in order to progress toward functional independence. met    Assess patient's needs for Dynasplints and set up consultation if needed. met       Long Term Goals:  8 weeks Progress   Function: Patient will demonstrate improved function as indicated by a functional limitation score of less than or equal to 60 out of 100 on FOTO = 40 % limitation PC   Mobility: Patient will improve AROM of bilateral knees to -10 degrees  in order to return to maximal functional potential and improve quality of life. PC   Functional Mobility - Patient will perform mat to wheelchair transfer with modified independence with walker in order to improve his independence with functional mobility. Met   Gait: Patient will ambulate 15 feet with rolling walker with moderate assistance to demonstrate improved strength, endurance and mobility. (Met)   (Modified) - Patient will ambulate 100 ft with walker incorporating turns with modified independence to negotiate home environment with independence. Met   HEP: Patient educated on HEP in preparation for d/c verbalizing and demonstrating good understanding  of topics discussed.    Met            Goals Key:  PC= progressing/continue;        PM= partially met;             DC= discontinue         PLAN     Continue Plan of Care (POC) and progress per patient tolerance. See Treatment section for exercise progression.  Outpatient Physical Therapy 2 times weekly for 8 weeks to include the following interventions: Electrical Stimulation Attended, Gait Training, Moist Heat/ Ice, Neuromuscular Re-ed, Orthotic Management and Training, Patient Education, Self Care, Therapeutic Activities, Therapeutic Exercise and Prosthetic Management.      Mindy Patino, PT, DPT

## 2022-10-17 ENCOUNTER — CLINICAL SUPPORT (OUTPATIENT)
Dept: REHABILITATION | Facility: HOSPITAL | Age: 82
End: 2022-10-17
Payer: MEDICARE

## 2022-10-17 DIAGNOSIS — Z74.09 DECREASED STRENGTH, ENDURANCE, AND MOBILITY: Primary | ICD-10-CM

## 2022-10-17 DIAGNOSIS — R53.1 DECREASED STRENGTH, ENDURANCE, AND MOBILITY: Primary | ICD-10-CM

## 2022-10-17 DIAGNOSIS — R68.89 DECREASED STRENGTH, ENDURANCE, AND MOBILITY: Primary | ICD-10-CM

## 2022-10-17 PROCEDURE — 97116 GAIT TRAINING THERAPY: CPT | Mod: CQ

## 2022-10-17 PROCEDURE — 97110 THERAPEUTIC EXERCISES: CPT | Mod: CQ

## 2022-10-17 NOTE — PROGRESS NOTES
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  Physician: Fallon Dudley*    Visit Date: 10/17/2022     Physician Orders: PT Eval and Treat   Medical Diagnosis from Referral: s/p Left BKA  Evaluation Date: 2/18/2022  Authorization Period Expiration: 12/31/22  Plan of Care Expiration: 11/24/22  [9/30-11/24]  Progress Note Due: 10/30/22  Visit # / Visits authorized: 61/80 (+1 eval)  FOTO: 3/3     Precautions: Standard, Diabetes, Fall, Incontinence    PTA Visit #: 1/5     Time In: 1000  Time Out: 1045  Total Billable Time:  41 minutes    SUBJECTIVE     Pt reports:  That he has been walking at home but not outside due to him still leary of falling because he reports that he has fallen in the past (not recently). He was able to perform sit to stands at home without his hands. He has been consistently stretching his knees towards extension at home.     Response to previous treatment: good with no adverse effects.    Functional change:  He reports that he is feeling stronger with more confidence with standing/walking and is able to stand for longer bouts at home.  He reports have increased confidence and comfort level with walking.  He is able to stand with 1 upper extremity support with improved confidence and is able to perform sit to stand with no upper extremity support with good hand placement on his rolling walker in correct location.      Pain: 0/10 at rest  Location: not applicable     OBJECTIVE     Objective Measures updated at progress report unless specified.  Re-Assessment for (0) minutes:    8/8/22  range of motion of bilateral knees measured with NuStep - Right = -20, Left = -23  Sitting with bilateral lower extremities over TBall: Right knee = -12, Left knee = -22  In supine: Right hip = -15 , Right knee = -30                   Left hip  = -15, Left knee = -35  Functional Mobility: Transfers modified independent with walker and bed mobility with independence  Ambulation Re-Assessment - ambulating 150 ft with walker with supervision to modified independence.   Supine: Right knee Extension = -25, Left knee extension = -30    9/19/22:   Patient presents with bruising around his Right eye, Left elbow abrasion and complaints of Left hip pain with palpation.  Skin inspection to Left hip and no redness or bruising noted.     9/30/22:  Sitting: Knee extension = -18 Right, -23 Left   Supine: Hip extension = -18 Right, -28 Left; Knee extension = -15 Right, -20 Left   Can now perform sit to stand transfer from raised seat surface (AirEx pad on chair seat) without upper extremity support  Hip flexion 4/5 bilateral, knee flexion 5/5, Right dorsiflexion 4- and Right plantarflexion 4+  Treatment     Hunter received the treatments listed below:      THERAPEUTIC EXERCISES to develop strength, endurance, ROM, flexibility, posture and core stabilization for (27) minutes including:  Intervention Performed Today    short arc quad in supine  lower extremities over wedge 2x20 Right AROM, Left 2x20 Active Assistive for form and quality    sidelying toward prone   Stretch to Hip flexor 3x30 seconds each side   Quad set   2x10 Left     Sidelying hip extension   2x10 bilaterally with 10 seconds endrange stretch on last rep   Gold[place] and [place] Left Prosthetic leg  Performed at edge of mat    Sitting in reclined position at edge of mat   Hip flexor stretch 3x30 seconds bilateral    Hip Abduction  In sidelying 2x10 bilateral    Bridge attempt  1x5   Posterior Pelvic Tilt Supine  2x5   Prone lying over light blue small triangle wedge  Holding position 2 minutes, then 4 minutes   Stretch to Hamstrings  3 x 1 minute Right lower extremity    Heelslide with forceful push towards extension  2x10 bilateral with 10 second enrange hold stretch on last rep    10 # over distal Left thigh   Stretch to Left hip flexor for over 5 minutes while exercising Right lower extremity    Prone Hamstring curl endrange for quad/hip flexor stretch  2x10 AAROM   lower extremity lifts towards extension in prone  2x10 AAROM   Prone hip flexor stretch   3x10 second holds bilateral    Standing frame raising to patient's tolerance    - Standing 2x's in standing frame holding each stand for 10-15  minutes working on the following:  - Horizontal Abduction with bilateral upper extremities 2x10 AROM   - terminal knee extension in stand position 2x20 bilateral   - Rows holding red theraband bilateral  2x10  - Pushing through upper extremities to achieve trunk extension 2x25  - cross body punch with 2# dumbbell 1x10 bilateral   - scap retraction 2x10  - attempted 1x10 shoulder rolls backward - patient had difficulty coordinating  - Overhead press with green tband bilateral 1x10  - Patient given feedback on standing frame position - working on increasing seat angle after 30 second rest between lifts (repeated 3-4 times)  - Lateral reaches 1x8 bilateral   - Beach Ball Volley 1x20 hits working on upright trunk posture  - Pulling with bilateral upper extremities on tray at front to pull hips forward from seat and extend knees 2x25  - Reaching Left and Right with cones to target to stretch lower extremities with each reach 2x10 cones to each side incorporating reach posteriorly    Shuttle    - 2x15 bilateral leg press with 5 bands   -2x15 single leg press with 3 bands for each leg    NuStep     12 minutes at Level 4-5, working on alternating movement of lower extremities and stretch to bilateral knees (able to scoot seat position back to #14 to increase stretch in lower extremities)   Step ups to a 4 in step in the parallel bars  - 1x10 Left    Stairs x 3 x clinic stairs with bilateral rails stand by  Assist verbal cues for ascending with right lower extremity and descending with left lower extremity    Parallel Bars        -  "Standing heelcord stretch 3x10 seconds Right   - step ups to 4 in step 10x's bilateral   - Stepping over 4 short hurdles forward and backward 1x each   Supine   - Heel slides 1x10 bilateral with end range 10 second hold    Sitting edge of mat   - mini lifts from raised surface, progressively lowering height after each set 3x5   - sit to stand from raised surface that progressively lowered after each successful stand 4x's    Stairs  - Step ups with Left lower extremity and down with right 4 steps with bilateral upper extremity support    Standing Frame  Standing 1 x in standing frame working on holding endrange position of approximately 55 degree seat angle for 5 minutes   Standing in parallel bars working sit to stands           - standing 2x's  - Standing weights shifts Left to Right 1x10  - Standing knee extension 1x10  - Standing Left upper extremity lifts off bar 1x5  - Standing bilateral upper extremity lifts off bar 1x10 with PT helping to hold hips for support with minimum to moderate assist   Nautilus leg extension    - 1x10 with 1 plate of resistance   - 3x5 with 2 plates of resistance   - then 1x10 lowering weight with eccentric quad control with 2 plates of resistance   Sitting on gray ball X    x - weight shifts Left and Right x 20  - weight shifts forward/back 1x10  - single leg lifts 2 x 10 bilateral  alternating   - lunge over forward leg 1x10 bilateral    Sitting long arc quad  x - 3x10 bilateral with 5 pounds  - 10 second end range hold wit 5# bilateral    Sitting to squat without upper extremity support   - 1x5 from 24" box  - 1x5 from 20" box   Air Ex Beam    - side stepping 1x   - tandem walking x's      * In standing frame:  Sitting position = 0 degree seat angle  Full upright stand position with hips and knees at 0 degrees = 90 degree seat angle  3/15/22 - 1st  standing frame was at 42 degree seat angle, last stand position was at 65 degree seat angle  3/17/22 - 1st  standing " frame was at 48 degree seat angle, last stand position was at 70 degree seat angle  3/21/22 - 1st  standing frame was at 52 degree seat angle, last stand position was at 70 degree seat angle  3/29/22 - 1st  standing frame was at 60 degree seat angle, last stand position was at 72 degree seat angle  3/31/22 - 1st  standing frame was at 55 degree seat angle, last stand position was at 75 degree seat angle  4/7/22 - 1st  standing frame was at 60 degree seat angle, last stand position was at 80 degree seat angle  4/11/22 - 1st  standing frame was at 60 degree seat angle, last stand position was at 78 degree seat angle  4/14/22 - 1st  standing frame was at 55 degree seat angle, last stand position was at 86 degree seat angle  4/19/22 - 1st  standing frame was at 60 degree seat angle, last stand position was at 78 degree seat angle  4/22/22 - 1st  standing frame was at 65 degree seat angle, last stand position was at 81 degree seat angle  4/26/22 - 1st  standing frame was at 65 degree seat angle, last stand position was at 82 degree seat angle  5/3/22 - 1st  standing frame was at 65 degree seat angle, last stand position was at 88 degree seat angle  5/5/22 - 1st  standing frame was at 65 degree seat angle, last stand position was at 84 degree seat angle  5/10/22 - 1st  standing frame was at 60 degree seat angle, last stand position was at 80 degree seat angle  5/12/22 - 1st  standing frame was at 65 degree seat angle, last stand position was at 80 degree seat angle  5/17/22 - 1st  standing frame was at 62 degree seat angle, last stand position was at 82 degree seat angle  5/19/22 - 1st  standing frame was at 60 degree seat angle, last stand position was at 80 degree seat angle  5/24/22 - 1st  standing frame was at 62 degree seat angle, last stand position was at 82 degree seat angle  5/26/22 -  1st  standing frame was at 60 degree seat angle, last stand position was at 80 degree seat angle   5/31/22 - 1st  standing frame was at 65 degree seat angle, last stand position was at 80 degree seat angle  6/2/22 - 1st  standing frame was at 70 degree seat angle, last stand position was at 82 degree seat angle  6/7/22 - 1st  standing frame was at 68 degree seat angle, last stand position was at 80 degree seat angle  6/10/22 - 1st  standing frame was at 60 degree seat angle, last stand position was at 80 degree seat angle  6/14/22 - 1st  standing frame was at 65 degree seat angle, last stand position was at 82 degree seat angle   6/16/22 - 1st  standing frame was at 70 degree seat angle, last stand position was at 84 degree seat angle  6/21/22 - 1st  standing frame was at 65 degree seat angle, last stand position was at 80 degree seat angle  Plan for Next Visit:      THERAPEUTIC ACTIVITIES to improve dynamic and functional  performance for (0) minutes including:    Intervention Performed Today    Bed - wheelchair transfer   Practiced set up and sequence   Bed mobility sit to supine to sit  Practiced sequence    Rolling   3 x each side working on sequence to incorporate hip flexor stretch on each side. Then separate stretch performed as listed above   Standing in parallel bars  4 x's working on several weight shifts towards hip extension and knee extension while standing.   Standing in parallel bars performing step forward  With Right lower extremity 5x's with moderate assistance to maintain standing position   Dynasplint Consult via [place] with Andriy to discuss fit     Sit to  parallel bars without assistance  2x's   Dynasplint Consult  Andriy Stout (Rep for Dynasplint) met with physical therapist and patient today during session to discuss options for dynasplints for bilateral knees due to patient's significant lack of range of motion.      Dynasplint Fitting  Andriy Stout (Rep for Dynasplint) met with physical therapist and patient today to deliver and fit patient for bilateral knee dynasplints.  We discussed wearing time of 1-2 hours/day for the first week and then we would re-assess.  Patient was shown how to don each brace and straps were marked for future use.  The Dynasplint force was increased to 6 on the Left and 7 on the Right to start.  Extra padding was added to thin straps for skin protection.   Dynasplint Consult  Andriy Stout (Rep for Dynasplint) met with physical therapist and patient today during session to discuss proper fit and positioning of Dynasplint to feel a better stretch at home. Pt only brought Left splint to session today and his splint was donned, straps were tightened and new black line added for patient to follow at home.  Left splint was increased from 7/13 to 9/13 tension.  He reports that he places a pillow under his knee at home and he was educated on better positioning of pillow under distal stump with prosthesis removed.  He also felt more of a stretch in long sitting vs supine and was asked to try this position at home for part of the stretch if able.    Standing at sink in Neuro Room working on stand posture and positioning   Simulating activity that he has been given to do for homework. 1x5 seconds, 1x10 seconds   Patient educated addition to Home Exercise Program   - Simulated sit to stand setup  From wheelchair to sink at home performing with walker in front to work on letting go of sink and holding on to walker.  Patient was educated on how to sit safely to prevent wheelchair from tipping backwards if he loses balance.  He was told to start with one hand on walker and one hand on counter top until he can progress to both hands on walker for support. This will be a way that he can safely challenge himself at home. Patient practiced standing to walker 3x's working on sequence and safety of task.   Patient  expressed concerns about not knowing his current weight, so PT assisted patient with obtaining weight at start of session with wheelchair scale.   He currently weighs 139 #   Several transfers and sit to stands to adjust walker height as needed  Contact guard to stand by assist for each   Several sit to stand and wheelchair to machine transfers with and without walker performed throughout session  With independence.    Discussed Left prosthesis sleeve   - It is currently to large/long and often curls.  Patient shared number of company as he has been unsuccessful with getting rep to deliver new one.    Left Prosthesis adjusted to turn foot to neutral, standing and stepping between each adjustment to correct position of leg  2 adjustments.    Discussed full below knee amputation program to work on at home in preparation for discharge  Patient verbalized good understanding of program   Skin inspection of Left hip   - see objective session at start of note   Bed mobility and transfer    - Patient transferred 6x's during session with independence and no increased complaints of pain  - he transitioned through sit to supine, rolling to Right and supine to sit with independence    Patient education  - Patient discussed fall from last week, PT recommended that he always keep cell phone on his person in case this happens again.  He was also educated that if the pain persists, he may need to follow up with MD for an X-ray.    Transfer from wheelchair to chair seat to mat then back to chair and back to wheelchair  Supervision to modified independence   Bed mobility sit to supine to sit   Independent    Lely/donning prosthetic Left lower extremity with [place]  For measuring Left lower extremity    Patient education on results of re-assessments, importance of continuing proper stretching technique at home for hips and knees, practiced set-up. Also discussed increasing activity level throughout the day to spend less time  in wheelchair and more time walking and sitting on other surfaces in his home.  Patient verbalized good understanding.      Plan for Next Visit:      Gait Training: for (14) minutes:  - Ambulated 245 feet rolling walker with stand by assist to supervision on flat floor and turf surfaces for each walk.   - Parallel bars: side stepping 1x each direction, backward walking 1x, forward walking with minimal upper extremity support 1 x held today    Patient Education and Home Exercises     Home Exercises Provided and Patient Education Provided     Education provided:   -2/22/22 Patient educated on increasing length and consistency of stretching knees and hips to every 2 hours at home.  He was shown and demonstrated understanding of short arc quads, rolling toward prone and sitting in reclined position to stretch hip flexors.  -2/24/22 Patient given Home Exercise Program on exercises for lower extremities to be performed 2x15 3x's/day  See patient Instructions in EMR  -3/1/22 Re-inforced Home Exercise Program and asked patient to incorporate prone lying for longer periods of time to build up to 20 minutes per day.  3/3/22 - Discussed the option of dynasplints for bilateral lower extremities. Patient educated on the option of Dynasplints vs basic knee braces and encouraged to consider Dynasplints as a more aggressive and efficient way of properly positioning and dynamically stretching his legs outside of therapy.  3/8/22 - Patient was educated on Dynasplints with Andriy Stout (Rep).   He was shown pictures on ipad and Andriy and physical therapist discussed wearing schedule to make gains with his muscle length outside of therapy sessions.  Andriy informed patient that he would assist with collecting information regarding insurance coverage and provide him with any out of pocket costs so he can decide if he would like to pursue this option.  3/22/22 - Patient was educated that Andriy Stout (Rep for Dynasplint) messaged me that his  splints have been approved and are being mailed this week.   3/24/22 - Patient was educated on Dynasplint management and importance of daily skin checks.  His tolerance of wearing splints for the first week will determine his wearing schedule for the following weeks.  3/29/22 - Patient was educated on only wearing Dynasplints while lying down to get the most effective stretch to lower extremities.  3/31/22 - Patient was [facility] to set up a time for Andriy to re-assess the positioning and fit of splints to see if he can feel more of a stretch with wearing schedule.  4/7/22 - Patient was asked to reach out to [facility] to make sure that splints are adjusted appropriately.  4/11/22 - PT recommended that patient follow up with Dynasplint rep for reassessment of splint fit.  4/14/22 - Patient informed that PT would reach out to [facility] again to have him schedule a visit.  He was encouraged to continue being active at home.  4/18/22 -  Patient asked to contact Dynasplint rep to set up a time for a consult that would work for his schedule.  4/28/22 - Patient educated on wearing Dynaspints for 2 hours/day for the next 4 days and then we will assess his ability to increase to 4 hours/day after next visit.  He verbalized good understanding of education from Andriy (Dynasplint) and was told to try Right splint on in new wear position first before increasing tension to 9/13.    5/3/22 - Patient educated on attempting standing at home at sink holding stand position for 10-15 seconds repeating 3x's and doing this in the morning and afternoon every day.   5/12/22 - Patient educated on the importance of walking with walker each session moving forward to try to get range applied to a functional task.  5/26/22 - Patient educated on adding walker to sit to stands at sink as a way of increasing his stand balance safely at home alone.  6/7/22 - Patient educated on his gains at this point and  that PT is requesting more visits for a renewed Plan of Care.   6/10/22 - Patient educated on progressing to standing marches with walker at home and standing lateral foot taps with walker at home in same set up as before with kitchen counter in front and wheelchair behind for safety.  6/21/22 - Patient educated on stair mobility sequence.   6/23/22 - Patient educated on the risk of falls with walking home alone at this time.   7/28/22 - Patient educated on safety with gait at home and advancing gait challenges in therapy.  8/8/22 - Patient educated on proper technique of side lying hip extension and that Plan of Care would be extended for another month in preparation for discharge.   8/12/22 - Discussed contacting prosthetic company to replace sleeve as it is too large and long.   9/2/22 - Discussed full below knee amputation home program in preparation for discharge.    9/19/22 - Patient educated on fall safety (see therapeutic activity section)  9/30/22 - See above in therapeutic activity section  10/10/22 - Discussed incorporating outdoor walking in future sessions.    Home Exercise Provided:  Exercises were reviewed and Hunter was able to verbalize good understanding prior to the end of the session.  Hunter demonstrated good  understanding of the education provided.   ASSESSMENT     Patient responded well to session today with good effort, participation and endurance.  He is demonstrating improved confidence with standing and transfers.  He carried over improved Left prosthetic foot contact with gait with increased Left knee extension with stance phase.  He would continue to benefit from working through standing balance challenges to better anticipated loss of balance.      Pt prognosis is Fair  To Good    Pt will continue to benefit from skilled outpatient physical therapy to address the deficits listed in the problem list box on initial evaluation, provide pt/family education and to maximize pt's level of  independence in the home and community environment.     Pt's spiritual, cultural and educational needs considered and pt agreeable to plan of care and goals.     Anticipated Barriers for therapy: co-morbidities, transportation assistance needed at times, lifestyle, potential contractures of knees/hips    GOALS:     Short Term Goals:  4 weeks Progress    Function: Patient will demonstrate improved function as indicated by a functional limitation score of less than or equal to 55 out of 100 on FOTO = 44% limitation PC   Mobility: Patient will improve sit to stand transfer with moderate assistance in order to progress towards independence with functional activities. (Met)  (Modified) - Patient will perform wheelchair to mat transfer with stand by assist to demonstrate improved independence with mobility. Met   Gait: Patient will ambulated 3 steps in parallel bars with Maximum assistance to demonstrate improved strength, posture and endurance met   HEP: Patient will demonstrate independence with HEP in order to progress toward functional independence. met    Assess patient's needs for Dynasplints and set up consultation if needed. met       Long Term Goals:  8 weeks Progress   Function: Patient will demonstrate improved function as indicated by a functional limitation score of less than or equal to 60 out of 100 on FOTO = 40 % limitation PC   Mobility: Patient will improve AROM of bilateral knees to -10 degrees  in order to return to maximal functional potential and improve quality of life. PC   Functional Mobility - Patient will perform mat to wheelchair transfer with modified independence with walker in order to improve his independence with functional mobility. Met   Gait: Patient will ambulate 15 feet with rolling walker with moderate assistance to demonstrate improved strength, endurance and mobility. (Met)   (Modified) - Patient will ambulate 100 ft with walker incorporating turns with modified independence to  negotiate home environment with independence. Met   HEP: Patient educated on HEP in preparation for d/c verbalizing and demonstrating good understanding of topics discussed.    Met            Goals Key:  PC= progressing/continue;        PM= partially met;             DC= discontinue         PLAN     Continue Plan of Care (POC) and progress per patient tolerance. See Treatment section for exercise progression.  Outpatient Physical Therapy 2 times weekly for 8 weeks to include the following interventions: Electrical Stimulation Attended, Gait Training, Moist Heat/ Ice, Neuromuscular Re-ed, Orthotic Management and Training, Patient Education, Self Care, Therapeutic Activities, Therapeutic Exercise and Prosthetic Management.      Sheng Charles, PTA

## 2022-10-21 ENCOUNTER — DOCUMENTATION ONLY (OUTPATIENT)
Dept: REHABILITATION | Facility: HOSPITAL | Age: 82
End: 2022-10-21
Payer: MEDICARE

## 2022-10-21 ENCOUNTER — CLINICAL SUPPORT (OUTPATIENT)
Dept: REHABILITATION | Facility: HOSPITAL | Age: 82
End: 2022-10-21
Payer: MEDICARE

## 2022-10-21 DIAGNOSIS — R53.1 DECREASED STRENGTH, ENDURANCE, AND MOBILITY: ICD-10-CM

## 2022-10-21 DIAGNOSIS — M25.662 DECREASED RANGE OF MOTION OF BOTH LOWER EXTREMITIES: Primary | ICD-10-CM

## 2022-10-21 DIAGNOSIS — M25.661 DECREASED RANGE OF MOTION OF BOTH LOWER EXTREMITIES: Primary | ICD-10-CM

## 2022-10-21 DIAGNOSIS — Z74.09 DECREASED STRENGTH, ENDURANCE, AND MOBILITY: ICD-10-CM

## 2022-10-21 DIAGNOSIS — R68.89 DECREASED STRENGTH, ENDURANCE, AND MOBILITY: ICD-10-CM

## 2022-10-21 PROCEDURE — 97116 GAIT TRAINING THERAPY: CPT | Mod: KX

## 2022-10-21 PROCEDURE — 97110 THERAPEUTIC EXERCISES: CPT | Mod: KX

## 2022-10-21 NOTE — PROGRESS NOTES
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  He reports that he has been walking indoor at home and building up his endurance and confidence for outdoor walking again.   He was able to perform sit to stands at home without his hands. He has been consistently stretching his knees towards extension at home.  He reports staying more active with walking at home.     Response to previous treatment: good with no adverse effects.    Functional change:  He reports that he is feeling stronger with more confidence with standing/walking and is able to stand for longer bouts at home.  He reports have increased confidence and comfort level with walking.  He is able to stand with only 1 upper extremity support with improved confidence and is able to perform sit to stand with no upper extremity support.      Pain: 0/10 at rest  Location: not applicable     OBJECTIVE     Objective Measures updated at progress report unless specified.  Re-Assessment for () minutes:    8/8/22  range of motion of bilateral knees measured with NuStep - Right = -20, Left = -23  Sitting with bilateral lower extremities over TBall: Right knee = -12, Left knee = -22  In supine: Right hip = -15 , Right knee = -30                   Left hip = -15,  Left knee = -35  Functional Mobility: Transfers modified independent with walker and bed mobility with independence  Ambulation Re-Assessment - ambulating 150 ft with walker with supervision to modified independence.   Supine: Right knee Extension = -25, Left knee extension = -30    9/19/22:   Patient presents with bruising around his Right eye, Left elbow abrasion and complaints of Left hip pain with palpation.  Skin inspection to Left hip and no redness or bruising noted.     9/30/22:  Sitting: Knee extension = -18 Right, -23 Left   Supine: Hip extension = -18 Right, -28 Left; Knee extension = -15 Right, -20 Left   Can now perform sit to stand transfer from raised seat surface (AirEx pad on chair seat) without upper extremity support  Hip flexion 4/5 bilateral, knee flexion 5/5, Right dorsiflexion 4- and Right plantarflexion 4+  Treatment     Hunter received the treatments listed below:      THERAPEUTIC EXERCISES to develop strength, endurance, ROM, flexibility, posture and core stabilization for (35) minutes including:  Intervention Performed Today    short arc quad in supine  lower extremities over wedge 2x20 Right AROM, Left 2x20 Active Assistive for form and quality    sidelying toward prone   Stretch to Hip flexor 3x30 seconds each side   Quad set   2x10 Left     Sidelying hip extension   2x10 bilaterally with 10 seconds endrange stretch on last rep   [place] Left Prosthetic leg  Performed at edge of mat    Sitting in reclined position at edge of mat   Hip flexor stretch 3x30 seconds bilateral    Hip Abduction  In sidelying 2x10 bilateral    Bridge attempt  1x5   Posterior Pelvic Tilt Supine  2x5   Prone lying over light blue small triangle wedge  Holding position 2 minutes, then 4 minutes   Stretch to Hamstrings  3 x 1 minute Right lower extremity    Heelslide with forceful push towards extension  2x10 bilateral with 10 second enrange hold stretch on last rep    10 # over distal Left thigh   Stretch to Left hip flexor for over 5 minutes while exercising Right lower extremity    Prone Hamstring curl endrange for quad/hip flexor stretch  2x10 AAROM   lower extremity lifts towards extension in prone  2x10 AAROM   Prone hip flexor stretch   3x10 second holds bilateral    Standing frame raising to patient's tolerance X          X      X                                             - Standing 2x's in standing frame holding each stand for 5  minutes working on the following:  - Horizontal Abduction with bilateral upper extremities 2x10 AROM   - terminal knee extension in stand position 2x10 bilateral   - Rows holding red theraband bilateral  2x10  - Pushing through upper extremities to achieve trunk extension 1x10  - cross body punch with 2# dumbbell 1x10 bilateral   - scap retraction 2x10  - attempted 1x10 shoulder rolls backward - patient had difficulty coordinating  - Overhead press with green tband bilateral 1x10  - Patient given feedback on standing frame position - working on increasing seat angle after 30 second rest between lifts (repeated 3-4 times)  - Lateral reaches 1x8 bilateral   - [place] 1x20 hits working on upright trunk posture  - Pulling with bilateral upper extremities on tray at front to pull hips forward from seat and extend knees 2x25  - Reaching Left and Right with cones to target to stretch lower extremities with each reach 2x10 cones to each side incorporating reach posteriorly    Shuttle    - 2x15 bilateral leg press with 5 bands   -2x15 single leg press with 3 bands for each leg    NuStep  X   10 minutes at Level 4-5, working on alternating movement of lower extremities and stretch to bilateral knees (able to scoot seat position back to #14 to increase stretch in lower extremities)   Step ups to a 4 in step in the parallel bars  - 1x10 Left    Stairs  4 stairs with bilateral rails with Contact Guard Assist   Parallel Bars        - Standing heelcord stretch 3x10 seconds Right  "  - step ups to 4 in step 10x's bilateral   - Stepping over 4 short hurdles forward and backward 1x each   Supine   - Heel slides 1x10 bilateral with end range 10 second hold    Sitting edge of mat   - mini lifts from raised surface, progressively lowering height after each set 3x5   - sit to stand from raised surface that progressively lowered after each successful stand 4x's    Stairs  - Step ups with Left lower extremity and down with right 4 steps with bilateral upper extremity support    Standing Frame  Standing 1 x in standing frame working on holding endrange position of approximately 55 degree seat angle for 5 minutes   Standing in parallel bars working sit to stands           - standing 2x's  - Standing weights shifts Left to Right 1x10  - Standing knee extension 1x10  - Standing Left upper extremity lifts off bar 1x5  - Standing bilateral upper extremity lifts off bar 1x10 with PT helping to hold hips for support with minimum to moderate assist   Nautilus leg extension   X       - 3x5 with 1 plate of resistance   - then 1x10 lowering weight with eccentric quad control with 2 plates of resistance   Sitting on gray ball  - weight shifts Left and Right 1x10  - weight shifts forward/back 1x10  - single leg lifts 1x5 bilateral then 5 seconds hold 2x's bilateral   - lunge over forward leg 1x10 bilateral    Sitting long arc quad   - 1x10 bilateral with 5#  - 10 second end range hold wit 5# bilateral    Sitting to squat without upper extremity support   - 1x5 from 24" box  - 1x5 from 20" box   Air Ex Beam    - side stepping 1x   - tandem walking x's      * In standing frame:  Sitting position = 0 degree seat angle  Full upright stand position with hips and knees at 0 degrees = 90 degree seat angle  3/15/22 - 1st  standing frame was at 42 degree seat angle, last stand position was at 65 degree seat angle  3/17/22 - 1st  standing frame was at 48 degree seat angle, last stand position was at 70 degree " seat angle  3/21/22 - 1st  standing frame was at 52 degree seat angle, last stand position was at 70 degree seat angle  3/29/22 - 1st  standing frame was at 60 degree seat angle, last stand position was at 72 degree seat angle  3/31/22 - 1st  standing frame was at 55 degree seat angle, last stand position was at 75 degree seat angle  4/7/22 - 1st  standing frame was at 60 degree seat angle, last stand position was at 80 degree seat angle  4/11/22 - 1st  standing frame was at 60 degree seat angle, last stand position was at 78 degree seat angle  4/14/22 - 1st  standing frame was at 55 degree seat angle, last stand position was at 86 degree seat angle  4/19/22 - 1st  standing frame was at 60 degree seat angle, last stand position was at 78 degree seat angle  4/22/22 - 1st  standing frame was at 65 degree seat angle, last stand position was at 81 degree seat angle  4/26/22 - 1st  standing frame was at 65 degree seat angle, last stand position was at 82 degree seat angle  5/3/22 - 1st  standing frame was at 65 degree seat angle, last stand position was at 88 degree seat angle  5/5/22 - 1st  standing frame was at 65 degree seat angle, last stand position was at 84 degree seat angle  5/10/22 - 1st  standing frame was at 60 degree seat angle, last stand position was at 80 degree seat angle  5/12/22 - 1st  standing frame was at 65 degree seat angle, last stand position was at 80 degree seat angle  5/17/22 - 1st  standing frame was at 62 degree seat angle, last stand position was at 82 degree seat angle  5/19/22 - 1st  standing frame was at 60 degree seat angle, last stand position was at 80 degree seat angle  5/24/22 - 1st  standing frame was at 62 degree seat angle, last stand position was at 82 degree seat angle  5/26/22 - 1st  standing frame was at 60 degree seat angle, last stand  position was at 80 degree seat angle   5/31/22 - 1st  standing frame was at 65 degree seat angle, last stand position was at 80 degree seat angle  6/2/22 - 1st  standing frame was at 70 degree seat angle, last stand position was at 82 degree seat angle  6/7/22 - 1st  standing frame was at 68 degree seat angle, last stand position was at 80 degree seat angle  6/10/22 - 1st  standing frame was at 60 degree seat angle, last stand position was at 80 degree seat angle  6/14/22 - 1st  standing frame was at 65 degree seat angle, last stand position was at 82 degree seat angle   6/16/22 - 1st  standing frame was at 70 degree seat angle, last stand position was at 84 degree seat angle  6/21/22 - 1st  standing frame was at 65 degree seat angle, last stand position was at 80 degree seat angle  10/21/22 - 1st  standing frame was at 82 degree seat angle, last stand position was at 82 degree seat angle   Plan for Next Visit:      THERAPEUTIC ACTIVITIES to improve dynamic and functional  performance for () minutes including:    Intervention Performed Today    Bed - wheelchair transfer   Practiced set up and sequence   Bed mobility sit to supine to sit  Practiced sequence    Rolling   3 x each side working on sequence to incorporate hip flexor stretch on each side. Then separate stretch performed as listed above   Standing in parallel bars  4 x's working on several weight shifts towards hip extension and knee extension while standing.   Standing in parallel bars performing step forward  With Right lower extremity 5x's with moderate assistance to maintain standing position   [facility] to discuss fit     Sit to  parallel bars without assistance  2x's   Dynasplint Consult  Andriy Stout (Rep for Dynasplint) met with physical therapist and patient today during session to discuss options for dynasplints for bilateral knees due to  patient's significant lack of range of motion.     Dynasplint Fitting  Andriy Stout (Rep for Dynasplint) met with physical therapist and patient today to deliver and fit patient for bilateral knee dynasplints.  We discussed wearing time of 1-2 hours/day for the first week and then we would re-assess.  Patient was shown how to don each brace and straps were marked for future use.  The Dynasplint force was increased to 6 on the Left and 7 on the Right to start.  Extra padding was added to thin straps for skin protection.   Dynasplint Consult  Andriy Stout (Rep for Dynasplint) met with physical therapist and patient today during session to discuss proper fit and positioning of Dynasplint to feel a better stretch at home. Pt only brought Left splint to session today and his splint was donned, straps were tightened and new black line added for patient to follow at home.  Left splint was increased from 7/13 to 9/13 tension.  He reports that he places a pillow under his knee at home and he was educated on better positioning of pillow under distal stump with prosthesis removed.  He also felt more of a stretch in long sitting vs supine and was asked to try this position at home for part of the stretch if able.    Standing at sink in Neuro Room working on stand posture and positioning   Simulating activity that he has been given to do for homework. 1x5 seconds, 1x10 seconds   Patient educated addition to Home Exercise Program   - Simulated sit to stand setup  From wheelchair to sink at home performing with walker in front to work on letting go of sink and holding on to walker.  Patient was educated on how to sit safely to prevent wheelchair from tipping backwards if he loses balance.  He was told to start with one hand on walker and one hand on counter top until he can progress to both hands on walker for support. This will be a way that he can safely challenge himself at home. Patient practiced standing to walker 3x's working  on sequence and safety of task.   Patient expressed concerns about not knowing his current weight, so PT assisted patient with obtaining weight at start of session with wheelchair scale.   He currently weighs 139 #   Several transfers and sit to stands to adjust walker height as needed  Contact guard to stand by assist for each   Several sit to stand and wheelchair to machine transfers with and without walker performed throughout session  With independence.    Discussed Left prosthesis sleeve   - It is currently to large/long and often curls.  Patient shared number of company as he has been unsuccessful with getting rep to deliver new one.    Left Prosthesis adjusted to turn foot to neutral, standing and stepping between each adjustment to correct position of leg  2 adjustments.    Discussed full below knee amputation program to work on at home in preparation for discharge  Patient verbalized good understanding of program   Skin inspection of Left hip   - see objective session at start of note   Bed mobility and transfer    - Patient transferred 6x's during session with independence and no increased complaints of pain  - he transitioned through sit to supine, rolling to Right and supine to sit with independence    Patient education  - Patient discussed fall from last week, PT recommended that he always keep cell phone on his person in case this happens again.  He was also educated that if the pain persists, he may need to follow up with MD for an X-ray.    Transfer from wheelchair to chair seat to mat then back to chair and back to wheelchair  Supervision to modified independence   Bed mobility sit to supine to sit   Independent    [place]/donning prosthetic Left lower extremity with [place]  For measuring Left lower extremity    Patient education on results of re-assessments, importance of continuing proper stretching technique at home for hips and knees, practiced set-up. Also discussed increasing activity  level throughout the day to spend less time in wheelchair and more time walking and sitting on other surfaces in his home.  Patient verbalized good understanding.      Plan for Next Visit:      Gait Training: for (10) minutes:  - Ambulated 150ft,40 ft, 40ft with walker with stand by assist to supervision on flat floor and turf surfaces for each walk.     Patient Education and Home Exercises     Home Exercises Provided and Patient Education Provided     Education provided:   -2/22/22 Patient educated on increasing length and consistency of stretching knees and hips to every 2 hours at home.  He was shown and demonstrated understanding of short arc quads, rolling toward prone and sitting in reclined position to stretch hip flexors.  -2/24/22 Patient given Home Exercise Program on exercises for lower extremities to be performed 2x15 3x's/day  See patient Instructions in EMR  -3/1/22 Re-inforced Home Exercise Program and asked patient to incorporate prone lying for longer periods of time to build up to 20 minutes per day.  3/3/22 - Discussed the option of dynasplints for bilateral lower extremities. Patient educated on the option of Dynasplints vs basic knee braces and encouraged to consider Dynasplints as a more aggressive and efficient way of properly positioning and dynamically stretching his legs outside of therapy.  3/8/22 - Patient was educated on Dynasplints with Andriy Stout (Rep).   He was shown pictures on ipad and Andriy and physical therapist discussed wearing schedule to make gains with his muscle length outside of therapy sessions.  Andriy informed patient that he would assist with collecting information regarding insurance coverage and provide him with any out of pocket costs so he can decide if he would like to pursue this option.  3/22/22 - Patient was educated that Andriy Stout (Rep for Dynasplint) messaged me that his splints have been approved and are being mailed this week.   3/24/22 - Patient was  educated on Dynasplint management and importance of daily skin checks.  His tolerance of wearing splints for the first week will determine his wearing schedule for the following weeks.  3/29/22 - Patient was educated on only wearing Dynasplints while lying down to get the most effective stretch to lower extremities.  3/31/22 - Patient was asked to reach out to Dynasplint rep (Andriy) to set up a time for Andriy to re-assess the positioning and fit of splints to see if he can feel more of a stretch with wearing schedule.  4/7/22 - [facility] sure that [facility]ints are adjusted appropriately.  4/11/22 - PT recommended that [facility] reass[facility] of splint fit.  4/14/22 - Patient informed that PT would [facility] to have him schedule a visit.  He was encouraged to continue being active at home.  4/18/22 -  Patient asked to contact Dynasplint rep to set up a time for a consult that would work for his schedule.  4/28/22 - Patient educated on wearing Dynaspints for 2 hours/day for the next 4 days and then we will assess his ability to increase to 4 hours/day after next visit.  He verbalized good understanding of education from Andriy (Dynasplint) and was told to try Right splint on in new wear position first before increasing tension to 9/13.    5/3/22 - Patient educated on attempting standing at home at sink holding stand position for 10-15 seconds repeating 3x's and doing this in the morning and afternoon every day.   5/12/22 - Patient educated on the importance of walking with walker each session moving forward to try to get range applied to a functional task.  5/26/22 - Patient educated on adding walker to sit to stands at sink as a way of increasing his stand balance safely at home alone.  6/7/22 - Patient educated on his gains at this point and that PT is requesting more visits for a renewed Plan of Care.   6/10/22 - Patient  educated on progressing to standing marches with walker at home and standing lateral foot taps with walker at home in same set up as before with kitchen counter in front and wheelchair behind for safety.  6/21/22 - Patient educated on stair mobility sequence.   6/23/22 - Patient educated on the risk of falls with walking home alone at this time.   7/28/22 - Patient educated on safety with gait at home and advancing gait challenges in therapy.  8/8/22 - Patient educated on proper technique of side lying hip extension and that Plan of Care would be extended for another month in preparation for discharge.   8/12/22 - Discussed contacting prosthetic company to replace sleeve as it is too large and long.   9/2/22 - Discussed full below knee amputation home program in preparation for discharge.    9/19/22 - Patient educated on fall safety (see therapeutic activity section)  9/30/22 - See above in therapeutic activity section  10/10/22 - Discussed incorporating outdoor walking in future sessions.    Home Exercise Provided:  Exercises were reviewed and Hunter was able to verbalize good understanding prior to the end of the session.  Hunter demonstrated good  understanding of the education provided.   ASSESSMENT     Patient responded well to session today with good effort, participation and endurance.  He was able to get back into standing frame as it was just delivered back to our clinic and his first stand was at an 82 degree seat angle which is much improved since his last  the standing frame.  He still needs to work on confidence and endurance with standing tasks.     Pt prognosis is Fair  To Good    Pt will continue to benefit from skilled outpatient physical therapy to address the deficits listed in the problem list box on initial evaluation, provide pt/family education and to maximize pt's level of independence in the home and community environment.     Pt's spiritual, cultural and educational needs considered  and pt agreeable to plan of care and goals.     Anticipated Barriers for therapy: co-morbidities, transportation assistance needed at times, lifestyle, potential contractures of knees/hips    GOALS:     Short Term Goals:  4 weeks Progress    Function: Patient will demonstrate improved function as indicated by a functional limitation score of less than or equal to 55 out of 100 on FOTO = 44% limitation PC   Mobility: Patient will improve sit to stand transfer with moderate assistance in order to progress towards independence with functional activities. (Met)  (Modified) - Patient will perform wheelchair to mat transfer with stand by assist to demonstrate improved independence with mobility. Met   Gait: Patient will ambulated 3 steps in parallel bars with Maximum assistance to demonstrate improved strength, posture and endurance met   HEP: Patient will demonstrate independence with HEP in order to progress toward functional independence. met    Assess patient's needs for Dynasplints and set up consultation if needed. met       Long Term Goals:  8 weeks Progress   Function: Patient will demonstrate improved function as indicated by a functional limitation score of less than or equal to 60 out of 100 on FOTO = 40 % limitation PC   Mobility: Patient will improve AROM of bilateral knees to -10 degrees  in order to return to maximal functional potential and improve quality of life. PC   Functional Mobility - Patient will perform mat to wheelchair transfer with modified independence with walker in order to improve his independence with functional mobility. Met   Gait: Patient will ambulate 15 feet with rolling walker with moderate assistance to demonstrate improved strength, endurance and mobility. (Met)   (Modified) - Patient will ambulate 100 ft with walker incorporating turns with modified independence to negotiate home environment with independence. Met   HEP: Patient educated on HEP in preparation for d/c verbalizing  and demonstrating good understanding of topics discussed.    Met            Goals Key:  PC= progressing/continue;        PM= partially met;             DC= discontinue         PLAN     Continue Plan of Care (POC) and progress per patient tolerance. See Treatment section for exercise progression.  Outpatient Physical Therapy 2 times weekly for 8 weeks to include the following interventions: Electrical Stimulation Attended, Gait Training, Moist Heat/ Ice, Neuromuscular Re-ed, Orthotic Management and Training, Patient Education, Self Care, Therapeutic Activities, Therapeutic Exercise and Prosthetic Management.      Mindy Patino, PT, DPT

## 2022-10-24 ENCOUNTER — CLINICAL SUPPORT (OUTPATIENT)
Dept: REHABILITATION | Facility: HOSPITAL | Age: 82
End: 2022-10-24
Payer: MEDICARE

## 2022-10-24 DIAGNOSIS — R53.1 DECREASED STRENGTH, ENDURANCE, AND MOBILITY: Primary | ICD-10-CM

## 2022-10-24 DIAGNOSIS — R68.89 DECREASED STRENGTH, ENDURANCE, AND MOBILITY: Primary | ICD-10-CM

## 2022-10-24 DIAGNOSIS — Z74.09 DECREASED STRENGTH, ENDURANCE, AND MOBILITY: Primary | ICD-10-CM

## 2022-10-24 PROCEDURE — 97116 GAIT TRAINING THERAPY: CPT | Mod: CQ

## 2022-10-24 PROCEDURE — 97110 THERAPEUTIC EXERCISES: CPT | Mod: CQ

## 2022-10-24 NOTE — PROGRESS NOTES
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  Reports increased standing tolerance with activties of daily living.      Response to previous treatment: good with no adverse effects.    Functional change:  He reports that he is feeling stronger with more confidence with standing/walking and is able to stand for longer bouts at home. He is able to stand with only 1 upper extremity support with improved confidence and is able to perform sit to stand with no upper extremity support.  Improved standing dynamic balance to tuck in the back of his shirt.     Pain: 0/10 at rest  Location: not applicable     OBJECTIVE     Objective Measures updated at progress report unless specified.  Re-Assessment for (0) minutes:    8/8/22  range of motion of bilateral knees measured with NuStep - Right = -20, Left = -23  Sitting with bilateral lower extremities over TBall: Right knee = -12, Left knee = -22  In supine: Right hip = -15 , Right knee = -30                   Left hip = -15, Left knee = -35  Functional Mobility: Transfers modified independent with walker and bed mobility with independence  Ambulation Re-Assessment - ambulating 150 ft with walker with supervision to modified independence.   Supine: Right knee Extension  = -25, Left knee extension = -30    9/19/22:   Patient presents with bruising around his Right eye, Left elbow abrasion and complaints of Left hip pain with palpation.  Skin inspection to Left hip and no redness or bruising noted.     9/30/22:  Sitting: Knee extension = -18 Right, -23 Left   Supine: Hip extension = -18 Right, -28 Left; Knee extension = -15 Right, -20 Left   Can now perform sit to stand transfer from raised seat surface (AirEx pad on chair seat) without upper extremity support  Hip flexion 4/5 bilateral, knee flexion 5/5, Right dorsiflexion 4- and Right plantarflexion 4+  Treatment     Hunter received the treatments listed below:      THERAPEUTIC EXERCISES to develop strength, endurance, ROM, flexibility, posture and core stabilization for (29) minutes including:  Intervention Performed Today    short arc quad in supine  lower extremities over wedge 2x20 Right AROM, Left 2x20 Active Assistive for form and quality    sidelying toward prone   Stretch to Hip flexor 3x30 seconds each side   Quad set   2x10 Left     Sidelying hip extension   2x10 bilaterally with 10 seconds endrange stretch on last rep   Howard [employer] [place]ning Left Prosthetic leg  Performed at edge of mat    Sitting in reclined position at edge of mat   Hip flexor stretch 3x30 seconds bilateral    Hip Abduction  In sidelying 2x10 bilateral    Bridge attempt  1x5   Posterior Pelvic Tilt Supine  2x5   Prone lying over light blue small triangle wedge  Holding position 2 minutes, then 4 minutes   Stretch to Hamstrings  3 x 1 minute Right lower extremity    Heelslide with forceful push towards extension  2x10 bilateral with 10 second enrange hold stretch on last rep    10 # over distal Left thigh  Stretch to Left hip flexor for over 5 minutes while exercising Right lower extremity    Prone Hamstring curl endrange for quad/hip flexor stretch  2x10 AAROM   lower extremity lifts towards extension in prone  2x10 AAROM   Prone hip flexor stretch   3x10  second holds bilateral    Standing frame raising to patient's tolerance                                                            - Standing 2x's in standing frame holding each stand for 5  minutes working on the following:  - Horizontal Abduction with bilateral upper extremities 2x10 AROM   - terminal knee extension in stand position 2x10 bilateral   - Rows holding red theraband bilateral  2x10  - Pushing through upper extremities to achieve trunk extension 1x10  - cross body punch with 2# dumbbell 1x10 bilateral   - scap retraction 2x10  - attempted 1x10 shoulder rolls backward - patient had difficulty coordinating  - Overhead press with green tband bilateral 1x10  - Patient given feedback on standing frame position - working on increasing seat angle after 30 second rest between lifts (repeated 3-4 times)  - Lateral reaches 1x8 bilateral   - Beach Ball Volley 1x20 hits working on upright trunk posture  - Pulling with bilateral upper extremities on tray at front to pull hips forward from seat and extend knees 2x25  - Reaching Left and Right with cones to target to stretch lower extremities with each reach 2x10 cones to each side incorporating reach posteriorly    Shuttle    - 2x15 bilateral leg press with 5 bands   -2x15 single leg press with 3 bands for each leg    NuStep  X   10 minutes at Level 4-5, working on alternating movement of lower extremities and stretch to bilateral knees (able to scoot seat position back to #14 to increase knee extension range of motion    Step ups to a 4 in step in the parallel bars  - 1x10 Left    Stairs  4 stairs with bilateral rails with Contact Guard Assist   Parallel Bars        - Standing heelcord stretch 3x10 seconds Right   - step ups to 4 in step 10x's bilateral   - Stepping over 4 short hurdles forward and backward 1x each   Supine   - Heel slides 1x10 bilateral with end range 10 second hold    Sitting edge of mat   - mini lifts from raised surface, progressively lowering  "height after each set 3x5   - sit to stand from raised surface that progressively lowered after each successful stand 4x's    Stairs x - Step ups with Left lower extremity and down with right 4 steps with bilateral upper extremity support    Standing Frame  Standing 1 x in standing frame working on holding endrange position of approximately 55 degree seat angle for 5 minutes   Standing in parallel bars working sit to stands           - standing 2x's  - Standing weights shifts Left to Right 1x10  - Standing knee extension 1x10  - Standing Left upper extremity lifts off bar 1x5  - Standing bilateral upper extremity lifts off bar 1x10 with PT helping to hold hips for support with minimum to moderate assist   Nautilus leg extension          - 3x5 with 1 plate of resistance   - then 1x10 lowering weight with eccentric quad control with 2 plates of resistance   Sitting on gray ball  - weight shifts Left and Right 1x10  - weight shifts forward/back 1x10  - single leg lifts 1x5 bilateral then 5 seconds hold 2x's bilateral   - lunge over forward leg 1x10 bilateral    Sitting long arc quad  X  x - 3 x 10 bilateral 5 pounds  - 10 second end range hold wit 5# bilateral    Standing terminal knee extension x X 15 red band bilateral holding rolling walker cues to stand erect    Sitting to squat without upper extremity support   - 1x5 from 24" box  - 1x5 from 20" box   Air Ex Beam    - side stepping 1x   - tandem walking x's      * In standing frame:  Sitting position = 0 degree seat angle  Full upright stand position with hips and knees at 0 degrees = 90 degree seat angle  3/15/22 - 1st  standing frame was at 42 degree seat angle, last stand position was at 65 degree seat angle  3/17/22 - 1st  standing frame was at 48 degree seat angle, last stand position was at 70 degree seat angle  3/21/22 - 1st  standing frame was at 52 degree seat angle, last stand position was at 70 degree seat angle  3/29/22 - 1st "  standing frame was at 60 degree seat angle, last stand position was at 72 degree seat angle  3/31/22 - 1st  standing frame was at 55 degree seat angle, last stand position was at 75 degree seat angle  4/7/22 - 1st  standing frame was at 60 degree seat angle, last stand position was at 80 degree seat angle  4/11/22 - 1st  standing frame was at 60 degree seat angle, last stand position was at 78 degree seat angle  4/14/22 - 1st  standing frame was at 55 degree seat angle, last stand position was at 86 degree seat angle  4/19/22 - 1st  standing frame was at 60 degree seat angle, last stand position was at 78 degree seat angle  4/22/22 - 1st  standing frame was at 65 degree seat angle, last stand position was at 81 degree seat angle  4/26/22 - 1st  standing frame was at 65 degree seat angle, last stand position was at 82 degree seat angle  5/3/22 - 1st  standing frame was at 65 degree seat angle, last stand position was at 88 degree seat angle  5/5/22 - 1st  standing frame was at 65 degree seat angle, last stand position was at 84 degree seat angle  5/10/22 - 1st  standing frame was at 60 degree seat angle, last stand position was at 80 degree seat angle  5/12/22 - 1st  standing frame was at 65 degree seat angle, last stand position was at 80 degree seat angle  5/17/22 - 1st  standing frame was at 62 degree seat angle, last stand position was at 82 degree seat angle  5/19/22 - 1st  standing frame was at 60 degree seat angle, last stand position was at 80 degree seat angle  5/24/22 - 1st  standing frame was at 62 degree seat angle, last stand position was at 82 degree seat angle  5/26/22 - 1st  standing frame was at 60 degree seat angle, last stand position was at 80 degree seat angle   5/31/22 - 1st  standing frame was at 65 degree seat angle, last stand position was at 80 degree seat  angle  6/2/22 - 1st  standing frame was at 70 degree seat angle, last stand position was at 82 degree seat angle  6/7/22 - 1st  standing frame was at 68 degree seat angle, last stand position was at 80 degree seat angle  6/10/22 - 1st  standing frame was at 60 degree seat angle, last stand position was at 80 degree seat angle  6/14/22 - 1st  standing frame was at 65 degree seat angle, last stand position was at 82 degree seat angle   6/16/22 - 1st  standing frame was at 70 degree seat angle, last stand position was at 84 degree seat angle  6/21/22 - 1st  standing frame was at 65 degree seat angle, last stand position was at 80 degree seat angle  10/21/22 - 1st  standing frame was at 82 degree seat angle, last stand position was at 82 degree seat angle   Plan for Next Visit:      THERAPEUTIC ACTIVITIES to improve dynamic and functional  performance for (0) minutes including:    Intervention Performed Today    Bed - wheelchair transfer   Practiced set up and sequence   Bed mobility sit to supine to sit  Practiced sequence    Rolling   3 x each side working on sequence to incorporate hip flexor stretch on each side. Then separate stretch performed as listed above   Standing in parallel bars  4 x's working on several weight shifts towards hip extension and knee extension while standing.   Standing in parallel bars performing step forward  With Right lower extremity 5x's with moderate assistance to maintain standing position   Dynasplint Consult via FaceTime with Andriy to discuss fit     Sit to  parallel bars without assistance  2x's   Dynasplint Consult  Andriy Stout (Rep for Dynasplint) met with physical therapist and patient today during session to discuss options for dynasplints for bilateral knees due to patient's significant lack of range of motion.     Dynasplint Fitting  Andriy Stout (Rep for Dynasplint) met with physical therapist and patient today to  deliver and fit patient for bilateral knee dynasplints.  We discussed wearing time of 1-2 hours/day for the first week and then we would re-assess.  Patient was shown how to don each brace and straps were marked for future use.  The Dynasplint force was increased to 6 on the Left and 7 on the Right to start.  Extra padding was added to thin straps for skin protection.   Dynasplint Consult  Andriy Stout (Rep for Dynasplint) met with physical therapist and patient today during session to discuss proper fit and positioning of Dynasplint to feel a better stretch at home. Pt only brought Left splint to session today and his splint was donned, straps were tightened and new black line added for patient to follow at home.  Left splint was increased from 7/13 to 9/13 tension.  He reports that he places a pillow under his knee at home and he was educated on better positioning of pillow under distal stump with prosthesis removed.  He also felt more of a stretch in long sitting vs supine and was asked to try this position at home for part of the stretch if able.    Standing at sink in Neuro Room working on stand posture and positioning   Simulating activity that he has been given to do for homework. 1x5 seconds, 1x10 seconds   Patient educated addition to Home Exercise Program   - Simulated sit to stand setup  From wheelchair to sink at home performing with walker in front to work on letting go of sink and holding on to walker.  Patient was educated on how to sit safely to prevent wheelchair from tipping backwards if he loses balance.  He was told to start with one hand on walker and one hand on counter top until he can progress to both hands on walker for support. This will be a way that he can safely challenge himself at home. Patient practiced standing to walker 3x's working on sequence and safety of task.   Patient expressed concerns about not knowing his current weight, so PT assisted patient with obtaining weight at start  of session with wheelchair scale.   He currently weighs 139 #   Several transfers and sit to stands to adjust walker height as needed  Contact guard to stand by assist for each   Several sit to stand and wheelchair to machine transfers with and without walker performed throughout session  With independence.    Discussed Left prosthesis sleeve   - It is currently to large/long and often curls.  Patient shared number of company as he has been unsuccessful with getting rep to deliver new one.    Left Prosthesis adjusted to turn foot to neutral, standing and stepping between each adjustment to correct position of leg  2 adjustments.    Discussed full below knee amputation program to work on at home in preparation for discharge  Patient verbalized good understanding of program   Skin inspection of Left hip   - see objective session at start of note   Bed mobility and transfer    - Patient transferred 6x's during session with independence and no increased complaints of pain  - he transitioned through sit to supine, rolling to Right and supine to sit with independence    Patient education  - Patient discussed fall from last week, PT recommended that he always keep cell phone on his person in case this happens again.  He was also educated that if the pain persists, he may need to follow up with MD for an X-ray.    Transfer from wheelchair to chair seat to mat then back to chair and back to wheelchair  Supervision to modified independence   Bed mobility sit to supine to sit   Independent    [place]/donning prosthetic Left lower extremity with Cole  For measuring Left lower extremity    Patient education on results of re-assessments, importance of continuing proper stretching technique at home for hips and knees, practiced set-up. Also discussed increasing activity level throughout the day to spend less time in wheelchair and more time walking and sitting on other surfaces in his home.  Patient verbalized good  understanding.      Plan for Next Visit:      Gait Training: for (10) minutes:  - Ambulated 210 feet, 40 feet, with rolling walker with contact guard on flat floor with verbal cues to stand erect.     Patient Education and Home Exercises     Home Exercises Provided and Patient Education Provided     Education provided:   -2/22/22 Patient educated on increasing length and consistency of stretching knees and hips to every 2 hours at home.  He was shown and demonstrated understanding of short arc quads, rolling toward prone and sitting in reclined position to stretch hip flexors.  -2/24/22 Patient given Home Exercise Program on exercises for lower extremities to be performed 2x15 3x's/day  See patient Instructions in EMR  -3/1/22 Re-inforced Home Exercise Program and asked patient to incorporate prone lying for longer periods of time to build up to 20 minutes per day.  3/3/22 - Discussed the option of dynasplints for bilateral lower extremities. Patient educated on the option of Dynasplints vs basic knee braces and encouraged to consider Dynasplints as a more aggressive and efficient way of properly positioning and dynamically stretching his legs outside of therapy.  3/8/22 - Patient was educated on Dynasplints with Andriy Stout (Rep).   He was shown pictures on ipad and Andriy and physical therapist discussed wearing schedule to make gains with his muscle length outside of therapy sessions.  Andriy informed patient that he would assist with collecting information regarding insurance coverage and provide him with any out of pocket costs so he can decide if he would like to pursue this option.  3/22/22 - Patient was educated that Andriy Stout (Rep for Dynasplint) messaged me that his splints have been approved and are being mailed this week.   3/24/22 - Patient was educated on Dynasplint management and importance of daily skin checks.  His tolerance of wearing splints for the first week will determine his wearing schedule for  the following weeks.  3/29/22 - Patient was educated on only wearing Dynasplints while lying down to get the most effective stretch to lower extremities.  3/31/22 - Patient was asked to reach out to Dynasplint rep (Andriy) to set up a time for Andriy to re-assess the positioning and fit of splints to see if he can feel more of a stretch with wearing schedule.  4/7/22 - Patient was asked to reach out to Dynasplint rep to make sure that splints are adjusted appropriately.  4/11/22 - PT recommended that patient follow up with Dynasplint rep for reassessment of splint fit.  4/14/22 - [facility].  He was encouraged to continue being active at home.  4/18/22 -  [facility] for a consult that would work for his schedule.  4/28/22 - Patient educated on wearing Dynaspints for 2 hours/day for the next 4 days and then we will assess his ability to increase to 4 hours/day after next visit.  He verbalized good understanding of education from Andriy (Dynasplint) and was told to try Right splint on in new wear position first before increasing tension to 9/13.    5/3/22 - Patient educated on attempting standing at home at sink holding stand position for 10-15 seconds repeating 3x's and doing this in the morning and afternoon every day.   5/12/22 - Patient educated on the importance of walking with walker each session moving forward to try to get range applied to a functional task.  5/26/22 - Patient educated on adding walker to sit to stands at sink as a way of increasing his stand balance safely at home alone.  6/7/22 - Patient educated on his gains at this point and that PT is requesting more visits for a renewed Plan of Care.   6/10/22 - Patient educated on progressing to standing marches with walker at home and standing lateral foot taps with walker at home in same set up as before with kitchen counter in  front and wheelchair behind for safety.  6/21/22 - Patient educated on stair mobility sequence.   6/23/22 - Patient educated on the risk of falls with walking home alone at this time.   7/28/22 - Patient educated on safety with gait at home and advancing gait challenges in therapy.  8/8/22 - Patient educated on proper technique of side lying hip extension and that Plan of Care would be extended for another month in preparation for discharge.   8/12/22 - Discussed contacting prosthetic company to replace sleeve as it is too large and long.   9/2/22 - Discussed full below knee amputation home program in preparation for discharge.    9/19/22 - Patient educated on fall safety (see therapeutic activity section)  9/30/22 - See above in therapeutic activity section  10/10/22 - Discussed incorporating outdoor walking in future sessions.    Home Exercise Provided:  Exercises were reviewed and Hunter was able to verbalize good understanding prior to the end of the session.  Hutner demonstrated good  understanding of the education provided.   ASSESSMENT     Patient responded well to session today with good effort, participation and endurance.  He was able to get back into standing frame as it was just delivered back to our clinic and his first stand was at an 82 degree seat angle which is much improved since his last  the standing frame.  He still needs to work on confidence and endurance with standing tasks.     Pt prognosis is Fair  To Good    Pt will continue to benefit from skilled outpatient physical therapy to address the deficits listed in the problem list box on initial evaluation, provide pt/family education and to maximize pt's level of independence in the home and community environment.     Pt's spiritual, cultural and educational needs considered and pt agreeable to plan of care and goals.     Anticipated Barriers for therapy: co-morbidities, transportation assistance needed at times, lifestyle, potential  contractures of knees/hips    GOALS:     Short Term Goals:  4 weeks Progress    Function: Patient will demonstrate improved function as indicated by a functional limitation score of less than or equal to 55 out of 100 on FOTO = 44% limitation PC   Mobility: Patient will improve sit to stand transfer with moderate assistance in order to progress towards independence with functional activities. (Met)  (Modified) - Patient will perform wheelchair to mat transfer with stand by assist to demonstrate improved independence with mobility. Met   Gait: Patient will ambulated 3 steps in parallel bars with Maximum assistance to demonstrate improved strength, posture and endurance met   HEP: Patient will demonstrate independence with HEP in order to progress toward functional independence. met    Assess patient's needs for Dynasplints and set up consultation if needed. met       Long Term Goals:  8 weeks Progress   Function: Patient will demonstrate improved function as indicated by a functional limitation score of less than or equal to 60 out of 100 on FOTO = 40 % limitation PC   Mobility: Patient will improve AROM of bilateral knees to -10 degrees  in order to return to maximal functional potential and improve quality of life. PC   Functional Mobility - Patient will perform mat to wheelchair transfer with modified independence with walker in order to improve his independence with functional mobility. Met   Gait: Patient will ambulate 15 feet with rolling walker with moderate assistance to demonstrate improved strength, endurance and mobility. (Met)   (Modified) - Patient will ambulate 100 ft with walker incorporating turns with modified independence to negotiate home environment with independence. Met   HEP: Patient educated on HEP in preparation for d/c verbalizing and demonstrating good understanding of topics discussed.    Met            Goals Key:  PC= progressing/continue;        PM= partially met;             DC=  discontinue         PLAN     Continue Plan of Care (POC) and progress per patient tolerance. See Treatment section for exercise progression.  Outpatient Physical Therapy 2 times weekly for 8 weeks to include the following interventions: Electrical Stimulation Attended, Gait Training, Moist Heat/ Ice, Neuromuscular Re-ed, Orthotic Management and Training, Patient Education, Self Care, Therapeutic Activities, Therapeutic Exercise and Prosthetic Management.      Sheng Charles, PTA

## 2022-10-28 ENCOUNTER — CLINICAL SUPPORT (OUTPATIENT)
Dept: REHABILITATION | Facility: HOSPITAL | Age: 82
End: 2022-10-28
Payer: MEDICARE

## 2022-10-28 DIAGNOSIS — R68.89 DECREASED STRENGTH, ENDURANCE, AND MOBILITY: Primary | ICD-10-CM

## 2022-10-28 DIAGNOSIS — Z74.09 DECREASED STRENGTH, ENDURANCE, AND MOBILITY: Primary | ICD-10-CM

## 2022-10-28 DIAGNOSIS — R53.1 DECREASED STRENGTH, ENDURANCE, AND MOBILITY: Primary | ICD-10-CM

## 2022-10-28 PROCEDURE — 97110 THERAPEUTIC EXERCISES: CPT | Mod: CQ

## 2022-10-28 PROCEDURE — 97140 MANUAL THERAPY 1/> REGIONS: CPT | Mod: CQ

## 2022-10-28 PROCEDURE — 97116 GAIT TRAINING THERAPY: CPT | Mod: CQ

## 2022-10-28 NOTE — PROGRESS NOTES
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 Walking in his home this morning before this session.      Response to previous treatment: good with no adverse effects.    Functional change:  He reports that he is feeling stronger with more confidence with standing/walking and is able to stand for longer bouts at home. He is able to stand with only 1 upper extremity support with improved confidence and is able to perform sit to stand with no upper extremity support.  Improved standing dynamic balance to tuck in the back of his shirt.     Pain: 0/10 at rest  Location: not applicable     OBJECTIVE     Objective Measures updated at progress report unless specified.  Re-Assessment for (0) minutes:    8/8/22  range of motion of bilateral knees measured with NuStep - Right = -20, Left = -23  Sitting with bilateral lower extremities over TBall: Right knee = -12, Left knee = -22  In supine: Right hip = -15 , Right knee = -30                   Left hip = -15, Left knee = -35  Functional Mobility: Transfers modified independent with walker and bed mobility with independence  Ambulation Re-Assessment - ambulating 150 ft with walker with supervision to modified independence.   Supine: Right knee Extension = -25, Left knee  extension = -30    9/19/22:   Patient presents with bruising around his Right eye, Left elbow abrasion and complaints of Left hip pain with palpation.  Skin inspection to Left hip and no redness or bruising noted.     9/30/22:  Sitting: Knee extension = -18 Right, -23 Left   Supine: Hip extension = -18 Right, -28 Left; Knee extension = -15 Right, -20 Left   Can now perform sit to stand transfer from raised seat surface (AirEx pad on chair seat) without upper extremity support  Hip flexion 4/5 bilateral, knee flexion 5/5, Right dorsiflexion 4- and Right plantarflexion 4+  Treatment     Hunter received the treatments listed below:      THERAPEUTIC EXERCISES to develop strength, endurance, ROM, flexibility, posture and core stabilization for (15) minutes including:  Intervention Performed Today    short arc quad in supine  lower extremities over wedge 2x20 Right AROM, Left 2x20 Active Assistive for form and quality    sidelying toward prone   Stretch to Hip flexor 3x30 seconds each side   Quad set   2x10 Left     Sidelying hip extension   2x10 bilaterally with 10 seconds endrange stretch on last rep   Menno and Donning Left Prosthetic leg  Performed at edge of mat    Sitting in reclined position at edge of mat   Hip flexor stretch 3x30 seconds bilateral    Hip Abduction  In sidelying 2x10 bilateral    Bridge attempt  1x5   Posterior Pelvic Tilt Supine  2x5   Prone lying over light blue small triangle wedge  Holding position 2 minutes, then 4 minutes   Stretch to Hamstrings  3 x 1 minute Right lower extremity    Heelslide with forceful push towards extension  2x10 bilateral with 10 second enrange hold stretch on last rep    10 # over distal Left thigh  Stretch to Left hip flexor for over 5 minutes while exercising Right lower extremity    Prone Hamstring curl endrange for quad/hip flexor stretch  2x10 AAROM   lower extremity lifts towards extension in prone  2x10 AAROM   Prone hip flexor stretch   3x10 second holds  bilateral    Standing frame raising to patient's tolerance                                                            - Standing 2x's in standing frame holding each stand for 5  minutes working on the following:  - Horizontal Abduction with bilateral upper extremities 2x10 AROM   - terminal knee extension in stand position 2x10 bilateral   - Rows holding red theraband bilateral  2x10  - Pushing through upper extremities to achieve trunk extension 1x10  - cross body punch with 2# dumbbell 1x10 bilateral   - scap retraction 2x10  - attempted 1x10 shoulder rolls backward - patient had difficulty coordinating  - Overhead press with green tband bilateral 1x10  - Patient given feedback on standing frame position - working on increasing seat angle after 30 second rest between lifts (repeated 3-4 times)  - Lateral reaches 1x8 bilateral   - Beach Ball Volley 1x20 hits working on upright trunk posture  - Pulling with bilateral upper extremities on tray at front to pull hips forward from seat and extend knees 2x25  - Reaching Left and Right with cones to target to stretch lower extremities with each reach 2x10 cones to each side incorporating reach posteriorly    Shuttle    - 2x15 bilateral leg press with 5 bands   -2x15 single leg press with 3 bands for each leg    NuStep  X   10 minutes at Level 4-5, working on alternating movement of lower extremities and stretch to bilateral knees (able to scoot seat position back to #14 to increase knee extension range of motion    Step ups to a 4 in step in the parallel bars  - 1x10 Left    Stairs  4 stairs with bilateral rails with Contact Guard Assist   Parallel Bars        - Standing heelcord stretch 3x10 seconds Right   - step ups to 4 in step 10x's bilateral   - Stepping over 4 short hurdles forward and backward 1x each   Supine   - Heel slides 1x10 bilateral with end range 10 second hold    Sitting edge of mat   - mini lifts from raised surface, progressively lowering height after  "each set 3x5   - sit to stand from raised surface that progressively lowered after each successful stand 4x's    Stairs  - Step ups with Left lower extremity and down with right 4 steps with bilateral upper extremity support    Standing Frame  Standing 1 x in standing frame working on holding endrange position of approximately 55 degree seat angle for 5 minutes   Standing in parallel bars working sit to stands           - standing 2x's  - Standing weights shifts Left to Right 1x10  - Standing knee extension 1x10  - Standing Left upper extremity lifts off bar 1x5  - Standing bilateral upper extremity lifts off bar 1x10 with PT helping to hold hips for support with minimum to moderate assist   Nautilus leg extension          - 3x5 with 1 plate of resistance   - then 1x10 lowering weight with eccentric quad control with 2 plates of resistance   Sitting on gray ball  - weight shifts Left and Right 1x10  - weight shifts forward/back 1x10  - single leg lifts 1x5 bilateral then 5 seconds hold 2x's bilateral   - lunge over forward leg 1x10 bilateral    Sitting long arc quad     - 3 x 10 bilateral 5 pounds  - 10 second end range hold wit 5# bilateral    Standing terminal knee extension x X 30 red band bilateral holding rolling walker cues to stand erect    Sitting to squat without upper extremity support   - 1x5 from 24" box  - 1x5 from 20" box   Air Ex Beam    - side stepping 1x   - tandem walking x's      * In standing frame:  Sitting position = 0 degree seat angle  Full upright stand position with hips and knees at 0 degrees = 90 degree seat angle  3/15/22 - 1st  standing frame was at 42 degree seat angle, last stand position was at 65 degree seat angle  3/17/22 - 1st  standing frame was at 48 degree seat angle, last stand position was at 70 degree seat angle  3/21/22 - 1st  standing frame was at 52 degree seat angle, last stand position was at 70 degree seat angle  3/29/22 - 1st  standing " frame was at 60 degree seat angle, last stand position was at 72 degree seat angle  3/31/22 - 1st  standing frame was at 55 degree seat angle, last stand position was at 75 degree seat angle  4/7/22 - 1st  standing frame was at 60 degree seat angle, last stand position was at 80 degree seat angle  4/11/22 - 1st  standing frame was at 60 degree seat angle, last stand position was at 78 degree seat angle  4/14/22 - 1st  standing frame was at 55 degree seat angle, last stand position was at 86 degree seat angle  4/19/22 - 1st  standing frame was at 60 degree seat angle, last stand position was at 78 degree seat angle  4/22/22 - 1st  standing frame was at 65 degree seat angle, last stand position was at 81 degree seat angle  4/26/22 - 1st  standing frame was at 65 degree seat angle, last stand position was at 82 degree seat angle  5/3/22 - 1st  standing frame was at 65 degree seat angle, last stand position was at 88 degree seat angle  5/5/22 - 1st  standing frame was at 65 degree seat angle, last stand position was at 84 degree seat angle  5/10/22 - 1st  standing frame was at 60 degree seat angle, last stand position was at 80 degree seat angle  5/12/22 - 1st  standing frame was at 65 degree seat angle, last stand position was at 80 degree seat angle  5/17/22 - 1st  standing frame was at 62 degree seat angle, last stand position was at 82 degree seat angle  5/19/22 - 1st  standing frame was at 60 degree seat angle, last stand position was at 80 degree seat angle  5/24/22 - 1st  standing frame was at 62 degree seat angle, last stand position was at 82 degree seat angle  5/26/22 - 1st  standing frame was at 60 degree seat angle, last stand position was at 80 degree seat angle   5/31/22 - 1st  standing frame was at 65 degree seat angle, last stand position was at 80 degree seat angle  6/2/22 -  1st  standing frame was at 70 degree seat angle, last stand position was at 82 degree seat angle  6/7/22 - 1st  standing frame was at 68 degree seat angle, last stand position was at 80 degree seat angle  6/10/22 - 1st  standing frame was at 60 degree seat angle, last stand position was at 80 degree seat angle  6/14/22 - 1st  standing frame was at 65 degree seat angle, last stand position was at 82 degree seat angle   6/16/22 - 1st  standing frame was at 70 degree seat angle, last stand position was at 84 degree seat angle  6/21/22 - 1st  standing frame was at 65 degree seat angle, last stand position was at 80 degree seat angle  10/21/22 - 1st  standing frame was at 82 degree seat angle, last stand position was at 82 degree seat angle   Plan for Next Visit:          MANUAL THERAPY TECHNIQUES were applied for (15) minutes, including:    Manual Intervention Performed Today    Soft Tissue Mobilization [x] bilateral knee extension in supine   Joint Mobilizations []     []     []    Functional Dry Needling  []      Plan for Next Visit: Continue as needed        THERAPEUTIC ACTIVITIES to improve dynamic and functional  performance for (0) minutes including:    Intervention Performed Today    Bed - wheelchair transfer   Practiced set up and sequence   Bed mobility sit to supine to sit  Practiced sequence    Rolling   3 x each side working on sequence to incorporate hip flexor stretch on each side. Then separate stretch performed as listed above   Standing in parallel bars  4 x's working on several weight shifts towards hip extension and knee extension while standing.   Standing in parallel bars performing step forward  With Right lower extremity 5x's with moderate assistance to maintain standing position   Dynasplint Consult via [facility] with Andriy to discuss fit     Sit to  parallel bars without assistance  2x's   Dynasplint Consult  Andriy Stout (Rep for  Dynasplint) met with physical therapist and patient today during session to discuss options for dynasplints for bilateral knees due to patient's significant lack of range of motion.     Dynasplint Fitting  Andriy Stout (Rep for Dynasplint) met with physical therapist and patient today to deliver and fit patient for bilateral knee dynasplints.  We discussed wearing time of 1-2 hours/day for the first week and then we would re-assess.  Patient was shown how to don each brace and straps were marked for future use.  The Dynasplint force was increased to 6 on the Left and 7 on the Right to start.  Extra padding was added to thin straps for skin protection.   Dynasplint Consult  Andriy Stout (Rep for Dynasplint) met with physical therapist and patient today during session to discuss proper fit and positioning of Dynasplint to feel a better stretch at home. Pt only brought Left splint to session today and his splint was donned, straps were tightened and new black line added for patient to follow at home.  Left splint was increased from 7/13 to 9/13 tension.  He reports that he places a pillow under his knee at home and he was educated on better positioning of pillow under distal stump with prosthesis removed.  He also felt more of a stretch in long sitting vs supine and was asked to try this position at home for part of the stretch if able.    Standing at sink in Neuro Room working on stand posture and positioning   Simulating activity that he has been given to do for homework. 1x5 seconds, 1x10 seconds   Patient educated addition to Home Exercise Program   - Simulated sit to stand setup  From wheelchair to sink at home performing with walker in front to work on letting go of sink and holding on to walker.  Patient was educated on how to sit safely to prevent wheelchair from tipping backwards if he loses balance.  He was told to start with one hand on walker and one hand on counter top until he can progress to both hands on  walker for support. This will be a way that he can safely challenge himself at home. Patient practiced standing to walker 3x's working on sequence and safety of task.   Patient expressed concerns about not knowing his current weight, so PT assisted patient with obtaining weight at start of session with wheelchair scale.   He currently weighs 139 #   Several transfers and sit to stands to adjust walker height as needed  Contact guard to stand by assist for each   Several sit to stand and wheelchair to machine transfers with and without walker performed throughout session  With independence.    Discussed Left prosthesis sleeve   - It is currently to large/long and often curls.  Patient shared number of company as he has been unsuccessful with getting rep to deliver new one.    Left Prosthesis adjusted to turn foot to neutral, standing and stepping between each adjustment to correct position of leg  2 adjustments.    Discussed full below knee amputation program to work on at home in preparation for discharge  Patient verbalized good understanding of program   Skin inspection of Left hip   - see objective session at start of note   Bed mobility and transfer    - Patient transferred 6x's during session with independence and no increased complaints of pain  - he transitioned through sit to supine, rolling to Right and supine to sit with independence    Patient education  - Patient discussed fall from last week, PT recommended that he always keep cell phone on his person in case this happens again.  He was also educated that if the pain persists, he may need to follow up with MD for an X-ray.    Transfer from wheelchair to chair seat to mat then back to chair and back to wheelchair  Supervision to modified independence   Bed mobility sit to supine to sit   Independent    [place]/donning prosthetic Left lower extremity with [place]  For measuring Left lower extremity    Patient education on results of re-assessments,  importance of continuing proper stretching technique at home for hips and knees, practiced set-up. Also discussed increasing activity level throughout the day to spend less time in wheelchair and more time walking and sitting on other surfaces in his home.  Patient verbalized good understanding.      Plan for Next Visit:      Gait Training: for (10) minutes:  - Ambulated 210 feet, 40 feet, with rolling walker with stand by assist     Patient Education and Home Exercises     Home Exercises Provided and Patient Education Provided     Education provided:   -2/22/22 Patient educated on increasing length and consistency of stretching knees and hips to every 2 hours at home.  He was shown and demonstrated understanding of short arc quads, rolling toward prone and sitting in reclined position to stretch hip flexors.  -2/24/22 Patient given Home Exercise Program on exercises for lower extremities to be performed 2x15 3x's/day  See patient Instructions in EMR  -3/1/22 Re-inforced Home Exercise Program and asked patient to incorporate prone lying for longer periods of time to build up to 20 minutes per day.  3/3/22 - Discussed the option of dynasplints for bilateral lower extremities. Patient educated on the option of Dynasplints vs basic knee braces and encouraged to consider Dynasplints as a more aggressive and efficient way of properly positioning and dynamically stretching his legs outside of therapy.  3/8/22 - Patient was educated on Dynasplints with Andriy Stout (Rep).   He was shown pictures on ipad and Andriy and physical therapist discussed wearing schedule to make gains with his muscle length outside of therapy sessions.  Andriy informed patient that he would assist with collecting information regarding insurance coverage and provide him with any out of pocket costs so he can decide if he would like to pursue this option.  3/22/22 - Patient was educated that Andriy Stout (Rep for Dynasplint) messaged me that his splints  have been approved and are being mailed this week.   3/24/22 - Patient was educated on Dynasplint management and importance of daily skin checks.  His tolerance of wearing splints for the first week will determine his wearing schedule for the following weeks.  3/29/22 - Patient was educated on only wearing Dynasplints while lying down to get the most effective stretch to lower extremities.  3/31/22 - [facility] to set up a time for Andriy to re-assess the positioning and fit of splints to see if he can feel more of a stretch with wearing schedule.  4/7/22 - Patient was asked to reach out to [facility] rep to make sure that splints are adjusted appropriately.  4/11/22 - PT recommended that patient follow up with Dynasplint rep for reassessment of splint fit.  4/14/22 - Patient informed that PT would reach out to [facility] again to have him schedule a visit.  He was encouraged to continue being active at home.  4/18/22 -  Patient asked to contact Dynasplint rep to set up a time for a consult that would work for his schedule.  4/28/22 - Patient educated on wearing Dynaspints for 2 hours/day for the next 4 days and then we will assess his ability to increase to 4 hours/day after next visit.  He verbalized good understanding of education from Andriy (Dynasplint) and was told to try Right splint on in new wear position first before increasing tension to 9/13.    5/3/22 - Patient educated on attempting standing at home at sink holding stand position for 10-15 seconds repeating 3x's and doing this in the morning and afternoon every day.   5/12/22 - Patient educated on the importance of walking with walker each session moving forward to try to get range applied to a functional task.  5/26/22 - Patient educated on adding walker to sit to stands at sink as a way of increasing his stand balance safely at home alone.  6/7/22 - Patient educated on his gains at this point and that PT  is requesting more visits for a renewed Plan of Care.   6/10/22 - Patient educated on progressing to standing marches with walker at home and standing lateral foot taps with walker at home in same set up as before with kitchen counter in front and wheelchair behind for safety.  6/21/22 - Patient educated on stair mobility sequence.   6/23/22 - Patient educated on the risk of falls with walking home alone at this time.   7/28/22 - Patient educated on safety with gait at home and advancing gait challenges in therapy.  8/8/22 - Patient educated on proper technique of side lying hip extension and that Plan of Care would be extended for another month in preparation for discharge.   8/12/22 - Discussed contacting prosthetic company to replace sleeve as it is too large and long.   9/2/22 - Discussed full below knee amputation home program in preparation for discharge.    9/19/22 - Patient educated on fall safety (see therapeutic activity section)  9/30/22 - See above in therapeutic activity section  10/10/22 - Discussed incorporating outdoor walking in future sessions.    Home Exercise Provided:  Exercises were reviewed and Hunter was able to verbalize good understanding prior to the end of the session.  Hunter demonstrated good  understanding of the education provided.   ASSESSMENT     Patient responded well to session today with sustained knee extension stretches bilaterally for increased hold time with distraction. After manual therapy, he was able to stand/walk with slightly more knee and hip extension. He still needs to work on confidence and endurance with standing tasks. Suspect that the amount of weight that he is applying on his knees for self stretches may not be sufficient to obtain additional knee extension.      Pt prognosis is Fair  To Good    Pt will continue to benefit from skilled outpatient physical therapy to address the deficits listed in the problem list box on initial evaluation, provide pt/family  education and to maximize pt's level of independence in the home and community environment.     Pt's spiritual, cultural and educational needs considered and pt agreeable to plan of care and goals.     Anticipated Barriers for therapy: co-morbidities, transportation assistance needed at times, lifestyle, potential contractures of knees/hips    GOALS:     Short Term Goals:  4 weeks Progress    Function: Patient will demonstrate improved function as indicated by a functional limitation score of less than or equal to 55 out of 100 on FOTO = 44% limitation PC   Mobility: Patient will improve sit to stand transfer with moderate assistance in order to progress towards independence with functional activities. (Met)  (Modified) - Patient will perform wheelchair to mat transfer with stand by assist to demonstrate improved independence with mobility. Met   Gait: Patient will ambulated 3 steps in parallel bars with Maximum assistance to demonstrate improved strength, posture and endurance met   HEP: Patient will demonstrate independence with HEP in order to progress toward functional independence. met    Assess patient's needs for Dynasplints and set up consultation if needed. met       Long Term Goals:  8 weeks Progress   Function: Patient will demonstrate improved function as indicated by a functional limitation score of less than or equal to 60 out of 100 on FOTO = 40 % limitation PC   Mobility: Patient will improve AROM of bilateral knees to -10 degrees  in order to return to maximal functional potential and improve quality of life. PC   Functional Mobility - Patient will perform mat to wheelchair transfer with modified independence with walker in order to improve his independence with functional mobility. Met   Gait: Patient will ambulate 15 feet with rolling walker with moderate assistance to demonstrate improved strength, endurance and mobility. (Met)   (Modified) - Patient will ambulate 100 ft with walker incorporating  turns with modified independence to negotiate home environment with independence. Met   HEP: Patient educated on HEP in preparation for d/c verbalizing and demonstrating good understanding of topics discussed.    Met            Goals Key:  PC= progressing/continue;        PM= partially met;             DC= discontinue         PLAN     Continue Plan of Care (POC) and progress per patient tolerance. See Treatment section for exercise progression.  Outpatient Physical Therapy 2 times weekly for 8 weeks to include the following interventions: Electrical Stimulation Attended, Gait Training, Moist Heat/ Ice, Neuromuscular Re-ed, Orthotic Management and Training, Patient Education, Self Care, Therapeutic Activities, Therapeutic Exercise and Prosthetic [email or web address]      Sheng Charles, PTA

## 2022-10-31 ENCOUNTER — CLINICAL SUPPORT (OUTPATIENT)
Dept: REHABILITATION | Facility: HOSPITAL | Age: 82
End: 2022-10-31
Payer: MEDICARE

## 2022-10-31 DIAGNOSIS — R68.89 DECREASED STRENGTH, ENDURANCE, AND MOBILITY: ICD-10-CM

## 2022-10-31 DIAGNOSIS — M25.662 DECREASED RANGE OF MOTION OF BOTH LOWER EXTREMITIES: Primary | ICD-10-CM

## 2022-10-31 DIAGNOSIS — Z74.09 DECREASED STRENGTH, ENDURANCE, AND MOBILITY: ICD-10-CM

## 2022-10-31 DIAGNOSIS — M25.661 DECREASED RANGE OF MOTION OF BOTH LOWER EXTREMITIES: Primary | ICD-10-CM

## 2022-10-31 DIAGNOSIS — R53.1 DECREASED STRENGTH, ENDURANCE, AND MOBILITY: ICD-10-CM

## 2022-10-31 PROCEDURE — 97116 GAIT TRAINING THERAPY: CPT | Mod: KX

## 2022-10-31 PROCEDURE — 97110 THERAPEUTIC EXERCISES: CPT | Mod: KX

## 2022-10-31 NOTE — PROGRESS NOTES
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 Walking in his home this morning before this session.  He reports stretching daily.     Response to previous treatment: good with no adverse effects.    Functional change:  He reports that he is feeling stronger with more confidence with standing/walking and is able to stand for longer bouts at home. He is able to stand with only 1 upper extremity support with improved confidence and is able to perform sit to stand with no upper extremity support.  Improved standing dynamic balance to tuck in the back of his shirt.     Pain: 0/10 at rest  Location: not applicable     OBJECTIVE     Objective Measures updated at progress report unless specified.  Re-Assessment for (0) minutes:    8/8/22  range of motion of bilateral knees measured with NuStep - Right = -20, Left = -23  Sitting with bilateral lower extremities over TBall: Right knee = -12, Left knee = -22  In supine: Right hip = -15 , Right knee = -30                   Left hip = -15, Left knee = -35  Functional Mobility: Transfers modified independent with walker and bed mobility with independence  Ambulation Re-Assessment - ambulating 150 ft with walker with supervision to modified independence.    Supine: Right knee Extension = -25, Left knee extension = -30    9/19/22:   Patient presents with bruising around his Right eye, Left elbow abrasion and complaints of Left hip pain with palpation.  Skin inspection to Left hip and no redness or bruising noted.     9/30/22:  Sitting: Knee extension = -18 Right, -23 Left   Supine: Hip extension = -18 Right, -28 Left; Knee extension = -15 Right, -20 Left   Can now perform sit to stand transfer from raised seat surface (AirEx pad on chair seat) without upper extremity support  Hip flexion 4/5 bilateral, knee flexion 5/5, Right dorsiflexion 4- and Right plantarflexion 4+  Treatment     Hunter received the treatments listed below:      THERAPEUTIC EXERCISES to develop strength, endurance, ROM, flexibility, posture and core stabilization for (20) minutes including:  Intervention Performed Today    short arc quad in supine  lower extremities over wedge 2x20 Right AROM, Left 2x20 Active Assistive for form and quality    sidelying toward prone   Stretch to Hip flexor 3x30 seconds each side   Quad set   2x10 Left     Sidelying hip extension   2x10 bilaterally with 10 seconds endrange stretch on last rep   Al[place]hany and [place] Left Prosthetic leg  Performed at edge of mat    Sitting in reclined position at edge of mat   Hip flexor stretch 3x30 seconds bilateral    Hip Abduction  In sidelying 2x10 bilateral    Bridge attempt  1x5   Posterior Pelvic Tilt Supine  2x5   Prone lying over light blue small triangle wedge  Holding position 2 minutes, then 4 minutes   Stretch to Hamstrings  3 x 1 minute Right lower extremity    Heelslide with forceful push towards extension  2x10 bilateral with 10 second enrange hold stretch on last rep    10 # over distal Left thigh  Stretch to Left hip flexor for over 5 minutes while exercising Right lower extremity    Prone Hamstring curl endrange for quad/hip flexor stretch  2x10 AAROM   lower extremity lifts towards extension in prone  2x10 AAROM    Prone hip flexor stretch   3x10 second holds bilateral    Standing frame raising to patient's tolerance                                                            - Standing 2x's in standing frame holding each stand for 5  minutes working on the following:  - Horizontal Abduction with bilateral upper extremities 2x10 AROM   - terminal knee extension in stand position 2x10 bilateral   - Rows holding red theraband bilateral  2x10  - Pushing through upper extremities to achieve trunk extension 1x10  - cross body punch with 2# dumbbell 1x10 bilateral   - scap retraction 2x10  - attempted 1x10 shoulder rolls backward - patient had difficulty coordinating  - Overhead press with green tband bilateral 1x10  - Patient given feedback on standing frame position - working on increasing seat angle after 30 second rest between lifts (repeated 3-4 times)  - Lateral reaches 1x8 bilateral   - Beach Ball Volley 1x20 hits working on upright trunk posture  - Pulling with bilateral upper extremities on tray at front to pull hips forward from seat and extend knees 2x25  - Reaching Left and Right with cones to target to stretch lower extremities with each reach 2x10 cones to each side incorporating reach posteriorly    Shuttle    - 2x15 bilateral leg press with 5 bands   -2x15 single leg press with 3 bands for each leg    NuStep  X   10 minutes at Level 4-5, working on alternating movement of lower extremities and stretch to bilateral knees (able to scoot seat position back to #13 to increase knee extension range of motion    Step ups to a 4 in step in the parallel bars  - 1x10 Left    Stairs  4 stairs with bilateral rails with Contact Guard Assist   Parallel Bars          - Standing heelcord stretch 3x10 seconds Right   - step ups to 4 in step 10x's bilateral   - Stepping over 4 short hurdles forward and backward 1x each   Supine   - Heel slides 1x10 bilateral with end range 10 second hold    Sitting edge of mat   - mini lifts from  "raised surface, progressively lowering height after each set 3x5   - sit to stand from raised surface that progressively lowered after each successful stand 4x's    Stairs  - Step ups with Left lower extremity and down with right 4 steps with bilateral upper extremity support    Standing Frame  Standing 1 x in standing frame working on holding endrange position of approximately 55 degree seat angle for 5 minutes   Standing in parallel bars working sit to stands           - standing 2x's  - Standing weights shifts Left to Right 1x10  - Standing knee extension 1x10  - Standing Left upper extremity lifts off bar 1x5  - Standing bilateral upper extremity lifts off bar 1x10 with PT helping to hold hips for support with minimum to moderate assist   Nautilus leg extension X     - 6x5 with 1 plate of resistance weight with eccentric quad control    Sitting on gray ball  - weight shifts Left and Right 1x10  - weight shifts forward/back 1x10  - single leg lifts 1x5 bilateral then 5 seconds hold 2x's bilateral   - lunge over forward leg 1x10 bilateral    Sitting long arc quad     - 3 x 10 bilateral 5 pounds  - 10 second end range hold wit 5# bilateral    Standing terminal knee extension  X 30 red band bilateral holding rolling walker cues to stand erect    Sitting to squat without upper extremity support   - 1x5 from 24" box  - 1x5 from 20" box   Air Ex Beam    - side stepping 1x   - tandem walking x's      * In standing frame:  Sitting position = 0 degree seat angle  Full upright stand position with hips and knees at 0 degrees = 90 degree seat angle  3/15/22 - 1st  standing frame was at 42 degree seat angle, last stand position was at 65 degree seat angle  3/17/22 - 1st  standing frame was at 48 degree seat angle, last stand position was at 70 degree seat angle  3/21/22 - 1st  standing frame was at 52 degree seat angle, last stand position was at 70 degree seat angle  3/29/22 - 1st  standing " frame was at 60 degree seat angle, last stand position was at 72 degree seat angle  3/31/22 - 1st  standing frame was at 55 degree seat angle, last stand position was at 75 degree seat angle  4/7/22 - 1st  standing frame was at 60 degree seat angle, last stand position was at 80 degree seat angle  4/11/22 - 1st  standing frame was at 60 degree seat angle, last stand position was at 78 degree seat angle  4/14/22 - 1st  standing frame was at 55 degree seat angle, last stand position was at 86 degree seat angle  4/19/22 - 1st  standing frame was at 60 degree seat angle, last stand position was at 78 degree seat angle  4/22/22 - 1st  standing frame was at 65 degree seat angle, last stand position was at 81 degree seat angle  4/26/22 - 1st  standing frame was at 65 degree seat angle, last stand position was at 82 degree seat angle  5/3/22 - 1st  standing frame was at 65 degree seat angle, last stand position was at 88 degree seat angle  5/5/22 - 1st  standing frame was at 65 degree seat angle, last stand position was at 84 degree seat angle  5/10/22 - 1st  standing frame was at 60 degree seat angle, last stand position was at 80 degree seat angle  5/12/22 - 1st  standing frame was at 65 degree seat angle, last stand position was at 80 degree seat angle  5/17/22 - 1st  standing frame was at 62 degree seat angle, last stand position was at 82 degree seat angle  5/19/22 - 1st  standing frame was at 60 degree seat angle, last stand position was at 80 degree seat angle  5/24/22 - 1st  standing frame was at 62 degree seat angle, last stand position was at 82 degree seat angle  5/26/22 - 1st  standing frame was at 60 degree seat angle, last stand position was at 80 degree seat angle   5/31/22 - 1st  standing frame was at 65 degree seat angle, last stand position was at 80 degree seat angle  6/2/22 -  1st  standing frame was at 70 degree seat angle, last stand position was at 82 degree seat angle  6/7/22 - 1st  standing frame was at 68 degree seat angle, last stand position was at 80 degree seat angle  6/10/22 - 1st  standing frame was at 60 degree seat angle, last stand position was at 80 degree seat angle  6/14/22 - 1st  standing frame was at 65 degree seat angle, last stand position was at 82 degree seat angle   6/16/22 - 1st  standing frame was at 70 degree seat angle, last stand position was at 84 degree seat angle  6/21/22 - 1st  standing frame was at 65 degree seat angle, last stand position was at 80 degree seat angle  10/21/22 - 1st  standing frame was at 82 degree seat angle, last stand position was at 82 degree seat angle   Plan for Next Visit:          MANUAL THERAPY TECHNIQUES were applied for () minutes, including:    Manual Intervention Performed Today    Soft Tissue Mobilization [] bilateral knee extension in supine   Joint Mobilizations []     []     []    Functional Dry Needling  []      Plan for Next Visit: Continue as needed        THERAPEUTIC ACTIVITIES to improve dynamic and functional  performance for (0) minutes including:    Intervention Performed Today    Bed - wheelchair transfer   Practiced set up and sequence   Bed mobility sit to supine to sit  Practiced sequence    Rolling   3 x each side working on sequence to incorporate hip flexor stretch on each side. Then separate stretch performed as listed above   Standing in parallel bars  4 x's working on several weight shifts towards hip extension and knee extension while standing.   Standing in parallel bars performing step forward  With Right lower extremity 5x's with moderate assistance to maintain standing position   Dynasplint Consult via [facility] with Andriy to discuss fit     Sit to  parallel bars without assistance  2x's   Dynasplint Consult  Andriy Stout (Rep for  Dynasplint) met with physical therapist and patient today during session to discuss options for dynasplints for bilateral knees due to patient's significant lack of range of motion.     Dynasplint Fitting  Andriy Stout (Rep for Dynasplint) met with physical therapist and patient today to deliver and fit patient for bilateral knee dynasplints.  We discussed wearing time of 1-2 hours/day for the first week and then we would re-assess.  Patient was shown how to don each brace and straps were marked for future use.  The Dynasplint force was increased to 6 on the Left and 7 on the Right to start.  Extra padding was added to thin straps for skin protection.   Dynasplint Consult  Andriy Stout (Rep for Dynasplint) met with physical therapist and patient today during session to discuss proper fit and positioning of Dynasplint to feel a better stretch at home. Pt only brought Left splint to session today and his splint was donned, straps were tightened and new black line added for patient to follow at home.  Left splint was increased from 7/13 to 9/13 tension.  He reports that he places a pillow under his knee at home and he was educated on better positioning of pillow under distal stump with prosthesis removed.  He also felt more of a stretch in long sitting vs supine and was asked to try this position at home for part of the stretch if able.    Standing at sink in Neuro Room working on stand posture and positioning   Simulating activity that he has been given to do for homework. 1x5 seconds, 1x10 seconds   Patient educated addition to Home Exercise Program   - Simulated sit to stand setup  From wheelchair to sink at home performing with walker in front to work on letting go of sink and holding on to walker.  Patient was educated on how to sit safely to prevent wheelchair from tipping backwards if he loses balance.  He was told to start with one hand on walker and one hand on counter top until he can progress to both hands on  walker for support. This will be a way that he can safely challenge himself at home. Patient practiced standing to walker 3x's working on sequence and safety of task.   Patient expressed concerns about not knowing his current weight, so PT assisted patient with obtaining weight at start of session with wheelchair scale.   He currently weighs 139 #   Several transfers and sit to stands to adjust walker height as needed  Contact guard to stand by assist for each   Several sit to stand and wheelchair to machine transfers with and without walker performed throughout session  With independence.    Discussed Left prosthesis sleeve   - It is currently to large/long and often curls.  Patient shared number of company as he has been unsuccessful with getting rep to deliver new one.    Left Prosthesis adjusted to turn foot to neutral, standing and stepping between each adjustment to correct position of leg  2 adjustments.    Discussed full below knee amputation program to work on at home in preparation for discharge  Patient verbalized good understanding of program   Skin inspection of Left hip   - see objective session at start of note   Bed mobility and transfer    - Patient transferred 6x's during session with independence and no increased complaints of pain  - he transitioned through sit to supine, rolling to Right and supine to sit with independence    Patient education  - Patient discussed fall from last week, PT recommended that he always keep cell phone on his person in case this happens again.  He was also educated that if the pain persists, he may need to follow up with MD for an X-ray.    Transfer from wheelchair to chair seat to mat then back to chair and back to wheelchair  Supervision to modified independence   Bed mobility sit to supine to sit   Independent    [place]/donning prosthetic Left lower extremity with [place]  For measuring Left lower extremity    Patient education on results of re-assessments,  importance of continuing proper stretching technique at home for hips and knees, practiced set-up. Also discussed increasing activity level throughout the day to spend less time in wheelchair and more time walking and sitting on other surfaces in his home.  Patient verbalized good understanding.      Plan for Next Visit:      Gait Training: for (25) minutes:  - Ambulated 150 ft with walker with stand by assist then 250 ft with rollator with contact guard assist over indoor and outdoor surfaces navigating brick, curb steps, concrete and grass.  - In parallel bars working on side stepping and forward walking several times with 1 - 0 upper extremity support    Patient Education and Home Exercises     Home Exercises Provided and Patient Education Provided     Education provided:   -2/22/22 Patient educated on increasing length and consistency of stretching knees and hips to every 2 hours at home.  He was shown and demonstrated understanding of short arc quads, rolling toward prone and sitting in reclined position to stretch hip flexors.  -2/24/22 Patient given Home Exercise Program on exercises for lower extremities to be performed 2x15 3x's/day  See patient Instructions in EMR  -3/1/22 Re-inforced Home Exercise Program and asked patient to incorporate prone lying for longer periods of time to build up to 20 minutes per day.  3/3/22 - Discussed the option of dynasplints for bilateral lower extremities. Patient educated on the option of Dynasplints vs basic knee braces and encouraged to consider Dynasplints as a more aggressive and efficient way of properly positioning and dynamically stretching his legs outside of therapy.  3/8/22 - Patient was educated on Dynasplints with Andriy Stout ().   He was shown pictures on ipad and Andriy and physical therapist discussed wearing schedule to make gains with his muscle length outside of therapy sessions.  Andriy informed patient that he would assist with collecting information  regarding insurance coverage and provide him with any out of pocket costs so he can decide if he would like to pursue this option.  3/22/22 - Patient was educated that Andriy Stout (Rep for Dynasplint) messaged me that his splints have been approved and are being mailed this week.   3/24/22 - Patient was educated on Dynasplint management and importance of daily skin checks.  His tolerance of wearing splints for the first week will determine his wearing schedule for the following weeks.  3/29/22 - Patient was educated on only wearing Dynasplints while lying down to get the most effective stretch to lower extremities.  3/31/22 - Patient was asked to reach out to Dynasplint rep (Andriy) to set up a time for Andriy to re-assess the positioning and fit of splints to see if he can feel more of a stretch with wearing schedule.  4/7/22 - Patient was asked to reach out to [employer] rep to make sure that splints are adjusted appropriately.  4/11/22 - PT recommended that patient follow up with [employer] rep for reassessment of splint fit.  4/14/22 - Patient informed that PT would reach out to [employer] rep again to have him schedule a visit.  He was encouraged to continue being active at home.  4/18/22 -  Patient asked to contact [employer] rep to set up a time for a consult that would work for his schedule.  4/28/22 - Patient educated on wearing Dynaspints for 2 hours/day for the next 4 days and then we will assess his ability to increase to 4 hours/day after next visit.  He verbalized good understanding of education from Andriy ([employer]) and was told to try Right splint on in new wear position first before increasing tension to 9/13.    5/3/22 - Patient educated on attempting standing at home at sink holding stand position for 10-15 seconds repeating 3x's and doing this in the morning and afternoon every day.   5/12/22 - Patient educated on the importance of walking with walker each session moving forward to try to get range  applied to a functional task.  5/26/22 - Patient educated on adding walker to sit to stands at sink as a way of increasing his stand balance safely at home alone.  6/7/22 - Patient educated on his gains at this point and that PT is requesting more visits for a renewed Plan of Care.   6/10/22 - Patient educated on progressing to standing marches with walker at home and standing lateral foot taps with walker at home in same set up as before with kitchen counter in front and wheelchair behind for safety.  6/21/22 - Patient educated on stair mobility sequence.   6/23/22 - Patient educated on the risk of falls with walking home alone at this time.   7/28/22 - Patient educated on safety with gait at home and advancing gait challenges in therapy.  8/8/22 - Patient educated on proper technique of side lying hip extension and that Plan of Care would be extended for another month in preparation for discharge.   8/12/22 - Discussed contacting prosthetic company to replace sleeve as it is too large and long.   9/2/22 - Discussed full below knee amputation home program in preparation for discharge.    9/19/22 - Patient educated on fall safety (see therapeutic activity section)  9/30/22 - See above in therapeutic activity section  10/10/22 - Discussed incorporating outdoor walking in future sessions.    Home Exercise Provided:  Exercises were reviewed and Hunter was able to verbalize good understanding prior to the end of the session.  Hunter demonstrated good  understanding of the education provided.   ASSESSMENT     Patient responded well to session today with progression to rollator for ambulation.  He demonstrated improved balance and safety navigating community surfaces and would benefit from continued practice with rollator.      Pt prognosis is Fair  To Good    Pt will continue to benefit from skilled outpatient physical therapy to address the deficits listed in the problem list box on initial evaluation, provide pt/family  education and to maximize pt's level of independence in the home and community environment.     Pt's spiritual, cultural and educational needs considered and pt agreeable to plan of care and goals.     Anticipated Barriers for therapy: co-morbidities, transportation assistance needed at times, lifestyle, potential contractures of knees/hips    GOALS:     Short Term Goals:  4 weeks Progress    Function: Patient will demonstrate improved function as indicated by a functional limitation score of less than or equal to 55 out of 100 on FOTO = 44% limitation PC   Mobility: Patient will improve sit to stand transfer with moderate assistance in order to progress towards independence with functional activities. (Met)  (Modified) - Patient will perform wheelchair to mat transfer with stand by assist to demonstrate improved independence with mobility. Met   Gait: Patient will ambulated 3 steps in parallel bars with Maximum assistance to demonstrate improved strength, posture and endurance met   HEP: Patient will demonstrate independence with HEP in order to progress toward functional independence. met    Assess patient's needs for Dynasplints and set up consultation if needed. met       Long Term Goals:  8 weeks Progress   Function: Patient will demonstrate improved function as indicated by a functional limitation score of less than or equal to 60 out of 100 on FOTO = 40 % limitation PC   Mobility: Patient will improve AROM of bilateral knees to -10 degrees  in order to return to maximal functional potential and improve quality of life. PC   Functional Mobility - Patient will perform mat to wheelchair transfer with modified independence with walker in order to improve his independence with functional mobility. Met   Gait: Patient will ambulate 15 feet with rolling walker with moderate assistance to demonstrate improved strength, endurance and mobility. (Met)   (Modified) - Patient will ambulate 100 ft with walker incorporating  turns with modified independence to negotiate home environment with independence. Met   HEP: Patient educated on HEP in preparation for d/c verbalizing and demonstrating good understanding of topics discussed.    Met            Goals Key:  PC= progressing/continue;        PM= partially met;             DC= discontinue         PLAN     Continue Plan of Care (POC) and progress per patient tolerance. See Treatment section for exercise progression.  Outpatient Physical Therapy 2 times weekly for 8 weeks to include the following interventions: Electrical Stimulation Attended, Gait Training, Moist Heat/ Ice, Neuromuscular Re-ed, Orthotic Management and Training, Patient Education, Self Care, Therapeutic Activities, Therapeutic Exercise and Prosthetic Management.      Mindy [email or web address] [phone]

## 2022-11-04 ENCOUNTER — CLINICAL SUPPORT (OUTPATIENT)
Dept: REHABILITATION | Facility: HOSPITAL | Age: 82
End: 2022-11-04
Payer: MEDICARE

## 2022-11-04 DIAGNOSIS — R53.1 DECREASED STRENGTH, ENDURANCE, AND MOBILITY: ICD-10-CM

## 2022-11-04 DIAGNOSIS — M25.662 DECREASED RANGE OF MOTION OF BOTH LOWER EXTREMITIES: Primary | ICD-10-CM

## 2022-11-04 DIAGNOSIS — M25.661 DECREASED RANGE OF MOTION OF BOTH LOWER EXTREMITIES: Primary | ICD-10-CM

## 2022-11-04 DIAGNOSIS — Z74.09 DECREASED STRENGTH, ENDURANCE, AND MOBILITY: ICD-10-CM

## 2022-11-04 DIAGNOSIS — R68.89 DECREASED STRENGTH, ENDURANCE, AND MOBILITY: ICD-10-CM

## 2022-11-04 PROCEDURE — 97110 THERAPEUTIC EXERCISES: CPT | Mod: KX

## 2022-11-04 PROCEDURE — 97116 GAIT TRAINING THERAPY: CPT | Mod: KX

## 2022-11-04 NOTE — PROGRESS NOTES
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 Walking in and out of his home this morning before this session.  He reports stretching daily. He would like to move forward with his MD placing an order for a rollator.    Response to previous treatment: good with no adverse effects.    Functional change:  He reports that he is feeling stronger with more confidence with standing/walking and is able to stand for longer bouts at home. He is able to stand with only 1 upper extremity support with improved confidence and is able to perform sit to stand with no upper extremity support.  Improved standing dynamic balance to tuck in the back of his shirt.     Pain: 0/10 at rest  Location: not applicable     OBJECTIVE     Objective Measures updated at progress report unless specified.  Re-Assessment for (0) minutes:    8/8/22  range of motion of bilateral knees measured with NuStep - Right = -20, Left = -23  Sitting with bilateral lower extremities over TBall: Right knee = -12, Left knee = -22  In supine: Right hip = -15 , Right knee = -30                   Left hip = -15, Left knee = -35  Functional Mobility: Transfers modified independent with walker and bed mobility with independence  Ambulation Re-Assessment -  ambulating 150 ft with walker with supervision to modified independence.   Supine: Right knee Extension = -25, Left knee extension = -30    9/19/22:   Patient presents with bruising around his Right eye, Left elbow abrasion and complaints of Left hip pain with palpation.  Skin inspection to Left hip and no redness or bruising noted.     9/30/22:  Sitting: Knee extension = -18 Right, -23 Left   Supine: Hip extension = -18 Right, -28 Left; Knee extension = -15 Right, -20 Left   Can now perform sit to stand transfer from raised seat surface (AirEx pad on chair seat) without upper extremity support  Hip flexion 4/5 bilateral, knee flexion 5/5, Right dorsiflexion 4- and Right plantarflexion 4+  Treatment     Hunter received the treatments listed below:      THERAPEUTIC EXERCISES to develop strength, endurance, ROM, flexibility, posture and core stabilization for (15) minutes including:  Intervention Performed Today    short arc quad in supine  lower extremities over wedge 2x20 Right AROM, Left 2x20 Active Assistive for form and quality    sidelying toward prone   Stretch to Hip flexor 3x30 seconds each side   Quad set   2x10 Left     Sidelying hip extension   2x10 bilaterally with 10 seconds endrange stretch on last rep   Makena and Donning Left Prosthetic leg  Performed at edge of mat    Sitting in reclined position at edge of mat   Hip flexor stretch 3x30 seconds bilateral    Hip Abduction  In sidelying 2x10 bilateral    Bridge attempt  1x5   Posterior Pelvic Tilt Supine  2x5   Prone lying over light blue small triangle wedge  Holding position 2 minutes, then 4 minutes   Stretch to Hamstrings  3 x 1 minute Right lower extremity    Heelslide with forceful push towards extension  2x10 bilateral with 10 second enrange hold stretch on last rep    10 # over distal Left thigh  Stretch to Left hip flexor for over 5 minutes while exercising Right lower extremity    Prone Hamstring curl endrange for quad/hip flexor stretch   2x10 AAROM   lower extremity lifts towards extension in prone  2x10 AAROM   Prone hip flexor stretch   3x10 second holds bilateral    Standing frame raising to patient's tolerance                                                            - Standing 2x's in standing frame holding each stand for 5  minutes working on the following:  - Horizontal Abduction with bilateral upper extremities 2x10 AROM   - terminal knee extension in stand position 2x10 bilateral   - Rows holding red theraband bilateral  2x10  - Pushing through upper extremities to achieve trunk extension 1x10  - cross body punch with 2# dumbbell 1x10 bilateral   - scap retraction 2x10  - attempted 1x10 shoulder rolls backward - patient had difficulty coordinating  - Overhead press with green tband bilateral 1x10  - Patient given feedback on standing frame position - working on increasing seat angle after 30 second rest between lifts (repeated 3-4 times)  - Lateral reaches 1x8 bilateral   - Beach Ball Volley 1x20 hits working on upright trunk posture  - Pulling with bilateral upper extremities on tray at front to pull hips forward from seat and extend knees 2x25  - Reaching Left and Right with cones to target to stretch lower extremities with each reach 2x10 cones to each side incorporating reach posteriorly    Shuttle    - 2x15 bilateral leg press with 5 bands   -2x15 single leg press with 3 bands for each leg    NuStep  X   8 minutes at Level 4-5, working on alternating movement of lower extremities and stretch to bilateral knees (able to scoot seat position back to #13 to increase knee extension range of motion    Step ups to a 4 in step in the parallel bars  - 1x10 Left    Stairs  4 stairs with bilateral rails with Contact Guard Assist   Parallel Bars          - Standing heelcord stretch 3x10 seconds Right   - step ups to 4 in step 10x's bilateral   - Stepping over 4 short hurdles forward and backward 1x each   Supine   - Heel slides 1x10 bilateral  "with end range 10 second hold    Sitting edge of mat   - mini lifts from raised surface, progressively lowering height after each set 3x5   - sit to stand from raised surface that progressively lowered after each successful stand 4x's    Stairs  - Step ups with Left lower extremity and down with right 4 steps with bilateral upper extremity support    Standing Frame  Standing 1 x in standing frame working on holding endrange position of approximately 55 degree seat angle for 5 minutes   Standing in parallel bars working sit to stands           - standing 2x's  - Standing weights shifts Left to Right 1x10  - Standing knee extension 1x10  - Standing Left upper extremity lifts off bar 1x5  - Standing bilateral upper extremity lifts off bar 1x10 with PT helping to hold hips for support with minimum to moderate assist   Nautilus leg extension X     - 3x10 with 1 plate of resistance weight with eccentric quad control    Parallel bars alternately tapping 4 inch step x 1 upper extremity support 1x5 bilateral    Sitting on gray ball  - weight shifts Left and Right 1x10  - weight shifts forward/back 1x10  - single leg lifts 1x5 bilateral then 5 seconds hold 2x's bilateral   - lunge over forward leg 1x10 bilateral    Sitting long arc quad     - 3 x 10 bilateral 5 pounds  - 10 second end range hold wit 5# bilateral    Standing terminal knee extension  X 30 red band bilateral holding rolling walker cues to stand erect    Sitting to squat without upper extremity support   - 1x5 from 24" box  - 1x5 from 20" box   Air Ex Beam    - side stepping 1x   - tandem walking x's      * In standing frame:  Sitting position = 0 degree seat angle  Full upright stand position with hips and knees at 0 degrees = 90 degree seat angle  3/15/22 - 1st  standing frame was at 42 degree seat angle, last stand position was at 65 degree seat angle  3/17/22 - 1st  standing frame was at 48 degree seat angle, last stand position was at 70 " degree seat angle  3/21/22 - 1st  standing frame was at 52 degree seat angle, last stand position was at 70 degree seat angle  3/29/22 - 1st  standing frame was at 60 degree seat angle, last stand position was at 72 degree seat angle  3/31/22 - 1st  standing frame was at 55 degree seat angle, last stand position was at 75 degree seat angle  4/7/22 - 1st  standing frame was at 60 degree seat angle, last stand position was at 80 degree seat angle  4/11/22 - 1st  standing frame was at 60 degree seat angle, last stand position was at 78 degree seat angle  4/14/22 - 1st  standing frame was at 55 degree seat angle, last stand position was at 86 degree seat angle  4/19/22 - 1st  standing frame was at 60 degree seat angle, last stand position was at 78 degree seat angle  4/22/22 - 1st  standing frame was at 65 degree seat angle, last stand position was at 81 degree seat angle  4/26/22 - 1st  standing frame was at 65 degree seat angle, last stand position was at 82 degree seat angle  5/3/22 - 1st  standing frame was at 65 degree seat angle, last stand position was at 88 degree seat angle  5/5/22 - 1st  standing frame was at 65 degree seat angle, last stand position was at 84 degree seat angle  5/10/22 - 1st  standing frame was at 60 degree seat angle, last stand position was at 80 degree seat angle  5/12/22 - 1st  standing frame was at 65 degree seat angle, last stand position was at 80 degree seat angle  5/17/22 - 1st  standing frame was at 62 degree seat angle, last stand position was at 82 degree seat angle  5/19/22 - 1st  standing frame was at 60 degree seat angle, last stand position was at 80 degree seat angle  5/24/22 - 1st  standing frame was at 62 degree seat angle, last stand position was at 82 degree seat angle  5/26/22 - 1st  standing frame was at 60 degree seat angle, last stand  position was at 80 degree seat angle   5/31/22 - 1st  standing frame was at 65 degree seat angle, last stand position was at 80 degree seat angle  6/2/22 - 1st  standing frame was at 70 degree seat angle, last stand position was at 82 degree seat angle  6/7/22 - 1st  standing frame was at 68 degree seat angle, last stand position was at 80 degree seat angle  6/10/22 - 1st  standing frame was at 60 degree seat angle, last stand position was at 80 degree seat angle  6/14/22 - 1st  standing frame was at 65 degree seat angle, last stand position was at 82 degree seat angle   6/16/22 - 1st  standing frame was at 70 degree seat angle, last stand position was at 84 degree seat angle  6/21/22 - 1st  standing frame was at 65 degree seat angle, last stand position was at 80 degree seat angle  10/21/22 - 1st  standing frame was at 82 degree seat angle, last stand position was at 82 degree seat angle   Plan for Next Visit:          MANUAL THERAPY TECHNIQUES were applied for () minutes, including:    Manual Intervention Performed Today    Soft Tissue Mobilization [] bilateral knee extension in supine   Joint Mobilizations []     []     []    Functional Dry Needling  []      Plan for Next Visit: Continue as needed        THERAPEUTIC ACTIVITIES to improve dynamic and functional  performance for (0) minutes including:    Intervention Performed Today    Bed - wheelchair transfer   Practiced set up and sequence   Bed mobility sit to supine to sit  Practiced sequence    Rolling   3 x each side working on sequence to incorporate hip flexor stretch on each side. Then separate stretch performed as listed above   Standing in parallel bars  4 x's working on several weight shifts towards hip extension and knee extension while standing.   Standing in parallel bars performing step forward  With Right lower extremity 5x's with moderate assistance to maintain standing position    Dynasplint Consult via FaceTime with Andriy to discuss fit     Sit to  parallel bars without assistance  2x's   Dynasplint Consult  Andriy Stout (Rep for Dynasplint) met with physical therapist and patient today during session to discuss options for dynasplints for bilateral knees due to patient's significant lack of range of motion.     Dynasplint Fitting  Andriyjohn Stout (Rep for Dynasplint) met with physical therapist and patient today to deliver and fit patient for bilateral knee dynasplints.  We discussed wearing time of 1-2 hours/day for the first week and then we would re-assess.  Patient was shown how to don each brace and straps were marked for future use.  The Dynasplint force was increased to 6 on the Left and 7 on the Right to start.  Extra padding was added to thin straps for skin protection.   Dynasplint Consult  Andriy Stout (Rep for Dynasplint) met with physical therapist and patient today during session to discuss proper fit and positioning of Dynasplint to feel a better stretch at home. Pt only brought Left splint to session today and his splint was donned, straps were tightened and new black line added for patient to follow at home.  Left splint was increased from 7/13 to 9/13 tension.  He reports that he places a pillow under his knee at home and he was educated on better positioning of pillow under distal stump with prosthesis removed.  He also felt more of a stretch in long sitting vs supine and was asked to try this position at home for part of the stretch if able.    Standing at sink in Neuro Room working on stand posture and positioning   Simulating activity that he has been given to do for homework. 1x5 seconds, 1x10 seconds   Patient educated addition to Home Exercise Program   - Simulated sit to stand setup  From wheelchair to sink at home performing with walker in front to work on letting go of sink and holding on to walker.  Patient was educated on how to sit safely to prevent  wheelchair from tipping backwards if he loses balance.  He was told to start with one hand on walker and one hand on counter top until he can progress to both hands on walker for support. This will be a way that he can safely challenge himself at home. Patient practiced standing to walker 3x's working on sequence and safety of task.   Patient expressed concerns about not knowing his current weight, so PT assisted patient with obtaining weight at start of session with wheelchair scale.   He currently weighs 139 #   Several transfers and sit to stands to adjust walker height as needed  Contact guard to stand by assist for each   Several sit to stand and wheelchair to machine transfers with and without walker performed throughout session  With independence.    Discussed Left prosthesis sleeve   - It is currently to large/long and often curls.  Patient shared number of company as he has been unsuccessful with getting rep to deliver new one.    Left Prosthesis adjusted to turn foot to neutral, standing and stepping between each adjustment to correct position of leg  2 adjustments.    Discussed full below knee amputation program to work on at home in preparation for discharge  Patient verbalized good understanding of program   Skin inspection of Left hip   - see objective session at start of note   Bed mobility and transfer    - Patient transferred 6x's during session with independence and no increased complaints of pain  - he transitioned through sit to supine, rolling to Right and supine to sit with independence    Patient education  - Patient discussed fall from last week, PT recommended that he always keep cell phone on his person in case this happens again.  He was also educated that if the pain persists, he may need to follow up with MD for an X-ray.    Transfer from wheelchair to chair seat to mat then back to chair and back to wheelchair  Supervision to modified independence   Bed mobility sit to supine to sit    Independent    [place]/donning prosthetic Left lower extremity with [place]  For measuring Left lower extremity    Patient education on results of re-assessments, importance of continuing proper stretching technique at home for hips and knees, practiced set-up. Also discussed increasing activity level throughout the day to spend less time in wheelchair and more time walking and sitting on other surfaces in his home.  Patient verbalized good understanding.      Plan for Next Visit:      Gait Training: for (30) minutes:  - Ambulated 150 ft with rollator with stand by assist over indoor surfaces and contact guard assist over outdoor surfaces (>300 ft) navigating brick, curb steps, concrete and grass.  - In parallel bars working on side stepping and forward walking/backward several times with 1 - 0 upper extremity support    Patient Education and Home Exercises     Home Exercises Provided and Patient Education Provided     Education provided:   -2/22/22 Patient educated on increasing length and consistency of stretching knees and hips to every 2 hours at home.  He was shown and demonstrated understanding of short arc quads, rolling toward prone and sitting in reclined position to stretch hip flexors.  -2/24/22 Patient given Home Exercise Program on exercises for lower extremities to be performed 2x15 3x's/day  See patient Instructions in EMR  -3/1/22 Re-inforced Home Exercise Program and asked patient to incorporate prone lying for longer periods of time to build up to 20 minutes per day.  3/3/22 - Discussed the option of dynasplints for bilateral lower extremities. Patient educated on the option of Dynasplints vs basic knee braces and encouraged to consider Dynasplints as a more aggressive and efficient way of properly positioning and dynamically stretching his legs outside of therapy.  3/8/22 - Patient was educated on Dynasplints with Andriy Stout (Rep).   He was shown pictures on ipad and Andriy and physical  therapist discussed wearing schedule to make gains with his muscle length outside of therapy sessions.  Andriy informed patient that he would assist with collecting information regarding insurance coverage and provide him with any out of pocket costs so he can decide if he would like to pursue this option.  3/22/22 - Patient was educated that Andriy Stout (Rep for Dynasplint) messaged me that his splints have been approved and are being mailed this week.   3/24/22 - Patient was educated on Dynasplint management and importance of daily skin checks.  His tolerance of wearing splints for the first week will determine his wearing schedule for the following weeks.  3/29/22 - Patient was educated on only wearing Dynasplints while lying down to get the most effective stretch to lower extremities.  3/31/22 - Patient was asked to reach out to Dynasplint rep (Andriy) to set up a time for Andriy to re-assess the positioning and fit of splints to see if he can feel more of a stretch with wearing schedule.  4/7/22 - Patient was asked to reach out to Dynasplint rep to make sure that splints are adjusted appropriately.  4/11/22 - PT recommended that patient follow up with [facility] rep for reassessment of splint fit.  4/14/22 - Patient informed that PT would reach out to [facility] rep again to have him schedule a visit.  He was encouraged to continue being active at home.  4/18/22 -  [facility] time for a consult that would work for his schedule.  4/28/22 - Patient educated on wearing Dynaspints for 2 hours/day for the next 4 days and then we will assess his ability to increase to 4 hours/day after next visit.  He verbalized good understanding of education from Andriy (Ricardolinjohn) and was told to try Right splint on in new wear position first before increasing tension to 9/13.    5/3/22 - Patient educated on attempting standing at home at sink holding stand position for 10-15 seconds repeating  3x's and doing this in the morning and afternoon every day.   5/12/22 - Patient educated on the importance of walking with walker each session moving forward to try to get range applied to a functional task.  5/26/22 - Patient educated on adding walker to sit to stands at sink as a way of increasing his stand balance safely at home alone.  6/7/22 - Patient educated on his gains at this point and that PT is requesting more visits for a renewed Plan of Care.   6/10/22 - Patient educated on progressing to standing marches with walker at home and standing lateral foot taps with walker at home in same set up as before with kitchen counter in front and wheelchair behind for safety.  6/21/22 - Patient educated on stair mobility sequence.   6/23/22 - Patient educated on the risk of falls with walking home alone at this time.   7/28/22 - Patient educated on safety with gait at home and advancing gait challenges in therapy.  8/8/22 - Patient educated on proper technique of side lying hip extension and that Plan of Care would be extended for another month in preparation for discharge.   8/12/22 - Discussed contacting prosthetic company to replace sleeve as it is too large and long.   9/2/22 - Discussed full below knee amputation home program in preparation for discharge.    9/19/22 - Patient educated on fall safety (see therapeutic activity section)  9/30/22 - See above in therapeutic activity section  10/10/22 - Discussed incorporating outdoor walking in future sessions.    Home Exercise Provided:  Exercises were reviewed and Hunter was able to verbalize good understanding prior to the end of the session.  Hunter demonstrated good  understanding of the education provided.   ASSESSMENT     Patient responded well to session today with progression to rollator for ambulation.  He demonstrated improved balance and safety navigating community surfaces and would benefit from continued practice with rollator.  He improved with standing  and walking endurance with distances outside today.      Pt prognosis is Fair  To Good    Pt will continue to benefit from skilled outpatient physical therapy to address the deficits listed in the problem list box on initial evaluation, provide pt/family education and to maximize pt's level of independence in the home and community environment.     Pt's spiritual, cultural and educational needs considered and pt agreeable to plan of care and goals.     Anticipated Barriers for therapy: co-morbidities, transportation assistance needed at times, lifestyle, potential contractures of knees/hips    GOALS:     Short Term Goals:  4 weeks Progress    Function: Patient will demonstrate improved function as indicated by a functional limitation score of less than or equal to 55 out of 100 on FOTO = 44% limitation PC   Mobility: Patient will improve sit to stand transfer with moderate assistance in order to progress towards independence with functional activities. (Met)  (Modified) - Patient will perform wheelchair to mat transfer with stand by assist to demonstrate improved independence with mobility. Met   Gait: Patient will ambulated 3 steps in parallel bars with Maximum assistance to demonstrate improved strength, posture and endurance met   HEP: Patient will demonstrate independence with HEP in order to progress toward functional independence. met    Assess patient's needs for Dynasplints and set up consultation if needed. met       Long Term Goals:  8 weeks Progress   Function: Patient will demonstrate improved function as indicated by a functional limitation score of less than or equal to 60 out of 100 on FOTO = 40 % limitation PC   Mobility: Patient will improve AROM of bilateral knees to -10 degrees  in order to return to maximal functional potential and improve quality of life. PC   Functional Mobility - Patient will perform mat to wheelchair transfer with modified independence with walker in order to improve his  independence with functional mobility. Met   Gait: Patient will ambulate 15 feet with rolling walker with moderate assistance to demonstrate improved strength, endurance and mobility. (Met)   (Modified) - Patient will ambulate 100 ft with walker incorporating turns with modified independence to negotiate home environment with independence. Met   HEP: Patient educated on HEP in preparation for d/c verbalizing and demonstrating good understanding of topics discussed.    Met            Goals Key:  PC= progressing/continue;        PM= partially met;             DC= discontinue         PLAN     Continue Plan of Care (POC) and progress per patient tolerance. See Treatment section for exercise progression.  Outpatient Physical Therapy 2 times weekly for 8 weeks to include the following interventions: Electrical Stimulation Attended, Gait Training, Moist Heat/ Ice, Neuromuscular Re-ed, Orthotic Management and Training, Patient Education, Self Care, Therapeutic Activities, Therapeutic Exercise and Prosthetic Management.      Mindy Patino, PT

## 2022-11-07 ENCOUNTER — CLINICAL SUPPORT (OUTPATIENT)
Dept: REHABILITATION | Facility: HOSPITAL | Age: 82
End: 2022-11-07
Payer: MEDICARE

## 2022-11-07 DIAGNOSIS — R68.89 DECREASED STRENGTH, ENDURANCE, AND MOBILITY: ICD-10-CM

## 2022-11-07 DIAGNOSIS — R53.1 DECREASED STRENGTH, ENDURANCE, AND MOBILITY: ICD-10-CM

## 2022-11-07 DIAGNOSIS — M25.661 DECREASED RANGE OF MOTION OF BOTH LOWER EXTREMITIES: Primary | ICD-10-CM

## 2022-11-07 DIAGNOSIS — Z74.09 DECREASED STRENGTH, ENDURANCE, AND MOBILITY: ICD-10-CM

## 2022-11-07 DIAGNOSIS — M25.662 DECREASED RANGE OF MOTION OF BOTH LOWER EXTREMITIES: Primary | ICD-10-CM

## 2022-11-07 PROCEDURE — 97110 THERAPEUTIC EXERCISES: CPT | Mod: KX

## 2022-11-07 PROCEDURE — 97116 GAIT TRAINING THERAPY: CPT | Mod: KX

## 2022-11-07 NOTE — PROGRESS NOTES
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 Patient reports walking at home prior to session today.     Response to previous treatment: good with no adverse effects.    Functional change:  He reports that he is feeling stronger with more confidence with standing/walking and is able to stand for longer bouts at home. He is able to stand with only 1 upper extremity support with improved confidence and is able to perform sit to stand with no upper extremity support.  Improved standing dynamic balance to tuck in the back of his shirt.     Pain: 0/10 at rest  Location: not applicable     OBJECTIVE     Objective Measures updated at progress report unless specified.  Re-Assessment for (0) minutes:    8/8/22  range of motion of bilateral knees measured with NuStep - Right = -20, Left = -23  Sitting with bilateral lower extremities over TBall: Right knee = -12, Left knee = -22  In supine: Right hip = -15 , Right knee = -30                   Left hip = -15, Left knee = -35  Functional Mobility: Transfers modified independent with walker and bed mobility with independence  Ambulation Re-Assessment - ambulating 150 ft with walker with supervision to modified independence.   Supine: Right knee Extension = -25, Left knee  extension = -30    9/19/22:   Patient presents with bruising around his Right eye, Left elbow abrasion and complaints of Left hip pain with palpation.  Skin inspection to Left hip and no redness or bruising noted.     9/30/22:  Sitting: Knee extension = -18 Right, -23 Left   Supine: Hip extension = -18 Right, -28 Left; Knee extension = -15 Right, -20 Left   Can now perform sit to stand transfer from raised seat surface (AirEx pad on chair seat) without upper extremity support  Hip flexion 4/5 bilateral, knee flexion 5/5, Right dorsiflexion 4- and Right plantarflexion 4+  Treatment     Hunter received the treatments listed below:      THERAPEUTIC EXERCISES to develop strength, endurance, ROM, flexibility, posture and core stabilization for (34) minutes including:  Intervention Performed Today    short arc quad in supine  lower extremities over wedge 2x20 Right AROM, Left 2x20 Active Assistive for form and quality    sidelying toward prone   Stretch to Hip flexor 3x30 seconds each side   Quad set   2x10 Left     Sidelying hip extension   2x10 bilaterally with 10 seconds endrange stretch on last rep   [place] Left Prosthetic leg  Performed at edge of mat    Sitting in reclined position at edge of mat   Hip flexor stretch 3x30 seconds bilateral    Hip Abduction  In sidelying 2x10 bilateral    Bridge attempt  1x5   Posterior Pelvic Tilt Supine  2x5   Prone lying over light blue small triangle wedge  Holding position 2 minutes, then 4 minutes   Stretch to Hamstrings  3 x 1 minute Right lower extremity    Heelslide with forceful push towards extension x 2x10 bilateral with 10 second enrange hold stretch on last rep    Seated long arc quad  x - 3x8 bilateral 9# Right, 8# Left    10 # over distal Left thigh  Stretch to Left hip flexor for over 5 minutes while exercising Right lower extremity    Prone Hamstring curl endrange for quad/hip flexor stretch  2x10 AAROM   lower extremity lifts towards extension in prone   2x10 AAROM   Prone hip flexor stretch   3x10 second holds bilateral    Standing frame raising to patient's tolerance                                                            - Standing 2x's in standing frame holding each stand for 5  minutes working on the following:  - Horizontal Abduction with bilateral upper extremities 2x10 AROM   - terminal knee extension in stand position 2x10 bilateral   - Rows holding red theraband bilateral  2x10  - Pushing through upper extremities to achieve trunk extension 1x10  - cross body punch with 2# dumbbell 1x10 bilateral   - scap retraction 2x10  - attempted 1x10 shoulder rolls backward - patient had difficulty coordinating  - Overhead press with green tband bilateral 1x10  - Patient given feedback on standing frame position - working on increasing seat angle after 30 second rest between lifts (repeated 3-4 times)  - Lateral reaches 1x8 bilateral   - [place] 1x20 hits working on upright trunk posture  - Pulling with bilateral upper extremities on tray at front to pull hips forward from seat and extend knees 2x25  - Reaching Left and Right with cones to target to stretch lower extremities with each reach 2x10 cones to each side incorporating reach posteriorly    Shuttle    - 2x15 bilateral leg press with 5 bands   -2x15 single leg press with 3 bands for each leg    NuStep  X   10 minutes at Level 4, working on alternating movement of lower extremities and stretch to bilateral knees (able to scoot seat position back to #14 to increase knee extension range of motion    AirEx Pad X  X   - weight shifts Left and Right 1x8  - weight shifts forward and back in staggered stance 1x8 bilateral    Step ups to a 4 in step in the parallel bars  - 1x10 Left    Stairs  4 stairs with bilateral rails with Contact Guard Assist   Parallel Bars          - Standing heelcord stretch 3x10 seconds Right   - step ups to 4 in step 10x's bilateral   - Stepping over 4 short hurdles forward and  "backward 1x each   Supine   - Heel slides 1x10 bilateral with end range 10 second hold    Sitting edge of mat   - mini lifts from raised surface, progressively lowering height after each set 3x5   - sit to stand from raised surface that progressively lowered after each successful stand 4x's    Stairs  - Step ups with Left lower extremity and down with right 4 steps with bilateral upper extremity support    Standing Frame  Standing 1 x in standing frame working on holding endrange position of approximately 55 degree seat angle for 5 minutes   Standing in parallel bars working sit to stands           - standing 2x's  - Standing weights shifts Left to Right 1x10  - Standing knee extension 1x10  - Standing Left upper extremity lifts off bar 1x5  - Standing bilateral upper extremity lifts off bar 1x10 with PT helping to hold hips for support with minimum to moderate assist   Nautilus leg extension      - 3x10 with 1 plate of resistance weight with eccentric quad control    Parallel bars alternately tapping 4 inch step  1 upper extremity support 1x5 bilateral    Sitting on gray ball  - weight shifts Left and Right 1x10  - weight shifts forward/back 1x10  - single leg lifts 1x5 bilateral then 5 seconds hold 2x's bilateral   - lunge over forward leg 1x10 bilateral    Sitting long arc quad     - 3 x 10 bilateral 5 pounds  - 10 second end range hold wit 5# bilateral    Standing terminal knee extension  X 30 red band bilateral holding rolling walker cues to stand erect    Sitting to squat without upper extremity support   - 1x5 from 24" box  - 1x5 from 20" box   Air Ex Beam    - side stepping 1x   - tandem walking x's      * In standing frame:  Sitting position = 0 degree seat angle  Full upright stand position with hips and knees at 0 degrees = 90 degree seat angle  3/15/22 - 1st  standing frame was at 42 degree seat angle, last stand position was at 65 degree seat angle  3/17/22 - 1st  standing frame was at " 48 degree seat angle, last stand position was at 70 degree seat angle  3/21/22 - 1st  standing frame was at 52 degree seat angle, last stand position was at 70 degree seat angle  3/29/22 - 1st  standing frame was at 60 degree seat angle, last stand position was at 72 degree seat angle  3/31/22 - 1st  standing frame was at 55 degree seat angle, last stand position was at 75 degree seat angle  4/7/22 - 1st  standing frame was at 60 degree seat angle, last stand position was at 80 degree seat angle  4/11/22 - 1st  standing frame was at 60 degree seat angle, last stand position was at 78 degree seat angle  4/14/22 - 1st  standing frame was at 55 degree seat angle, last stand position was at 86 degree seat angle  4/19/22 - 1st  standing frame was at 60 degree seat angle, last stand position was at 78 degree seat angle  4/22/22 - 1st  standing frame was at 65 degree seat angle, last stand position was at 81 degree seat angle  4/26/22 - 1st  standing frame was at 65 degree seat angle, last stand position was at 82 degree seat angle  5/3/22 - 1st  standing frame was at 65 degree seat angle, last stand position was at 88 degree seat angle  5/5/22 - 1st  standing frame was at 65 degree seat angle, last stand position was at 84 degree seat angle  5/10/22 - 1st  standing frame was at 60 degree seat angle, last stand position was at 80 degree seat angle  5/12/22 - 1st  standing frame was at 65 degree seat angle, last stand position was at 80 degree seat angle  5/17/22 - 1st  standing frame was at 62 degree seat angle, last stand position was at 82 degree seat angle  5/19/22 - 1st  standing frame was at 60 degree seat angle, last stand position was at 80 degree seat angle  5/24/22 - 1st  standing frame was at 62 degree seat angle, last stand position was at 82 degree seat angle  5/26/22 - 1st   standing frame was at 60 degree seat angle, last stand position was at 80 degree seat angle   5/31/22 - 1st  standing frame was at 65 degree seat angle, last stand position was at 80 degree seat angle  6/2/22 - 1st  standing frame was at 70 degree seat angle, last stand position was at 82 degree seat angle  6/7/22 - 1st  standing frame was at 68 degree seat angle, last stand position was at 80 degree seat angle  6/10/22 - 1st  standing frame was at 60 degree seat angle, last stand position was at 80 degree seat angle  6/14/22 - 1st  standing frame was at 65 degree seat angle, last stand position was at 82 degree seat angle   6/16/22 - 1st  standing frame was at 70 degree seat angle, last stand position was at 84 degree seat angle  6/21/22 - 1st  standing frame was at 65 degree seat angle, last stand position was at 80 degree seat angle  10/21/22 - 1st  standing frame was at 82 degree seat angle, last stand position was at 82 degree seat angle   Plan for Next Visit:          MANUAL THERAPY TECHNIQUES were applied for () minutes, including:    Manual Intervention Performed Today    Soft Tissue Mobilization [] bilateral knee extension in supine   Joint Mobilizations []     []     []    Functional Dry Needling  []      Plan for Next Visit: Continue as needed        THERAPEUTIC ACTIVITIES to improve dynamic and functional  performance for (0) minutes including:    Intervention Performed Today    Bed - wheelchair transfer   Practiced set up and sequence   Bed mobility sit to supine to sit  Practiced sequence    Rolling   3 x each side working on sequence to incorporate hip flexor stretch on each side. Then separate stretch performed as listed above   Standing in parallel bars  4 x's working on several weight shifts towards hip extension and knee extension while standing.   Standing in parallel bars performing step forward  With Right lower extremity 5x's with  moderate assistance to maintain standing position   Dynasplint Consult via FaceTime with Andriy to discuss fit     Sit to  parallel bars without assistance  2x's   Dynasplint Consult  Andriy Stout (Rep for Dynasplint) met with physical therapist and patient today during session to discuss options for dynasplints for bilateral knees due to patient's significant lack of range of motion.     Dynasplint Fitting  Andriyjohn Stout (Rep for Dynasplint) met with physical therapist and patient today to deliver and fit patient for bilateral knee dynasplints.  We discussed wearing time of 1-2 hours/day for the first week and then we would re-assess.  Patient was shown how to don each brace and straps were marked for future use.  The Dynasplint force was increased to 6 on the Left and 7 on the Right to start.  Extra padding was added to thin straps for skin protection.   Dynasplint Consult  Andriy Stout (Rep for Dynasplint) met with physical therapist and patient today during session to discuss proper fit and positioning of Dynasplint to feel a better stretch at home. Pt only brought Left splint to session today and his splint was donned, straps were tightened and new black line added for patient to follow at home.  Left splint was increased from 7/13 to 9/13 tension.  He reports that he places a pillow under his knee at home and he was educated on better positioning of pillow under distal stump with prosthesis removed.  He also felt more of a stretch in long sitting vs supine and was asked to try this position at home for part of the stretch if able.    Standing at sink in Neuro Room working on stand posture and positioning   Simulating activity that he has been given to do for homework. 1x5 seconds, 1x10 seconds   Patient educated addition to Home Exercise Program   - Simulated sit to stand setup  From wheelchair to sink at home performing with walker in front to work on letting go of sink and holding on to walker.  Patient  was educated on how to sit safely to prevent wheelchair from tipping backwards if he loses balance.  He was told to start with one hand on walker and one hand on counter top until he can progress to both hands on walker for support. This will be a way that he can safely challenge himself at home. Patient practiced standing to walker 3x's working on sequence and safety of task.   Patient expressed concerns about not knowing his current weight, so PT assisted patient with obtaining weight at start of session with wheelchair scale.   He currently weighs 139 #   Several transfers and sit to stands to adjust walker height as needed  Contact guard to stand by assist for each   Several sit to stand and wheelchair to machine transfers with and without walker performed throughout session  With independence.    Discussed Left prosthesis sleeve   - It is currently to large/long and often curls.  Patient shared number of company as he has been unsuccessful with getting rep to deliver new one.    Left Prosthesis adjusted to turn foot to neutral, standing and stepping between each adjustment to correct position of leg  2 adjustments.    Discussed full below knee amputation program to work on at home in preparation for discharge  Patient verbalized good understanding of program   Skin inspection of Left hip   - see objective session at start of note   Bed mobility and transfer    - Patient transferred 6x's during session with independence and no increased complaints of pain  - he transitioned through sit to supine, rolling to Right and supine to sit with independence    Patient education  - Patient discussed fall from last week, PT recommended that he always keep cell phone on his person in case this happens again.  He was also educated that if the pain persists, he may need to follow up with MD for an X-ray.    Transfer from wheelchair to chair seat to mat then back to chair and back to wheelchair  Supervision to modified  independence   Bed mobility sit to supine to sit   Independent    [place] prosthetic Left lower extremity with [place]  For measuring Left lower extremity    Patient education on results of re-assessments, importance of continuing proper stretching technique at home for hips and knees, practiced set-up. Also discussed increasing activity level throughout the day to spend less time in wheelchair and more time walking and sitting on other surfaces in his home.  Patient verbalized good understanding.      Plan for Next Visit:      Gait Training: for (10) minutes:  - Ambulated 150 ft (2x's) with rollator with stand by assist to contact guard assist over indoor surfaces - In parallel bars working on side stepping and forward walking/backward several times with 1 - 0 upper extremity support    Patient Education and Home Exercises     Home Exercises Provided and Patient Education Provided     Education provided:   -2/22/22 Patient educated on increasing length and consistency of stretching knees and hips to every 2 hours at home.  He was shown and demonstrated understanding of short arc quads, rolling toward prone and sitting in reclined position to stretch hip flexors.  -2/24/22 Patient given Home Exercise Program on exercises for lower extremities to be performed 2x15 3x's/day  See patient Instructions in EMR  -3/1/22 Re-inforced Home Exercise Program and asked patient to incorporate prone lying for longer periods of time to build up to 20 minutes per day.  3/3/22 - Discussed the option of dynasplints for bilateral lower extremities. Patient educated on the option of Dynasplints vs basic knee braces and encouraged to consider Dynasplints as a more aggressive and efficient way of properly positioning and dynamically stretching his legs outside of therapy.  3/8/22 - Patient was educated on Dynasplints with Andriy Stout ().   He was shown pictures on ipad and Andriy and physical therapist discussed wearing  schedule to make gains with his muscle length outside of therapy sessions.  Andriy informed patient that he would assist with collecting information regarding insurance coverage and provide him with any out of pocket costs so he can decide if he would like to pursue this option.  3/22/22 - Patient was educated that Andriy Stout (Rep for Dynasplint) messaged me that his splints have been approved and are being mailed this week.   3/24/22 - Patient was educated on Dynasplint management and importance of daily skin checks.  His tolerance of wearing splints for the first week will determine his wearing schedule for the following weeks.  3/29/22 - Patient was educated on only wearing Dynasplints while lying down to get the most effective stretch to lower extremities.  3/31/22 - Patient was asked to reach out to Dynasplint rep (Andriy) to set up a time for Andriy to re-assess the positioning and fit of splints to see if he can feel more of a stretch with wearing schedule.  4/7/22 - Patient was asked to reach out to Dynasplint rep to make sure that splints are adjusted appropriately.  4/11/22 - [facility].  4/14/22 - Patient informed that PT would [facility] to have him schedule a visit.  He was encouraged to continue being active at home.  4/18/22 -  [facility] time for a consult that would work for his schedule.  4/28/22 - Patient educated on wearing Dynaspints for 2 hours/day for the next 4 days and then we will assess his ability to increase to 4 hours/day after next visit.  He verbalized good understanding of education from Andriy (Dynasplinjohn) and was told to try Right splint on in new wear position first before increasing tension to 9/13.    5/3/22 - Patient educated on attempting standing at home at sink holding stand position for 10-15 seconds repeating 3x's and doing this in the  morning and afternoon every day.   5/12/22 - Patient educated on the importance of walking with walker each session moving forward to try to get range applied to a functional task.  5/26/22 - Patient educated on adding walker to sit to stands at sink as a way of increasing his stand balance safely at home alone.  6/7/22 - Patient educated on his gains at this point and that PT is requesting more visits for a renewed Plan of Care.   6/10/22 - Patient educated on progressing to standing marches with walker at home and standing lateral foot taps with walker at home in same set up as before with kitchen counter in front and wheelchair behind for safety.  6/21/22 - Patient educated on stair mobility sequence.   6/23/22 - Patient educated on the risk of falls with walking home alone at this time.   7/28/22 - Patient educated on safety with gait at home and advancing gait challenges in therapy.  8/8/22 - Patient educated on proper technique of side lying hip extension and that Plan of Care would be extended for another month in preparation for discharge.   8/12/22 - Discussed contacting prosthetic company to replace sleeve as it is too large and long.   9/2/22 - Discussed full below knee amputation home program in preparation for discharge.    9/19/22 - Patient educated on fall safety (see therapeutic activity section)  9/30/22 - See above in therapeutic activity section  10/10/22 - Discussed incorporating outdoor walking in future sessions.    Home Exercise Provided:  Exercises were reviewed and Hunter was able to verbalize good understanding prior to the end of the session.  Hunter demonstrated good  understanding of the education provided.   ASSESSMENT     Patient responded well to session today with progression to rollator for ambulation.  He had intermittent loss of balance with rollator today, mostly with initial stand and start of gait. He would benefit from continued practice with rollator.  He improved with standing  and walking endurance with distances outside today.      Pt prognosis is Fair  To Good    Pt will continue to benefit from skilled outpatient physical therapy to address the deficits listed in the problem list box on initial evaluation, provide pt/family education and to maximize pt's level of independence in the home and community environment.     Pt's spiritual, cultural and educational needs considered and pt agreeable to plan of care and goals.     Anticipated Barriers for therapy: co-morbidities, transportation assistance needed at times, lifestyle, potential contractures of knees/hips    GOALS:     Short Term Goals:  4 weeks Progress    Function: Patient will demonstrate improved function as indicated by a functional limitation score of less than or equal to 55 out of 100 on FOTO = 44% limitation PC   Mobility: Patient will improve sit to stand transfer with moderate assistance in order to progress towards independence with functional activities. (Met)  (Modified) - Patient will perform wheelchair to mat transfer with stand by assist to demonstrate improved independence with mobility. Met   Gait: Patient will ambulated 3 steps in parallel bars with Maximum assistance to demonstrate improved strength, posture and endurance met   HEP: Patient will demonstrate independence with HEP in order to progress toward functional independence. met    Assess patient's needs for Dynasplints and set up consultation if needed. met       Long Term Goals:  8 weeks Progress   Function: Patient will demonstrate improved function as indicated by a functional limitation score of less than or equal to 60 out of 100 on FOTO = 40 % limitation PC   Mobility: Patient will improve AROM of bilateral knees to -10 degrees  in order to return to maximal functional potential and improve quality of life. PC   Functional Mobility - Patient will perform mat to wheelchair transfer with modified independence with walker in order to improve his  independence with functional mobility. Met   Gait: Patient will ambulate 15 feet with rolling walker with moderate assistance to demonstrate improved strength, endurance and mobility. (Met)   (Modified) - Patient will ambulate 100 ft with walker incorporating turns with modified independence to negotiate home environment with independence. Met   HEP: Patient educated on HEP in preparation for d/c verbalizing and demonstrating good understanding of topics discussed.    Met            Goals Key:  PC= progressing/continue;        PM= partially met;             DC= discontinue         PLAN     Continue Plan of Care (POC) and progress per patient tolerance. See Treatment section for exercise progression.  Outpatient Physical Therapy 2 times weekly for 8 weeks to include the following interventions: Electrical Stimulation Attended, Gait Training, Moist Heat/ Ice, Neuromuscular Re-ed, Orthotic Management and Training, Patient Education, Self Care, Therapeutic Activities, Therapeutic Exercise and Prosthetic Management.      Mindy Patino, PT

## 2022-11-09 ENCOUNTER — CLINICAL SUPPORT (OUTPATIENT)
Dept: REHABILITATION | Facility: HOSPITAL | Age: 82
End: 2022-11-09
Payer: MEDICARE

## 2022-11-09 DIAGNOSIS — Z74.09 DECREASED STRENGTH, ENDURANCE, AND MOBILITY: ICD-10-CM

## 2022-11-09 DIAGNOSIS — M25.662 DECREASED RANGE OF MOTION OF BOTH LOWER EXTREMITIES: Primary | ICD-10-CM

## 2022-11-09 DIAGNOSIS — M25.661 DECREASED RANGE OF MOTION OF BOTH LOWER EXTREMITIES: Primary | ICD-10-CM

## 2022-11-09 DIAGNOSIS — R68.89 DECREASED STRENGTH, ENDURANCE, AND MOBILITY: ICD-10-CM

## 2022-11-09 DIAGNOSIS — R53.1 DECREASED STRENGTH, ENDURANCE, AND MOBILITY: ICD-10-CM

## 2022-11-09 PROCEDURE — 97116 GAIT TRAINING THERAPY: CPT | Mod: KX

## 2022-11-09 PROCEDURE — 97110 THERAPEUTIC EXERCISES: CPT | Mod: KX

## 2022-11-09 NOTE — PROGRESS NOTES
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 Patient reports that he has a Tele Health Appointment today with Dr. Dudley and will be requesting an order for a rollator.     Response to previous treatment: good with no adverse effects.    Functional change:  He reports that he is feeling stronger with more confidence with standing/walking and is able to stand for longer bouts at home. He is able to stand with only 1 upper extremity support with improved confidence and is able to perform sit to stand with no upper extremity support.  Improved standing dynamic balance to tuck in the back of his shirt.     Pain: 0/10 at rest  Location: not applicable     OBJECTIVE     Objective Measures updated at progress report unless specified.  Re-Assessment for (0) minutes:    8/8/22  range of motion of bilateral knees measured with NuStep - Right = -20, Left = -23  Sitting with bilateral lower extremities over TBall: Right knee = -12, Left knee = -22  In supine: Right hip = -15 , Right knee = -30                   Left hip = -15, Left knee = -35  Functional Mobility: Transfers modified independent with walker and bed mobility with independence  Ambulation Re-Assessment - ambulating 150 ft with walker with supervision to  modified independence.   Supine: Right knee Extension = -25, Left knee extension = -30    9/19/22:   Patient presents with bruising around his Right eye, Left elbow abrasion and complaints of Left hip pain with palpation.  Skin inspection to Left hip and no redness or bruising noted.     9/30/22:  Sitting: Knee extension = -18 Right, -23 Left   Supine: Hip extension = -18 Right, -28 Left; Knee extension = -15 Right, -20 Left   Can now perform sit to stand transfer from raised seat surface (AirEx pad on chair seat) without upper extremity support  Hip flexion 4/5 bilateral, knee flexion 5/5, Right dorsiflexion 4- and Right plantarflexion 4+  Treatment     Hunter received the treatments listed below:      THERAPEUTIC EXERCISES to develop strength, endurance, ROM, flexibility, posture and core stabilization for (32) minutes including:  Intervention Performed Today    short arc quad in supine  lower extremities over wedge 2x20 Right AROM, Left 2x20 Active Assistive for form and quality    sidelying toward prone   Stretch to Hip flexor 3x30 seconds each side   Quad set   2x10 Left     Sidelying hip extension   2x10 bilaterally with 10 seconds endrange stretch on last rep   [place] and [place] Left Prosthetic leg  Performed at edge of mat    Sitting in reclined position at edge of mat   Hip flexor stretch 3x30 seconds bilateral    Hip Abduction  In sidelying 2x10 bilateral    Bridge attempt  1x5   Posterior Pelvic Tilt Supine  2x5   Prone lying over light blue small triangle wedge  Holding position 2 minutes, then 4 minutes   Stretch to Hamstrings  3 x 1 minute Right lower extremity    Heelslide with forceful push towards extension  2x10 bilateral with 10 second enrange hold stretch on last rep    Seated long arc quad      10 # over distal Left thigh  Stretch to Left hip flexor for over 5 minutes while exercising Right lower extremity    Prone Hamstring curl endrange for quad/hip flexor stretch  2x10 AAROM   lower  extremity lifts towards extension in prone  2x10 AAROM   Prone hip flexor stretch   3x10 second holds bilateral    Standing frame raising to patient's tolerance                                                            - Standing 2x's in standing frame holding each stand for 5  minutes working on the following:  - Horizontal Abduction with bilateral upper extremities 2x10 AROM   - terminal knee extension in stand position 2x10 bilateral   - Rows holding red theraband bilateral  2x10  - Pushing through upper extremities to achieve trunk extension 1x10  - cross body punch with 2# dumbbell 1x10 bilateral   - scap retraction 2x10  - attempted 1x10 shoulder rolls backward - patient had difficulty coordinating  - Overhead press with green tband bilateral 1x10  - Patient given feedback on standing frame position - working on increasing seat angle after 30 second rest between lifts (repeated 3-4 times)  - Lateral reaches 1x8 bilateral   - [place] 1x20 hits working on upright trunk posture  - Pulling with bilateral upper extremities on tray at front to pull hips forward from seat and extend knees 2x25  - Reaching Left and Right with cones to target to stretch lower extremities with each reach 2x10 cones to each side incorporating reach posteriorly    Shuttle    - 2x15 bilateral leg press with 5 bands   -2x15 single leg press with 3 bands for each leg    NuStep  X   11 minutes at Level 4, working on alternating movement of lower extremities and stretch to bilateral knees (able to scoot seat position back to #14 to increase knee extension range of motion    AirEx Pad    - weight shifts Left and Right 1x8  - weight shifts forward and back in staggered stance 1x8 bilateral    Step ups to a 4 in step in the parallel bars  - 1x10 Left    Stairs  4 stairs with bilateral rails with Contact Guard Assist   Parallel Bars          - Standing heelcord stretch 3x10 seconds Right   - step ups to 4 in step 10x's bilateral   -  "Stepping over 4 short hurdles forward and backward 1x each   Supine   - Heel slides 1x10 bilateral with end range 10 second hold    Sitting edge of mat   - mini lifts from raised surface, progressively lowering height after each set 3x5   - sit to stand from raised surface that progressively lowered after each successful stand 4x's    Stairs  - Step ups with Left lower extremity and down with right 4 steps with bilateral upper extremity support    Standing Frame  Standing 1 x in standing frame working on holding endrange position of approximately 55 degree seat angle for 5 minutes   Standing in parallel bars working sit to stands           - standing 2x's  - Standing weights shifts Left to Right 1x10  - Standing knee extension 1x10  - Standing Left upper extremity lifts off bar 1x5  - Standing bilateral upper extremity lifts off bar 1x10 with PT helping to hold hips for support with minimum to moderate assist   Nautilus leg extension x     - 4x5 with 1 plate of resistance weight with eccentric quad control    Parallel bars alternately tapping 4 inch step  1 upper extremity support 1x5 bilateral    Sitting on gray ball  - weight shifts Left and Right 1x10  - weight shifts forward/back 1x10  - single leg lifts 1x5 bilateral then 5 seconds hold 2x's bilateral   - lunge over forward leg 1x10 bilateral    Sitting long arc quad  x   - 1x 10 bilateral 5# bilateral   - 10 second end range hold wit 5# bilateral    Sitting hamstring stretch  x - 2x 30 seconds bilateral    Standing terminal knee extension  X 30 red band bilateral holding rolling walker cues to stand erect    Single leg standing with 1-2 upper extremity support to work on standing endurance of single leg x - 2x's bilateral    Sitting to squat without upper extremity support   - 1x5 from 24" box  - 1x5 from 20" box   Air Ex Beam    - side stepping 1x   - tandem walking x's      * In standing frame:  Sitting position = 0 degree seat angle  Full upright stand " position with hips and knees at 0 degrees = 90 degree seat angle  3/15/22 - 1st  standing frame was at 42 degree seat angle, last stand position was at 65 degree seat angle  3/17/22 - 1st  standing frame was at 48 degree seat angle, last stand position was at 70 degree seat angle  3/21/22 - 1st  standing frame was at 52 degree seat angle, last stand position was at 70 degree seat angle  3/29/22 - 1st  standing frame was at 60 degree seat angle, last stand position was at 72 degree seat angle  3/31/22 - 1st  standing frame was at 55 degree seat angle, last stand position was at 75 degree seat angle  4/7/22 - 1st  standing frame was at 60 degree seat angle, last stand position was at 80 degree seat angle  4/11/22 - 1st  standing frame was at 60 degree seat angle, last stand position was at 78 degree seat angle  4/14/22 - 1st  standing frame was at 55 degree seat angle, last stand position was at 86 degree seat angle  4/19/22 - 1st  standing frame was at 60 degree seat angle, last stand position was at 78 degree seat angle  4/22/22 - 1st  standing frame was at 65 degree seat angle, last stand position was at 81 degree seat angle  4/26/22 - 1st  standing frame was at 65 degree seat angle, last stand position was at 82 degree seat angle  5/3/22 - 1st  standing frame was at 65 degree seat angle, last stand position was at 88 degree seat angle  5/5/22 - 1st  standing frame was at 65 degree seat angle, last stand position was at 84 degree seat angle  5/10/22 - 1st  standing frame was at 60 degree seat angle, last stand position was at 80 degree seat angle  5/12/22 - 1st  standing frame was at 65 degree seat angle, last stand position was at 80 degree seat angle  5/17/22 - 1st  standing frame was at 62 degree seat angle, last stand position was at 82 degree seat angle  5/19/22 - 1st   standing frame was at 60 degree seat angle, last stand position was at 80 degree seat angle  5/24/22 - 1st  standing frame was at 62 degree seat angle, last stand position was at 82 degree seat angle  5/26/22 - 1st  standing frame was at 60 degree seat angle, last stand position was at 80 degree seat angle   5/31/22 - 1st  standing frame was at 65 degree seat angle, last stand position was at 80 degree seat angle  6/2/22 - 1st  standing frame was at 70 degree seat angle, last stand position was at 82 degree seat angle  6/7/22 - 1st  standing frame was at 68 degree seat angle, last stand position was at 80 degree seat angle  6/10/22 - 1st  standing frame was at 60 degree seat angle, last stand position was at 80 degree seat angle  6/14/22 - 1st  standing frame was at 65 degree seat angle, last stand position was at 82 degree seat angle   6/16/22 - 1st  standing frame was at 70 degree seat angle, last stand position was at 84 degree seat angle  6/21/22 - 1st  standing frame was at 65 degree seat angle, last stand position was at 80 degree seat angle  10/21/22 - 1st  standing frame was at 82 degree seat angle, last stand position was at 82 degree seat angle   Plan for Next Visit:          MANUAL THERAPY TECHNIQUES were applied for () minutes, including:    Manual Intervention Performed Today    Soft Tissue Mobilization [] bilateral knee extension in supine   Joint Mobilizations []     []     []    Functional Dry Needling  []      Plan for Next Visit: Continue as needed        THERAPEUTIC ACTIVITIES to improve dynamic and functional  performance for (0) minutes including:    Intervention Performed Today    Bed - wheelchair transfer   Practiced set up and sequence   Bed mobility sit to supine to sit  Practiced sequence    Rolling   3 x each side working on sequence to incorporate hip flexor stretch on each side. Then separate stretch  performed as listed above   Standing in parallel bars  4 x's working on several weight shifts towards hip extension and knee extension while standing.   Standing in parallel bars performing step forward  With Right lower extremity 5x's with moderate assistance to maintain standing position   Dynasplint Consult via FaceTime with Andriy to discuss fit     Sit to  parallel bars without assistance  2x's   Dynasplint Consult  Andriy Stout (Rep for Dynasplint) met with physical therapist and patient today during session to discuss options for dynasplints for bilateral knees due to patient's significant lack of range of motion.     Dynasplint Fitting  Andriy Stout (Rep for Dynasplint) met with physical therapist and patient today to deliver and fit patient for bilateral knee dynasplints.  We discussed wearing time of 1-2 hours/day for the first week and then we would re-assess.  Patient was shown how to don each brace and straps were marked for future use.  The Dynasplint force was increased to 6 on the Left and 7 on the Right to start.  Extra padding was added to thin straps for skin protection.   Dynasplint Consult  Andriy Stout (Rep for Dynasplint) met with physical therapist and patient today during session to discuss proper fit and positioning of Dynasplint to feel a better stretch at home. Pt only brought Left splint to session today and his splint was donned, straps were tightened and new black line added for patient to follow at home.  Left splint was increased from 7/13 to 9/13 tension.  He reports that he places a pillow under his knee at home and he was educated on better positioning of pillow under distal stump with prosthesis removed.  He also felt more of a stretch in long sitting vs supine and was asked to try this position at home for part of the stretch if able.    Standing at sink in Neuro Room working on stand posture and positioning   Simulating activity that he has been given to do for homework. 1x5  seconds, 1x10 seconds   Patient educated addition to Home Exercise Program   - Simulated sit to stand setup  From wheelchair to sink at home performing with walker in front to work on letting go of sink and holding on to walker.  Patient was educated on how to sit safely to prevent wheelchair from tipping backwards if he loses balance.  He was told to start with one hand on walker and one hand on counter top until he can progress to both hands on walker for support. This will be a way that he can safely challenge himself at home. Patient practiced standing to walker 3x's working on sequence and safety of task.   Patient expressed concerns about not knowing his current weight, so PT assisted patient with obtaining weight at start of session with wheelchair scale.   He currently weighs 139 #   Several transfers and sit to stands to adjust walker height as needed  Contact guard to stand by assist for each   Several sit to stand and wheelchair to machine transfers with and without walker performed throughout session  With independence.    Discussed Left prosthesis sleeve   - It is currently to large/long and often curls.  Patient shared number of company as he has been unsuccessful with getting rep to deliver new one.    Left Prosthesis adjusted to turn foot to neutral, standing and stepping between each adjustment to correct position of leg  2 adjustments.    Discussed full below knee amputation program to work on at home in preparation for discharge  Patient verbalized good understanding of program   Skin inspection of Left hip   - see objective session at start of note   Bed mobility and transfer    - Patient transferred 6x's during session with independence and no increased complaints of pain  - he transitioned through sit to supine, rolling to Right and supine to sit with independence    Patient education  - Patient discussed fall from last week, PT recommended that he always keep cell phone on his person in case  this happens again.  He was also educated that if the pain persists, he may need to follow up with MD for an X-ray.    Transfer from wheelchair to chair seat to mat then back to chair and back to wheelchair  Supervision to modified independence   Bed mobility sit to supine to sit   Independent    [place]/donning prosthetic Left lower extremity with [place]  For measuring Left lower extremity    Patient education on results of re-assessments, importance of continuing proper stretching technique at home for hips and knees, practiced set-up. Also discussed increasing activity level throughout the day to spend less time in wheelchair and more time walking and sitting on other surfaces in his home.  Patient verbalized good understanding.      Plan for Next Visit:      Gait Training: for (12) minutes:  - Ambulated 200 ft, 200ft with rollator with stand by assist to contact guard assist over indoor surfaces - In parallel bars working on side stepping and forward walking several times with 1 - 0 upper extremity support    Patient Education and Home Exercises     Home Exercises Provided and Patient Education Provided     Education provided:   -2/22/22 Patient educated on increasing length and consistency of stretching knees and hips to every 2 hours at home.  He was shown and demonstrated understanding of short arc quads, rolling toward prone and sitting in reclined position to stretch hip flexors.  -2/24/22 Patient given Home Exercise Program on exercises for lower extremities to be performed 2x15 3x's/day  See patient Instructions in EMR  -3/1/22 Re-inforced Home Exercise Program and asked patient to incorporate prone lying for longer periods of time to build up to 20 minutes per day.  3/3/22 - Discussed the option of dynasplints for bilateral lower extremities. Patient educated on the option of Dynasplints vs basic knee braces and encouraged to consider Dynasplints as a more aggressive and efficient way of properly  positioning and dynamically stretching his legs outside of therapy.  3/8/22 - Patient was educated on Dynasplints with Andriy Stout (Rep).   He was shown pictures on ipad and Andriy and physical therapist discussed wearing schedule to make gains with his muscle length outside of therapy sessions.  Andriy informed patient that he would assist with collecting information regarding insurance coverage and provide him with any out of pocket costs so he can decide if he would like to pursue this option.  3/22/22 - Patient was educated that Andriy Stout (Rep for Dynasplint) messaged me that his splints have been approved and are being mailed this week.   3/24/22 - Patient was educated on Dynasplint management and importance of daily skin checks.  His tolerance of wearing splints for the first week will determine his wearing schedule for the following weeks.  3/29/22 - Patient was educated on only wearing Dynasplints while lying down to get the most effective stretch to lower extremities.  3/31/22 - [facility] to set up a time for Andriy to re-assess the positioning and fit of splints to see if he can feel more of a stretch with wearing schedule.  4/7/22 - [facility] are [facility].  4/11/22 - PT recommended that patient follow up with Dynasplint rep for reassessment of splint fit.  4/14/22 - Patient informed that PT would reach out to [facility] again to have him schedule a visit.  He was encouraged to continue being active at home.  4/18/22 -  Patient asked to contact Dynasplint rep to set up a time for a consult that would work for his schedule.  4/28/22 - Patient educated on wearing Dynaspints for 2 hours/day for the next 4 days and then we will assess his ability to increase to 4 hours/day after next visit.  He verbalized good understanding of education from Andriy (Dynasplint) and was told to try Right  splint on in new wear position first before increasing tension to 9/13.    5/3/22 - Patient educated on attempting standing at home at sink holding stand position for 10-15 seconds repeating 3x's and doing this in the morning and afternoon every day.   5/12/22 - Patient educated on the importance of walking with walker each session moving forward to try to get range applied to a functional task.  5/26/22 - Patient educated on adding walker to sit to stands at sink as a way of increasing his stand balance safely at home alone.  6/7/22 - Patient educated on his gains at this point and that PT is requesting more visits for a renewed Plan of Care.   6/10/22 - Patient educated on progressing to standing marches with walker at home and standing lateral foot taps with walker at home in same set up as before with kitchen counter in front and wheelchair behind for safety.  6/21/22 - Patient educated on stair mobility sequence.   6/23/22 - Patient educated on the risk of falls with walking home alone at this time.   7/28/22 - Patient educated on safety with gait at home and advancing gait challenges in therapy.  8/8/22 - Patient educated on proper technique of side lying hip extension and that Plan of Care would be extended for another month in preparation for discharge.   8/12/22 - Discussed contacting prosthetic company to replace sleeve as it is too large and long.   9/2/22 - Discussed full below knee amputation home program in preparation for discharge.    9/19/22 - Patient educated on fall safety (see therapeutic activity section)  9/30/22 - See above in therapeutic activity section  10/10/22 - Discussed incorporating outdoor walking in future sessions.    Home Exercise Provided:  Exercises were reviewed and Hunter was able to verbalize good understanding prior to the end of the session.  Hunter demonstrated good  understanding of the education provided.   ASSESSMENT     Patient responded well to session today with  progression to rollator for ambulation.   He has good effort and PT will start preparing with Home Exercise Program practice of his last few sessions before discharge. He improved with standing and walking endurance with distances today.      Pt prognosis is Fair  To Good    Pt will continue to benefit from skilled outpatient physical therapy to address the deficits listed in the problem list box on initial evaluation, provide pt/family education and to maximize pt's level of independence in the home and community environment.     Pt's spiritual, cultural and educational needs considered and pt agreeable to plan of care and goals.     Anticipated Barriers for therapy: co-morbidities, transportation assistance needed at times, lifestyle, potential contractures of knees/hips    GOALS:     Short Term Goals:  4 weeks Progress    Function: Patient will demonstrate improved function as indicated by a functional limitation score of less than or equal to 55 out of 100 on FOTO = 44% limitation PC   Mobility: Patient will improve sit to stand transfer with moderate assistance in order to progress towards independence with functional activities. (Met)  (Modified) - Patient will perform wheelchair to mat transfer with stand by assist to demonstrate improved independence with mobility. Met   Gait: Patient will ambulated 3 steps in parallel bars with Maximum assistance to demonstrate improved strength, posture and endurance met   HEP: Patient will demonstrate independence with HEP in order to progress toward functional independence. met    Assess patient's needs for Dynasplints and set up consultation if needed. met       Long Term Goals:  8 weeks Progress   Function: Patient will demonstrate improved function as indicated by a functional limitation score of less than or equal to 60 out of 100 on FOTO = 40 % limitation PC   Mobility: Patient will improve AROM of bilateral knees to -10 degrees  in order to return to maximal  functional potential and improve quality of life. PC   Functional Mobility - Patient will perform mat to wheelchair transfer with modified independence with walker in order to improve his independence with functional mobility. Met   Gait: Patient will ambulate 15 feet with rolling walker with moderate assistance to demonstrate improved strength, endurance and mobility. (Met)   (Modified) - Patient will ambulate 100 ft with walker incorporating turns with modified independence to negotiate home environment with independence. Met   HEP: Patient educated on HEP in preparation for d/c verbalizing and demonstrating good understanding of topics discussed.    Met            Goals Key:  PC= progressing/continue;        PM= partially met;             DC= discontinue         PLAN     Continue Plan of Care (POC) and progress per patient tolerance. See Treatment section for exercise progression.  Outpatient Physical Therapy 2 times weekly for 8 weeks to include the following interventions: Electrical Stimulation Attended, Gait Training, Moist Heat/ Ice, Neuromuscular Re-ed, Orthotic Management and Training, Patient Education, Self Care, Therapeutic Activities, Therapeutic Exercise and Prosthetic Management.      Mindy Patino, PT

## 2022-11-14 ENCOUNTER — CLINICAL SUPPORT (OUTPATIENT)
Dept: REHABILITATION | Facility: HOSPITAL | Age: 82
End: 2022-11-14
Payer: MEDICARE

## 2022-11-14 DIAGNOSIS — M25.662 DECREASED RANGE OF MOTION OF BOTH LOWER EXTREMITIES: Primary | ICD-10-CM

## 2022-11-14 DIAGNOSIS — Z74.09 DECREASED STRENGTH, ENDURANCE, AND MOBILITY: ICD-10-CM

## 2022-11-14 DIAGNOSIS — M25.661 DECREASED RANGE OF MOTION OF BOTH LOWER EXTREMITIES: Primary | ICD-10-CM

## 2022-11-14 DIAGNOSIS — R53.1 DECREASED STRENGTH, ENDURANCE, AND MOBILITY: ICD-10-CM

## 2022-11-14 DIAGNOSIS — R68.89 DECREASED STRENGTH, ENDURANCE, AND MOBILITY: ICD-10-CM

## 2022-11-14 PROCEDURE — 97116 GAIT TRAINING THERAPY: CPT | Mod: KX

## 2022-11-14 PROCEDURE — 97110 THERAPEUTIC EXERCISES: CPT | Mod: KX

## 2022-11-14 NOTE — PROGRESS NOTES
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 Patient reports that he is expecting a delivery today from "[employer] and he suspects that it is his rollator since his MD told him that the order was placed last Friday.     Response to previous treatment: good with no adverse effects.    Functional change:  He reports that he is feeling stronger with more confidence with standing/walking and is able to stand for longer bouts at home.     Pain: 0/10 at rest  Location: not applicable     OBJECTIVE     Objective Measures updated at progress report unless specified.  Re-Assessment for (0) minutes:    8/8/22  range of motion of bilateral knees measured with NuStep - Right = -20, Left = -23  Sitting with bilateral lower extremities over TBall: Right knee = -12, Left knee = -22  In supine: Right hip = -15 , Right knee = -30                   Left hip = -15, Left knee = -35  Functional Mobility: Transfers modified independent with walker and bed mobility with independence  Ambulation Re-Assessment - ambulating 150 ft with walker with supervision to modified independence.   Supine: Right knee Extension = -25, Left knee extension = -30    9/19/22:   Patient presents with bruising around his Right eye, Left elbow abrasion  and complaints of Left hip pain with palpation.  Skin inspection to Left hip and no redness or bruising noted.     9/30/22:  Sitting: Knee extension = -18 Right, -23 Left   Supine: Hip extension = -18 Right, -28 Left; Knee extension = -15 Right, -20 Left   Can now perform sit to stand transfer from raised seat surface (AirEx pad on chair seat) without upper extremity support  Hip flexion 4/5 bilateral, knee flexion 5/5, Right dorsiflexion 4- and Right plantarflexion 4+    11/14/22:   Supine knee extension = -27 Right, -23 Left   Treatment     Hunter received the treatments listed below:      THERAPEUTIC EXERCISES to develop strength, endurance, ROM, flexibility, posture and core stabilization for (34) minutes including:  Intervention Performed Today    short arc quad in supine  lower extremities over wedge 2x20 Right AROM, Left 2x20 Active Assistive for form and quality    sidelying toward prone   Stretch to Hip flexor 3x30 seconds each side   Quad set   2x10 Left     Sidelying hip extension   2x10 bilaterally with 10 seconds endrange stretch on last rep   [place] Left Prosthetic leg  Performed at edge of mat    Sitting in reclined position at edge of mat   Hip flexor stretch 3x30 seconds bilateral    Hip Abduction  In sidelying 2x10 bilateral    Bridge attempt  1x5   Posterior Pelvic Tilt Supine  2x5   Prone lying over light blue small triangle wedge  Holding position 2 minutes, then 4 minutes   Stretch to Hamstrings  3 x 1 minute Right lower extremity    Heelslide with forceful push towards extension  2x10 bilateral with 10 second enrange hold stretch on last rep    Seated long arc quad      10 # over distal Left thigh  Stretch to Left hip flexor for over 5 minutes while exercising Right lower extremity    Prone Hamstring curl endrange for quad/hip flexor stretch  2x10 AAROM   lower extremity lifts towards extension in prone  2x10 AAROM   Prone hip flexor stretch   3x10 second holds bilateral     Standing frame raising to patient's tolerance                                                            - Standing 2x's in standing frame holding each stand for 5  minutes working on the following:  - Horizontal Abduction with bilateral upper extremities 2x10 AROM   - terminal knee extension in stand position 2x10 bilateral   - Rows holding red theraband bilateral  2x10  - Pushing through upper extremities to achieve trunk extension 1x10  - cross body punch with 2# dumbbell 1x10 bilateral   - scap retraction 2x10  - attempted 1x10 shoulder rolls backward - patient had difficulty coordinating  - Overhead press with green tband bilateral 1x10  - Patient given feedback on standing frame position - working on increasing seat angle after 30 second rest between lifts (repeated 3-4 times)  - Lateral reaches 1x8 bilateral   - Beach Ball Volley 1x20 hits working on upright trunk posture  - Pulling with bilateral upper extremities on tray at front to pull hips forward from seat and extend knees 2x25  - Reaching Left and Right with cones to target to stretch lower extremities with each reach 2x10 cones to each side incorporating reach posteriorly    Shuttle    - 2x15 bilateral leg press with 5 bands   -2x15 single leg press with 3 bands for each leg    NuStep  X   10 minutes at Level 4, working on alternating movement of lower extremities and stretch to bilateral knees (able to scoot seat position back to #13 to increase knee extension range of motion    Supine heel slide x 2x10 bilateral with 10 second end range hold    AirEx Pad    - weight shifts Left and Right 1x8  - weight shifts forward and back in staggered stance 1x8 bilateral    Step ups to a 4 in step in the parallel bars  - 1x10 Left    Stairs  4 stairs with bilateral rails with Contact Guard Assist   Parallel Bars          - Standing heelcord stretch 3x10 seconds Right   - step ups to 4 in step 10x's bilateral   - Stepping over 4 short hurdles forward and  "backward 1x each   Supine   - Heel slides 1x10 bilateral with end range 10 second hold    Sitting edge of mat   - mini lifts from raised surface, progressively lowering height after each set 3x5   - sit to stand from raised surface that progressively lowered after each successful stand 4x's    Stairs  - Step ups with Left lower extremity and down with right 4 steps with bilateral upper extremity support    Standing Frame  Standing 1 x in standing frame working on holding endrange position of approximately 55 degree seat angle for 5 minutes   Standing in parallel bars working sit to stands           - standing 2x's  - Standing weights shifts Left to Right 1x10  - Standing knee extension 1x10  - Standing Left upper extremity lifts off bar 1x5  - Standing bilateral upper extremity lifts off bar 1x10 with PT helping to hold hips for support with minimum to moderate assist   Nautilus leg extension x     - 4x8 with 1 plate of resistance weight with eccentric quad control    Parallel bars alternately tapping 4 inch step  1 upper extremity support 1x5 bilateral    Sitting on gray ball  - weight shifts Left and Right 1x10  - weight shifts forward/back 1x10  - single leg lifts 1x5 bilateral then 5 seconds hold 2x's bilateral   - lunge over forward leg 1x10 bilateral    Sitting long arc quad     - 1x 10 bilateral 5# bilateral   - 10 second end range hold wit 5# bilateral    Sitting hamstring stretch   - 2x 30 seconds bilateral    Standing terminal knee extension  X 30 red band bilateral holding rolling walker cues to stand erect    Single leg standing with 1-2 upper extremity support to work on standing endurance of single leg  - 2x's bilateral    Sitting to squat without upper extremity support   - 1x5 from 24" box  - 1x5 from 20" box   Air Ex Beam    - side stepping 1x   - tandem walking x's      * In standing frame:  Sitting position = 0 degree seat angle  Full upright stand position with hips and knees at 0 degrees = 90 " degree seat angle  3/15/22 - 1st  standing frame was at 42 degree seat angle, last stand position was at 65 degree seat angle  3/17/22 - 1st  standing frame was at 48 degree seat angle, last stand position was at 70 degree seat angle  3/21/22 - 1st  standing frame was at 52 degree seat angle, last stand position was at 70 degree seat angle  3/29/22 - 1st  standing frame was at 60 degree seat angle, last stand position was at 72 degree seat angle  3/31/22 - 1st  standing frame was at 55 degree seat angle, last stand position was at 75 degree seat angle  4/7/22 - 1st  standing frame was at 60 degree seat angle, last stand position was at 80 degree seat angle  4/11/22 - 1st  standing frame was at 60 degree seat angle, last stand position was at 78 degree seat angle  4/14/22 - 1st  standing frame was at 55 degree seat angle, last stand position was at 86 degree seat angle  4/19/22 - 1st  standing frame was at 60 degree seat angle, last stand position was at 78 degree seat angle  4/22/22 - 1st  standing frame was at 65 degree seat angle, last stand position was at 81 degree seat angle  4/26/22 - 1st  standing frame was at 65 degree seat angle, last stand position was at 82 degree seat angle  5/3/22 - 1st  standing frame was at 65 degree seat angle, last stand position was at 88 degree seat angle  5/5/22 - 1st  standing frame was at 65 degree seat angle, last stand position was at 84 degree seat angle  5/10/22 - 1st  standing frame was at 60 degree seat angle, last stand position was at 80 degree seat angle  5/12/22 - 1st  standing frame was at 65 degree seat angle, last stand position was at 80 degree seat angle  5/17/22 - 1st  standing frame was at 62 degree seat angle, last stand position was at 82 degree seat angle  5/19/22 - 1st  standing frame was at 60 degree seat angle, last stand  position was at 80 degree seat angle  5/24/22 - 1st  standing frame was at 62 degree seat angle, last stand position was at 82 degree seat angle  5/26/22 - 1st  standing frame was at 60 degree seat angle, last stand position was at 80 degree seat angle   5/31/22 - 1st  standing frame was at 65 degree seat angle, last stand position was at 80 degree seat angle  6/2/22 - 1st  standing frame was at 70 degree seat angle, last stand position was at 82 degree seat angle  6/7/22 - 1st  standing frame was at 68 degree seat angle, last stand position was at 80 degree seat angle  6/10/22 - 1st  standing frame was at 60 degree seat angle, last stand position was at 80 degree seat angle  6/14/22 - 1st  standing frame was at 65 degree seat angle, last stand position was at 82 degree seat angle   6/16/22 - 1st  standing frame was at 70 degree seat angle, last stand position was at 84 degree seat angle  6/21/22 - 1st  standing frame was at 65 degree seat angle, last stand position was at 80 degree seat angle  10/21/22 - 1st  standing frame was at 82 degree seat angle, last stand position was at 82 degree seat angle   Plan for Next Visit:          MANUAL THERAPY TECHNIQUES were applied for () minutes, including:    Manual Intervention Performed Today    Soft Tissue Mobilization [] bilateral knee extension in supine   Joint Mobilizations []     []     []    Functional Dry Needling  []      Plan for Next Visit: Continue as needed        THERAPEUTIC ACTIVITIES to improve dynamic and functional  performance for (0) minutes including:    Intervention Performed Today    Bed - wheelchair transfer   Practiced set up and sequence   Bed mobility sit to supine to sit  Practiced sequence    Rolling   3 x each side working on sequence to incorporate hip flexor stretch on each side. Then separate stretch performed as listed above   Standing in parallel bars  4 x's  working on several weight shifts towards hip extension and knee extension while standing.   Standing in parallel bars performing step forward  With Right lower extremity 5x's with moderate assistance to maintain standing position   Dynasplint Consult via FaceTime with Andriy to discuss fit     Sit to  parallel bars without assistance  2x's   Dynasplint Consult  Andriy Stout (Rep for Dynasplint) met with physical therapist and patient today during session to discuss options for dynasplints for bilateral knees due to patient's significant lack of range of motion.     Dynasplint Fitting  Andriyjohn Stout (Rep for Dynasplint) met with physical therapist and patient today to deliver and fit patient for bilateral knee dynasplints.  We discussed wearing time of 1-2 hours/day for the first week and then we would re-assess.  Patient was shown how to don each brace and straps were marked for future use.  The Dynasplint force was increased to 6 on the Left and 7 on the Right to start.  Extra padding was added to thin straps for skin protection.   Dynasplint Consult  Andriy Stout (Rep for Dynasplint) met with physical therapist and patient today during session to discuss proper fit and positioning of Dynasplint to feel a better stretch at home. Pt only brought Left splint to session today and his splint was donned, straps were tightened and new black line added for patient to follow at home.  Left splint was increased from 7/13 to 9/13 tension.  He reports that he places a pillow under his knee at home and he was educated on better positioning of pillow under distal stump with prosthesis removed.  He also felt more of a stretch in long sitting vs supine and was asked to try this position at home for part of the stretch if able.    Standing at sink in Neuro Room working on stand posture and positioning   Simulating activity that he has been given to do for homework. 1x5 seconds, 1x10 seconds   Patient educated addition to Home  Exercise Program   - Simulated sit to stand setup  From wheelchair to sink at home performing with walker in front to work on letting go of sink and holding on to walker.  Patient was educated on how to sit safely to prevent wheelchair from tipping backwards if he loses balance.  He was told to start with one hand on walker and one hand on counter top until he can progress to both hands on walker for support. This will be a way that he can safely challenge himself at home. Patient practiced standing to walker 3x's working on sequence and safety of task.   Patient expressed concerns about not knowing his current weight, so PT assisted patient with obtaining weight at start of session with wheelchair scale.   He currently weighs 139 #   Several transfers and sit to stands to adjust walker height as needed  Contact guard to stand by assist for each   Several sit to stand and wheelchair to machine transfers with and without walker performed throughout session  With independence.    Discussed Left prosthesis sleeve   - It is currently to large/long and often curls.  Patient shared number of company as he has been unsuccessful with getting rep to deliver new one.    Left Prosthesis adjusted to turn foot to neutral, standing and stepping between each adjustment to correct position of leg  2 adjustments.    Discussed full below knee amputation program to work on at home in preparation for discharge  Patient verbalized good understanding of program   Skin inspection of Left hip   - see objective session at start of note   Bed mobility and transfer    - Patient transferred 6x's during session with independence and no increased complaints of pain  - he transitioned through sit to supine, rolling to Right and supine to sit with independence    Patient education  - Patient discussed fall from last week, PT recommended that he always keep cell phone on his person in case this happens again.  He was also educated that if the pain  persists, he may need to follow up with MD for an X-ray.    Transfer from wheelchair to chair seat to mat then back to chair and back to wheelchair  Supervision to modified independence   Bed mobility sit to supine to sit   Independent    [place]/donning prosthetic Left lower extremity with [place]  For measuring Left lower extremity    Patient education on results of re-assessments, importance of continuing proper stretching technique at home for hips and knees, practiced set-up. Also discussed increasing activity level throughout the day to spend less time in wheelchair and more time walking and sitting on other surfaces in his home.  Patient verbalized good understanding.      Plan for Next Visit:      Gait Training: for (10) minutes:  - Ambulated 200 ft, 200ft with rollator with stand by assist to contact guard assist over indoor surfaces - In parallel bars working on side stepping and forward walking several times with 1 - 0 upper extremity support    Patient Education and Home Exercises     Home Exercises Provided and Patient Education Provided     Education provided:   -2/22/22 Patient educated on increasing length and consistency of stretching knees and hips to every 2 hours at home.  He was shown and demonstrated understanding of short arc quads, rolling toward prone and sitting in reclined position to stretch hip flexors.  -2/24/22 Patient given Home Exercise Program on exercises for lower extremities to be performed 2x15 3x's/day  See patient Instructions in EMR  -3/1/22 Re-inforced Home Exercise Program and asked patient to incorporate prone lying for longer periods of time to build up to 20 minutes per day.  3/3/22 - Discussed the option of dynasplints for bilateral lower extremities. Patient educated on the option of Dynasplints vs basic knee braces and encouraged to consider Dynasplints as a more aggressive and efficient way of properly positioning and dynamically stretching his legs outside of  therapy.  3/8/22 - Patient was educated on Dynasplints with Andriy Stout (Rep).   He was shown pictures on ipad and Andriy and physical therapist discussed wearing schedule to make gains with his muscle length outside of therapy sessions.  Andriy informed patient that he would assist with collecting information regarding insurance coverage and provide him with any out of pocket costs so he can decide if he would like to pursue this option.  3/22/22 - Patient was educated that Andriy Stout (Rep for Dynasplint) messaged me that his splints have been approved and are being mailed this week.   3/24/22 - Patient was educated on Dynasplint management and importance of daily skin checks.  His tolerance of wearing splints for the first week will determine his wearing schedule for the following weeks.  3/29/22 - Patient was educated on only wearing Dynasplints while lying down to get the most effective stretch to lower extremities.  3/31/22 - [facility] to set up a time for Andriy to re-assess the positioning and fit of splints to see if he can feel more of a stretch with wearing schedule.  4/7/22 - Patient was asked to reach out to [facility] to make sure that splints are adjusted appropriately.  4/11/22 - PT recommended that patient follow up with Dynasplint rep for reassessment of splint fit.  4/14/22 - Patient informed that PT would reach out to [facility] again to have him schedule a visit.  He was encouraged to continue being active at home.  4/18/22 -  Patient asked to contact Dynasplint rep to set up a time for a consult that would work for his schedule.  4/28/22 - Patient educated on wearing Dynaspints for 2 hours/day for the next 4 days and then we will assess his ability to increase to 4 hours/day after next visit.  He verbalized good understanding of education from Andriy (Dynasplint) and was told to try Right splint on in new wear position first before increasing  tension to 9/13.    5/3/22 - Patient educated on attempting standing at home at sink holding stand position for 10-15 seconds repeating 3x's and doing this in the morning and afternoon every day.   5/12/22 - Patient educated on the importance of walking with walker each session moving forward to try to get range applied to a functional task.  5/26/22 - Patient educated on adding walker to sit to stands at sink as a way of increasing his stand balance safely at home alone.  6/7/22 - Patient educated on his gains at this point and that PT is requesting more visits for a renewed Plan of Care.   6/10/22 - Patient educated on progressing to standing marches with walker at home and standing lateral foot taps with walker at home in same set up as before with kitchen counter in front and wheelchair behind for safety.  6/21/22 - Patient educated on stair mobility sequence.   6/23/22 - Patient educated on the risk of falls with walking home alone at this time.   7/28/22 - Patient educated on safety with gait at home and advancing gait challenges in therapy.  8/8/22 - Patient educated on proper technique of side lying hip extension and that Plan of Care would be extended for another month in preparation for discharge.   8/12/22 - Discussed contacting prosthetic company to replace sleeve as it is too large and long.   9/2/22 - Discussed full below knee amputation home program in preparation for discharge.    9/19/22 - Patient educated on fall safety (see therapeutic activity section)  9/30/22 - See above in therapeutic activity section  10/10/22 - Discussed incorporating outdoor walking in future sessions.  11/14/22 - Discussed safety with rollator at home and that next session would be discharge.     Home Exercise Provided:  Exercises were reviewed and Hunter was able to verbalize good understanding prior to the end of the session.  Hunter demonstrated good  understanding of the education provided.   ASSESSMENT     Patient  responded well to session today with progression to rollator for ambulation.   He had increased stiffness in Right > Left knee with extension measurement compared to last assessment, colder weather may have also contributed.  He has good effort and will be discharged from PT on next session.     Pt prognosis is Fair  To Good    Pt will continue to benefit from skilled outpatient physical therapy to address the deficits listed in the problem list box on initial evaluation, provide pt/family education and to maximize pt's level of independence in the home and community environment.     Pt's spiritual, cultural and educational needs considered and pt agreeable to plan of care and goals.     Anticipated Barriers for therapy: co-morbidities, transportation assistance needed at times, lifestyle, potential contractures of knees/hips    GOALS:     Short Term Goals:  4 weeks Progress    Function: Patient will demonstrate improved function as indicated by a functional limitation score of less than or equal to 55 out of 100 on FOTO = 44% limitation PC   Mobility: Patient will improve sit to stand transfer with moderate assistance in order to progress towards independence with functional activities. (Met)  (Modified) - Patient will perform wheelchair to mat transfer with stand by assist to demonstrate improved independence with mobility. Met   Gait: Patient will ambulated 3 steps in parallel bars with Maximum assistance to demonstrate improved strength, posture and endurance met   HEP: Patient will demonstrate independence with HEP in order to progress toward functional independence. met    Assess patient's needs for Dynasplints and set up consultation if needed. met       Long Term Goals:  8 weeks Progress   Function: Patient will demonstrate improved function as indicated by a functional limitation score of less than or equal to 60 out of 100 on FOTO = 40 % limitation PC   Mobility: Patient will improve AROM of bilateral  knees to -10 degrees  in order to return to maximal functional potential and improve quality of life. PC   Functional Mobility - Patient will perform mat to wheelchair transfer with modified independence with walker in order to improve his independence with functional mobility. Met   Gait: Patient will ambulate 15 feet with rolling walker with moderate assistance to demonstrate improved strength, endurance and mobility. (Met)   (Modified) - Patient will ambulate 100 ft with walker incorporating turns with modified independence to negotiate home environment with independence. Met   HEP: Patient educated on HEP in preparation for d/c verbalizing and demonstrating good understanding of topics discussed.    Met            Goals Key:  PC= progressing/continue;        PM= partially met;             DC= discontinue         PLAN     Continue Plan of Care (POC) and progress per patient tolerance. See Treatment section for exercise progression.  Outpatient Physical Therapy 2 times weekly for 8 weeks to include the following interventions: Electrical Stimulation Attended, Gait Training, Moist Heat/ Ice, Neuromuscular Re-ed, Orthotic Management and Training, Patient Education, Self Care, Therapeutic Activities, Therapeutic Exercise and Prosthetic Management.      Mindy Patino, PT

## 2022-11-17 ENCOUNTER — CLINICAL SUPPORT (OUTPATIENT)
Dept: REHABILITATION | Facility: HOSPITAL | Age: 82
End: 2022-11-17
Payer: MEDICARE

## 2022-11-17 DIAGNOSIS — M25.662 DECREASED RANGE OF MOTION OF BOTH LOWER EXTREMITIES: Primary | ICD-10-CM

## 2022-11-17 DIAGNOSIS — R53.1 DECREASED STRENGTH, ENDURANCE, AND MOBILITY: ICD-10-CM

## 2022-11-17 DIAGNOSIS — Z74.09 DECREASED STRENGTH, ENDURANCE, AND MOBILITY: ICD-10-CM

## 2022-11-17 DIAGNOSIS — R68.89 DECREASED STRENGTH, ENDURANCE, AND MOBILITY: ICD-10-CM

## 2022-11-17 DIAGNOSIS — M25.661 DECREASED RANGE OF MOTION OF BOTH LOWER EXTREMITIES: Primary | ICD-10-CM

## 2022-11-17 PROCEDURE — 97110 THERAPEUTIC EXERCISES: CPT | Mod: KX

## 2022-11-17 NOTE — PROGRESS NOTES
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 Patient reports that he has received his rollator and it is working well at home.     Response to previous treatment: good with no adverse effects.    Functional change:  He reports that he is feeling stronger with more confidence with standing/walking and is able to stand for longer bouts at home. He has been walking with rollator at home.     Pain: 0/10 at rest  Location: not applicable     OBJECTIVE     Objective Measures updated at progress report unless specified.  Re-Assessment for (0) minutes:    8/8/22  range of motion of bilateral knees measured with NuStep - Right = -20, Left = -23  Sitting with bilateral lower extremities over TBall: Right knee = -12, Left knee = -22  In supine: Right hip = -15 , Right knee = -30                   Left hip = -15, Left knee = -35  Functional Mobility: Transfers modified independent with walker and bed mobility with independence  Ambulation Re-Assessment - ambulating 150 ft with walker with supervision to modified independence.   Supine: Right knee Extension = -25, Left knee extension = -30    9/19/22:   Patient presents with bruising around his Right eye, Left elbow abrasion and complaints of Left hip pain  with palpation.  Skin inspection to Left hip and no redness or bruising noted.     9/30/22:  Sitting: Knee extension = -18 Right, -23 Left   Supine: Hip extension = -18 Right, -28 Left; Knee extension = -15 Right, -20 Left   Can now perform sit to stand transfer from raised seat surface (AirEx pad on chair seat) without upper extremity support  Hip flexion 4/5 bilateral, knee flexion 5/5, Right dorsiflexion 4- and Right plantarflexion 4+    11/14/22:   Supine knee extension = -27 Right, -23 Left     11/17/22:   Lower extremity measurements while standing in the standing frame at 75 degree seat angle = -18 bilateral knees and -15 bilateral hips    Treatment     Hunter received the treatments listed below:      THERAPEUTIC EXERCISES to develop strength, endurance, ROM, flexibility, posture and core stabilization for (40) minutes including:  Intervention Performed Today    short arc quad in supine  lower extremities over wedge 2x20 Right AROM, Left 2x20 Active Assistive for form and quality    sidelying toward prone   Stretch to Hip flexor 3x30 seconds each side   Quad set   2x10 Left     Sidelying hip extension   2x10 bilaterally with 10 seconds endrange stretch on last rep   [place] Left Prosthetic leg  Performed at edge of mat    Sitting in reclined position at edge of mat   Hip flexor stretch 3x30 seconds bilateral    Hip Abduction  In sidelying 2x10 bilateral    Bridge attempt  1x5   Posterior Pelvic Tilt Supine  2x5   Prone lying over light blue small triangle wedge  Holding position 2 minutes, then 4 minutes   Stretch to Hamstrings  3 x 1 minute Right lower extremity    Heelslide with forceful push towards extension  2x10 bilateral with 10 second enrange hold stretch on last rep    Seated long arc quad      10 # over distal Left thigh  Stretch to Left hip flexor for over 5 minutes while exercising Right lower extremity    Prone Hamstring curl endrange for quad/hip flexor stretch  2x10 AAROM   lower  extremity lifts towards extension in prone  2x10 AAROM   Prone hip flexor stretch   3x10 second holds bilateral    Standing frame raising to patient's tolerance of 75 degrees x      X  X                                                           - Standing 2x's in standing frame holding each stand for 5-10  minutes working on the following:  - measuring bilateral lower extremities  - Going over Home Exercise Program for discharge  - Horizontal Abduction with bilateral upper extremities 2x10 AROM   - terminal knee extension in stand position 2x10 bilateral   - Rows holding red theraband bilateral  2x10  - Pushing through upper extremities to achieve trunk extension 1x10  - cross body punch with 2# dumbbell 1x10 bilateral   - scap retraction 2x10  - attempted 1x10 shoulder rolls backward - patient had difficulty coordinating  - Overhead press with green tband bilateral 1x10  - Patient given feedback on standing frame position - working on increasing seat angle after 30 second rest between lifts (repeated 3-4 times)  - Lateral reaches 1x8 bilateral   - [place] 1x20 hits working on upright trunk posture  - Pulling with bilateral upper extremities on tray at front to pull hips forward from seat and extend knees 2x25  - Reaching Left and Right with cones to target to stretch lower extremities with each reach 2x10 cones to each side incorporating reach posteriorly    Shuttle    - 2x15 bilateral leg press with 5 bands   -2x15 single leg press with 3 bands for each leg    NuStep  X   10 minutes at Level 4, working on alternating movement of lower extremities and stretch to bilateral knees (able to scoot seat position back to #13 to increase knee extension range of motion    Supine heel slide  2x10 bilateral with 10 second end range hold    AirEx Pad    - weight shifts Left and Right 1x8  - weight shifts forward and back in staggered stance 1x8 bilateral    Step ups to a 4 in step in the parallel bars  - 1x10 Left     Stairs  4 stairs with bilateral rails with Contact Guard Assist   Parallel Bars          - Standing heelcord stretch 3x10 seconds Right   - step ups to 4 in step 10x's bilateral   - Stepping over 4 short hurdles forward and backward 1x each   Supine   - Heel slides 1x10 bilateral with end range 10 second hold    Sitting edge of mat   - mini lifts from raised surface, progressively lowering height after each set 3x5   - sit to stand from raised surface that progressively lowered after each successful stand 4x's    Stairs  - Step ups with Left lower extremity and down with right 4 steps with bilateral upper extremity support    Standing Frame  Standing 1 x in standing frame working on holding endrange position of approximately 55 degree seat angle for 5 minutes   Standing in parallel bars working sit to stands           - standing 2x's  - Standing weights shifts Left to Right 1x10  - Standing knee extension 1x10  - Standing Left upper extremity lifts off bar 1x5  - Standing bilateral upper extremity lifts off bar 1x10 with PT helping to hold hips for support with minimum to moderate assist   Nautilus leg extension    - 4x8 with 1 plate of resistance weight with eccentric quad control    Parallel bars alternately tapping 4 inch step  1 upper extremity support 1x5 bilateral    Sitting on gray ball  - weight shifts Left and Right 1x10  - weight shifts forward/back 1x10  - single leg lifts 1x5 bilateral then 5 seconds hold 2x's bilateral   - lunge over forward leg 1x10 bilateral    Sitting long arc quad     - 1x 10 bilateral 5# bilateral   - 10 second end range hold wit 5# bilateral    Sitting hamstring stretch   - 2x 30 seconds bilateral    Standing terminal knee extension  X 30 red band bilateral holding rolling walker cues to stand erect    Single leg standing with 1-2 upper extremity support to work on standing endurance of single leg  - 2x's bilateral    Sitting to squat without upper extremity support   - 1x5 from  "24" box  - 1x5 from 20" box   Air Ex Beam    - side stepping 1x   - tandem walking x's      * In standing frame:  Sitting position = 0 degree seat angle  Full upright stand position with hips and knees at 0 degrees = 90 degree seat angle  3/15/22 - 1st  standing frame was at 42 degree seat angle, last stand position was at 65 degree seat angle  3/17/22 - 1st  standing frame was at 48 degree seat angle, last stand position was at 70 degree seat angle  3/21/22 - 1st  standing frame was at 52 degree seat angle, last stand position was at 70 degree seat angle  3/29/22 - 1st  standing frame was at 60 degree seat angle, last stand position was at 72 degree seat angle  3/31/22 - 1st  standing frame was at 55 degree seat angle, last stand position was at 75 degree seat angle  4/7/22 - 1st  standing frame was at 60 degree seat angle, last stand position was at 80 degree seat angle  4/11/22 - 1st  standing frame was at 60 degree seat angle, last stand position was at 78 degree seat angle  4/14/22 - 1st  standing frame was at 55 degree seat angle, last stand position was at 86 degree seat angle  4/19/22 - 1st  standing frame was at 60 degree seat angle, last stand position was at 78 degree seat angle  4/22/22 - 1st  standing frame was at 65 degree seat angle, last stand position was at 81 degree seat angle  4/26/22 - 1st  standing frame was at 65 degree seat angle, last stand position was at 82 degree seat angle  5/3/22 - 1st  standing frame was at 65 degree seat angle, last stand position was at 88 degree seat angle  5/5/22 - 1st  standing frame was at 65 degree seat angle, last stand position was at 84 degree seat angle  5/10/22 - 1st  standing frame was at 60 degree seat angle, last stand position was at 80 degree seat angle  5/12/22 - 1st  standing frame was at 65 degree seat angle, last stand position " was at 80 degree seat angle  5/17/22 - 1st  standing frame was at 62 degree seat angle, last stand position was at 82 degree seat angle  5/19/22 - 1st  standing frame was at 60 degree seat angle, last stand position was at 80 degree seat angle  5/24/22 - 1st  standing frame was at 62 degree seat angle, last stand position was at 82 degree seat angle  5/26/22 - 1st  standing frame was at 60 degree seat angle, last stand position was at 80 degree seat angle   5/31/22 - 1st  standing frame was at 65 degree seat angle, last stand position was at 80 degree seat angle  6/2/22 - 1st  standing frame was at 70 degree seat angle, last stand position was at 82 degree seat angle  6/7/22 - 1st  standing frame was at 68 degree seat angle, last stand position was at 80 degree seat angle  6/10/22 - 1st  standing frame was at 60 degree seat angle, last stand position was at 80 degree seat angle  6/14/22 - 1st  standing frame was at 65 degree seat angle, last stand position was at 82 degree seat angle   6/16/22 - 1st  standing frame was at 70 degree seat angle, last stand position was at 84 degree seat angle  6/21/22 - 1st  standing frame was at 65 degree seat angle, last stand position was at 80 degree seat angle  10/21/22 - 1st  standing frame was at 82 degree seat angle, last stand position was at 82 degree seat angle   Plan for Next Visit:          MANUAL THERAPY TECHNIQUES were applied for () minutes, including:    Manual Intervention Performed Today    Soft Tissue Mobilization [] bilateral knee extension in supine   Joint Mobilizations []     []     []    Functional Dry Needling  []      Plan for Next Visit: Continue as needed        THERAPEUTIC ACTIVITIES to improve dynamic and functional  performance for (0) minutes including:    Intervention Performed Today    Bed - wheelchair transfer   Practiced set up and sequence   Bed  mobility sit to supine to sit  Practiced sequence    Rolling   3 x each side working on sequence to incorporate hip flexor stretch on each side. Then separate stretch performed as listed above   Standing in parallel bars  4 x's working on several weight shifts towards hip extension and knee extension while standing.   Standing in parallel bars performing step forward  With Right lower extremity 5x's with moderate assistance to maintain standing position   Dynasplint Consult via FaceTime with Andriy to discuss fit     Sit to  parallel bars without assistance  2x's   Dynasplint Consult  Andriy Stout (Rep for Dynasplint) met with physical therapist and patient today during session to discuss options for dynasplints for bilateral knees due to patient's significant lack of range of motion.     Dynasplint Fitting  Andriy Stout (Rep for Dynasplint) met with physical therapist and patient today to deliver and fit patient for bilateral knee dynasplints.  We discussed wearing time of 1-2 hours/day for the first week and then we would re-assess.  Patient was shown how to don each brace and straps were marked for future use.  The Dynasplint force was increased to 6 on the Left and 7 on the Right to start.  Extra padding was added to thin straps for skin protection.   Dynasplint Consult  Andriy Stout (Rep for Dynasplint) met with physical therapist and patient today during session to discuss proper fit and positioning of Dynasplint to feel a better stretch at home. Pt only brought Left splint to session today and his splint was donned, straps were tightened and new black line added for patient to follow at home.  Left splint was increased from 7/13 to 9/13 tension.  He reports that he places a pillow under his knee at home and he was educated on better positioning of pillow under distal stump with prosthesis removed.  He also felt more of a stretch in long sitting vs supine and was asked to try this position at home for part  of the stretch if able.    Standing at sink in Neuro Room working on stand posture and positioning   Simulating activity that he has been given to do for homework. 1x5 seconds, 1x10 seconds   Patient educated addition to Home Exercise Program   - Simulated sit to stand setup  From wheelchair to sink at home performing with walker in front to work on letting go of sink and holding on to walker.  Patient was educated on how to sit safely to prevent wheelchair from tipping backwards if he loses balance.  He was told to start with one hand on walker and one hand on counter top until he can progress to both hands on walker for support. This will be a way that he can safely challenge himself at home. Patient practiced standing to walker 3x's working on sequence and safety of task.   Patient expressed concerns about not knowing his current weight, so PT assisted patient with obtaining weight at start of session with wheelchair scale.   He currently weighs 139 #   Several transfers and sit to stands to adjust walker height as needed  Contact guard to stand by assist for each   Several sit to stand and wheelchair to machine transfers with and without walker performed throughout session  With independence.    Discussed Left prosthesis sleeve   - It is currently to large/long and often curls.  Patient shared number of company as he has been unsuccessful with getting rep to deliver new one.    Left Prosthesis adjusted to turn foot to neutral, standing and stepping between each adjustment to correct position of leg  2 adjustments.    Discussed full below knee amputation program to work on at home in preparation for discharge  Patient verbalized good understanding of program   Skin inspection of Left hip   - see objective session at start of note   Bed mobility and transfer    - Patient transferred 6x's during session with independence and no increased complaints of pain  - he transitioned through sit to supine, rolling to Right  and supine to sit with independence    Patient education  - Patient discussed fall from last week, PT recommended that he always keep cell phone on his person in case this happens again.  He was also educated that if the pain persists, he may need to follow up with MD for an X-ray.    Transfer from wheelchair to chair seat to mat then back to chair and back to wheelchair  Supervision to modified independence   Bed mobility sit to supine to sit   Independent    [place]/donning prosthetic Left lower extremity with [place]  For measuring Left lower extremity    Patient education on results of re-assessments, importance of continuing proper stretching technique at home for hips and knees, practiced set-up. Also discussed increasing activity level throughout the day to spend less time in wheelchair and more time walking and sitting on other surfaces in his home.  Patient verbalized good understanding.      Plan for Next Visit:      Gait Training: for (5) minutes:  - Ambulated 200 ft,  with rollator with stand by assist to contact guard assist over indoor surfaces     Patient Education and Home Exercises     Home Exercises Provided and Patient Education Provided     Education provided:   -2/22/22 Patient educated on increasing length and consistency of stretching knees and hips to every 2 hours at home.  He was shown and demonstrated understanding of short arc quads, rolling toward prone and sitting in reclined position to stretch hip flexors.  -2/24/22 Patient given Home Exercise Program on exercises for lower extremities to be performed 2x15 3x's/day  See patient Instructions in EMR  -3/1/22 Re-inforced Home Exercise Program and asked patient to incorporate prone lying for longer periods of time to build up to 20 minutes per day.  3/3/22 - Discussed the option of dynasplints for bilateral lower extremities. Patient educated on the option of Dynasplints vs basic knee braces and encouraged to consider Dynasplints as  a more aggressive and efficient way of properly positioning and dynamically stretching his legs outside of therapy.  3/8/22 - Patient was educated on Dynasplints with Andriy Stout (Rep).   He was shown pictures on ipad and Andriy and physical therapist discussed wearing schedule to make gains with his muscle length outside of therapy sessions.  Andriy informed patient that he would assist with collecting information regarding insurance coverage and provide him with any out of pocket costs so he can decide if he would like to pursue this option.  3/22/22 - Patient was educated that Andriy Stout (Rep for Dynasplint) messaged me that his splints have been approved and are being mailed this week.   3/24/22 - Patient was educated on Dynasplint management and importance of daily skin checks.  His tolerance of wearing splints for the first week will determine his wearing schedule for the following weeks.  3/29/22 - Patient was educated on only wearing Dynasplints while lying down to get the most effective stretch to lower extremities.  3/31/22 - [facility] to set up a time for Andriy to re-assess the positioning and fit of splints to see if he can feel more of a stretch with wearing schedule.  4/7/22 - [facility] to make sure that splints are adjusted appropriately.  4/11/22 - PT recommended that patient follow up with Dynasplint rep for reassessment of splint fit.  4/14/22 - Patient informed that PT would reach out to [facility] again to have him schedule a visit.  He was encouraged to continue being active at home.  4/18/22 -  [facility] for a consult that would work for his schedule.  4/28/22 - Patient educated on wearing Dynaspints for 2 hours/day for the next 4 days and then we will assess his ability to increase to 4 hours/day after next visit.  He verbalized good understanding of education  from Andriy (Dynasplint) and was told to try Right splint on in new wear position first before increasing tension to 9/13.    5/3/22 - Patient educated on attempting standing at home at sink holding stand position for 10-15 seconds repeating 3x's and doing this in the morning and afternoon every day.   5/12/22 - Patient educated on the importance of walking with walker each session moving forward to try to get range applied to a functional task.  5/26/22 - Patient educated on adding walker to sit to stands at sink as a way of increasing his stand balance safely at home alone.  6/7/22 - Patient educated on his gains at this point and that PT is requesting more visits for a renewed Plan of Care.   6/10/22 - Patient educated on progressing to standing marches with walker at home and standing lateral foot taps with walker at home in same set up as before with kitchen counter in front and wheelchair behind for safety.  6/21/22 - Patient educated on stair mobility sequence.   6/23/22 - Patient educated on the risk of falls with walking home alone at this time.   7/28/22 - Patient educated on safety with gait at home and advancing gait challenges in therapy.  8/8/22 - Patient educated on proper technique of side lying hip extension and that Plan of Care would be extended for another month in preparation for discharge.   8/12/22 - Discussed contacting prosthetic company to replace sleeve as it is too large and long.   9/2/22 - Discussed full below knee amputation home program in preparation for discharge.    9/19/22 - Patient educated on fall safety (see therapeutic activity section)  9/30/22 - See above in therapeutic activity section  10/10/22 - Discussed incorporating outdoor walking in future sessions.  11/14/22 - Discussed safety with rollator at home and that next session would be discharge.   11/17/22 - Reviewed over all handouts for Home Exercise Program for discharge    Home Exercise Provided:  Exercises were  reviewed and Hunter was able to verbalize good understanding prior to the end of the session.  Hunter demonstrated good  understanding of the education provided.   ASSESSMENT     Patient responded well to session today.   He has progressed well with his strength, endurance and balance since start of therapy.  He has progressed to ambulating with a rollator and has received one for home use.  He has made improvements with his knee and hip motion, but is still lacking flexibility.  He is very compliant with his Home Exercise Program and has good potential to maintain his level.  He will be discharged from PT at this time.     Pt prognosis is Fair  To Good    Pt will continue to benefit from skilled outpatient physical therapy to address the deficits listed in the problem list box on initial evaluation, provide pt/family education and to maximize pt's level of independence in the home and community environment.     Pt's spiritual, cultural and educational needs considered and pt agreeable to plan of care and goals.     Anticipated Barriers for therapy: co-morbidities, transportation assistance needed at times, lifestyle, potential contractures of knees/hips    GOALS:     Short Term Goals:  4 weeks Progress    Function: Patient will demonstrate improved function as indicated by a functional limitation score of less than or equal to 55 out of 100 on FOTO = 44% limitation Met   Mobility: Patient will improve sit to stand transfer with moderate assistance in order to progress towards independence with functional activities. (Met)  (Modified) - Patient will perform wheelchair to mat transfer with stand by assist to demonstrate improved independence with mobility. Met   Gait: Patient will ambulated 3 steps in parallel bars with Maximum assistance to demonstrate improved strength, posture and endurance met   HEP: Patient will demonstrate independence with HEP in order to progress toward functional independence. met    Assess  patient's needs for Dynasplints and set up consultation if needed. met       Long Term Goals:  8 weeks Progress   Function: Patient will demonstrate improved function as indicated by a functional limitation score of less than or equal to 60 out of 100 on FOTO = 40 % limitation PC   Mobility: Patient will improve AROM of bilateral knees to -10 degrees  in order to return to maximal functional potential and improve quality of life. PC   Functional Mobility - Patient will perform mat to wheelchair transfer with modified independence with walker in order to improve his independence with functional mobility. Met   Gait: Patient will ambulate 15 feet with rolling walker with moderate assistance to demonstrate improved strength, endurance and mobility. (Met)   (Modified) - Patient will ambulate 100 ft with walker incorporating turns with modified independence to negotiate home environment with independence. Met   HEP: Patient educated on HEP in preparation for d/c verbalizing and demonstrating good understanding of topics discussed.    Met            Goals Key:  PC= progressing/continue;        PM= partially met;             DC= discontinue         PLAN     Continue Plan of Care (POC) and progress per patient tolerance. See Treatment section for exercise progression.  Outpatient Physical Therapy 2 times weekly for 8 weeks to include the following interventions: Electrical Stimulation Attended, Gait Training, Moist Heat/ Ice, Neuromuscular Re-ed, Orthotic Management and Training, Patient Education, Self Care, Therapeutic Activities, Therapeutic Exercise and Prosthetic Management.      Mindy Patino, PT

## 2022-11-17 NOTE — PLAN OF CARE
[facility]   Discharge Note    Name: Hunter Schofield  Clinic Number: [id number]    Therapy Diagnosis:   Encounter Diagnoses   Name Primary?    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   He has progressed well with his strength, endurance and balance since start of therapy.  He has progressed to ambulating with a rollator and has received one for home use.  He has made improvements with his knee and hip motion, but is still lacking flexibility.  He is very compliant with his Home Exercise Program and has good potential to maintain his level.  He will be discharged from PT at this time.     Discharge reason: Patient has reached the maximum rehab potential for the present time    Discharge FOTO Score: 59/100      GOALS:     Short Term Goals:  4 weeks Progress    Function: Patient will demonstrate improved function as indicated by a functional limitation score of less than or equal to 55 out of 100 on FOTO = 44% limitation Met   Mobility: Patient will improve sit to stand transfer with moderate assistance in order to progress towards independence with functional activities. (Met)  (Modified) - Patient will perform wheelchair to mat transfer with stand by assist to demonstrate improved independence with mobility. Met   Gait: Patient will ambulated 3 steps in parallel bars with Maximum assistance to demonstrate improved strength, posture and endurance met   HEP: Patient will demonstrate independence with HEP in order to progress toward functional independence. met    Assess patient's needs for Dynasplints and set up consultation if needed. met       Long Term  Goals:  8 weeks Progress   Function: Patient will demonstrate improved function as indicated by a functional limitation score of less than or equal to 60 out of 100 on FOTO = 40 % limitation PC   Mobility: Patient will improve AROM of bilateral knees to -10 degrees  in order to return to maximal functional potential and improve quality of life. PC   Functional Mobility - Patient will perform mat to wheelchair transfer with modified independence with walker in order to improve his independence with functional mobility. Met   Gait: Patient will ambulate 15 feet with rolling walker with moderate assistance to demonstrate improved strength, endurance and mobility. (Met)   (Modified) - Patient will ambulate 100 ft with walker incorporating turns with modified independence to negotiate home environment with independence. Met   HEP: Patient educated on HEP in preparation for d/c verbalizing and demonstrating good understanding of topics discussed.    Met            Goals Key:  PC= progressing/continue;        PM= partially met;             DC= discontinue    PLAN   This patient is discharged from Physical Therapy      Mindy Patino PT

## 2023-08-23 NOTE — PROGRESS NOTES
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  He reports that on his last visit with his wound care nurse, he was informed that there is a blue/black spot on his 5th Right toe that they are concerned about.  He is waiting on insurance authorization for an injection that has been recommended that will slow the progression.  Without this injection, he is at a higher risk for needing toes or foot amputated.  Response to previous treatment: good with no adverse effects  Functional change:  No significant change in function.  Pain: 0/10 at rest  Location: not applicable     OBJECTIVE     Objective Measures updated at progress report unless specified.   In supine:  Measures taken 2/22/22  Left Hip Extension = - 20  Right Hip Extension = - 25  Left Knee Extension = - 48  Right Knee Extension = - 45     3/3/22  In prone:   Hip Extension = - 20  Right, unable to get accurate measure on Left    Treatment     Hunter Schofield received the treatments listed below:      THERAPEUTIC EXERCISES to develop strength, endurance, ROM, flexibility, posture and core stabilization for (41) minutes including:    Intervention Performed Today    short arc quad in supine  lower extremities over wedge 2x20 Right AROM, Left 2x20 Active  Assistive for form and quality    sidelying toward prone   Stretch to Hip flexor 3x30 seconds each side   Quad set   2x10 Left     Sidelying hip extension   2x10 bilaterally with 10 seconds endrange stretch on last rep   [place] Left Prosthetic leg  Performed at edge of mat    Sitting in reclined position at edge of mat   Hip flexor stretch 3x30 seconds bilateral    Hip Abduction  In sidelying 2x10 bilateral    Bridge attempt  1x5   Posterior Pelvic Tilt Supine  2x5   Prone lying over light blue small triangle wedge  Holding position 2 minutes, then 4 minutes   Stretch to Hamstrings  3 x 1 minute Right lower extremity    Heelslide with forceful push towards extension  2x10 bilateral with 10 second enrange hold stretch on last rep    10 # over distal Left thigh  Stretch to Left hip flexor for over 5 minutes while exercising Right lower extremity    Prone Hamstring curl endrange for quad/hip flexor stretch  2x10 AAROM   lower extremity lifts towards extension in prone  2x10 AAROM   Prone hip flexor stretch   3x10 second holds bilateral    Standing frame raising to patient's tolerance X      X  X  X  X     - Standing 7x's in standing frame (towel under Right heel to offload anterior foot) holding each stand for 1-5 minutes working on the following:  - Horizontal Abduction with bilateral upper extremities 1x10  - terminal knee extension in stand position 4x10 bilateral   - Rows with red theraband 4x10  - Pushing through upper extremities to achieve trunk extension 2x10            Plan for Next Visit:      manual therapy techniques: Joint mobilizations, Manual traction and Soft tissue Mobilization were applied to the:(3) minutes, including:    Intervention Performed Today    Patella glides superiorly x 2 minutes bilateral    Anterior tibia glide Grade 1-2 x 1 minute bilateral                                      neuromuscular re-education activities to improve: Balance, Coordination, Sense, Proprioception and  Posture for 0 minutes. The following activities were included:    Intervention Performed Today                                                THERAPEUTIC ACTIVITIES to improve dynamic and functional  performance for () minutes including:    Intervention Performed Today    Bed - wheelchair transfer   Practiced set up and sequence   Bed mobility sit to supine to sit  Practiced sequence    Rolling   3 x each side working on sequence to incorporate hip flexor stretch on each side. Then separate stretch performed as listed above   Standing in parallel bars  4 x's working on several weight shifts towards hip extension and knee extension while standing.   Standing in parallel bars performing step forward  With Right lower extremity 5x's with moderate assistance to maintain standing position   Sit to  parallel bars without assistance  2x's   Dynasplint Consult  Andriy Stout (Rep for Dynasplint) met with physical therapist and patient today during session to discuss options for dynasplints for bilateral knees due to patient's significant lack of range of motion.       Plan for Next Visit:        Patient Education and Home Exercises     Home Exercises Provided and Patient Education Provided     Education provided:   -2/22/22 Patient educated on increasing length and consistency of stretching knees and hips to every 2 hours at home.  He was shown and demonstrated understanding of short arc quads, rolling toward prone and sitting in reclined position to stretch hip flexors.  -2/24/22 Patient given Home Exercise Program on exercises for lower extremities to be performed 2x15 3x's/day  See patient Instructions in EMR  -3/1/22 Re-inforced Home Exercise Program and asked patient to incorporate prone lying for longer periods of time to build up to 20 minutes per day.  3/3/22 - Discussed the option of dynasplints for bilateral lower extremities. Patient educated on the option of Dynasplints vs basic knee braces and encouraged  to consider Dynasplints as a more aggressive and efficient way of properly positioning and dynamically stretching his legs outside of therapy.  3/8/22 - Patient was educated on Dynasplints with Andriy Stout ().   He was shown pictures on ipad and Andriy and physical therapist discussed wearing schedule to make gains with his muscle length outside of therapy sessions.  Andriy informed patient that he would assist with collecting information regarding insurance coverage and provide him with any out of pocket costs so he can decide if he would like to pursue this option.    Home Exercise Provided: 2/24/22 Exercises were reviewed and Hunter Schofield was able to demonstrate them prior to the end of the session.  Hunter Schofield demonstrated good  understanding of the education provided.   ASSESSMENT     Pt responded well to session today.  He was able to tolerate 7 stands in the standing frame with good endurance, increasing lift each time.  He is progressing well with Physical Therapy.    Pt prognosis is Fair  To Good    Pt will continue to benefit from skilled outpatient physical therapy to address the deficits listed in the problem list box on initial evaluation, provide pt/family education and to maximize pt's level of independence in the home and community environment.     Pt's spiritual, cultural and educational needs considered and pt agreeable to plan of care and goals.     Anticipated Barriers for therapy: co-morbidities, transportation assistance needed at times, lifestyle, potential contractures of knees/hips    GOALS:     Short Term Goals:  4 weeks Progress    1. Function: Patient will demonstrate improved function as indicated by a functional limitation score of less than or equal to 55 out of 100 on FOTO = 44% limitation PC   2. Mobility: Patient will improve sit to stand transfer with moderate assistance in order to progress towards independence with functional activities.  PC   3. Gait: Patient will  ambulated 3 steps in parallel bars with Maximum assistance to demonstrate improved strength, posture and endurance PC   4. Balance: Complete FIST test upon next visit. PC   5. HEP: Patient will demonstrate independence with HEP in order to progress toward functional independence.     6. Assess patient's needs for Dynasplints and set up consultation if needed.        Long Term Goals:  8 weeks Progress   1. Function: Patient will demonstrate improved function as indicated by a functional limitation score of less than or equal to 60 out of 100 on FOTO = 40 % limitation PC   2. Mobility: Patient will improve AROM of bilateral knees to -10 degrees  in order to return to maximal functional potential and improve quality of life. PC   3. Gait: Patient will ambulate 15 feet with rolling walker with moderate assistance to demonstrate improved strength, endurance and mobility. PC   4. Balance: Modify FIST goal once completed. PC   5. HEP: Patient educated on HEP in preparation for d/c verbalizing and demonstrating good understanding of topics discussed.       6.          Goals Key:  PC= progressing/continue;        PM= partially met;             DC= discontinue         PLAN     Continue Plan of Care (POC) and progress per patient tolerance. See Treatment section for exercise progression.  Outpatient Physical Therapy 2 times weekly for 8 weeks to include the following interventions: Electrical Stimulation Attended, Gait Training, Moist Heat/ Ice, Neuromuscular Re-ed, Orthotic Management and Training, Patient Education, Self Care, Therapeutic Activities, Therapeutic Exercise and Prosthetic Management.      Mindy Patino, PT, DPT        4 = No assist / stand by assistance